# Patient Record
Sex: MALE | Race: WHITE | NOT HISPANIC OR LATINO | Employment: OTHER | URBAN - NONMETROPOLITAN AREA
[De-identification: names, ages, dates, MRNs, and addresses within clinical notes are randomized per-mention and may not be internally consistent; named-entity substitution may affect disease eponyms.]

---

## 2017-02-15 ENCOUNTER — TRANSCRIBE ORDERS (OUTPATIENT)
Dept: LAB | Facility: HOSPITAL | Age: 72
End: 2017-02-15

## 2017-02-15 ENCOUNTER — APPOINTMENT (OUTPATIENT)
Dept: LAB | Facility: HOSPITAL | Age: 72
End: 2017-02-15

## 2017-02-15 DIAGNOSIS — R53.83 FATIGUE, UNSPECIFIED TYPE: ICD-10-CM

## 2017-02-15 DIAGNOSIS — R07.9 CHEST PAIN, UNSPECIFIED: Primary | ICD-10-CM

## 2017-02-15 LAB
ALBUMIN SERPL-MCNC: 4.2 G/DL (ref 3.4–4.8)
ALBUMIN/GLOB SERPL: 1.3 G/DL (ref 1.1–1.8)
ALP SERPL-CCNC: 95 U/L (ref 38–126)
ALT SERPL W P-5'-P-CCNC: 24 U/L (ref 21–72)
ANION GAP SERPL CALCULATED.3IONS-SCNC: 11 MMOL/L (ref 5–15)
AST SERPL-CCNC: 58 U/L (ref 17–59)
BILIRUB SERPL-MCNC: 1 MG/DL (ref 0.2–1.3)
BUN BLD-MCNC: 26 MG/DL (ref 7–21)
BUN/CREAT SERPL: 16.9 (ref 7–25)
CALCIUM SPEC-SCNC: 9.6 MG/DL (ref 8.4–10.2)
CHLORIDE SERPL-SCNC: 102 MMOL/L (ref 95–110)
CK MB SERPL-CCNC: 1.27 NG/ML (ref 0–5)
CK SERPL-CCNC: 89 U/L (ref 55–170)
CO2 SERPL-SCNC: 28 MMOL/L (ref 22–31)
CREAT BLD-MCNC: 1.54 MG/DL (ref 0.7–1.3)
D-DIMER, QUANTITATIVE (MAD,POW, STR): 1401 NG/ML (FEU) (ref 0–470)
DEPRECATED RDW RBC AUTO: 46.4 FL (ref 35.1–43.9)
EOSINOPHIL # BLD MANUAL: 0.93 10*3/MM3 (ref 0–0.7)
EOSINOPHIL NFR BLD MANUAL: 10 % (ref 0–7)
ERYTHROCYTE [DISTWIDTH] IN BLOOD BY AUTOMATED COUNT: 13.2 % (ref 11.5–14.5)
GFR SERPL CREATININE-BSD FRML MDRD: 45 ML/MIN/1.73 (ref 42–98)
GLOBULIN UR ELPH-MCNC: 3.3 GM/DL (ref 2.3–3.5)
GLUCOSE BLD-MCNC: 80 MG/DL (ref 60–100)
HCT VFR BLD AUTO: 45 % (ref 39–49)
HGB BLD-MCNC: 15.9 G/DL (ref 13.7–17.3)
LYMPHOCYTES # BLD MANUAL: 2.04 10*3/MM3 (ref 0.6–4.2)
LYMPHOCYTES NFR BLD MANUAL: 12 % (ref 0–12)
LYMPHOCYTES NFR BLD MANUAL: 22 % (ref 10–50)
MCH RBC QN AUTO: 34.3 PG (ref 26.5–34)
MCHC RBC AUTO-ENTMCNC: 35.3 G/DL (ref 31.5–36.3)
MCV RBC AUTO: 97 FL (ref 80–98)
MONOCYTES # BLD AUTO: 1.11 10*3/MM3 (ref 0–0.9)
NEUTROPHILS # BLD AUTO: 5.2 10*3/MM3 (ref 2–8.6)
NEUTROPHILS NFR BLD MANUAL: 56 % (ref 37–80)
PLATELET # BLD AUTO: 138 10*3/MM3 (ref 150–450)
PMV BLD AUTO: 10.6 FL (ref 8–12)
POTASSIUM BLD-SCNC: 4.3 MMOL/L (ref 3.5–5.1)
PROT SERPL-MCNC: 7.5 G/DL (ref 6.3–8.6)
RBC # BLD AUTO: 4.64 10*6/MM3 (ref 4.37–5.74)
RBC MORPH BLD: NORMAL
SMALL PLATELETS BLD QL SMEAR: ABNORMAL
SODIUM BLD-SCNC: 141 MMOL/L (ref 137–145)
TROPONIN I SERPL-MCNC: <0.012 NG/ML
TSH SERPL DL<=0.05 MIU/L-ACNC: 3.13 MIU/ML (ref 0.46–4.68)
WBC MORPH BLD: NORMAL
WBC NRBC COR # BLD: 9.29 10*3/MM3 (ref 3.2–9.8)

## 2017-02-15 PROCEDURE — 36415 COLL VENOUS BLD VENIPUNCTURE: CPT | Performed by: INTERNAL MEDICINE

## 2017-02-15 PROCEDURE — 84484 ASSAY OF TROPONIN QUANT: CPT | Performed by: INTERNAL MEDICINE

## 2017-02-15 PROCEDURE — 82550 ASSAY OF CK (CPK): CPT | Performed by: INTERNAL MEDICINE

## 2017-02-15 PROCEDURE — 85007 BL SMEAR W/DIFF WBC COUNT: CPT | Performed by: INTERNAL MEDICINE

## 2017-02-15 PROCEDURE — 85027 COMPLETE CBC AUTOMATED: CPT | Performed by: INTERNAL MEDICINE

## 2017-02-15 PROCEDURE — 85379 FIBRIN DEGRADATION QUANT: CPT | Performed by: INTERNAL MEDICINE

## 2017-02-15 PROCEDURE — 80053 COMPREHEN METABOLIC PANEL: CPT | Performed by: INTERNAL MEDICINE

## 2017-02-15 PROCEDURE — 82553 CREATINE MB FRACTION: CPT | Performed by: INTERNAL MEDICINE

## 2017-02-15 PROCEDURE — 84443 ASSAY THYROID STIM HORMONE: CPT | Performed by: INTERNAL MEDICINE

## 2017-04-12 RX ORDER — ASPIRIN 81 MG/1
81 TABLET, CHEWABLE ORAL DAILY
COMMUNITY

## 2017-04-12 RX ORDER — OMEPRAZOLE 40 MG/1
40 CAPSULE, DELAYED RELEASE ORAL EVERY OTHER DAY
COMMUNITY
Start: 2017-02-14

## 2017-04-12 RX ORDER — SIMVASTATIN 40 MG
40 TABLET ORAL DAILY
COMMUNITY
Start: 2017-02-14

## 2017-04-12 RX ORDER — LEVOTHYROXINE SODIUM 0.07 MG/1
75 TABLET ORAL DAILY
COMMUNITY
Start: 2016-10-06 | End: 2017-10-02

## 2017-04-12 RX ORDER — COLCHICINE 0.6 MG/1
0.6 TABLET ORAL DAILY
COMMUNITY
Start: 2016-09-29 | End: 2019-01-01

## 2017-04-12 RX ORDER — ISOSORBIDE MONONITRATE 30 MG/1
30 TABLET, EXTENDED RELEASE ORAL DAILY
COMMUNITY
Start: 2016-09-29 | End: 2019-01-01 | Stop reason: ALTCHOICE

## 2017-04-12 RX ORDER — ALLOPURINOL 300 MG/1
300 TABLET ORAL DAILY
COMMUNITY
Start: 2017-02-14 | End: 2019-01-01

## 2017-04-19 ENCOUNTER — ANESTHESIA (OUTPATIENT)
Dept: GASTROENTEROLOGY | Facility: HOSPITAL | Age: 72
End: 2017-04-19

## 2017-04-19 ENCOUNTER — HOSPITAL ENCOUNTER (OUTPATIENT)
Facility: HOSPITAL | Age: 72
Setting detail: HOSPITAL OUTPATIENT SURGERY
Discharge: HOME | End: 2017-04-19
Attending: INTERNAL MEDICINE | Admitting: INTERNAL MEDICINE

## 2017-04-19 ENCOUNTER — ANESTHESIA EVENT (OUTPATIENT)
Dept: GASTROENTEROLOGY | Facility: HOSPITAL | Age: 72
End: 2017-04-19

## 2017-04-19 VITALS
HEIGHT: 72 IN | WEIGHT: 240.96 LBS | SYSTOLIC BLOOD PRESSURE: 118 MMHG | RESPIRATION RATE: 20 BRPM | HEART RATE: 61 BPM | DIASTOLIC BLOOD PRESSURE: 76 MMHG | BODY MASS INDEX: 32.64 KG/M2 | OXYGEN SATURATION: 95 % | TEMPERATURE: 97.9 F

## 2017-04-19 DIAGNOSIS — Z86.010 HISTORY OF COLON POLYPS: ICD-10-CM

## 2017-04-19 PROCEDURE — 88305 TISSUE EXAM BY PATHOLOGIST: CPT | Performed by: PATHOLOGY

## 2017-04-19 PROCEDURE — 25010000002 MIDAZOLAM PER 1 MG: Performed by: NURSE ANESTHETIST, CERTIFIED REGISTERED

## 2017-04-19 PROCEDURE — 25010000002 PROPOFOL 10 MG/ML EMULSION: Performed by: NURSE ANESTHETIST, CERTIFIED REGISTERED

## 2017-04-19 PROCEDURE — 88305 TISSUE EXAM BY PATHOLOGIST: CPT | Performed by: INTERNAL MEDICINE

## 2017-04-19 RX ORDER — DEXAMETHASONE SODIUM PHOSPHATE 4 MG/ML
8 INJECTION, SOLUTION INTRA-ARTICULAR; INTRALESIONAL; INTRAMUSCULAR; INTRAVENOUS; SOFT TISSUE ONCE AS NEEDED
Status: DISCONTINUED | OUTPATIENT
Start: 2017-04-19 | End: 2017-04-19 | Stop reason: HOSPADM

## 2017-04-19 RX ORDER — PROPOFOL 10 MG/ML
VIAL (ML) INTRAVENOUS AS NEEDED
Status: DISCONTINUED | OUTPATIENT
Start: 2017-04-19 | End: 2017-04-19 | Stop reason: SURG

## 2017-04-19 RX ORDER — PROMETHAZINE HYDROCHLORIDE 25 MG/1
25 SUPPOSITORY RECTAL ONCE AS NEEDED
Status: DISCONTINUED | OUTPATIENT
Start: 2017-04-19 | End: 2017-04-19 | Stop reason: HOSPADM

## 2017-04-19 RX ORDER — DEXTROSE AND SODIUM CHLORIDE 5; .45 G/100ML; G/100ML
30 INJECTION, SOLUTION INTRAVENOUS CONTINUOUS
Status: DISCONTINUED | OUTPATIENT
Start: 2017-04-19 | End: 2017-04-19 | Stop reason: HOSPADM

## 2017-04-19 RX ORDER — PROMETHAZINE HYDROCHLORIDE 25 MG/ML
12.5 INJECTION, SOLUTION INTRAMUSCULAR; INTRAVENOUS ONCE AS NEEDED
Status: DISCONTINUED | OUTPATIENT
Start: 2017-04-19 | End: 2017-04-19 | Stop reason: HOSPADM

## 2017-04-19 RX ORDER — MIDAZOLAM HYDROCHLORIDE 1 MG/ML
INJECTION INTRAMUSCULAR; INTRAVENOUS AS NEEDED
Status: DISCONTINUED | OUTPATIENT
Start: 2017-04-19 | End: 2017-04-19 | Stop reason: SURG

## 2017-04-19 RX ORDER — PROMETHAZINE HYDROCHLORIDE 25 MG/1
25 TABLET ORAL ONCE AS NEEDED
Status: DISCONTINUED | OUTPATIENT
Start: 2017-04-19 | End: 2017-04-19 | Stop reason: HOSPADM

## 2017-04-19 RX ORDER — ONDANSETRON 2 MG/ML
4 INJECTION INTRAMUSCULAR; INTRAVENOUS ONCE AS NEEDED
Status: DISCONTINUED | OUTPATIENT
Start: 2017-04-19 | End: 2017-04-19 | Stop reason: HOSPADM

## 2017-04-19 RX ADMIN — PROPOFOL 40 MG: 10 INJECTION, EMULSION INTRAVENOUS at 15:10

## 2017-04-19 RX ADMIN — PROPOFOL 30 MG: 10 INJECTION, EMULSION INTRAVENOUS at 15:05

## 2017-04-19 RX ADMIN — PROPOFOL 60 MG: 10 INJECTION, EMULSION INTRAVENOUS at 15:04

## 2017-04-19 RX ADMIN — MIDAZOLAM 2 MG: 1 INJECTION INTRAMUSCULAR; INTRAVENOUS at 15:02

## 2017-04-19 RX ADMIN — DEXTROSE AND SODIUM CHLORIDE 30 ML/HR: 5; 450 INJECTION, SOLUTION INTRAVENOUS at 14:16

## 2017-04-19 NOTE — H&P
Felicity Ayala DO,[facility]  Gastroenterology  Hepatology  Endoscopy  Board Certified in Internal Medicine and gastroenterology  [address], [address]  [place]. [address]  - [phone]   F [phone]     GASTROENTEROLOGY HISTORY AND PHYSICAL  NOTE   FELICITY AYALA DO.         SUBJECTIVE:   4/19/2017    Name: Kuldip Nevarez  DOD: 1945        Chief Complaint:     Subjective : Surveillance examination for high risk for colon cancer    Patient is 72 y.o. male presents with surveillance examination.  Personal history of colon polyps as well as a personal history of colon cancer 13 years ago.  Low anterior resection for distal sigmoid colon cancer.      ROS/HISTORY/ CURRENT MEDICATIONS/OBJECTIVE/VS/PE:   Review of Systems:   Review of Systems    History:     Past Medical History:   Diagnosis Date   • Arthritis    • Cancer     colon, skin   • Coronary artery disease    • Disease of thyroid gland    • History of transfusion    • MI (myocardial infarction)     2003   • Pacemaker    • Sleep apnea      Past Surgical History:   Procedure Laterality Date   • APPENDECTOMY     • COLON SURGERY     • KIDNEY STONE SURGERY       History reviewed. No pertinent family history.  Social History   Substance Use Topics   • Smoking status: Former Smoker     Quit date: 2003   • Smokeless tobacco: None   • Alcohol use No     Prescriptions Prior to Admission   Medication Sig Dispense Refill Last Dose   • allopurinol (ZYLOPRIM) 300 MG tablet Take 300 mg by mouth Daily.   4/18/2017 at Unknown time   • colchicine 0.6 MG tablet Take 0.6 mg by mouth Daily.   4/18/2017 at Unknown time   • Cyanocobalamin (VITAMIN B 12 PO) Take 1 tablet by mouth Daily.   4/18/2017 at Unknown time   • isosorbide mononitrate (IMDUR) 30 MG 24 hr tablet Take 30 mg by mouth Daily.   4/18/2017 at Unknown time   • levothyroxine (SYNTHROID) 75 MCG tablet Take 75 mcg by mouth Daily.   4/18/2017 at Unknown time   • omeprazole (priLOSEC) 40 MG  capsule Take 40 mg by mouth Every Other Day.   4/18/2017 at Unknown time   • simvastatin (ZOCOR) 40 MG tablet Take 40 mg by mouth Daily.   4/18/2017 at Unknown time   • aspirin 81 MG chewable tablet Chew 81 mg Daily.   4/15/2017     Allergies:  Tape    I have reviewed the patients medical history, surgical history and family history in the available medical record system.     Current Medications:     Current Facility-Administered Medications   Medication Dose Route Frequency Provider Last Rate Last Dose   • dexamethasone (DECADRON) injection 8 mg  8 mg Intravenous Once PRN Bessie Huertas CRNA       • dextrose 5 % and sodium chloride 0.45 % infusion  30 mL/hr Intravenous Continuous Barrie Jarrett DO 30 mL/hr at 04/19/17 1416 30 mL/hr at 04/19/17 1416   • meperidine (DEMEROL) injection 12.5 mg  12.5 mg Intravenous Q5 Min PRN Bessie Huertas CRNA       • ondansetron (ZOFRAN) injection 4 mg  4 mg Intravenous Once PRN Bessie Huertas CRNA       • promethazine (PHENERGAN) injection 12.5 mg  12.5 mg Intravenous Once PRN Bessie Huertas CRNA        Or   • promethazine (PHENERGAN) injection 12.5 mg  12.5 mg Intramuscular Once PRN Bessie Huertas CRNA        Or   • promethazine (PHENERGAN) suppository 25 mg  25 mg Rectal Once PRN Bessie Huertas CRNA        Or   • promethazine (PHENERGAN) tablet 25 mg  25 mg Oral Once PRN Bessie Huertas CRNA           Objective     Physical Exam:   Temp:  [99.2 °F (37.3 °C)] 99.2 °F (37.3 °C)  Heart Rate:  [68] 68  Resp:  [18] 18  BP: (160)/(90) 160/90    Physical Exam:  General Appearance:    Alert, cooperative, in no acute distress   Head:    Normocephalic, without obvious abnormality, atraumatic   Eyes:            Lids and lashes normal, conjunctivae and sclerae normal, no   icterus, no pallor, corneas clear, PERRLA   Ears:    Ears appear intact with no abnormalities noted   Throat:   No oral lesions, no thrush, oral mucosa moist   Neck:   No adenopathy,  supple, trachea midline, no thyromegaly, no     carotid bruit, no JVD   Back:     No kyphosis present, no scoliosis present, no skin lesions,       erythema or scars, no tenderness to percussion or                   palpation,   range of motion normal   Lungs:     Clear to auscultation,respirations regular, even and                   unlabored    Heart:    Regular rhythm and normal rate, normal S1 and S2, no            murmur, no gallop, no rub, no click   Breast Exam:    Deferred   Abdomen:     Normal bowel sounds, no masses, no organomegaly, soft        non-tender, non-distended, no guarding, no rebound                 tenderness   Genitalia:    Deferred   Extremities:   Moves all extremities well, no edema, no cyanosis, no              redness   Pulses:   Pulses palpable and equal bilaterally   Skin:   No bleeding, bruising or rash   Lymph nodes:   No palpable adenopathy   Neurologic:   Cranial nerves 2 - 12 grossly intact, sensation intact, DTR        present and equal bilaterally      Results Review:     Lab Results   Component Value Date    WBC 9.29 02/15/2017    WBC 5.4 09/06/2016    WBC 6.5 09/02/2016    HGB 15.9 02/15/2017    HGB 14.8 09/06/2016    HGB 14.3 09/02/2016    HCT 45.0 02/15/2017    HCT 42.3 09/06/2016    HCT 41.1 09/02/2016     (L) 02/15/2017     (L) 09/06/2016     (L) 09/02/2016             No results found for: LIPASE  Lab Results   Component Value Date    INR 1.2 09/06/2016          Radiology Review:  Imaging Results (last 72 hours)     ** No results found for the last 72 hours. **           I reviewed the patient's new clinical results.  I reviewed the patient's new imaging results and agree with the interpretation.     ASSESSMENT/PLAN:   ASSESSMENT:   1.  Personal history of colon cancer, status post low anterior resection    PLAN:   1.  Colonoscopy    Risk and benefits associated with the procedure are reviewed with the patient.  He wishes to proceed      Barrie POWELL  DO [place]  04/19/17  2:58 PM

## 2017-04-19 NOTE — ANESTHESIA POSTPROCEDURE EVALUATION
Patient: Kuldip Nevarez    Procedure Summary     Date Anesthesia Start Anesthesia Stop Room / Location    04/19/17 1503 1530 [facility] ENDOSCOPY 2 / [facility] ENDOSCOPY       Procedure Diagnosis Surgeon Provider    COLONOSCOPY (N/A ) History of colon polyps  (HX OF COLON POLYPS) DO Bessie Johnston CRNA          Anesthesia Type: MAC  Last vitals  BP      Temp      Pulse     Resp      SpO2        Post Anesthesia Care and Evaluation    Patient location during evaluation: bedside  Patient participation: complete - patient participated  Level of consciousness: awake  Pain score: 0  Pain management: adequate  Airway patency: patent  Anesthetic complications: No anesthetic complications  PONV Status: none  Cardiovascular status: acceptable  Respiratory status: acceptable  Hydration status: acceptable

## 2017-04-19 NOTE — ANESTHESIA PREPROCEDURE EVALUATION
Anesthesia Evaluation     Patient summary reviewed and Nursing notes reviewed   NPO Status: > 2 hours   Airway   Mallampati: III  TM distance: >3 FB  Neck ROM: full  no difficulty expected  Dental - normal exam     Pulmonary - normal exam   (+) sleep apnea,   Cardiovascular - normal exam  Exercise tolerance: good (4-7 METS)    (+) pacemaker pacemaker interrogated unknown, past MI  >12 months, CAD, hyperlipidemia      Neuro/Psych  GI/Hepatic/Renal/Endo    (+) obesity,      ROS Comment: Colon Cancer 2004    Musculoskeletal     Abdominal  - normal exam   Substance History      OB/GYN          Other   (+) arthritis                                 Anesthesia Plan    ASA 3     MAC     Anesthetic plan and risks discussed with patient.

## 2017-04-19 NOTE — PLAN OF CARE
Problem: Patient Care Overview (Adult)  Goal: Plan of Care Review  Outcome: Outcome(s) achieved Date Met:  04/19/17 04/19/17 1544   Coping/Psychosocial Response Interventions   Plan Of Care Reviewed With patient   Patient Care Overview   Progress no change   Outcome Evaluation   Outcome Summary/Follow up Plan vss, pt alert         Problem: GI Endoscopy (Adult)  Goal: Signs and Symptoms of Listed Potential Problems Will be Absent or Manageable (GI Endoscopy)  Outcome: Outcome(s) achieved Date Met:  04/19/17 04/19/17 1544   GI Endoscopy   Problems Assessed (GI Endoscopy) all   Problems Present (GI Endoscopy) none

## 2017-04-19 NOTE — PLAN OF CARE
Problem: Patient Care Overview (Adult)  Goal: Plan of Care Review  Outcome: Ongoing (interventions implemented as appropriate)    04/19/17 1529   Coping/Psychosocial Response Interventions   Plan Of Care Reviewed With patient   Patient Care Overview   Progress no change   Outcome Evaluation   Outcome Summary/Follow up Plan vss         Problem: GI Endoscopy (Adult)  Goal: Signs and Symptoms of Listed Potential Problems Will be Absent or Manageable (GI Endoscopy)  Outcome: Ongoing (interventions implemented as appropriate)    04/19/17 1529   GI Endoscopy   Problems Assessed (GI Endoscopy) all   Problems Present (GI Endoscopy) none

## 2017-04-21 LAB
LAB AP CASE REPORT: NORMAL
Lab: NORMAL
PATH REPORT.FINAL DX SPEC: NORMAL
PATH REPORT.GROSS SPEC: NORMAL

## 2017-10-06 ENCOUNTER — TRANSCRIBE ORDERS (OUTPATIENT)
Dept: CARDIAC SURGERY | Facility: CLINIC | Age: 72
End: 2017-10-06

## 2017-10-06 DIAGNOSIS — I73.9 PERIPHERAL VASCULAR DISEASE (HCC): Primary | ICD-10-CM

## 2019-01-01 ENCOUNTER — ANESTHESIA EVENT (OUTPATIENT)
Dept: PERIOP | Facility: HOSPITAL | Age: 74
End: 2019-01-01

## 2019-01-01 ENCOUNTER — APPOINTMENT (OUTPATIENT)
Dept: CARDIOLOGY | Facility: HOSPITAL | Age: 74
End: 2019-01-01

## 2019-01-01 ENCOUNTER — APPOINTMENT (OUTPATIENT)
Dept: CT IMAGING | Facility: HOSPITAL | Age: 74
End: 2019-01-01

## 2019-01-01 ENCOUNTER — APPOINTMENT (OUTPATIENT)
Dept: NUCLEAR MEDICINE | Facility: HOSPITAL | Age: 74
End: 2019-01-01

## 2019-01-01 ENCOUNTER — PREP FOR SURGERY (OUTPATIENT)
Dept: OTHER | Facility: HOSPITAL | Age: 74
End: 2019-01-01

## 2019-01-01 ENCOUNTER — OFFICE VISIT (OUTPATIENT)
Dept: ORTHOPEDIC SURGERY | Facility: CLINIC | Age: 74
End: 2019-01-01

## 2019-01-01 ENCOUNTER — HOSPITAL ENCOUNTER (INPATIENT)
Facility: HOSPITAL | Age: 74
LOS: 13 days | Discharge: HOME HEALTH | End: 2019-07-09
Attending: FAMILY MEDICINE | Admitting: FAMILY MEDICINE

## 2019-01-01 ENCOUNTER — READMISSION MANAGEMENT (OUTPATIENT)
Dept: CALL CENTER | Facility: HOSPITAL | Age: 74
End: 2019-01-01

## 2019-01-01 ENCOUNTER — OFFICE VISIT (OUTPATIENT)
Dept: CARDIAC SURGERY | Facility: CLINIC | Age: 74
End: 2019-01-01

## 2019-01-01 ENCOUNTER — PROCEDURE VISIT (OUTPATIENT)
Dept: PULMONOLOGY | Facility: CLINIC | Age: 74
End: 2019-01-01

## 2019-01-01 ENCOUNTER — APPOINTMENT (OUTPATIENT)
Dept: GENERAL RADIOLOGY | Facility: HOSPITAL | Age: 74
End: 2019-01-01

## 2019-01-01 ENCOUNTER — ANESTHESIA (OUTPATIENT)
Dept: PERIOP | Facility: HOSPITAL | Age: 74
End: 2019-01-01

## 2019-01-01 ENCOUNTER — HOSPITAL ENCOUNTER (INPATIENT)
Facility: HOSPITAL | Age: 74
LOS: 3 days | Discharge: HOME | End: 2019-10-21
Attending: THORACIC SURGERY (CARDIOTHORACIC VASCULAR SURGERY) | Admitting: THORACIC SURGERY (CARDIOTHORACIC VASCULAR SURGERY)

## 2019-01-01 ENCOUNTER — OFFICE VISIT (OUTPATIENT)
Dept: ONCOLOGY | Facility: CLINIC | Age: 74
End: 2019-01-01

## 2019-01-01 ENCOUNTER — APPOINTMENT (OUTPATIENT)
Dept: ONCOLOGY | Facility: HOSPITAL | Age: 74
End: 2019-01-01

## 2019-01-01 ENCOUNTER — TELEPHONE (OUTPATIENT)
Dept: NUTRITION | Facility: HOSPITAL | Age: 74
End: 2019-01-01

## 2019-01-01 ENCOUNTER — TELEPHONE (OUTPATIENT)
Dept: ONCOLOGY | Facility: CLINIC | Age: 74
End: 2019-01-01

## 2019-01-01 ENCOUNTER — APPOINTMENT (OUTPATIENT)
Dept: ONCOLOGY | Facility: CLINIC | Age: 74
End: 2019-01-01

## 2019-01-01 ENCOUNTER — HOSPITAL ENCOUNTER (OUTPATIENT)
Dept: GENERAL RADIOLOGY | Facility: HOSPITAL | Age: 74
Discharge: HOME | End: 2019-11-25
Admitting: NURSE PRACTITIONER

## 2019-01-01 ENCOUNTER — APPOINTMENT (OUTPATIENT)
Dept: ULTRASOUND IMAGING | Facility: HOSPITAL | Age: 74
End: 2019-01-01

## 2019-01-01 ENCOUNTER — APPOINTMENT (OUTPATIENT)
Dept: INTERVENTIONAL RADIOLOGY/VASCULAR | Facility: HOSPITAL | Age: 74
End: 2019-01-01

## 2019-01-01 ENCOUNTER — TELEPHONE (OUTPATIENT)
Dept: ONCOLOGY | Facility: HOSPITAL | Age: 74
End: 2019-01-01

## 2019-01-01 ENCOUNTER — LAB (OUTPATIENT)
Dept: ONCOLOGY | Facility: HOSPITAL | Age: 74
End: 2019-01-01

## 2019-01-01 ENCOUNTER — TELEPHONE (OUTPATIENT)
Dept: PULMONOLOGY | Facility: CLINIC | Age: 74
End: 2019-01-01

## 2019-01-01 ENCOUNTER — HOSPITAL ENCOUNTER (OUTPATIENT)
Facility: HOSPITAL | Age: 74
Setting detail: OBSERVATION
Discharge: HOME | End: 2019-08-20
Attending: FAMILY MEDICINE | Admitting: INTERNAL MEDICINE

## 2019-01-01 ENCOUNTER — OFFICE VISIT (OUTPATIENT)
Dept: PULMONOLOGY | Facility: CLINIC | Age: 74
End: 2019-01-01

## 2019-01-01 ENCOUNTER — APPOINTMENT (OUTPATIENT)
Dept: PREADMISSION TESTING | Facility: HOSPITAL | Age: 74
End: 2019-01-01

## 2019-01-01 ENCOUNTER — TELEPHONE (OUTPATIENT)
Dept: ORTHOPEDIC SURGERY | Facility: CLINIC | Age: 74
End: 2019-01-01

## 2019-01-01 ENCOUNTER — CONSULT (OUTPATIENT)
Dept: ONCOLOGY | Facility: CLINIC | Age: 74
End: 2019-01-01

## 2019-01-01 ENCOUNTER — HOSPITAL ENCOUNTER (INPATIENT)
Facility: HOSPITAL | Age: 74
LOS: 11 days | Discharge: NURSING, REHAB OR LONG-TERM CARE | End: 2019-06-26
Attending: FAMILY MEDICINE | Admitting: INTERNAL MEDICINE

## 2019-01-01 ENCOUNTER — HOSPITAL ENCOUNTER (INPATIENT)
Facility: HOSPITAL | Age: 74
LOS: 2 days | Discharge: HOME HEALTH | End: 2019-12-09
Attending: HOSPITALIST | Admitting: HOSPITALIST

## 2019-01-01 ENCOUNTER — HOSPITAL ENCOUNTER (OUTPATIENT)
Dept: PET IMAGING | Facility: HOSPITAL | Age: 74
Discharge: HOME | End: 2019-09-27
Admitting: INTERNAL MEDICINE

## 2019-01-01 VITALS
HEIGHT: 72 IN | WEIGHT: 206 LBS | HEART RATE: 61 BPM | DIASTOLIC BLOOD PRESSURE: 76 MMHG | SYSTOLIC BLOOD PRESSURE: 129 MMHG | BODY MASS INDEX: 27.9 KG/M2 | OXYGEN SATURATION: 98 %

## 2019-01-01 VITALS
WEIGHT: 220.5 LBS | DIASTOLIC BLOOD PRESSURE: 70 MMHG | SYSTOLIC BLOOD PRESSURE: 132 MMHG | BODY MASS INDEX: 29.87 KG/M2 | RESPIRATION RATE: 18 BRPM | TEMPERATURE: 98.5 F | OXYGEN SATURATION: 95 % | HEART RATE: 83 BPM | HEIGHT: 72 IN

## 2019-01-01 VITALS
BODY MASS INDEX: 30.75 KG/M2 | HEIGHT: 72 IN | HEART RATE: 60 BPM | BODY MASS INDEX: 30.34 KG/M2 | OXYGEN SATURATION: 99 % | SYSTOLIC BLOOD PRESSURE: 130 MMHG | DIASTOLIC BLOOD PRESSURE: 70 MMHG | WEIGHT: 224 LBS | WEIGHT: 227 LBS | TEMPERATURE: 97.8 F | HEIGHT: 72 IN

## 2019-01-01 VITALS
SYSTOLIC BLOOD PRESSURE: 115 MMHG | TEMPERATURE: 98.8 F | WEIGHT: 183.6 LBS | RESPIRATION RATE: 18 BRPM | DIASTOLIC BLOOD PRESSURE: 67 MMHG | HEIGHT: 72 IN | BODY MASS INDEX: 24.87 KG/M2 | HEART RATE: 63 BPM | OXYGEN SATURATION: 93 %

## 2019-01-01 VITALS
HEART RATE: 64 BPM | SYSTOLIC BLOOD PRESSURE: 101 MMHG | TEMPERATURE: 96.3 F | OXYGEN SATURATION: 93 % | BODY MASS INDEX: 27.75 KG/M2 | DIASTOLIC BLOOD PRESSURE: 57 MMHG | HEIGHT: 72 IN | WEIGHT: 204.9 LBS | RESPIRATION RATE: 20 BRPM

## 2019-01-01 VITALS
OXYGEN SATURATION: 98 % | HEART RATE: 60 BPM | BODY MASS INDEX: 27.72 KG/M2 | HEIGHT: 70 IN | DIASTOLIC BLOOD PRESSURE: 77 MMHG | SYSTOLIC BLOOD PRESSURE: 136 MMHG | TEMPERATURE: 98.4 F | WEIGHT: 193.6 LBS | RESPIRATION RATE: 18 BRPM

## 2019-01-01 VITALS
HEART RATE: 70 BPM | RESPIRATION RATE: 18 BRPM | TEMPERATURE: 99.6 F | HEIGHT: 72 IN | BODY MASS INDEX: 30.46 KG/M2 | WEIGHT: 224.9 LBS | OXYGEN SATURATION: 93 % | SYSTOLIC BLOOD PRESSURE: 128 MMHG | DIASTOLIC BLOOD PRESSURE: 64 MMHG

## 2019-01-01 VITALS
DIASTOLIC BLOOD PRESSURE: 80 MMHG | OXYGEN SATURATION: 98 % | BODY MASS INDEX: 29.53 KG/M2 | SYSTOLIC BLOOD PRESSURE: 140 MMHG | WEIGHT: 218 LBS | HEIGHT: 72 IN | HEART RATE: 57 BPM

## 2019-01-01 VITALS
OXYGEN SATURATION: 98 % | WEIGHT: 205.4 LBS | BODY MASS INDEX: 27.82 KG/M2 | HEART RATE: 62 BPM | HEIGHT: 72 IN | SYSTOLIC BLOOD PRESSURE: 104 MMHG | DIASTOLIC BLOOD PRESSURE: 61 MMHG

## 2019-01-01 VITALS
HEART RATE: 55 BPM | DIASTOLIC BLOOD PRESSURE: 82 MMHG | OXYGEN SATURATION: 99 % | TEMPERATURE: 97.2 F | RESPIRATION RATE: 16 BRPM | WEIGHT: 218 LBS | SYSTOLIC BLOOD PRESSURE: 140 MMHG | BODY MASS INDEX: 29.53 KG/M2 | HEIGHT: 72 IN

## 2019-01-01 VITALS
BODY MASS INDEX: 29.66 KG/M2 | HEIGHT: 72 IN | RESPIRATION RATE: 18 BRPM | TEMPERATURE: 98.7 F | HEART RATE: 61 BPM | DIASTOLIC BLOOD PRESSURE: 76 MMHG | SYSTOLIC BLOOD PRESSURE: 150 MMHG | OXYGEN SATURATION: 96 % | WEIGHT: 219 LBS

## 2019-01-01 VITALS — HEIGHT: 72 IN | WEIGHT: 227 LBS | BODY MASS INDEX: 30.75 KG/M2

## 2019-01-01 VITALS
DIASTOLIC BLOOD PRESSURE: 66 MMHG | SYSTOLIC BLOOD PRESSURE: 122 MMHG | HEART RATE: 60 BPM | TEMPERATURE: 97.9 F | HEIGHT: 70 IN | WEIGHT: 198.8 LBS | BODY MASS INDEX: 28.46 KG/M2

## 2019-01-01 VITALS — HEIGHT: 72 IN | BODY MASS INDEX: 27.79 KG/M2

## 2019-01-01 DIAGNOSIS — J44.9 COPD MIXED TYPE (HCC): ICD-10-CM

## 2019-01-01 DIAGNOSIS — Z78.9 IMPAIRED MOBILITY AND ADLS: ICD-10-CM

## 2019-01-01 DIAGNOSIS — Z74.09 IMPAIRED MOBILITY AND ACTIVITIES OF DAILY LIVING: ICD-10-CM

## 2019-01-01 DIAGNOSIS — Z09 FOLLOW-UP SURGERY CARE: Primary | ICD-10-CM

## 2019-01-01 DIAGNOSIS — R53.81 PHYSICAL DECONDITIONING: ICD-10-CM

## 2019-01-01 DIAGNOSIS — R91.1 LUNG NODULE: ICD-10-CM

## 2019-01-01 DIAGNOSIS — N18.30 STAGE 3 CHRONIC KIDNEY DISEASE (HCC): Primary | ICD-10-CM

## 2019-01-01 DIAGNOSIS — Z74.09 IMPAIRED MOBILITY AND ADLS: ICD-10-CM

## 2019-01-01 DIAGNOSIS — N18.30 STAGE 3 CHRONIC KIDNEY DISEASE (HCC): ICD-10-CM

## 2019-01-01 DIAGNOSIS — C18.9 MALIGNANT NEOPLASM OF COLON, UNSPECIFIED PART OF COLON (HCC): ICD-10-CM

## 2019-01-01 DIAGNOSIS — C78.01 SECONDARY MALIGNANT MELANOMA OF RIGHT LUNG (HCC): ICD-10-CM

## 2019-01-01 DIAGNOSIS — R91.1 NODULE OF UPPER LOBE OF RIGHT LUNG: Primary | ICD-10-CM

## 2019-01-01 DIAGNOSIS — R06.09 DYSPNEA ON EXERTION: Primary | ICD-10-CM

## 2019-01-01 DIAGNOSIS — Z78.9 IMPAIRED MOBILITY AND ACTIVITIES OF DAILY LIVING: ICD-10-CM

## 2019-01-01 DIAGNOSIS — Z74.09 IMPAIRED FUNCTIONAL MOBILITY, BALANCE, GAIT, AND ENDURANCE: ICD-10-CM

## 2019-01-01 DIAGNOSIS — S72.002D CLOSED FRACTURE OF LEFT HIP WITH ROUTINE HEALING, SUBSEQUENT ENCOUNTER: Primary | ICD-10-CM

## 2019-01-01 DIAGNOSIS — I25.10 CORONARY ARTERY DISEASE INVOLVING NATIVE CORONARY ARTERY OF NATIVE HEART WITHOUT ANGINA PECTORIS: ICD-10-CM

## 2019-01-01 DIAGNOSIS — E66.09 CLASS 1 OBESITY DUE TO EXCESS CALORIES WITH SERIOUS COMORBIDITY AND BODY MASS INDEX (BMI) OF 30.0 TO 30.9 IN ADULT: ICD-10-CM

## 2019-01-01 DIAGNOSIS — R91.1 LUNG NODULE: Primary | ICD-10-CM

## 2019-01-01 DIAGNOSIS — J18.9 PNEUMONIA OF LEFT LUNG DUE TO INFECTIOUS ORGANISM, UNSPECIFIED PART OF LUNG: ICD-10-CM

## 2019-01-01 DIAGNOSIS — R55 NEAR SYNCOPE: Primary | ICD-10-CM

## 2019-01-01 DIAGNOSIS — S72.002D CLOSED FRACTURE OF LEFT HIP WITH ROUTINE HEALING, SUBSEQUENT ENCOUNTER: ICD-10-CM

## 2019-01-01 DIAGNOSIS — C78.01 SECONDARY MALIGNANT MELANOMA OF RIGHT LUNG (HCC): Primary | ICD-10-CM

## 2019-01-01 DIAGNOSIS — S72.145D CLOSED NONDISPLACED INTERTROCHANTERIC FRACTURE OF LEFT FEMUR WITH ROUTINE HEALING: ICD-10-CM

## 2019-01-01 DIAGNOSIS — E83.42 HYPOMAGNESEMIA: ICD-10-CM

## 2019-01-01 DIAGNOSIS — Z87.81 S/P LEFT HIP FRACTURE: Primary | ICD-10-CM

## 2019-01-01 DIAGNOSIS — I25.10 ATHEROSCLEROSIS OF NATIVE CORONARY ARTERY OF NATIVE HEART, ANGINA PRESENCE UNSPECIFIED: ICD-10-CM

## 2019-01-01 DIAGNOSIS — I49.5 SSS (SICK SINUS SYNDROME) (HCC): ICD-10-CM

## 2019-01-01 DIAGNOSIS — R19.7 DIARRHEA, UNSPECIFIED TYPE: ICD-10-CM

## 2019-01-01 DIAGNOSIS — Z79.899 OTHER LONG TERM (CURRENT) DRUG THERAPY: ICD-10-CM

## 2019-01-01 DIAGNOSIS — R41.0 DELIRIUM: ICD-10-CM

## 2019-01-01 DIAGNOSIS — Z74.09 IMPAIRED PHYSICAL MOBILITY: ICD-10-CM

## 2019-01-01 DIAGNOSIS — Z87.891 PERSONAL HISTORY OF TOBACCO USE, PRESENTING HAZARDS TO HEALTH: ICD-10-CM

## 2019-01-01 DIAGNOSIS — Z85.038 HISTORY OF COLON CANCER: ICD-10-CM

## 2019-01-01 DIAGNOSIS — R91.1 NODULE OF UPPER LOBE OF RIGHT LUNG: ICD-10-CM

## 2019-01-01 DIAGNOSIS — Z09 FOLLOW-UP SURGERY CARE: ICD-10-CM

## 2019-01-01 DIAGNOSIS — I95.1 ORTHOSTASIS: ICD-10-CM

## 2019-01-01 DIAGNOSIS — R48.9 SYMBOLIC DYSFUNCTION: ICD-10-CM

## 2019-01-01 DIAGNOSIS — Z85.048 HISTORY OF RECTAL CANCER: ICD-10-CM

## 2019-01-01 DIAGNOSIS — N19 RENAL FAILURE, UNSPECIFIED CHRONICITY: ICD-10-CM

## 2019-01-01 DIAGNOSIS — I50.23 ACUTE ON CHRONIC SYSTOLIC CHF (CONGESTIVE HEART FAILURE) (HCC): Primary | ICD-10-CM

## 2019-01-01 LAB
A-A DO2: ABNORMAL MMHG
A-A DO2: ABNORMAL MMHG
ABO GROUP BLD: NORMAL
ABO GROUP BLD: NORMAL
ALBUMIN SERPL-MCNC: 2.7 G/DL (ref 3.5–5.2)
ALBUMIN SERPL-MCNC: 2.7 G/DL (ref 3.5–5.2)
ALBUMIN SERPL-MCNC: 2.8 G/DL (ref 3.5–5.2)
ALBUMIN SERPL-MCNC: 2.9 G/DL (ref 3.5–5.2)
ALBUMIN SERPL-MCNC: 3 G/DL (ref 3.5–5.2)
ALBUMIN SERPL-MCNC: 3.1 G/DL (ref 3.5–5.2)
ALBUMIN SERPL-MCNC: 3.2 G/DL (ref 3.5–5.2)
ALBUMIN SERPL-MCNC: 3.4 G/DL (ref 3.5–5.2)
ALBUMIN SERPL-MCNC: 3.6 G/DL (ref 3.5–5.2)
ALBUMIN/GLOB SERPL: 0.8 G/DL
ALBUMIN/GLOB SERPL: 0.9 G/DL
ALBUMIN/GLOB SERPL: 1 G/DL
ALBUMIN/GLOB SERPL: 1.1 G/DL
ALBUMIN/GLOB SERPL: 1.1 G/DL
ALP SERPL-CCNC: 107 U/L (ref 39–117)
ALP SERPL-CCNC: 108 U/L (ref 39–117)
ALP SERPL-CCNC: 111 U/L (ref 39–117)
ALP SERPL-CCNC: 112 U/L (ref 39–117)
ALP SERPL-CCNC: 120 U/L (ref 39–117)
ALP SERPL-CCNC: 124 U/L (ref 39–117)
ALP SERPL-CCNC: 126 U/L (ref 39–117)
ALP SERPL-CCNC: 131 U/L (ref 39–117)
ALP SERPL-CCNC: 133 U/L (ref 39–117)
ALP SERPL-CCNC: 136 U/L (ref 39–117)
ALP SERPL-CCNC: 147 U/L (ref 39–117)
ALP SERPL-CCNC: 189 U/L (ref 39–117)
ALP SERPL-CCNC: 206 U/L (ref 39–117)
ALP SERPL-CCNC: 223 U/L (ref 39–117)
ALT SERPL W P-5'-P-CCNC: 10 U/L (ref 1–41)
ALT SERPL W P-5'-P-CCNC: 10 U/L (ref 1–41)
ALT SERPL W P-5'-P-CCNC: 11 U/L (ref 1–41)
ALT SERPL W P-5'-P-CCNC: 11 U/L (ref 1–41)
ALT SERPL W P-5'-P-CCNC: 13 U/L (ref 1–41)
ALT SERPL W P-5'-P-CCNC: 14 U/L (ref 1–41)
ALT SERPL W P-5'-P-CCNC: 14 U/L (ref 1–41)
ALT SERPL W P-5'-P-CCNC: 16 U/L (ref 1–41)
ALT SERPL W P-5'-P-CCNC: 16 U/L (ref 1–41)
ALT SERPL W P-5'-P-CCNC: 17 U/L (ref 1–41)
ALT SERPL W P-5'-P-CCNC: 22 U/L (ref 1–41)
ALT SERPL W P-5'-P-CCNC: 24 U/L (ref 1–41)
ALT SERPL W P-5'-P-CCNC: 27 U/L (ref 1–41)
ALT SERPL W P-5'-P-CCNC: 29 U/L (ref 1–41)
ALT SERPL W P-5'-P-CCNC: 32 U/L (ref 1–41)
ALT SERPL W P-5'-P-CCNC: 34 U/L (ref 1–41)
AMMONIA BLD-SCNC: 13 UMOL/L (ref 16–60)
ANION GAP SERPL CALCULATED.3IONS-SCNC: 10 MMOL/L (ref 5–15)
ANION GAP SERPL CALCULATED.3IONS-SCNC: 10 MMOL/L (ref 5–15)
ANION GAP SERPL CALCULATED.3IONS-SCNC: 11 MMOL/L
ANION GAP SERPL CALCULATED.3IONS-SCNC: 11 MMOL/L (ref 5–15)
ANION GAP SERPL CALCULATED.3IONS-SCNC: 12 MMOL/L
ANION GAP SERPL CALCULATED.3IONS-SCNC: 12 MMOL/L (ref 5–15)
ANION GAP SERPL CALCULATED.3IONS-SCNC: 13 MMOL/L
ANION GAP SERPL CALCULATED.3IONS-SCNC: 19 MMOL/L
ANION GAP SERPL CALCULATED.3IONS-SCNC: 7 MMOL/L (ref 5–15)
ANION GAP SERPL CALCULATED.3IONS-SCNC: 9 MMOL/L (ref 5–15)
ANISOCYTOSIS BLD QL: ABNORMAL
APTT PPP: 36 SECONDS (ref 20–40.3)
ARTERIAL PATENCY WRIST A: ABNORMAL
AST SERPL-CCNC: 16 U/L (ref 1–40)
AST SERPL-CCNC: 16 U/L (ref 1–40)
AST SERPL-CCNC: 20 U/L (ref 1–40)
AST SERPL-CCNC: 21 U/L (ref 1–40)
AST SERPL-CCNC: 22 U/L (ref 1–40)
AST SERPL-CCNC: 26 U/L (ref 1–40)
AST SERPL-CCNC: 36 U/L (ref 1–40)
AST SERPL-CCNC: 37 U/L (ref 1–40)
AST SERPL-CCNC: 37 U/L (ref 1–40)
AST SERPL-CCNC: 38 U/L (ref 1–40)
AST SERPL-CCNC: 38 U/L (ref 1–40)
AST SERPL-CCNC: 39 U/L (ref 1–40)
AST SERPL-CCNC: 39 U/L (ref 1–40)
AST SERPL-CCNC: 40 U/L (ref 1–40)
AST SERPL-CCNC: 47 U/L (ref 1–40)
AST SERPL-CCNC: 52 U/L (ref 1–40)
ATMOSPHERIC PRESS: 741 MMHG
ATMOSPHERIC PRESS: 743 MMHG
ATMOSPHERIC PRESS: 743 MMHG
ATMOSPHERIC PRESS: 744 MMHG
ATMOSPHERIC PRESS: 745 MMHG
ATMOSPHERIC PRESS: 746 MMHG
ATMOSPHERIC PRESS: 746 MMHG
B PERT DNA SPEC QL NAA+PROBE: NOT DETECTED
BACTERIA BLD CULT: ABNORMAL
BACTERIA SPEC AEROBE CULT: ABNORMAL
BACTERIA SPEC AEROBE CULT: NORMAL
BACTERIA SPEC RESP CULT: NORMAL
BACTERIA SPEC RESP CULT: NORMAL
BACTERIA UR QL AUTO: ABNORMAL /HPF
BASE EXCESS BLDA CALC-SCNC: -0.6 MMOL/L (ref 0–2)
BASE EXCESS BLDA CALC-SCNC: -0.7 MMOL/L (ref 0–2)
BASE EXCESS BLDA CALC-SCNC: -0.8 MMOL/L (ref 0–2)
BASE EXCESS BLDA CALC-SCNC: -0.8 MMOL/L (ref 0–2)
BASE EXCESS BLDA CALC-SCNC: -1.7 MMOL/L (ref 0–2)
BASE EXCESS BLDA CALC-SCNC: -3.2 MMOL/L (ref 0–2)
BASE EXCESS BLDA CALC-SCNC: -5.3 MMOL/L (ref 0–2)
BASE EXCESS BLDA CALC-SCNC: -6.9 MMOL/L (ref 0–2)
BASE EXCESS BLDA CALC-SCNC: 0.3 MMOL/L (ref 0–2)
BASOPHILS # BLD AUTO: 0.01 10*3/MM3 (ref 0–0.2)
BASOPHILS # BLD AUTO: 0.02 10*3/MM3 (ref 0–0.2)
BASOPHILS # BLD AUTO: 0.03 10*3/MM3 (ref 0–0.2)
BASOPHILS # BLD AUTO: 0.04 10*3/MM3 (ref 0–0.2)
BASOPHILS # BLD AUTO: 0.05 10*3/MM3 (ref 0–0.2)
BASOPHILS # BLD AUTO: 0.06 10*3/MM3 (ref 0–0.2)
BASOPHILS # BLD AUTO: 0.09 10*3/MM3 (ref 0–0.2)
BASOPHILS NFR BLD AUTO: 0.1 % (ref 0–1.5)
BASOPHILS NFR BLD AUTO: 0.2 % (ref 0–1.5)
BASOPHILS NFR BLD AUTO: 0.3 % (ref 0–1.5)
BASOPHILS NFR BLD AUTO: 0.4 % (ref 0–1.5)
BASOPHILS NFR BLD AUTO: 0.4 % (ref 0–1.5)
BASOPHILS NFR BLD AUTO: 0.5 % (ref 0–1.5)
BDY SITE: ABNORMAL
BH CV ECHO MEAS - ACS: 2.2 CM
BH CV ECHO MEAS - AO MAX PG (FULL): 2.8 MMHG
BH CV ECHO MEAS - AO MAX PG: 6.6 MMHG
BH CV ECHO MEAS - AO MEAN PG (FULL): 1 MMHG
BH CV ECHO MEAS - AO MEAN PG: 3 MMHG
BH CV ECHO MEAS - AO ROOT AREA (BSA CORRECTED): 1.7
BH CV ECHO MEAS - AO ROOT AREA: 12.6 CM^2
BH CV ECHO MEAS - AO ROOT DIAM: 4 CM
BH CV ECHO MEAS - AO V2 MAX: 128 CM/SEC
BH CV ECHO MEAS - AO V2 MEAN: 82.5 CM/SEC
BH CV ECHO MEAS - AO V2 VTI: 22.8 CM
BH CV ECHO MEAS - AVA(I,A): 3 CM^2
BH CV ECHO MEAS - AVA(I,D): 3 CM^2
BH CV ECHO MEAS - AVA(V,A): 2.9 CM^2
BH CV ECHO MEAS - AVA(V,D): 2.9 CM^2
BH CV ECHO MEAS - BSA(HAYCOCK): 2.4 M^2
BH CV ECHO MEAS - BSA: 2.3 M^2
BH CV ECHO MEAS - BZI_BMI: 32.6 KILOGRAMS/M^2
BH CV ECHO MEAS - BZI_METRIC_HEIGHT: 182.9 CM
BH CV ECHO MEAS - BZI_METRIC_WEIGHT: 108.9 KG
BH CV ECHO MEAS - EDV(CUBED): 230.3 ML
BH CV ECHO MEAS - EDV(TEICH): 189 ML
BH CV ECHO MEAS - EF(CUBED): 62.5 %
BH CV ECHO MEAS - EF(TEICH): 53.1 %
BH CV ECHO MEAS - ESV(CUBED): 86.4 ML
BH CV ECHO MEAS - ESV(TEICH): 88.6 ML
BH CV ECHO MEAS - FS: 27.9 %
BH CV ECHO MEAS - IVS/LVPW: 1.6
BH CV ECHO MEAS - IVSD: 1.3 CM
BH CV ECHO MEAS - LA DIMENSION: 4.4 CM
BH CV ECHO MEAS - LA/AO: 1.1
BH CV ECHO MEAS - LV MASS(C)D: 284.7 GRAMS
BH CV ECHO MEAS - LV MASS(C)DI: 123.7 GRAMS/M^2
BH CV ECHO MEAS - LV MAX PG: 3.8 MMHG
BH CV ECHO MEAS - LV MEAN PG: 2 MMHG
BH CV ECHO MEAS - LV V1 MAX: 97.5 CM/SEC
BH CV ECHO MEAS - LV V1 MEAN: 61.4 CM/SEC
BH CV ECHO MEAS - LV V1 VTI: 17.7 CM
BH CV ECHO MEAS - LVIDD: 6.1 CM
BH CV ECHO MEAS - LVIDS: 4.4 CM
BH CV ECHO MEAS - LVOT AREA (M): 3.8 CM^2
BH CV ECHO MEAS - LVOT AREA: 3.8 CM^2
BH CV ECHO MEAS - LVOT DIAM: 2.2 CM
BH CV ECHO MEAS - LVPWD: 0.83 CM
BH CV ECHO MEAS - MR MAX PG: 15.1 MMHG
BH CV ECHO MEAS - MR MAX VEL: 194 CM/SEC
BH CV ECHO MEAS - MV A MAX VEL: 106 CM/SEC
BH CV ECHO MEAS - MV DEC SLOPE: 389 CM/SEC^2
BH CV ECHO MEAS - MV E MAX VEL: 87.8 CM/SEC
BH CV ECHO MEAS - MV E/A: 0.83
BH CV ECHO MEAS - MV MAX PG: 4.2 MMHG
BH CV ECHO MEAS - MV MEAN PG: 2 MMHG
BH CV ECHO MEAS - MV P1/2T MAX VEL: 89 CM/SEC
BH CV ECHO MEAS - MV P1/2T: 67 MSEC
BH CV ECHO MEAS - MV V2 MAX: 102 CM/SEC
BH CV ECHO MEAS - MV V2 MEAN: 62.4 CM/SEC
BH CV ECHO MEAS - MV V2 VTI: 22.3 CM
BH CV ECHO MEAS - MVA P1/2T LCG: 2.5 CM^2
BH CV ECHO MEAS - MVA(P1/2T): 3.3 CM^2
BH CV ECHO MEAS - MVA(VTI): 3 CM^2
BH CV ECHO MEAS - PA MAX PG: 2 MMHG
BH CV ECHO MEAS - PA V2 MAX: 70.6 CM/SEC
BH CV ECHO MEAS - RAP SYSTOLE: 10 MMHG
BH CV ECHO MEAS - RVDD: 4.4 CM
BH CV ECHO MEAS - RVSP: 44.8 MMHG
BH CV ECHO MEAS - SI(AO): 124.5 ML/M^2
BH CV ECHO MEAS - SI(CUBED): 62.6 ML/M^2
BH CV ECHO MEAS - SI(LVOT): 29.2 ML/M^2
BH CV ECHO MEAS - SI(TEICH): 43.6 ML/M^2
BH CV ECHO MEAS - SV(AO): 286.5 ML
BH CV ECHO MEAS - SV(CUBED): 144 ML
BH CV ECHO MEAS - SV(LVOT): 67.3 ML
BH CV ECHO MEAS - SV(TEICH): 100.4 ML
BH CV ECHO MEAS - TR MAX VEL: 295 CM/SEC
BILIRUB SERPL-MCNC: 0.6 MG/DL (ref 0.2–1.2)
BILIRUB SERPL-MCNC: 0.7 MG/DL (ref 0.2–1.2)
BILIRUB SERPL-MCNC: 0.8 MG/DL (ref 0.2–1.2)
BILIRUB SERPL-MCNC: 1 MG/DL (ref 0.2–1.2)
BILIRUB SERPL-MCNC: 1.2 MG/DL (ref 0.2–1.2)
BILIRUB SERPL-MCNC: 1.2 MG/DL (ref 0.2–1.2)
BILIRUB SERPL-MCNC: 1.4 MG/DL (ref 0.2–1.2)
BILIRUB SERPL-MCNC: 1.6 MG/DL (ref 0.2–1.2)
BILIRUB SERPL-MCNC: 1.8 MG/DL (ref 0.2–1.2)
BILIRUB UR QL STRIP: NEGATIVE
BLD GP AB SCN SERPL QL: NEGATIVE
BLD GP AB SCN SERPL QL: NEGATIVE
BUN BLD-MCNC: 14 MG/DL (ref 8–23)
BUN BLD-MCNC: 17 MG/DL (ref 8–23)
BUN BLD-MCNC: 21 MG/DL (ref 8–23)
BUN BLD-MCNC: 22 MG/DL (ref 8–23)
BUN BLD-MCNC: 22 MG/DL (ref 8–23)
BUN BLD-MCNC: 30 MG/DL (ref 8–23)
BUN BLD-MCNC: 32 MG/DL (ref 8–23)
BUN BLD-MCNC: 32 MG/DL (ref 8–23)
BUN BLD-MCNC: 33 MG/DL (ref 8–23)
BUN BLD-MCNC: 34 MG/DL (ref 8–23)
BUN BLD-MCNC: 37 MG/DL (ref 8–23)
BUN BLD-MCNC: 39 MG/DL (ref 8–23)
BUN BLD-MCNC: 39 MG/DL (ref 8–23)
BUN BLD-MCNC: 41 MG/DL (ref 8–23)
BUN BLD-MCNC: 42 MG/DL (ref 8–23)
BUN BLD-MCNC: 44 MG/DL (ref 8–23)
BUN BLD-MCNC: 44 MG/DL (ref 8–23)
BUN BLD-MCNC: 45 MG/DL (ref 8–23)
BUN BLD-MCNC: 46 MG/DL (ref 8–23)
BUN BLD-MCNC: 47 MG/DL (ref 8–23)
BUN/CREAT SERPL: 10.4 (ref 7–25)
BUN/CREAT SERPL: 12.1 (ref 7–25)
BUN/CREAT SERPL: 14.6 (ref 7–25)
BUN/CREAT SERPL: 15.5 (ref 7–25)
BUN/CREAT SERPL: 16.3 (ref 7–25)
BUN/CREAT SERPL: 16.3 (ref 7–25)
BUN/CREAT SERPL: 16.5 (ref 7–25)
BUN/CREAT SERPL: 19.6 (ref 7–25)
BUN/CREAT SERPL: 20.1 (ref 7–25)
BUN/CREAT SERPL: 20.4 (ref 7–25)
BUN/CREAT SERPL: 21.6 (ref 7–25)
BUN/CREAT SERPL: 22.3 (ref 7–25)
BUN/CREAT SERPL: 23.9 (ref 7–25)
BUN/CREAT SERPL: 23.9 (ref 7–25)
BUN/CREAT SERPL: 24.2 (ref 7–25)
BUN/CREAT SERPL: 25.7 (ref 7–25)
BUN/CREAT SERPL: 25.8 (ref 7–25)
BUN/CREAT SERPL: 27.7 (ref 7–25)
BUN/CREAT SERPL: 27.8 (ref 7–25)
BUN/CREAT SERPL: 28 (ref 7–25)
BUN/CREAT SERPL: 28.2 (ref 7–25)
BUN/CREAT SERPL: 28.9 (ref 7–25)
BUN/CREAT SERPL: 29.5 (ref 7–25)
BUN/CREAT SERPL: 32.4 (ref 7–25)
C PNEUM DNA NPH QL NAA+NON-PROBE: NOT DETECTED
CA-I BLD-MCNC: 4.32 MG/DL (ref 4.6–5.6)
CA-I BLD-MCNC: 4.56 MG/DL (ref 4.6–5.6)
CALCIUM SPEC-SCNC: 10.2 MG/DL (ref 8.6–10.5)
CALCIUM SPEC-SCNC: 8.5 MG/DL (ref 8.6–10.5)
CALCIUM SPEC-SCNC: 8.5 MG/DL (ref 8.6–10.5)
CALCIUM SPEC-SCNC: 8.6 MG/DL (ref 8.6–10.5)
CALCIUM SPEC-SCNC: 8.7 MG/DL (ref 8.6–10.5)
CALCIUM SPEC-SCNC: 8.8 MG/DL (ref 8.6–10.5)
CALCIUM SPEC-SCNC: 8.9 MG/DL (ref 8.6–10.5)
CALCIUM SPEC-SCNC: 9 MG/DL (ref 8.6–10.5)
CALCIUM SPEC-SCNC: 9.1 MG/DL (ref 8.6–10.5)
CALCIUM SPEC-SCNC: 9.2 MG/DL (ref 8.6–10.5)
CALCIUM SPEC-SCNC: 9.3 MG/DL (ref 8.6–10.5)
CALCIUM SPEC-SCNC: 9.4 MG/DL (ref 8.6–10.5)
CALCIUM SPEC-SCNC: 9.4 MG/DL (ref 8.6–10.5)
CHLORIDE SERPL-SCNC: 101 MMOL/L (ref 98–107)
CHLORIDE SERPL-SCNC: 101 MMOL/L (ref 98–107)
CHLORIDE SERPL-SCNC: 102 MMOL/L (ref 98–107)
CHLORIDE SERPL-SCNC: 102 MMOL/L (ref 98–107)
CHLORIDE SERPL-SCNC: 103 MMOL/L (ref 98–107)
CHLORIDE SERPL-SCNC: 104 MMOL/L (ref 98–107)
CHLORIDE SERPL-SCNC: 105 MMOL/L (ref 98–107)
CHLORIDE SERPL-SCNC: 106 MMOL/L (ref 98–107)
CHLORIDE SERPL-SCNC: 107 MMOL/L (ref 98–107)
CHLORIDE SERPL-SCNC: 108 MMOL/L (ref 98–107)
CK SERPL-CCNC: 184 U/L (ref 20–200)
CLARITY UR: CLEAR
CO2 SERPL-SCNC: 19 MMOL/L (ref 22–29)
CO2 SERPL-SCNC: 22 MMOL/L (ref 22–29)
CO2 SERPL-SCNC: 23 MMOL/L (ref 22–29)
CO2 SERPL-SCNC: 24 MMOL/L (ref 22–29)
CO2 SERPL-SCNC: 25 MMOL/L (ref 22–29)
CO2 SERPL-SCNC: 26 MMOL/L (ref 22–29)
CO2 SERPL-SCNC: 27 MMOL/L (ref 22–29)
CO2 SERPL-SCNC: 32 MMOL/L (ref 22–29)
COHGB MFR BLD: 1.5 % (ref 0–5)
COHGB MFR BLD: 1.6 % (ref 0–5)
COLOR UR: YELLOW
CREAT BLD-MCNC: 1.12 MG/DL (ref 0.76–1.27)
CREAT BLD-MCNC: 1.31 MG/DL (ref 0.76–1.27)
CREAT BLD-MCNC: 1.35 MG/DL (ref 0.76–1.27)
CREAT BLD-MCNC: 1.35 MG/DL (ref 0.76–1.27)
CREAT BLD-MCNC: 1.4 MG/DL (ref 0.76–1.27)
CREAT BLD-MCNC: 1.42 MG/DL (ref 0.76–1.27)
CREAT BLD-MCNC: 1.44 MG/DL (ref 0.76–1.27)
CREAT BLD-MCNC: 1.45 MG/DL (ref 0.76–1.27)
CREAT BLD-MCNC: 1.48 MG/DL (ref 0.76–1.27)
CREAT BLD-MCNC: 1.5 MG/DL (ref 0.76–1.27)
CREAT BLD-MCNC: 1.52 MG/DL (ref 0.76–1.27)
CREAT BLD-MCNC: 1.52 MG/DL (ref 0.76–1.27)
CREAT BLD-MCNC: 1.53 MG/DL (ref 0.76–1.27)
CREAT BLD-MCNC: 1.56 MG/DL (ref 0.76–1.27)
CREAT BLD-MCNC: 1.59 MG/DL (ref 0.76–1.27)
CREAT BLD-MCNC: 1.59 MG/DL (ref 0.76–1.27)
CREAT BLD-MCNC: 1.62 MG/DL (ref 0.76–1.27)
CREAT BLD-MCNC: 1.62 MG/DL (ref 0.76–1.27)
CREAT BLD-MCNC: 1.63 MG/DL (ref 0.76–1.27)
CREAT BLD-MCNC: 1.64 MG/DL (ref 0.76–1.27)
CREAT BLD-MCNC: 1.66 MG/DL (ref 0.76–1.27)
CREAT BLD-MCNC: 1.84 MG/DL (ref 0.76–1.27)
CREAT BLD-MCNC: 1.94 MG/DL (ref 0.76–1.27)
CREAT BLD-MCNC: 2.13 MG/DL (ref 0.76–1.27)
D-DIMER, QUANTITATIVE (MAD,POW, STR): >4000 NG/ML (FEU) (ref 0–470)
D-LACTATE SERPL-SCNC: 1.1 MMOL/L (ref 0.5–2)
D-LACTATE SERPL-SCNC: 1.3 MMOL/L (ref 0.5–2)
D-LACTATE SERPL-SCNC: 1.5 MMOL/L (ref 0.5–2)
D-LACTATE SERPL-SCNC: 2.3 MMOL/L (ref 0.5–2)
DEPRECATED RDW RBC AUTO: 44.8 FL (ref 37–54)
DEPRECATED RDW RBC AUTO: 45.3 FL (ref 37–54)
DEPRECATED RDW RBC AUTO: 45.5 FL (ref 37–54)
DEPRECATED RDW RBC AUTO: 45.8 FL (ref 37–54)
DEPRECATED RDW RBC AUTO: 46 FL (ref 37–54)
DEPRECATED RDW RBC AUTO: 46.1 FL (ref 37–54)
DEPRECATED RDW RBC AUTO: 46.2 FL (ref 37–54)
DEPRECATED RDW RBC AUTO: 46.4 FL (ref 37–54)
DEPRECATED RDW RBC AUTO: 46.5 FL (ref 37–54)
DEPRECATED RDW RBC AUTO: 47.2 FL (ref 37–54)
DEPRECATED RDW RBC AUTO: 47.5 FL (ref 37–54)
DEPRECATED RDW RBC AUTO: 47.8 FL (ref 37–54)
DEPRECATED RDW RBC AUTO: 48.4 FL (ref 37–54)
DEPRECATED RDW RBC AUTO: 49.1 FL (ref 37–54)
DEPRECATED RDW RBC AUTO: 49.2 FL (ref 37–54)
DEPRECATED RDW RBC AUTO: 51.6 FL (ref 37–54)
DEPRECATED RDW RBC AUTO: 51.7 FL (ref 37–54)
DEPRECATED RDW RBC AUTO: 53.1 FL (ref 37–54)
DEPRECATED RDW RBC AUTO: 54.8 FL (ref 37–54)
EOSINOPHIL # BLD AUTO: 0 10*3/MM3 (ref 0–0.4)
EOSINOPHIL # BLD AUTO: 0.01 10*3/MM3 (ref 0–0.4)
EOSINOPHIL # BLD AUTO: 0.01 10*3/MM3 (ref 0–0.4)
EOSINOPHIL # BLD AUTO: 0.03 10*3/MM3 (ref 0–0.4)
EOSINOPHIL # BLD AUTO: 0.03 10*3/MM3 (ref 0–0.4)
EOSINOPHIL # BLD AUTO: 0.06 10*3/MM3 (ref 0–0.4)
EOSINOPHIL # BLD AUTO: 0.24 10*3/MM3 (ref 0–0.4)
EOSINOPHIL # BLD AUTO: 0.25 10*3/MM3 (ref 0–0.4)
EOSINOPHIL # BLD AUTO: 0.26 10*3/MM3 (ref 0–0.4)
EOSINOPHIL # BLD AUTO: 0.27 10*3/MM3 (ref 0–0.4)
EOSINOPHIL # BLD AUTO: 0.61 10*3/MM3 (ref 0–0.4)
EOSINOPHIL # BLD AUTO: 0.61 10*3/MM3 (ref 0–0.4)
EOSINOPHIL # BLD AUTO: 0.69 10*3/MM3 (ref 0–0.4)
EOSINOPHIL # BLD MANUAL: 0.71 10*3/MM3 (ref 0–0.4)
EOSINOPHIL NFR BLD AUTO: 0 % (ref 0.3–6.2)
EOSINOPHIL NFR BLD AUTO: 0.1 % (ref 0.3–6.2)
EOSINOPHIL NFR BLD AUTO: 0.1 % (ref 0.3–6.2)
EOSINOPHIL NFR BLD AUTO: 0.2 % (ref 0.3–6.2)
EOSINOPHIL NFR BLD AUTO: 0.2 % (ref 0.3–6.2)
EOSINOPHIL NFR BLD AUTO: 0.4 % (ref 0.3–6.2)
EOSINOPHIL NFR BLD AUTO: 1.4 % (ref 0.3–6.2)
EOSINOPHIL NFR BLD AUTO: 10.6 % (ref 0.3–6.2)
EOSINOPHIL NFR BLD AUTO: 10.6 % (ref 0.3–6.2)
EOSINOPHIL NFR BLD AUTO: 2.5 % (ref 0.3–6.2)
EOSINOPHIL NFR BLD AUTO: 3 % (ref 0.3–6.2)
EOSINOPHIL NFR BLD AUTO: 3.2 % (ref 0.3–6.2)
EOSINOPHIL NFR BLD AUTO: 8.1 % (ref 0.3–6.2)
EOSINOPHIL NFR BLD MANUAL: 4 % (ref 0.3–6.2)
ERYTHROCYTE [DISTWIDTH] IN BLOOD BY AUTOMATED COUNT: 12.8 % (ref 12.3–15.4)
ERYTHROCYTE [DISTWIDTH] IN BLOOD BY AUTOMATED COUNT: 12.9 % (ref 12.3–15.4)
ERYTHROCYTE [DISTWIDTH] IN BLOOD BY AUTOMATED COUNT: 12.9 % (ref 12.3–15.4)
ERYTHROCYTE [DISTWIDTH] IN BLOOD BY AUTOMATED COUNT: 13 % (ref 12.3–15.4)
ERYTHROCYTE [DISTWIDTH] IN BLOOD BY AUTOMATED COUNT: 13.2 % (ref 12.3–15.4)
ERYTHROCYTE [DISTWIDTH] IN BLOOD BY AUTOMATED COUNT: 13.4 % (ref 12.3–15.4)
ERYTHROCYTE [DISTWIDTH] IN BLOOD BY AUTOMATED COUNT: 13.4 % (ref 12.3–15.4)
ERYTHROCYTE [DISTWIDTH] IN BLOOD BY AUTOMATED COUNT: 13.6 % (ref 12.3–15.4)
ERYTHROCYTE [DISTWIDTH] IN BLOOD BY AUTOMATED COUNT: 13.7 % (ref 12.3–15.4)
ERYTHROCYTE [DISTWIDTH] IN BLOOD BY AUTOMATED COUNT: 14.2 % (ref 12.3–15.4)
ERYTHROCYTE [DISTWIDTH] IN BLOOD BY AUTOMATED COUNT: 14.3 % (ref 12.3–15.4)
ERYTHROCYTE [DISTWIDTH] IN BLOOD BY AUTOMATED COUNT: 14.6 % (ref 12.3–15.4)
ERYTHROCYTE [DISTWIDTH] IN BLOOD BY AUTOMATED COUNT: 14.7 % (ref 12.3–15.4)
FLUAV H1 2009 PAND RNA NPH QL NAA+PROBE: NOT DETECTED
FLUAV H1 HA GENE NPH QL NAA+PROBE: NOT DETECTED
FLUAV H3 RNA NPH QL NAA+PROBE: NOT DETECTED
FLUAV SUBTYP SPEC NAA+PROBE: NOT DETECTED
FLUBV RNA ISLT QL NAA+PROBE: NOT DETECTED
GAS FLOW AIRWAY: 3 LPM
GAS FLOW AIRWAY: 30 LPM
GAS FLOW AIRWAY: 4 LPM
GFR SERPL CREATININE-BSD FRML MDRD: 30 ML/MIN/1.73
GFR SERPL CREATININE-BSD FRML MDRD: 34 ML/MIN/1.73
GFR SERPL CREATININE-BSD FRML MDRD: 36 ML/MIN/1.73
GFR SERPL CREATININE-BSD FRML MDRD: 40 ML/MIN/1.73
GFR SERPL CREATININE-BSD FRML MDRD: 41 ML/MIN/1.73
GFR SERPL CREATININE-BSD FRML MDRD: 41 ML/MIN/1.73
GFR SERPL CREATININE-BSD FRML MDRD: 42 ML/MIN/1.73
GFR SERPL CREATININE-BSD FRML MDRD: 42 ML/MIN/1.73
GFR SERPL CREATININE-BSD FRML MDRD: 43 ML/MIN/1.73
GFR SERPL CREATININE-BSD FRML MDRD: 44 ML/MIN/1.73
GFR SERPL CREATININE-BSD FRML MDRD: 45 ML/MIN/1.73
GFR SERPL CREATININE-BSD FRML MDRD: 45 ML/MIN/1.73
GFR SERPL CREATININE-BSD FRML MDRD: 46 ML/MIN/1.73
GFR SERPL CREATININE-BSD FRML MDRD: 46 ML/MIN/1.73
GFR SERPL CREATININE-BSD FRML MDRD: 47 ML/MIN/1.73
GFR SERPL CREATININE-BSD FRML MDRD: 48 ML/MIN/1.73
GFR SERPL CREATININE-BSD FRML MDRD: 48 ML/MIN/1.73
GFR SERPL CREATININE-BSD FRML MDRD: 49 ML/MIN/1.73
GFR SERPL CREATININE-BSD FRML MDRD: 51 ML/MIN/1.73
GFR SERPL CREATININE-BSD FRML MDRD: 51 ML/MIN/1.73
GFR SERPL CREATININE-BSD FRML MDRD: 53 ML/MIN/1.73
GFR SERPL CREATININE-BSD FRML MDRD: 64 ML/MIN/1.73
GLOBULIN UR ELPH-MCNC: 2.7 GM/DL
GLOBULIN UR ELPH-MCNC: 2.9 GM/DL
GLOBULIN UR ELPH-MCNC: 2.9 GM/DL
GLOBULIN UR ELPH-MCNC: 3.1 GM/DL
GLOBULIN UR ELPH-MCNC: 3.2 GM/DL
GLOBULIN UR ELPH-MCNC: 3.3 GM/DL
GLOBULIN UR ELPH-MCNC: 3.3 GM/DL
GLOBULIN UR ELPH-MCNC: 3.4 GM/DL
GLOBULIN UR ELPH-MCNC: 3.5 GM/DL
GLOBULIN UR ELPH-MCNC: 3.7 GM/DL
GLOBULIN UR ELPH-MCNC: 3.7 GM/DL
GLOBULIN UR ELPH-MCNC: 3.8 GM/DL
GLOBULIN UR ELPH-MCNC: 3.9 GM/DL
GLUCOSE BLD-MCNC: 101 MG/DL (ref 65–99)
GLUCOSE BLD-MCNC: 103 MG/DL (ref 65–99)
GLUCOSE BLD-MCNC: 104 MG/DL (ref 65–99)
GLUCOSE BLD-MCNC: 108 MG/DL (ref 65–99)
GLUCOSE BLD-MCNC: 111 MG/DL (ref 65–99)
GLUCOSE BLD-MCNC: 116 MG/DL (ref 65–99)
GLUCOSE BLD-MCNC: 121 MG/DL (ref 65–99)
GLUCOSE BLD-MCNC: 122 MG/DL (ref 65–99)
GLUCOSE BLD-MCNC: 122 MG/DL (ref 65–99)
GLUCOSE BLD-MCNC: 125 MG/DL (ref 65–99)
GLUCOSE BLD-MCNC: 125 MG/DL (ref 65–99)
GLUCOSE BLD-MCNC: 129 MG/DL (ref 65–99)
GLUCOSE BLD-MCNC: 131 MG/DL (ref 65–99)
GLUCOSE BLD-MCNC: 131 MG/DL (ref 65–99)
GLUCOSE BLD-MCNC: 148 MG/DL (ref 65–99)
GLUCOSE BLD-MCNC: 87 MG/DL (ref 65–99)
GLUCOSE BLD-MCNC: 88 MG/DL (ref 65–99)
GLUCOSE BLD-MCNC: 89 MG/DL (ref 65–99)
GLUCOSE BLD-MCNC: 89 MG/DL (ref 65–99)
GLUCOSE BLD-MCNC: 93 MG/DL (ref 65–99)
GLUCOSE BLD-MCNC: 94 MG/DL (ref 65–99)
GLUCOSE BLD-MCNC: 95 MG/DL (ref 65–99)
GLUCOSE BLD-MCNC: 96 MG/DL (ref 65–99)
GLUCOSE BLD-MCNC: 99 MG/DL (ref 65–99)
GLUCOSE BLDA-MCNC: 131 MMOL/L (ref 65–95)
GLUCOSE BLDA-MCNC: 144 MMOL/L (ref 65–95)
GLUCOSE BLDC GLUCOMTR-MCNC: 117 MG/DL (ref 70–130)
GLUCOSE UR STRIP-MCNC: NEGATIVE MG/DL
GRAM STN SPEC: ABNORMAL
GRAM STN SPEC: NORMAL
HADV DNA SPEC NAA+PROBE: NOT DETECTED
HBA1C MFR BLD: 5.1 % (ref 4.8–5.6)
HCO3 BLDA-SCNC: 21 MMOL/L (ref 20–26)
HCO3 BLDA-SCNC: 21.7 MMOL/L (ref 20–26)
HCO3 BLDA-SCNC: 22.1 MMOL/L (ref 20–26)
HCO3 BLDA-SCNC: 22.2 MMOL/L (ref 20–26)
HCO3 BLDA-SCNC: 22.3 MMOL/L (ref 20–26)
HCO3 BLDA-SCNC: 22.3 MMOL/L (ref 20–26)
HCO3 BLDA-SCNC: 23 MMOL/L (ref 20–26)
HCO3 BLDA-SCNC: 24 MMOL/L (ref 20–26)
HCO3 BLDA-SCNC: 24.8 MMOL/L (ref 20–26)
HCOV 229E RNA SPEC QL NAA+PROBE: NOT DETECTED
HCOV HKU1 RNA SPEC QL NAA+PROBE: NOT DETECTED
HCOV NL63 RNA SPEC QL NAA+PROBE: NOT DETECTED
HCOV OC43 RNA SPEC QL NAA+PROBE: NOT DETECTED
HCT VFR BLD AUTO: 32.9 % (ref 37.5–51)
HCT VFR BLD AUTO: 35 % (ref 37.5–51)
HCT VFR BLD AUTO: 35 % (ref 37.5–51)
HCT VFR BLD AUTO: 35.2 % (ref 37.5–51)
HCT VFR BLD AUTO: 35.3 % (ref 37.5–51)
HCT VFR BLD AUTO: 35.4 % (ref 37.5–51)
HCT VFR BLD AUTO: 35.5 % (ref 37.5–51)
HCT VFR BLD AUTO: 35.6 % (ref 37.5–51)
HCT VFR BLD AUTO: 35.9 % (ref 37.5–51)
HCT VFR BLD AUTO: 36 % (ref 37.5–51)
HCT VFR BLD AUTO: 36.4 % (ref 37.5–51)
HCT VFR BLD AUTO: 36.9 % (ref 37.5–51)
HCT VFR BLD AUTO: 36.9 % (ref 37.5–51)
HCT VFR BLD AUTO: 37 % (ref 37.5–51)
HCT VFR BLD AUTO: 37.1 % (ref 37.5–51)
HCT VFR BLD AUTO: 37.2 % (ref 37.5–51)
HCT VFR BLD AUTO: 37.4 % (ref 37.5–51)
HCT VFR BLD AUTO: 37.4 % (ref 37.5–51)
HCT VFR BLD AUTO: 37.6 % (ref 37.5–51)
HCT VFR BLD AUTO: 39.7 % (ref 37.5–51)
HCT VFR BLD AUTO: 39.8 % (ref 37.5–51)
HCT VFR BLD AUTO: 40.3 % (ref 37.5–51)
HCT VFR BLD AUTO: 40.8 % (ref 37.5–51)
HCT VFR BLD CALC: 37.8 % (ref 38–51)
HCT VFR BLD CALC: 41 % (ref 38–51)
HGB BLD-MCNC: 11 G/DL (ref 13–17.7)
HGB BLD-MCNC: 11.8 G/DL (ref 13–17.7)
HGB BLD-MCNC: 11.8 G/DL (ref 13–17.7)
HGB BLD-MCNC: 11.9 G/DL (ref 13–17.7)
HGB BLD-MCNC: 12 G/DL (ref 13–17.7)
HGB BLD-MCNC: 12 G/DL (ref 13–17.7)
HGB BLD-MCNC: 12.1 G/DL (ref 13–17.7)
HGB BLD-MCNC: 12.2 G/DL (ref 13–17.7)
HGB BLD-MCNC: 12.3 G/DL (ref 13–17.7)
HGB BLD-MCNC: 12.4 G/DL (ref 13–17.7)
HGB BLD-MCNC: 12.4 G/DL (ref 13–17.7)
HGB BLD-MCNC: 12.5 G/DL (ref 13–17.7)
HGB BLD-MCNC: 12.6 G/DL (ref 13–17.7)
HGB BLD-MCNC: 13.2 G/DL (ref 13–17.7)
HGB BLD-MCNC: 13.7 G/DL (ref 13–17.7)
HGB BLD-MCNC: 13.8 G/DL (ref 13–17.7)
HGB BLD-MCNC: 14 G/DL (ref 13–17.7)
HGB BLDA-MCNC: 12.3 G/DL (ref 14–18)
HGB BLDA-MCNC: 13.4 G/DL (ref 14–18)
HGB UR QL STRIP.AUTO: ABNORMAL
HMPV RNA NPH QL NAA+NON-PROBE: NOT DETECTED
HOLD SPECIMEN: NORMAL
HOROWITZ INDEX BLD+IHG-RTO: 100 %
HOROWITZ INDEX BLD+IHG-RTO: 100 %
HOROWITZ INDEX BLD+IHG-RTO: 33 %
HOROWITZ INDEX BLD+IHG-RTO: 35 %
HOROWITZ INDEX BLD+IHG-RTO: 40 %
HPIV1 RNA SPEC QL NAA+PROBE: NOT DETECTED
HPIV2 RNA SPEC QL NAA+PROBE: NOT DETECTED
HPIV3 RNA NPH QL NAA+PROBE: NOT DETECTED
HPIV4 P GENE NPH QL NAA+PROBE: NOT DETECTED
HYALINE CASTS UR QL AUTO: ABNORMAL /LPF
IMM GRANULOCYTES # BLD AUTO: 0.02 10*3/MM3 (ref 0–0.05)
IMM GRANULOCYTES # BLD AUTO: 0.02 10*3/MM3 (ref 0–0.05)
IMM GRANULOCYTES # BLD AUTO: 0.04 10*3/MM3 (ref 0–0.05)
IMM GRANULOCYTES # BLD AUTO: 0.05 10*3/MM3 (ref 0–0.05)
IMM GRANULOCYTES # BLD AUTO: 0.07 10*3/MM3 (ref 0–0.05)
IMM GRANULOCYTES # BLD AUTO: 0.12 10*3/MM3 (ref 0–0.05)
IMM GRANULOCYTES # BLD AUTO: 0.12 10*3/MM3 (ref 0–0.05)
IMM GRANULOCYTES # BLD AUTO: 0.15 10*3/MM3 (ref 0–0.05)
IMM GRANULOCYTES # BLD AUTO: 0.3 10*3/MM3 (ref 0–0.05)
IMM GRANULOCYTES # BLD AUTO: 0.33 10*3/MM3 (ref 0–0.05)
IMM GRANULOCYTES # BLD AUTO: 0.56 10*3/MM3 (ref 0–0.05)
IMM GRANULOCYTES # BLD AUTO: 0.57 10*3/MM3 (ref 0–0.05)
IMM GRANULOCYTES # BLD AUTO: 0.64 10*3/MM3 (ref 0–0.05)
IMM GRANULOCYTES # BLD AUTO: 0.65 10*3/MM3 (ref 0–0.05)
IMM GRANULOCYTES # BLD AUTO: 0.73 10*3/MM3 (ref 0–0.05)
IMM GRANULOCYTES # BLD AUTO: 0.75 10*3/MM3 (ref 0–0.05)
IMM GRANULOCYTES # BLD AUTO: 0.76 10*3/MM3 (ref 0–0.05)
IMM GRANULOCYTES # BLD AUTO: 0.83 10*3/MM3 (ref 0–0.05)
IMM GRANULOCYTES NFR BLD AUTO: 0.3 % (ref 0–0.5)
IMM GRANULOCYTES NFR BLD AUTO: 0.3 % (ref 0–0.5)
IMM GRANULOCYTES NFR BLD AUTO: 0.5 % (ref 0–0.5)
IMM GRANULOCYTES NFR BLD AUTO: 0.5 % (ref 0–0.5)
IMM GRANULOCYTES NFR BLD AUTO: 0.6 % (ref 0–0.5)
IMM GRANULOCYTES NFR BLD AUTO: 0.6 % (ref 0–0.5)
IMM GRANULOCYTES NFR BLD AUTO: 0.7 % (ref 0–0.5)
IMM GRANULOCYTES NFR BLD AUTO: 0.9 % (ref 0–0.5)
IMM GRANULOCYTES NFR BLD AUTO: 1.2 % (ref 0–0.5)
IMM GRANULOCYTES NFR BLD AUTO: 1.2 % (ref 0–0.5)
IMM GRANULOCYTES NFR BLD AUTO: 2 % (ref 0–0.5)
IMM GRANULOCYTES NFR BLD AUTO: 2.2 % (ref 0–0.5)
IMM GRANULOCYTES NFR BLD AUTO: 3 % (ref 0–0.5)
IMM GRANULOCYTES NFR BLD AUTO: 4 % (ref 0–0.5)
IMM GRANULOCYTES NFR BLD AUTO: 4.3 % (ref 0–0.5)
IMM GRANULOCYTES NFR BLD AUTO: 4.6 % (ref 0–0.5)
IMM GRANULOCYTES NFR BLD AUTO: 4.7 % (ref 0–0.5)
IMM GRANULOCYTES NFR BLD AUTO: 4.7 % (ref 0–0.5)
IMM GRANULOCYTES NFR BLD AUTO: 5 % (ref 0–0.5)
IMM GRANULOCYTES NFR BLD AUTO: 5.2 % (ref 0–0.5)
INR PPP: 1.37 (ref 0.8–1.2)
IRON 24H UR-MRATE: 64 MCG/DL (ref 59–158)
IRON SATN MFR SERPL: 26 % (ref 20–50)
ISOLATED FROM: ABNORMAL
KETONES UR QL STRIP: NEGATIVE
L PNEUMO1 AG UR QL IA: NEGATIVE
LAB AP CASE REPORT: NORMAL
LAB AP DIAGNOSIS COMMENT: NORMAL
LAB AP SPECIAL STAINS: NORMAL
LEUKOCYTE ESTERASE UR QL STRIP.AUTO: NEGATIVE
LYMPHOCYTES # BLD AUTO: 0.47 10*3/MM3 (ref 0.7–3.1)
LYMPHOCYTES # BLD AUTO: 0.52 10*3/MM3 (ref 0.7–3.1)
LYMPHOCYTES # BLD AUTO: 0.56 10*3/MM3 (ref 0.7–3.1)
LYMPHOCYTES # BLD AUTO: 0.57 10*3/MM3 (ref 0.7–3.1)
LYMPHOCYTES # BLD AUTO: 0.69 10*3/MM3 (ref 0.7–3.1)
LYMPHOCYTES # BLD AUTO: 0.69 10*3/MM3 (ref 0.7–3.1)
LYMPHOCYTES # BLD AUTO: 0.71 10*3/MM3 (ref 0.7–3.1)
LYMPHOCYTES # BLD AUTO: 0.73 10*3/MM3 (ref 0.7–3.1)
LYMPHOCYTES # BLD AUTO: 0.74 10*3/MM3 (ref 0.7–3.1)
LYMPHOCYTES # BLD AUTO: 0.78 10*3/MM3 (ref 0.7–3.1)
LYMPHOCYTES # BLD AUTO: 0.86 10*3/MM3 (ref 0.7–3.1)
LYMPHOCYTES # BLD AUTO: 0.94 10*3/MM3 (ref 0.7–3.1)
LYMPHOCYTES # BLD AUTO: 1 10*3/MM3 (ref 0.7–3.1)
LYMPHOCYTES # BLD AUTO: 1.05 10*3/MM3 (ref 0.7–3.1)
LYMPHOCYTES # BLD AUTO: 1.07 10*3/MM3 (ref 0.7–3.1)
LYMPHOCYTES # BLD AUTO: 1.12 10*3/MM3 (ref 0.7–3.1)
LYMPHOCYTES # BLD AUTO: 1.16 10*3/MM3 (ref 0.7–3.1)
LYMPHOCYTES # BLD AUTO: 1.32 10*3/MM3 (ref 0.7–3.1)
LYMPHOCYTES # BLD AUTO: 1.35 10*3/MM3 (ref 0.7–3.1)
LYMPHOCYTES # BLD AUTO: 1.82 10*3/MM3 (ref 0.7–3.1)
LYMPHOCYTES # BLD MANUAL: 0.33 10*3/MM3 (ref 0.7–3.1)
LYMPHOCYTES # BLD MANUAL: 1.6 10*3/MM3 (ref 0.7–3.1)
LYMPHOCYTES NFR BLD AUTO: 10.2 % (ref 19.6–45.3)
LYMPHOCYTES NFR BLD AUTO: 13.2 % (ref 19.6–45.3)
LYMPHOCYTES NFR BLD AUTO: 16.2 % (ref 19.6–45.3)
LYMPHOCYTES NFR BLD AUTO: 18.5 % (ref 19.6–45.3)
LYMPHOCYTES NFR BLD AUTO: 20.2 % (ref 19.6–45.3)
LYMPHOCYTES NFR BLD AUTO: 21.4 % (ref 19.6–45.3)
LYMPHOCYTES NFR BLD AUTO: 3.1 % (ref 19.6–45.3)
LYMPHOCYTES NFR BLD AUTO: 3.4 % (ref 19.6–45.3)
LYMPHOCYTES NFR BLD AUTO: 3.7 % (ref 19.6–45.3)
LYMPHOCYTES NFR BLD AUTO: 4.5 % (ref 19.6–45.3)
LYMPHOCYTES NFR BLD AUTO: 4.6 % (ref 19.6–45.3)
LYMPHOCYTES NFR BLD AUTO: 4.9 % (ref 19.6–45.3)
LYMPHOCYTES NFR BLD AUTO: 5.3 % (ref 19.6–45.3)
LYMPHOCYTES NFR BLD AUTO: 5.4 % (ref 19.6–45.3)
LYMPHOCYTES NFR BLD AUTO: 5.5 % (ref 19.6–45.3)
LYMPHOCYTES NFR BLD AUTO: 5.6 % (ref 19.6–45.3)
LYMPHOCYTES NFR BLD AUTO: 6.2 % (ref 19.6–45.3)
LYMPHOCYTES NFR BLD AUTO: 6.9 % (ref 19.6–45.3)
LYMPHOCYTES NFR BLD AUTO: 7.4 % (ref 19.6–45.3)
LYMPHOCYTES NFR BLD AUTO: 8.3 % (ref 19.6–45.3)
LYMPHOCYTES NFR BLD MANUAL: 2 % (ref 19.6–45.3)
LYMPHOCYTES NFR BLD MANUAL: 2 % (ref 5–12)
LYMPHOCYTES NFR BLD MANUAL: 7 % (ref 5–12)
LYMPHOCYTES NFR BLD MANUAL: 9 % (ref 19.6–45.3)
Lab: ABNORMAL
Lab: NORMAL
M PNEUMO IGG SER IA-ACNC: NOT DETECTED
MACROCYTES BLD QL SMEAR: ABNORMAL
MAGNESIUM SERPL-MCNC: 1.4 MG/DL (ref 1.6–2.4)
MAGNESIUM SERPL-MCNC: 1.8 MG/DL (ref 1.6–2.4)
MAGNESIUM SERPL-MCNC: 1.9 MG/DL (ref 1.6–2.4)
MAGNESIUM SERPL-MCNC: 2.1 MG/DL (ref 1.6–2.4)
MAXIMAL PREDICTED HEART RATE: 144 BPM
MCH RBC QN AUTO: 31.6 PG (ref 26.6–33)
MCH RBC QN AUTO: 31.7 PG (ref 26.6–33)
MCH RBC QN AUTO: 31.9 PG (ref 26.6–33)
MCH RBC QN AUTO: 31.9 PG (ref 26.6–33)
MCH RBC QN AUTO: 32.6 PG (ref 26.6–33)
MCH RBC QN AUTO: 32.7 PG (ref 26.6–33)
MCH RBC QN AUTO: 32.9 PG (ref 26.6–33)
MCH RBC QN AUTO: 33.1 PG (ref 26.6–33)
MCH RBC QN AUTO: 33.2 PG (ref 26.6–33)
MCH RBC QN AUTO: 33.3 PG (ref 26.6–33)
MCH RBC QN AUTO: 33.4 PG (ref 26.6–33)
MCH RBC QN AUTO: 33.4 PG (ref 26.6–33)
MCH RBC QN AUTO: 33.5 PG (ref 26.6–33)
MCH RBC QN AUTO: 33.5 PG (ref 26.6–33)
MCH RBC QN AUTO: 33.6 PG (ref 26.6–33)
MCH RBC QN AUTO: 33.7 PG (ref 26.6–33)
MCH RBC QN AUTO: 33.8 PG (ref 26.6–33)
MCH RBC QN AUTO: 34 PG (ref 26.6–33)
MCHC RBC AUTO-ENTMCNC: 32.6 G/DL (ref 31.5–35.7)
MCHC RBC AUTO-ENTMCNC: 32.8 G/DL (ref 31.5–35.7)
MCHC RBC AUTO-ENTMCNC: 32.8 G/DL (ref 31.5–35.7)
MCHC RBC AUTO-ENTMCNC: 33.2 G/DL (ref 31.5–35.7)
MCHC RBC AUTO-ENTMCNC: 33.2 G/DL (ref 31.5–35.7)
MCHC RBC AUTO-ENTMCNC: 33.4 G/DL (ref 31.5–35.7)
MCHC RBC AUTO-ENTMCNC: 33.4 G/DL (ref 31.5–35.7)
MCHC RBC AUTO-ENTMCNC: 33.7 G/DL (ref 31.5–35.7)
MCHC RBC AUTO-ENTMCNC: 33.7 G/DL (ref 31.5–35.7)
MCHC RBC AUTO-ENTMCNC: 33.8 G/DL (ref 31.5–35.7)
MCHC RBC AUTO-ENTMCNC: 33.9 G/DL (ref 31.5–35.7)
MCHC RBC AUTO-ENTMCNC: 34 G/DL (ref 31.5–35.7)
MCHC RBC AUTO-ENTMCNC: 34.1 G/DL (ref 31.5–35.7)
MCHC RBC AUTO-ENTMCNC: 34.3 G/DL (ref 31.5–35.7)
MCHC RBC AUTO-ENTMCNC: 34.3 G/DL (ref 31.5–35.7)
MCHC RBC AUTO-ENTMCNC: 34.4 G/DL (ref 31.5–35.7)
MCHC RBC AUTO-ENTMCNC: 34.5 G/DL (ref 31.5–35.7)
MCHC RBC AUTO-ENTMCNC: 34.5 G/DL (ref 31.5–35.7)
MCHC RBC AUTO-ENTMCNC: 34.7 G/DL (ref 31.5–35.7)
MCV RBC AUTO: 100.5 FL (ref 79–97)
MCV RBC AUTO: 100.8 FL (ref 79–97)
MCV RBC AUTO: 101.6 FL (ref 79–97)
MCV RBC AUTO: 94.7 FL (ref 79–97)
MCV RBC AUTO: 95.4 FL (ref 79–97)
MCV RBC AUTO: 95.8 FL (ref 79–97)
MCV RBC AUTO: 96.5 FL (ref 79–97)
MCV RBC AUTO: 96.5 FL (ref 79–97)
MCV RBC AUTO: 96.9 FL (ref 79–97)
MCV RBC AUTO: 97 FL (ref 79–97)
MCV RBC AUTO: 97.1 FL (ref 79–97)
MCV RBC AUTO: 97.3 FL (ref 79–97)
MCV RBC AUTO: 97.6 FL (ref 79–97)
MCV RBC AUTO: 97.8 FL (ref 79–97)
MCV RBC AUTO: 97.9 FL (ref 79–97)
MCV RBC AUTO: 98.1 FL (ref 79–97)
MCV RBC AUTO: 98.2 FL (ref 79–97)
MCV RBC AUTO: 98.3 FL (ref 79–97)
MCV RBC AUTO: 98.9 FL (ref 79–97)
MCV RBC AUTO: 99.4 FL (ref 79–97)
MCV RBC AUTO: 99.7 FL (ref 79–97)
METAMYELOCYTES NFR BLD MANUAL: 2 % (ref 0–0)
METHGB BLD QL: 0.3 % (ref 0–3)
METHGB BLD QL: 0.9 % (ref 0–3)
MODALITY: ABNORMAL
MONOCYTES # BLD AUTO: 0.33 10*3/MM3 (ref 0.1–0.9)
MONOCYTES # BLD AUTO: 0.48 10*3/MM3 (ref 0.1–0.9)
MONOCYTES # BLD AUTO: 0.51 10*3/MM3 (ref 0.1–0.9)
MONOCYTES # BLD AUTO: 0.53 10*3/MM3 (ref 0.1–0.9)
MONOCYTES # BLD AUTO: 0.65 10*3/MM3 (ref 0.1–0.9)
MONOCYTES # BLD AUTO: 0.72 10*3/MM3 (ref 0.1–0.9)
MONOCYTES # BLD AUTO: 0.75 10*3/MM3 (ref 0.1–0.9)
MONOCYTES # BLD AUTO: 0.76 10*3/MM3 (ref 0.1–0.9)
MONOCYTES # BLD AUTO: 0.81 10*3/MM3 (ref 0.1–0.9)
MONOCYTES # BLD AUTO: 0.83 10*3/MM3 (ref 0.1–0.9)
MONOCYTES # BLD AUTO: 0.85 10*3/MM3 (ref 0.1–0.9)
MONOCYTES # BLD AUTO: 0.86 10*3/MM3 (ref 0.1–0.9)
MONOCYTES # BLD AUTO: 0.86 10*3/MM3 (ref 0.1–0.9)
MONOCYTES # BLD AUTO: 0.97 10*3/MM3 (ref 0.1–0.9)
MONOCYTES # BLD AUTO: 1.06 10*3/MM3 (ref 0.1–0.9)
MONOCYTES # BLD AUTO: 1.09 10*3/MM3 (ref 0.1–0.9)
MONOCYTES # BLD AUTO: 1.17 10*3/MM3 (ref 0.1–0.9)
MONOCYTES # BLD AUTO: 1.23 10*3/MM3 (ref 0.1–0.9)
MONOCYTES # BLD AUTO: 1.24 10*3/MM3 (ref 0.1–0.9)
MONOCYTES # BLD AUTO: 1.37 10*3/MM3 (ref 0.1–0.9)
MONOCYTES # BLD AUTO: 1.46 10*3/MM3 (ref 0.1–0.9)
MONOCYTES # BLD AUTO: 1.59 10*3/MM3 (ref 0.1–0.9)
MONOCYTES NFR BLD AUTO: 10 % (ref 5–12)
MONOCYTES NFR BLD AUTO: 10.1 % (ref 5–12)
MONOCYTES NFR BLD AUTO: 10.3 % (ref 5–12)
MONOCYTES NFR BLD AUTO: 10.9 % (ref 5–12)
MONOCYTES NFR BLD AUTO: 3.9 % (ref 5–12)
MONOCYTES NFR BLD AUTO: 4.3 % (ref 5–12)
MONOCYTES NFR BLD AUTO: 5.1 % (ref 5–12)
MONOCYTES NFR BLD AUTO: 5.2 % (ref 5–12)
MONOCYTES NFR BLD AUTO: 5.2 % (ref 5–12)
MONOCYTES NFR BLD AUTO: 5.8 % (ref 5–12)
MONOCYTES NFR BLD AUTO: 6.3 % (ref 5–12)
MONOCYTES NFR BLD AUTO: 7.3 % (ref 5–12)
MONOCYTES NFR BLD AUTO: 8.1 % (ref 5–12)
MONOCYTES NFR BLD AUTO: 8.8 % (ref 5–12)
MONOCYTES NFR BLD AUTO: 9 % (ref 5–12)
MONOCYTES NFR BLD AUTO: 9 % (ref 5–12)
MONOCYTES NFR BLD AUTO: 9.1 % (ref 5–12)
MONOCYTES NFR BLD AUTO: 9.1 % (ref 5–12)
MONOCYTES NFR BLD AUTO: 9.2 % (ref 5–12)
MONOCYTES NFR BLD AUTO: 9.3 % (ref 5–12)
MRSA SPEC QL CULT: NORMAL
MYELOCYTES NFR BLD MANUAL: 2 % (ref 0–0)
NEUTROPHILS # BLD AUTO: 10.74 10*3/MM3 (ref 1.7–7)
NEUTROPHILS # BLD AUTO: 11 10*3/MM3 (ref 1.7–7)
NEUTROPHILS # BLD AUTO: 11.07 10*3/MM3 (ref 1.7–7)
NEUTROPHILS # BLD AUTO: 12.08 10*3/MM3 (ref 1.7–7)
NEUTROPHILS # BLD AUTO: 12.26 10*3/MM3 (ref 1.7–7)
NEUTROPHILS # BLD AUTO: 13.07 10*3/MM3 (ref 1.7–7)
NEUTROPHILS # BLD AUTO: 13.48 10*3/MM3 (ref 1.7–7)
NEUTROPHILS # BLD AUTO: 13.6 10*3/MM3 (ref 1.7–7)
NEUTROPHILS # BLD AUTO: 13.74 10*3/MM3 (ref 1.7–7)
NEUTROPHILS # BLD AUTO: 14.5 10*3/MM3 (ref 1.7–7)
NEUTROPHILS # BLD AUTO: 15.89 10*3/MM3 (ref 1.7–7)
NEUTROPHILS # BLD AUTO: 15.89 10*3/MM3 (ref 1.7–7)
NEUTROPHILS # BLD AUTO: 16.19 10*3/MM3 (ref 1.7–7)
NEUTROPHILS # BLD AUTO: 3.39 10*3/MM3 (ref 1.7–7)
NEUTROPHILS # BLD AUTO: 3.53 10*3/MM3 (ref 1.7–7)
NEUTROPHILS # BLD AUTO: 5.04 10*3/MM3 (ref 1.7–7)
NEUTROPHILS # BLD AUTO: 5.8 10*3/MM3 (ref 1.7–7)
NEUTROPHILS # BLD AUTO: 6.27 10*3/MM3 (ref 1.7–7)
NEUTROPHILS # BLD AUTO: 7.7 10*3/MM3 (ref 1.7–7)
NEUTROPHILS # BLD AUTO: 7.78 10*3/MM3 (ref 1.7–7)
NEUTROPHILS # BLD AUTO: 8.81 10*3/MM3 (ref 1.7–7)
NEUTROPHILS # BLD AUTO: 9.3 10*3/MM3 (ref 1.7–7)
NEUTROPHILS NFR BLD AUTO: 59.2 % (ref 42.7–76)
NEUTROPHILS NFR BLD AUTO: 59.4 % (ref 42.7–76)
NEUTROPHILS NFR BLD AUTO: 61.3 % (ref 42.7–76)
NEUTROPHILS NFR BLD AUTO: 69.6 % (ref 42.7–76)
NEUTROPHILS NFR BLD AUTO: 74.1 % (ref 42.7–76)
NEUTROPHILS NFR BLD AUTO: 75.7 % (ref 42.7–76)
NEUTROPHILS NFR BLD AUTO: 77.4 % (ref 42.7–76)
NEUTROPHILS NFR BLD AUTO: 81.7 % (ref 42.7–76)
NEUTROPHILS NFR BLD AUTO: 82 % (ref 42.7–76)
NEUTROPHILS NFR BLD AUTO: 82.2 % (ref 42.7–76)
NEUTROPHILS NFR BLD AUTO: 82.9 % (ref 42.7–76)
NEUTROPHILS NFR BLD AUTO: 83.8 % (ref 42.7–76)
NEUTROPHILS NFR BLD AUTO: 84.1 % (ref 42.7–76)
NEUTROPHILS NFR BLD AUTO: 85.5 % (ref 42.7–76)
NEUTROPHILS NFR BLD AUTO: 85.5 % (ref 42.7–76)
NEUTROPHILS NFR BLD AUTO: 85.9 % (ref 42.7–76)
NEUTROPHILS NFR BLD AUTO: 86 % (ref 42.7–76)
NEUTROPHILS NFR BLD AUTO: 86.3 % (ref 42.7–76)
NEUTROPHILS NFR BLD AUTO: 86.5 % (ref 42.7–76)
NEUTROPHILS NFR BLD AUTO: 87.7 % (ref 42.7–76)
NEUTROPHILS NFR BLD MANUAL: 76 % (ref 42.7–76)
NEUTROPHILS NFR BLD MANUAL: 95 % (ref 42.7–76)
NEUTS BAND NFR BLD MANUAL: 1 % (ref 0–5)
NITRITE UR QL STRIP: NEGATIVE
NOTE: ABNORMAL
NOTE: ABNORMAL
NRBC BLD AUTO-RTO: 0 /100 WBC (ref 0–0.2)
NRBC SPEC MANUAL: 1 /100 WBC (ref 0–0.2)
NT-PROBNP SERPL-MCNC: 3297 PG/ML (ref 5–1800)
NT-PROBNP SERPL-MCNC: 5635 PG/ML (ref 5–1800)
NT-PROBNP SERPL-MCNC: ABNORMAL PG/ML (ref 5–1800)
OXYHGB MFR BLDV: 86.2 % (ref 94–99)
OXYHGB MFR BLDV: 93.6 % (ref 94–99)
PATH REPORT.FINAL DX SPEC: NORMAL
PATH REPORT.GROSS SPEC: NORMAL
PCO2 BLDA: 29.2 MM HG (ref 35–45)
PCO2 BLDA: 30.5 MM HG (ref 35–45)
PCO2 BLDA: 30.7 MM HG (ref 35–45)
PCO2 BLDA: 34.5 MM HG (ref 35–45)
PCO2 BLDA: 38.1 MM HG (ref 35–45)
PCO2 BLDA: 38.9 MM HG (ref 35–45)
PCO2 BLDA: 39.4 MM HG (ref 35–45)
PCO2 BLDA: 49.8 MM HG (ref 35–45)
PCO2 BLDA: 60.9 MM HG (ref 35–45)
PCO2 TEMP ADJ BLD: ABNORMAL MM HG (ref 35–48)
PCO2 TEMP ADJ BLD: ABNORMAL MM HG (ref 35–48)
PEEP RESPIRATORY: 5 CM[H2O]
PEEP RESPIRATORY: 5 CM[H2O]
PH BLDA: 7.17 PH UNITS (ref 7.35–7.45)
PH BLDA: 7.26 PH UNITS (ref 7.35–7.45)
PH BLDA: 7.39 PH UNITS (ref 7.35–7.45)
PH BLDA: 7.41 PH UNITS (ref 7.35–7.45)
PH BLDA: 7.47 PH UNITS (ref 7.35–7.45)
PH BLDA: 7.47 PH UNITS (ref 7.35–7.45)
PH BLDA: 7.48 PH UNITS (ref 7.35–7.45)
PH UR STRIP.AUTO: 6 [PH] (ref 5–9)
PH, TEMP CORRECTED: ABNORMAL PH UNITS
PH, TEMP CORRECTED: ABNORMAL PH UNITS
PHOSPHATE SERPL-MCNC: 2.6 MG/DL (ref 2.5–4.5)
PHOSPHATE SERPL-MCNC: 3.1 MG/DL (ref 2.5–4.5)
PHOSPHATE SERPL-MCNC: 3.1 MG/DL (ref 2.5–4.5)
PHOSPHATE SERPL-MCNC: 3.2 MG/DL (ref 2.5–4.5)
PHOSPHATE SERPL-MCNC: 3.4 MG/DL (ref 2.5–4.5)
PLAT MORPH BLD: NORMAL
PLATELET # BLD AUTO: 110 10*3/MM3 (ref 140–450)
PLATELET # BLD AUTO: 114 10*3/MM3 (ref 140–450)
PLATELET # BLD AUTO: 121 10*3/MM3 (ref 140–450)
PLATELET # BLD AUTO: 129 10*3/MM3 (ref 140–450)
PLATELET # BLD AUTO: 137 10*3/MM3 (ref 140–450)
PLATELET # BLD AUTO: 144 10*3/MM3 (ref 140–450)
PLATELET # BLD AUTO: 154 10*3/MM3 (ref 140–450)
PLATELET # BLD AUTO: 169 10*3/MM3 (ref 140–450)
PLATELET # BLD AUTO: 182 10*3/MM3 (ref 140–450)
PLATELET # BLD AUTO: 184 10*3/MM3 (ref 140–450)
PLATELET # BLD AUTO: 186 10*3/MM3 (ref 140–450)
PLATELET # BLD AUTO: 190 10*3/MM3 (ref 140–450)
PLATELET # BLD AUTO: 191 10*3/MM3 (ref 140–450)
PLATELET # BLD AUTO: 195 10*3/MM3 (ref 140–450)
PLATELET # BLD AUTO: 209 10*3/MM3 (ref 140–450)
PLATELET # BLD AUTO: 213 10*3/MM3 (ref 140–450)
PLATELET # BLD AUTO: 216 10*3/MM3 (ref 140–450)
PLATELET # BLD AUTO: 217 10*3/MM3 (ref 140–450)
PLATELET # BLD AUTO: 218 10*3/MM3 (ref 140–450)
PLATELET # BLD AUTO: 223 10*3/MM3 (ref 140–450)
PLATELET # BLD AUTO: 223 10*3/MM3 (ref 140–450)
PLATELET # BLD AUTO: 91 10*3/MM3 (ref 140–450)
PLATELET # BLD AUTO: 98 10*3/MM3 (ref 140–450)
PMV BLD AUTO: 10 FL (ref 6–12)
PMV BLD AUTO: 10.7 FL (ref 6–12)
PMV BLD AUTO: 11 FL (ref 6–12)
PMV BLD AUTO: 11.2 FL (ref 6–12)
PMV BLD AUTO: 11.3 FL (ref 6–12)
PMV BLD AUTO: 11.4 FL (ref 6–12)
PMV BLD AUTO: 11.4 FL (ref 6–12)
PMV BLD AUTO: 11.5 FL (ref 6–12)
PMV BLD AUTO: 11.7 FL (ref 6–12)
PMV BLD AUTO: 11.8 FL (ref 6–12)
PMV BLD AUTO: 11.8 FL (ref 6–12)
PMV BLD AUTO: 11.9 FL (ref 6–12)
PMV BLD AUTO: 11.9 FL (ref 6–12)
PMV BLD AUTO: 12 FL (ref 6–12)
PMV BLD AUTO: 9.2 FL (ref 6–12)
PMV BLD AUTO: 9.4 FL (ref 6–12)
PMV BLD AUTO: 9.6 FL (ref 6–12)
PO2 BLDA: 379 MM HG (ref 83–108)
PO2 BLDA: 50.8 MM HG (ref 83–108)
PO2 BLDA: 53.5 MM HG (ref 83–108)
PO2 BLDA: 61.9 MM HG (ref 83–108)
PO2 BLDA: 68.5 MM HG (ref 83–108)
PO2 BLDA: 75.2 MM HG (ref 83–108)
PO2 BLDA: 82.7 MM HG (ref 83–108)
PO2 BLDA: 90.7 MM HG (ref 83–108)
PO2 BLDA: 93.2 MM HG (ref 83–108)
PO2 TEMP ADJ BLD: ABNORMAL MM HG (ref 83–108)
PO2 TEMP ADJ BLD: ABNORMAL MM HG (ref 83–108)
POLYCHROMASIA BLD QL SMEAR: ABNORMAL
POTASSIUM BLD-SCNC: 3.5 MMOL/L (ref 3.5–5.2)
POTASSIUM BLD-SCNC: 3.5 MMOL/L (ref 3.5–5.2)
POTASSIUM BLD-SCNC: 4 MMOL/L (ref 3.5–5.2)
POTASSIUM BLD-SCNC: 4.1 MMOL/L (ref 3.5–5.2)
POTASSIUM BLD-SCNC: 4.2 MMOL/L (ref 3.5–5.2)
POTASSIUM BLD-SCNC: 4.3 MMOL/L (ref 3.5–5.2)
POTASSIUM BLD-SCNC: 4.3 MMOL/L (ref 3.5–5.2)
POTASSIUM BLD-SCNC: 4.4 MMOL/L (ref 3.5–5.2)
POTASSIUM BLD-SCNC: 4.4 MMOL/L (ref 3.5–5.2)
POTASSIUM BLD-SCNC: 4.5 MMOL/L (ref 3.5–5.2)
POTASSIUM BLD-SCNC: 4.6 MMOL/L (ref 3.5–5.2)
POTASSIUM BLD-SCNC: 4.7 MMOL/L (ref 3.5–5.2)
POTASSIUM BLD-SCNC: 4.8 MMOL/L (ref 3.5–5.2)
POTASSIUM BLD-SCNC: 5 MMOL/L (ref 3.5–5.2)
POTASSIUM BLD-SCNC: 5 MMOL/L (ref 3.5–5.2)
POTASSIUM BLDA-SCNC: 4 MMOL/L (ref 3.4–4.5)
POTASSIUM BLDA-SCNC: 4.7 MMOL/L (ref 3.4–4.5)
PROCALCITONIN SERPL-MCNC: 0.09 NG/ML (ref 0.1–0.25)
PROCALCITONIN SERPL-MCNC: 0.69 NG/ML (ref 0.1–0.25)
PROT SERPL-MCNC: 5.7 G/DL (ref 6–8.5)
PROT SERPL-MCNC: 5.8 G/DL (ref 6–8.5)
PROT SERPL-MCNC: 6 G/DL (ref 6–8.5)
PROT SERPL-MCNC: 6.2 G/DL (ref 6–8.5)
PROT SERPL-MCNC: 6.2 G/DL (ref 6–8.5)
PROT SERPL-MCNC: 6.3 G/DL (ref 6–8.5)
PROT SERPL-MCNC: 6.4 G/DL (ref 6–8.5)
PROT SERPL-MCNC: 6.5 G/DL (ref 6–8.5)
PROT SERPL-MCNC: 6.5 G/DL (ref 6–8.5)
PROT SERPL-MCNC: 6.6 G/DL (ref 6–8.5)
PROT SERPL-MCNC: 6.8 G/DL (ref 6–8.5)
PROT SERPL-MCNC: 7.3 G/DL (ref 6–8.5)
PROT SERPL-MCNC: 7.4 G/DL (ref 6–8.5)
PROT UR QL STRIP: NEGATIVE
PROTHROMBIN TIME: 16.7 SECONDS (ref 11.1–15.3)
PSV: 8 CMH2O
RBC # BLD AUTO: 3.45 10*6/MM3 (ref 4.14–5.8)
RBC # BLD AUTO: 3.57 10*6/MM3 (ref 4.14–5.8)
RBC # BLD AUTO: 3.58 10*6/MM3 (ref 4.14–5.8)
RBC # BLD AUTO: 3.59 10*6/MM3 (ref 4.14–5.8)
RBC # BLD AUTO: 3.6 10*6/MM3 (ref 4.14–5.8)
RBC # BLD AUTO: 3.61 10*6/MM3 (ref 4.14–5.8)
RBC # BLD AUTO: 3.65 10*6/MM3 (ref 4.14–5.8)
RBC # BLD AUTO: 3.68 10*6/MM3 (ref 4.14–5.8)
RBC # BLD AUTO: 3.68 10*6/MM3 (ref 4.14–5.8)
RBC # BLD AUTO: 3.69 10*6/MM3 (ref 4.14–5.8)
RBC # BLD AUTO: 3.72 10*6/MM3 (ref 4.14–5.8)
RBC # BLD AUTO: 3.73 10*6/MM3 (ref 4.14–5.8)
RBC # BLD AUTO: 3.74 10*6/MM3 (ref 4.14–5.8)
RBC # BLD AUTO: 3.75 10*6/MM3 (ref 4.14–5.8)
RBC # BLD AUTO: 3.76 10*6/MM3 (ref 4.14–5.8)
RBC # BLD AUTO: 3.79 10*6/MM3 (ref 4.14–5.8)
RBC # BLD AUTO: 3.82 10*6/MM3 (ref 4.14–5.8)
RBC # BLD AUTO: 4.08 10*6/MM3 (ref 4.14–5.8)
RBC # BLD AUTO: 4.1 10*6/MM3 (ref 4.14–5.8)
RBC # BLD AUTO: 4.16 10*6/MM3 (ref 4.14–5.8)
RBC # BLD AUTO: 4.26 10*6/MM3 (ref 4.14–5.8)
RBC # UR: ABNORMAL /HPF
RBC MORPH BLD: NORMAL
REF LAB TEST METHOD: ABNORMAL
RH BLD: POSITIVE
RH BLD: POSITIVE
RHINOVIRUS RNA SPEC NAA+PROBE: NOT DETECTED
RSV RNA NPH QL NAA+NON-PROBE: NOT DETECTED
S PNEUM AG SPEC QL LA: NEGATIVE
SAO2 % BLDCOA: 87.9 % (ref 94–99)
SAO2 % BLDCOA: 88 % (ref 94–99)
SAO2 % BLDCOA: 88.9 % (ref 94–99)
SAO2 % BLDCOA: 91.3 % (ref 94–99)
SAO2 % BLDCOA: 95.9 % (ref 94–99)
SAO2 % BLDCOA: 96.4 % (ref 94–99)
SAO2 % BLDCOA: 96.6 % (ref 94–99)
SAO2 % BLDCOA: 96.7 % (ref 94–99)
SAO2 % BLDCOA: >100 % (ref 94–99)
SET MECH RESP RATE: 14
SET MECH RESP RATE: 14
SMALL PLATELETS BLD QL SMEAR: ADEQUATE
SMALL PLATELETS BLD QL SMEAR: NORMAL
SODIUM BLD-SCNC: 137 MMOL/L (ref 136–145)
SODIUM BLD-SCNC: 138 MMOL/L (ref 136–145)
SODIUM BLD-SCNC: 138 MMOL/L (ref 136–145)
SODIUM BLD-SCNC: 139 MMOL/L (ref 136–145)
SODIUM BLD-SCNC: 140 MMOL/L (ref 136–145)
SODIUM BLD-SCNC: 140 MMOL/L (ref 136–145)
SODIUM BLD-SCNC: 141 MMOL/L (ref 136–145)
SODIUM BLD-SCNC: 142 MMOL/L (ref 136–145)
SODIUM BLD-SCNC: 143 MMOL/L (ref 136–145)
SODIUM BLD-SCNC: 145 MMOL/L (ref 136–145)
SODIUM BLDA-SCNC: 136 MMOL/L (ref 136–146)
SODIUM BLDA-SCNC: 140 MMOL/L (ref 136–146)
SP GR UR STRIP: 1.02 (ref 1–1.03)
SQUAMOUS #/AREA URNS HPF: ABNORMAL /HPF
STRESS TARGET HR: 122 BPM
T&S EXPIRATION DATE: NORMAL
T&S EXPIRATION DATE: NORMAL
T4 FREE SERPL-MCNC: 0.96 NG/DL (ref 0.93–1.7)
T4 FREE SERPL-MCNC: 1.01 NG/DL (ref 0.93–1.7)
TIBC SERPL-MCNC: 250 MCG/DL (ref 298–536)
TRANSFERRIN SERPL-MCNC: 168 MG/DL (ref 200–360)
TROPONIN T SERPL-MCNC: 0.02 NG/ML (ref 0–0.03)
TROPONIN T SERPL-MCNC: 0.04 NG/ML (ref 0–0.03)
TROPONIN T SERPL-MCNC: 0.05 NG/ML (ref 0–0.03)
TROPONIN T SERPL-MCNC: 0.07 NG/ML (ref 0–0.03)
TROPONIN T SERPL-MCNC: <0.01 NG/ML (ref 0–0.03)
TROPONIN T SERPL-MCNC: <0.01 NG/ML (ref 0–0.03)
TSH SERPL DL<=0.05 MIU/L-ACNC: 2.09 MIU/ML (ref 0.27–4.2)
TSH SERPL DL<=0.05 MIU/L-ACNC: 2.76 UIU/ML (ref 0.27–4.2)
TSH SERPL DL<=0.05 MIU/L-ACNC: 2.95 UIU/ML (ref 0.27–4.2)
UROBILINOGEN UR QL STRIP: ABNORMAL
VENTILATOR MODE: ABNORMAL
VENTILATOR MODE: AC
VT ON VENT VENT: 500 ML
VT ON VENT VENT: 500 ML
WBC MORPH BLD: NORMAL
WBC NRBC COR # BLD: 10.21 10*3/MM3 (ref 3.4–10.8)
WBC NRBC COR # BLD: 10.28 10*3/MM3 (ref 3.4–10.8)
WBC NRBC COR # BLD: 10.46 10*3/MM3 (ref 3.4–10.8)
WBC NRBC COR # BLD: 10.5 10*3/MM3 (ref 3.4–10.8)
WBC NRBC COR # BLD: 11.06 10*3/MM3 (ref 3.4–10.8)
WBC NRBC COR # BLD: 11.89 10*3/MM3 (ref 3.4–10.8)
WBC NRBC COR # BLD: 12.82 10*3/MM3 (ref 3.4–10.8)
WBC NRBC COR # BLD: 13.39 10*3/MM3 (ref 3.4–10.8)
WBC NRBC COR # BLD: 13.57 10*3/MM3 (ref 3.4–10.8)
WBC NRBC COR # BLD: 13.95 10*3/MM3 (ref 3.4–10.8)
WBC NRBC COR # BLD: 14.79 10*3/MM3 (ref 3.4–10.8)
WBC NRBC COR # BLD: 15.19 10*3/MM3 (ref 3.4–10.8)
WBC NRBC COR # BLD: 15.9 10*3/MM3 (ref 3.4–10.8)
WBC NRBC COR # BLD: 16.55 10*3/MM3 (ref 3.4–10.8)
WBC NRBC COR # BLD: 17.74 10*3/MM3 (ref 3.4–10.8)
WBC NRBC COR # BLD: 18.52 10*3/MM3 (ref 3.4–10.8)
WBC NRBC COR # BLD: 18.9 10*3/MM3 (ref 3.4–10.8)
WBC NRBC COR # BLD: 5.74 10*3/MM3 (ref 3.4–10.8)
WBC NRBC COR # BLD: 5.77 10*3/MM3 (ref 3.4–10.8)
WBC NRBC COR # BLD: 8.33 10*3/MM3 (ref 3.4–10.8)
WBC NRBC COR # BLD: 8.46 10*3/MM3 (ref 3.4–10.8)
WBC NRBC COR # BLD: 8.49 10*3/MM3 (ref 3.4–10.8)
WBC NRBC COR # BLD: 9.39 10*3/MM3 (ref 3.4–10.8)
WBC UR QL AUTO: ABNORMAL /HPF
WHOLE BLOOD HOLD SPECIMEN: NORMAL

## 2019-01-01 PROCEDURE — 97110 THERAPEUTIC EXERCISES: CPT

## 2019-01-01 PROCEDURE — C1751 CATH, INF, PER/CENT/MIDLINE: HCPCS

## 2019-01-01 PROCEDURE — 36600 WITHDRAWAL OF ARTERIAL BLOOD: CPT

## 2019-01-01 PROCEDURE — 99024 POSTOP FOLLOW-UP VISIT: CPT | Performed by: NURSE PRACTITIONER

## 2019-01-01 PROCEDURE — 94760 N-INVAS EAR/PLS OXIMETRY 1: CPT

## 2019-01-01 PROCEDURE — 97116 GAIT TRAINING THERAPY: CPT

## 2019-01-01 PROCEDURE — C1729 CATH, DRAINAGE: HCPCS | Performed by: THORACIC SURGERY (CARDIOTHORACIC VASCULAR SURGERY)

## 2019-01-01 PROCEDURE — 25010000002 FENTANYL CITRATE (PF) 100 MCG/2ML SOLUTION 2 ML VIAL: Performed by: ANESTHESIOLOGY

## 2019-01-01 PROCEDURE — 25010000002 PIPERACILLIN SOD-TAZOBACTAM PER 1 G: Performed by: FAMILY MEDICINE

## 2019-01-01 PROCEDURE — 85025 COMPLETE CBC W/AUTO DIFF WBC: CPT | Performed by: INTERNAL MEDICINE

## 2019-01-01 PROCEDURE — 99232 SBSQ HOSP IP/OBS MODERATE 35: CPT | Performed by: FAMILY MEDICINE

## 2019-01-01 PROCEDURE — 25010000002 MAGNESIUM SULFATE PER 500 MG OF MAGNESIUM: Performed by: INTERNAL MEDICINE

## 2019-01-01 PROCEDURE — 84443 ASSAY THYROID STIM HORMONE: CPT | Performed by: HOSPITALIST

## 2019-01-01 PROCEDURE — 63710000001 PREDNISONE PER 1 MG: Performed by: NURSE PRACTITIONER

## 2019-01-01 PROCEDURE — 97162 PT EVAL MOD COMPLEX 30 MIN: CPT

## 2019-01-01 PROCEDURE — 97530 THERAPEUTIC ACTIVITIES: CPT

## 2019-01-01 PROCEDURE — 94799 UNLISTED PULMONARY SVC/PX: CPT

## 2019-01-01 PROCEDURE — 83735 ASSAY OF MAGNESIUM: CPT | Performed by: FAMILY MEDICINE

## 2019-01-01 PROCEDURE — 99024 POSTOP FOLLOW-UP VISIT: CPT | Performed by: ORTHOPAEDIC SURGERY

## 2019-01-01 PROCEDURE — 25010000002 EPINEPHRINE PER 0.1 MG: Performed by: NURSE ANESTHETIST, CERTIFIED REGISTERED

## 2019-01-01 PROCEDURE — 80053 COMPREHEN METABOLIC PANEL: CPT | Performed by: INTERNAL MEDICINE

## 2019-01-01 PROCEDURE — 97535 SELF CARE MNGMENT TRAINING: CPT

## 2019-01-01 PROCEDURE — 63710000001 PREDNISONE PER 5 MG: Performed by: FAMILY MEDICINE

## 2019-01-01 PROCEDURE — 87899 AGENT NOS ASSAY W/OPTIC: CPT | Performed by: FAMILY MEDICINE

## 2019-01-01 PROCEDURE — 71045 X-RAY EXAM CHEST 1 VIEW: CPT

## 2019-01-01 PROCEDURE — 71046 X-RAY EXAM CHEST 2 VIEWS: CPT

## 2019-01-01 PROCEDURE — 84466 ASSAY OF TRANSFERRIN: CPT | Performed by: FAMILY MEDICINE

## 2019-01-01 PROCEDURE — 88342 IMHCHEM/IMCYTCHM 1ST ANTB: CPT | Performed by: PATHOLOGY

## 2019-01-01 PROCEDURE — 25010000002 CEFAZOLIN PER 500 MG: Performed by: ORTHOPAEDIC SURGERY

## 2019-01-01 PROCEDURE — A9539 TC99M PENTETATE: HCPCS | Performed by: INTERNAL MEDICINE

## 2019-01-01 PROCEDURE — 81001 URINALYSIS AUTO W/SCOPE: CPT | Performed by: NURSE PRACTITIONER

## 2019-01-01 PROCEDURE — 25010000002 MAGNESIUM SULFATE 2 GM/50ML SOLUTION: Performed by: FAMILY MEDICINE

## 2019-01-01 PROCEDURE — 85027 COMPLETE CBC AUTOMATED: CPT | Performed by: FAMILY MEDICINE

## 2019-01-01 PROCEDURE — C1713 ANCHOR/SCREW BN/BN,TIS/BN: HCPCS | Performed by: ORTHOPAEDIC SURGERY

## 2019-01-01 PROCEDURE — 25010000002 MIDAZOLAM PER 1 MG: Performed by: INTERNAL MEDICINE

## 2019-01-01 PROCEDURE — 88331 PATH CONSLTJ SURG 1 BLK 1SPC: CPT | Performed by: PATHOLOGY

## 2019-01-01 PROCEDURE — 93010 ELECTROCARDIOGRAM REPORT: CPT | Performed by: INTERNAL MEDICINE

## 2019-01-01 PROCEDURE — G9903 PT SCRN TBCO ID AS NON USER: HCPCS | Performed by: INTERNAL MEDICINE

## 2019-01-01 PROCEDURE — 80069 RENAL FUNCTION PANEL: CPT | Performed by: FAMILY MEDICINE

## 2019-01-01 PROCEDURE — 25010000002 HYDROMORPHONE 1 MG/ML SOLUTION: Performed by: NURSE ANESTHETIST, CERTIFIED REGISTERED

## 2019-01-01 PROCEDURE — 82803 BLOOD GASES ANY COMBINATION: CPT

## 2019-01-01 PROCEDURE — 82375 ASSAY CARBOXYHB QUANT: CPT

## 2019-01-01 PROCEDURE — 97166 OT EVAL MOD COMPLEX 45 MIN: CPT

## 2019-01-01 PROCEDURE — 25010000002 METHYLPREDNISOLONE PER 40 MG: Performed by: FAMILY MEDICINE

## 2019-01-01 PROCEDURE — 97542 WHEELCHAIR MNGMENT TRAINING: CPT

## 2019-01-01 PROCEDURE — G0463 HOSPITAL OUTPT CLINIC VISIT: HCPCS | Performed by: INTERNAL MEDICINE

## 2019-01-01 PROCEDURE — 78815 PET IMAGE W/CT SKULL-THIGH: CPT

## 2019-01-01 PROCEDURE — 25010000002 MIDAZOLAM PER 1 MG: Performed by: NURSE ANESTHETIST, CERTIFIED REGISTERED

## 2019-01-01 PROCEDURE — 71250 CT THORAX DX C-: CPT

## 2019-01-01 PROCEDURE — 80048 BASIC METABOLIC PNL TOTAL CA: CPT | Performed by: NURSE PRACTITIONER

## 2019-01-01 PROCEDURE — A9540 TC99M MAA: HCPCS | Performed by: INTERNAL MEDICINE

## 2019-01-01 PROCEDURE — 94727 GAS DIL/WSHOT DETER LNG VOL: CPT | Performed by: INTERNAL MEDICINE

## 2019-01-01 PROCEDURE — 84484 ASSAY OF TROPONIN QUANT: CPT | Performed by: INTERNAL MEDICINE

## 2019-01-01 PROCEDURE — 87040 BLOOD CULTURE FOR BACTERIA: CPT | Performed by: FAMILY MEDICINE

## 2019-01-01 PROCEDURE — 82962 GLUCOSE BLOOD TEST: CPT

## 2019-01-01 PROCEDURE — 80053 COMPREHEN METABOLIC PANEL: CPT | Performed by: FAMILY MEDICINE

## 2019-01-01 PROCEDURE — 83605 ASSAY OF LACTIC ACID: CPT | Performed by: INTERNAL MEDICINE

## 2019-01-01 PROCEDURE — 70450 CT HEAD/BRAIN W/O DYE: CPT

## 2019-01-01 PROCEDURE — A9539 TC99M PENTETATE: HCPCS | Performed by: HOSPITALIST

## 2019-01-01 PROCEDURE — 85025 COMPLETE CBC W/AUTO DIFF WBC: CPT | Performed by: THORACIC SURGERY (CARDIOTHORACIC VASCULAR SURGERY)

## 2019-01-01 PROCEDURE — G0378 HOSPITAL OBSERVATION PER HR: HCPCS

## 2019-01-01 PROCEDURE — 25010000002 MORPHINE PER 10 MG: Performed by: ORTHOPAEDIC SURGERY

## 2019-01-01 PROCEDURE — 80053 COMPREHEN METABOLIC PANEL: CPT

## 2019-01-01 PROCEDURE — 76000 FLUOROSCOPY <1 HR PHYS/QHP: CPT

## 2019-01-01 PROCEDURE — 63710000001 PREDNISONE PER 1 MG: Performed by: FAMILY MEDICINE

## 2019-01-01 PROCEDURE — 85025 COMPLETE CBC W/AUTO DIFF WBC: CPT | Performed by: NURSE PRACTITIONER

## 2019-01-01 PROCEDURE — 82805 BLOOD GASES W/O2 SATURATION: CPT

## 2019-01-01 PROCEDURE — 87798 DETECT AGENT NOS DNA AMP: CPT | Performed by: FAMILY MEDICINE

## 2019-01-01 PROCEDURE — 94640 AIRWAY INHALATION TREATMENT: CPT

## 2019-01-01 PROCEDURE — 82550 ASSAY OF CK (CPK): CPT | Performed by: FAMILY MEDICINE

## 2019-01-01 PROCEDURE — 80048 BASIC METABOLIC PNL TOTAL CA: CPT | Performed by: THORACIC SURGERY (CARDIOTHORACIC VASCULAR SURGERY)

## 2019-01-01 PROCEDURE — 84439 ASSAY OF FREE THYROXINE: CPT | Performed by: HOSPITALIST

## 2019-01-01 PROCEDURE — 25010000002 PIPERACILLIN-TAZOBACTAM: Performed by: FAMILY MEDICINE

## 2019-01-01 PROCEDURE — 85025 COMPLETE CBC W/AUTO DIFF WBC: CPT | Performed by: FAMILY MEDICINE

## 2019-01-01 PROCEDURE — 88307 TISSUE EXAM BY PATHOLOGIST: CPT | Performed by: THORACIC SURGERY (CARDIOTHORACIC VASCULAR SURGERY)

## 2019-01-01 PROCEDURE — 25010000002 PROPOFOL 10 MG/ML EMULSION: Performed by: NURSE ANESTHETIST, CERTIFIED REGISTERED

## 2019-01-01 PROCEDURE — 99238 HOSP IP/OBS DSCHRG MGMT 30/<: CPT | Performed by: FAMILY MEDICINE

## 2019-01-01 PROCEDURE — 87147 CULTURE TYPE IMMUNOLOGIC: CPT | Performed by: FAMILY MEDICINE

## 2019-01-01 PROCEDURE — 86900 BLOOD TYPING SEROLOGIC ABO: CPT | Performed by: ANESTHESIOLOGY

## 2019-01-01 PROCEDURE — 94729 DIFFUSING CAPACITY: CPT | Performed by: INTERNAL MEDICINE

## 2019-01-01 PROCEDURE — 83735 ASSAY OF MAGNESIUM: CPT | Performed by: HOSPITALIST

## 2019-01-01 PROCEDURE — 25010000002 FUROSEMIDE PER 20 MG: Performed by: INTERNAL MEDICINE

## 2019-01-01 PROCEDURE — 83540 ASSAY OF IRON: CPT | Performed by: FAMILY MEDICINE

## 2019-01-01 PROCEDURE — 0 TECHNETIUM TC 99M PENTETATE KIT: Performed by: INTERNAL MEDICINE

## 2019-01-01 PROCEDURE — 74176 CT ABD & PELVIS W/O CONTRAST: CPT

## 2019-01-01 PROCEDURE — 99222 1ST HOSP IP/OBS MODERATE 55: CPT | Performed by: FAMILY MEDICINE

## 2019-01-01 PROCEDURE — C1769 GUIDE WIRE: HCPCS | Performed by: ORTHOPAEDIC SURGERY

## 2019-01-01 PROCEDURE — 93306 TTE W/DOPPLER COMPLETE: CPT

## 2019-01-01 PROCEDURE — 99223 1ST HOSP IP/OBS HIGH 75: CPT | Performed by: ORTHOPAEDIC SURGERY

## 2019-01-01 PROCEDURE — 83605 ASSAY OF LACTIC ACID: CPT | Performed by: HOSPITALIST

## 2019-01-01 PROCEDURE — 85610 PROTHROMBIN TIME: CPT | Performed by: ORTHOPAEDIC SURGERY

## 2019-01-01 PROCEDURE — 99215 OFFICE O/P EST HI 40 MIN: CPT | Performed by: INTERNAL MEDICINE

## 2019-01-01 PROCEDURE — 1123F ACP DISCUSS/DSCN MKR DOCD: CPT | Performed by: INTERNAL MEDICINE

## 2019-01-01 PROCEDURE — 25010000002 FENTANYL CITRATE (PF) 100 MCG/2ML SOLUTION: Performed by: NURSE ANESTHETIST, CERTIFIED REGISTERED

## 2019-01-01 PROCEDURE — 84484 ASSAY OF TROPONIN QUANT: CPT | Performed by: STUDENT IN AN ORGANIZED HEALTH CARE EDUCATION/TRAINING PROGRAM

## 2019-01-01 PROCEDURE — 99204 OFFICE O/P NEW MOD 45 MIN: CPT | Performed by: INTERNAL MEDICINE

## 2019-01-01 PROCEDURE — 93005 ELECTROCARDIOGRAM TRACING: CPT | Performed by: STUDENT IN AN ORGANIZED HEALTH CARE EDUCATION/TRAINING PROGRAM

## 2019-01-01 PROCEDURE — 87205 SMEAR GRAM STAIN: CPT | Performed by: FAMILY MEDICINE

## 2019-01-01 PROCEDURE — 85007 BL SMEAR W/DIFF WBC COUNT: CPT | Performed by: INTERNAL MEDICINE

## 2019-01-01 PROCEDURE — 25010000002 FUROSEMIDE PER 20 MG: Performed by: FAMILY MEDICINE

## 2019-01-01 PROCEDURE — 85027 COMPLETE CBC AUTOMATED: CPT | Performed by: ORTHOPAEDIC SURGERY

## 2019-01-01 PROCEDURE — 97116 GAIT TRAINING THERAPY: CPT | Performed by: PHYSICAL THERAPIST

## 2019-01-01 PROCEDURE — 27244 TREAT THIGH FRACTURE: CPT | Performed by: ORTHOPAEDIC SURGERY

## 2019-01-01 PROCEDURE — 93005 ELECTROCARDIOGRAM TRACING: CPT | Performed by: HOSPITALIST

## 2019-01-01 PROCEDURE — 82140 ASSAY OF AMMONIA: CPT | Performed by: FAMILY MEDICINE

## 2019-01-01 PROCEDURE — 99239 HOSP IP/OBS DSCHRG MGMT >30: CPT | Performed by: NURSE PRACTITIONER

## 2019-01-01 PROCEDURE — 88341 IMHCHEM/IMCYTCHM EA ADD ANTB: CPT | Performed by: PATHOLOGY

## 2019-01-01 PROCEDURE — 88305 TISSUE EXAM BY PATHOLOGIST: CPT | Performed by: PATHOLOGY

## 2019-01-01 PROCEDURE — 88341 IMHCHEM/IMCYTCHM EA ADD ANTB: CPT

## 2019-01-01 PROCEDURE — 85025 COMPLETE CBC W/AUTO DIFF WBC: CPT | Performed by: HOSPITALIST

## 2019-01-01 PROCEDURE — 80048 BASIC METABOLIC PNL TOTAL CA: CPT | Performed by: PHYSICIAN ASSISTANT

## 2019-01-01 PROCEDURE — 94770: CPT

## 2019-01-01 PROCEDURE — 87150 DNA/RNA AMPLIFIED PROBE: CPT | Performed by: FAMILY MEDICINE

## 2019-01-01 PROCEDURE — 87205 SMEAR GRAM STAIN: CPT | Performed by: INTERNAL MEDICINE

## 2019-01-01 PROCEDURE — G8731 PAIN NEG NO PLAN: HCPCS | Performed by: INTERNAL MEDICINE

## 2019-01-01 PROCEDURE — 99205 OFFICE O/P NEW HI 60 MIN: CPT | Performed by: THORACIC SURGERY (CARDIOTHORACIC VASCULAR SURGERY)

## 2019-01-01 PROCEDURE — 88305 TISSUE EXAM BY PATHOLOGIST: CPT | Performed by: THORACIC SURGERY (CARDIOTHORACIC VASCULAR SURGERY)

## 2019-01-01 PROCEDURE — 83605 ASSAY OF LACTIC ACID: CPT | Performed by: FAMILY MEDICINE

## 2019-01-01 PROCEDURE — 87633 RESP VIRUS 12-25 TARGETS: CPT | Performed by: FAMILY MEDICINE

## 2019-01-01 PROCEDURE — 83880 ASSAY OF NATRIURETIC PEPTIDE: CPT | Performed by: INTERNAL MEDICINE

## 2019-01-01 PROCEDURE — 93005 ELECTROCARDIOGRAM TRACING: CPT | Performed by: INTERNAL MEDICINE

## 2019-01-01 PROCEDURE — 78582 LUNG VENTILAT&PERFUS IMAGING: CPT

## 2019-01-01 PROCEDURE — 86901 BLOOD TYPING SEROLOGIC RH(D): CPT | Performed by: THORACIC SURGERY (CARDIOTHORACIC VASCULAR SURGERY)

## 2019-01-01 PROCEDURE — 88342 IMHCHEM/IMCYTCHM 1ST ANTB: CPT

## 2019-01-01 PROCEDURE — 25810000003 SODIUM CHLORIDE 0.9 % WITH KCL 20 MEQ 20-0.9 MEQ/L-% SOLUTION: Performed by: ORTHOPAEDIC SURGERY

## 2019-01-01 PROCEDURE — 87486 CHLMYD PNEUM DNA AMP PROBE: CPT | Performed by: FAMILY MEDICINE

## 2019-01-01 PROCEDURE — 0QS734Z REPOSITION LEFT UPPER FEMUR WITH INTERNAL FIXATION DEVICE, PERCUTANEOUS APPROACH: ICD-10-PCS | Performed by: ORTHOPAEDIC SURGERY

## 2019-01-01 PROCEDURE — 72040 X-RAY EXAM NECK SPINE 2-3 VW: CPT

## 2019-01-01 PROCEDURE — 84443 ASSAY THYROID STIM HORMONE: CPT | Performed by: FAMILY MEDICINE

## 2019-01-01 PROCEDURE — 86900 BLOOD TYPING SEROLOGIC ABO: CPT | Performed by: THORACIC SURGERY (CARDIOTHORACIC VASCULAR SURGERY)

## 2019-01-01 PROCEDURE — 25010000002 PHENYLEPHRINE PER 1 ML: Performed by: NURSE ANESTHETIST, CERTIFIED REGISTERED

## 2019-01-01 PROCEDURE — 0 TECHNETIUM ALBUMIN AGGREGATED: Performed by: INTERNAL MEDICINE

## 2019-01-01 PROCEDURE — 87070 CULTURE OTHR SPECIMN AEROBIC: CPT | Performed by: INTERNAL MEDICINE

## 2019-01-01 PROCEDURE — 84100 ASSAY OF PHOSPHORUS: CPT | Performed by: HOSPITALIST

## 2019-01-01 PROCEDURE — 85025 COMPLETE CBC W/AUTO DIFF WBC: CPT | Performed by: PHYSICIAN ASSISTANT

## 2019-01-01 PROCEDURE — 32674 THORACOSCOPY LYMPH NODE EXC: CPT | Performed by: THORACIC SURGERY (CARDIOTHORACIC VASCULAR SURGERY)

## 2019-01-01 PROCEDURE — 86850 RBC ANTIBODY SCREEN: CPT | Performed by: THORACIC SURGERY (CARDIOTHORACIC VASCULAR SURGERY)

## 2019-01-01 PROCEDURE — 0 FLUDEOXYGLUCOSE F18 SOLUTION: Performed by: INTERNAL MEDICINE

## 2019-01-01 PROCEDURE — 25010000002 NEOSTIGMINE 4 MG/4ML SOLUTION PREFILLED SYRINGE: Performed by: NURSE ANESTHETIST, CERTIFIED REGISTERED

## 2019-01-01 PROCEDURE — 99214 OFFICE O/P EST MOD 30 MIN: CPT | Performed by: INTERNAL MEDICINE

## 2019-01-01 PROCEDURE — 88307 TISSUE EXAM BY PATHOLOGIST: CPT | Performed by: PATHOLOGY

## 2019-01-01 PROCEDURE — 0 TECHNETIUM TC 99M PENTETATE KIT: Performed by: HOSPITALIST

## 2019-01-01 PROCEDURE — 36415 COLL VENOUS BLD VENIPUNCTURE: CPT

## 2019-01-01 PROCEDURE — 85027 COMPLETE CBC AUTOMATED: CPT | Performed by: THORACIC SURGERY (CARDIOTHORACIC VASCULAR SURGERY)

## 2019-01-01 PROCEDURE — 25010000002 CEFAZOLIN PER 500 MG: Performed by: THORACIC SURGERY (CARDIOTHORACIC VASCULAR SURGERY)

## 2019-01-01 PROCEDURE — 25010000002 AMIODARONE IN DEXTROSE 5% 150-4.21 MG/100ML-% SOLUTION: Performed by: INTERNAL MEDICINE

## 2019-01-01 PROCEDURE — 88342 IMHCHEM/IMCYTCHM 1ST ANTB: CPT | Performed by: THORACIC SURGERY (CARDIOTHORACIC VASCULAR SURGERY)

## 2019-01-01 PROCEDURE — 97110 THERAPEUTIC EXERCISES: CPT | Performed by: PHYSICAL THERAPIST

## 2019-01-01 PROCEDURE — 80053 COMPREHEN METABOLIC PANEL: CPT | Performed by: HOSPITALIST

## 2019-01-01 PROCEDURE — 85025 COMPLETE CBC W/AUTO DIFF WBC: CPT

## 2019-01-01 PROCEDURE — 25010000002 CEFTRIAXONE PER 250 MG: Performed by: INTERNAL MEDICINE

## 2019-01-01 PROCEDURE — 80053 COMPREHEN METABOLIC PANEL: CPT | Performed by: STUDENT IN AN ORGANIZED HEALTH CARE EDUCATION/TRAINING PROGRAM

## 2019-01-01 PROCEDURE — 88323 CONSLTJ&REPRT MATRL PREP SLD: CPT

## 2019-01-01 PROCEDURE — 74019 RADEX ABDOMEN 2 VIEWS: CPT

## 2019-01-01 PROCEDURE — 25010000002 SUCCINYLCHOLINE PER 20 MG: Performed by: NURSE ANESTHETIST, CERTIFIED REGISTERED

## 2019-01-01 PROCEDURE — A9552 F18 FDG: HCPCS | Performed by: INTERNAL MEDICINE

## 2019-01-01 PROCEDURE — 87081 CULTURE SCREEN ONLY: CPT | Performed by: FAMILY MEDICINE

## 2019-01-01 PROCEDURE — 25010000002 FENTANYL CITRATE (PF) 100 MCG/2ML SOLUTION: Performed by: INTERNAL MEDICINE

## 2019-01-01 PROCEDURE — 83880 ASSAY OF NATRIURETIC PEPTIDE: CPT | Performed by: HOSPITALIST

## 2019-01-01 PROCEDURE — 87070 CULTURE OTHR SPECIMN AEROBIC: CPT | Performed by: FAMILY MEDICINE

## 2019-01-01 PROCEDURE — 83036 HEMOGLOBIN GLYCOSYLATED A1C: CPT | Performed by: FAMILY MEDICINE

## 2019-01-01 PROCEDURE — 84484 ASSAY OF TROPONIN QUANT: CPT | Performed by: FAMILY MEDICINE

## 2019-01-01 PROCEDURE — 32608 THORACOSCOPY W/BX NODULE: CPT | Performed by: THORACIC SURGERY (CARDIOTHORACIC VASCULAR SURGERY)

## 2019-01-01 PROCEDURE — 25010000002 ZIPRASIDONE MESYLATE PER 10 MG: Performed by: INTERNAL MEDICINE

## 2019-01-01 PROCEDURE — 94002 VENT MGMT INPAT INIT DAY: CPT

## 2019-01-01 PROCEDURE — 92523 SPEECH SOUND LANG COMPREHEN: CPT | Performed by: SPEECH-LANGUAGE PATHOLOGIST

## 2019-01-01 PROCEDURE — 99285 EMERGENCY DEPT VISIT HI MDM: CPT

## 2019-01-01 PROCEDURE — 83880 ASSAY OF NATRIURETIC PEPTIDE: CPT | Performed by: FAMILY MEDICINE

## 2019-01-01 PROCEDURE — 86901 BLOOD TYPING SEROLOGIC RH(D): CPT | Performed by: ANESTHESIOLOGY

## 2019-01-01 PROCEDURE — 88341 IMHCHEM/IMCYTCHM EA ADD ANTB: CPT | Performed by: THORACIC SURGERY (CARDIOTHORACIC VASCULAR SURGERY)

## 2019-01-01 PROCEDURE — 73502 X-RAY EXAM HIP UNI 2-3 VIEWS: CPT

## 2019-01-01 PROCEDURE — 0BBC4ZX EXCISION OF RIGHT UPPER LUNG LOBE, PERCUTANEOUS ENDOSCOPIC APPROACH, DIAGNOSTIC: ICD-10-PCS | Performed by: THORACIC SURGERY (CARDIOTHORACIC VASCULAR SURGERY)

## 2019-01-01 PROCEDURE — 0BJ08ZZ INSPECTION OF TRACHEOBRONCHIAL TREE, VIA NATURAL OR ARTIFICIAL OPENING ENDOSCOPIC: ICD-10-PCS | Performed by: THORACIC SURGERY (CARDIOTHORACIC VASCULAR SURGERY)

## 2019-01-01 PROCEDURE — 25010000002 HEPARIN (PORCINE) PER 1000 UNITS: Performed by: HOSPITALIST

## 2019-01-01 PROCEDURE — 88331 PATH CONSLTJ SURG 1 BLK 1SPC: CPT | Performed by: THORACIC SURGERY (CARDIOTHORACIC VASCULAR SURGERY)

## 2019-01-01 PROCEDURE — 25010000002 ONDANSETRON PER 1 MG: Performed by: NURSE ANESTHETIST, CERTIFIED REGISTERED

## 2019-01-01 PROCEDURE — 87581 M.PNEUMON DNA AMP PROBE: CPT | Performed by: FAMILY MEDICINE

## 2019-01-01 PROCEDURE — 99205 OFFICE O/P NEW HI 60 MIN: CPT | Performed by: INTERNAL MEDICINE

## 2019-01-01 PROCEDURE — 36410 VNPNXR 3YR/> PHY/QHP DX/THER: CPT

## 2019-01-01 PROCEDURE — 25010000002 HALOPERIDOL LACTATE PER 5 MG: Performed by: INTERNAL MEDICINE

## 2019-01-01 PROCEDURE — 83735 ASSAY OF MAGNESIUM: CPT | Performed by: INTERNAL MEDICINE

## 2019-01-01 PROCEDURE — 07B74ZX EXCISION OF THORAX LYMPHATIC, PERCUTANEOUS ENDOSCOPIC APPROACH, DIAGNOSTIC: ICD-10-PCS | Performed by: THORACIC SURGERY (CARDIOTHORACIC VASCULAR SURGERY)

## 2019-01-01 PROCEDURE — 5A1945Z RESPIRATORY VENTILATION, 24-96 CONSECUTIVE HOURS: ICD-10-PCS | Performed by: INTERNAL MEDICINE

## 2019-01-01 PROCEDURE — 85730 THROMBOPLASTIN TIME PARTIAL: CPT | Performed by: ORTHOPAEDIC SURGERY

## 2019-01-01 PROCEDURE — 93005 ELECTROCARDIOGRAM TRACING: CPT | Performed by: FAMILY MEDICINE

## 2019-01-01 PROCEDURE — 84145 PROCALCITONIN (PCT): CPT | Performed by: HOSPITALIST

## 2019-01-01 PROCEDURE — 85025 COMPLETE CBC W/AUTO DIFF WBC: CPT | Performed by: STUDENT IN AN ORGANIZED HEALTH CARE EDUCATION/TRAINING PROGRAM

## 2019-01-01 PROCEDURE — 99284 EMERGENCY DEPT VISIT MOD MDM: CPT

## 2019-01-01 PROCEDURE — 84443 ASSAY THYROID STIM HORMONE: CPT

## 2019-01-01 PROCEDURE — 76937 US GUIDE VASCULAR ACCESS: CPT

## 2019-01-01 PROCEDURE — 93005 ELECTROCARDIOGRAM TRACING: CPT

## 2019-01-01 PROCEDURE — 84145 PROCALCITONIN (PCT): CPT | Performed by: FAMILY MEDICINE

## 2019-01-01 PROCEDURE — 83050 HGB METHEMOGLOBIN QUAN: CPT

## 2019-01-01 PROCEDURE — 85379 FIBRIN DEGRADATION QUANT: CPT | Performed by: INTERNAL MEDICINE

## 2019-01-01 PROCEDURE — 86850 RBC ANTIBODY SCREEN: CPT | Performed by: ANESTHESIOLOGY

## 2019-01-01 PROCEDURE — 25010000002 CEFTRIAXONE: Performed by: INTERNAL MEDICINE

## 2019-01-01 PROCEDURE — 93880 EXTRACRANIAL BILAT STUDY: CPT

## 2019-01-01 PROCEDURE — 94060 EVALUATION OF WHEEZING: CPT | Performed by: INTERNAL MEDICINE

## 2019-01-01 PROCEDURE — 25010000002 VANCOMYCIN 5 G RECONSTITUTED SOLUTION: Performed by: FAMILY MEDICINE

## 2019-01-01 PROCEDURE — 84439 ASSAY OF FREE THYROXINE: CPT

## 2019-01-01 PROCEDURE — 99213 OFFICE O/P EST LOW 20 MIN: CPT | Performed by: ORTHOPAEDIC SURGERY

## 2019-01-01 PROCEDURE — 97530 THERAPEUTIC ACTIVITIES: CPT | Performed by: PHYSICAL THERAPIST

## 2019-01-01 DEVICE — CLIP LIGAT VASC HORIZON TI SM/WD RD 6CT: Type: IMPLANTABLE DEVICE | Site: CHEST | Status: FUNCTIONAL

## 2019-01-01 DEVICE — ENDOPATH ECHELON ENDOSCOPIC LINEAR CUTTER RELOADS, BLACK, 60MM
Type: IMPLANTABLE DEVICE | Site: CHEST | Status: FUNCTIONAL
Brand: ECHELON ENDOPATH

## 2019-01-01 DEVICE — IMPLANTABLE DEVICE: Type: IMPLANTABLE DEVICE | Site: FEMUR | Status: FUNCTIONAL

## 2019-01-01 DEVICE — PERI-STRIPS DRY WITH VERITAS COLLAGEN MATRIX (PSD-V) IS PREPARED FROM DEHYDRATED BOVINE PERICARDIUM PROCURED FROM CATTLE UNDER 30 MONTHS OF AGE IN THE UNITED STATES. ONE (1) TUBE OF PSD GEL (GEL) IS PROVIDED FOR EVERY TWO (2) POUCHES OF PSD-V. THE GEL IS USED TO CREATE A TEMPORARY BOND BETWEEN THE PSD-V BUTTRESS AND THE SURGICAL STAPLER JAWS UNTIL THE STAPLER IS POSITIONED AND FIRED.
Type: IMPLANTABLE DEVICE | Site: CHEST | Status: FUNCTIONAL
Brand: PERI-STRIPS DRY WITH VERITAS COLLAGEN MATRIX

## 2019-01-01 DEVICE — SCRW CORT S/TAP 4.5X44MM: Type: IMPLANTABLE DEVICE | Site: FEMUR | Status: FUNCTIONAL

## 2019-01-01 RX ORDER — SENNA AND DOCUSATE SODIUM 50; 8.6 MG/1; MG/1
2 TABLET, FILM COATED ORAL NIGHTLY
Status: DISCONTINUED | OUTPATIENT
Start: 2019-01-01 | End: 2019-01-01 | Stop reason: HOSPADM

## 2019-01-01 RX ORDER — ONDANSETRON 4 MG/1
4 TABLET, FILM COATED ORAL EVERY 6 HOURS PRN
Status: CANCELLED | OUTPATIENT
Start: 2019-01-01

## 2019-01-01 RX ORDER — SODIUM CHLORIDE AND POTASSIUM CHLORIDE 150; 900 MG/100ML; MG/100ML
100 INJECTION, SOLUTION INTRAVENOUS CONTINUOUS
Status: DISCONTINUED | OUTPATIENT
Start: 2019-01-01 | End: 2019-01-01

## 2019-01-01 RX ORDER — SODIUM CHLORIDE 9 MG/ML
30 INJECTION, SOLUTION INTRAVENOUS CONTINUOUS
Status: DISCONTINUED | OUTPATIENT
Start: 2019-01-01 | End: 2019-01-01 | Stop reason: HOSPADM

## 2019-01-01 RX ORDER — ZIPRASIDONE MESYLATE 20 MG/ML
20 INJECTION, POWDER, LYOPHILIZED, FOR SOLUTION INTRAMUSCULAR ONCE
Status: COMPLETED | OUTPATIENT
Start: 2019-01-01 | End: 2019-01-01

## 2019-01-01 RX ORDER — SODIUM CHLORIDE 0.9 % (FLUSH) 0.9 %
3 SYRINGE (ML) INJECTION EVERY 12 HOURS SCHEDULED
Status: CANCELLED | OUTPATIENT
Start: 2019-01-01

## 2019-01-01 RX ORDER — PREDNISONE 20 MG/1
40 TABLET ORAL
Status: DISCONTINUED | OUTPATIENT
Start: 2019-01-01 | End: 2019-01-01 | Stop reason: HOSPADM

## 2019-01-01 RX ORDER — ACETAMINOPHEN 325 MG/1
650 TABLET ORAL EVERY 4 HOURS PRN
Status: CANCELLED | OUTPATIENT
Start: 2019-01-01

## 2019-01-01 RX ORDER — COLCHICINE 0.6 MG/1
0.6 TABLET ORAL DAILY
Status: DISCONTINUED | OUTPATIENT
Start: 2019-01-01 | End: 2019-01-01 | Stop reason: HOSPADM

## 2019-01-01 RX ORDER — HALOPERIDOL 5 MG/ML
2 INJECTION INTRAMUSCULAR ONCE
Status: DISCONTINUED | OUTPATIENT
Start: 2019-01-01 | End: 2019-01-01

## 2019-01-01 RX ORDER — ASPIRIN 81 MG/1
81 TABLET, CHEWABLE ORAL DAILY
Status: DISCONTINUED | OUTPATIENT
Start: 2019-01-01 | End: 2019-01-01 | Stop reason: HOSPADM

## 2019-01-01 RX ORDER — IPRATROPIUM BROMIDE AND ALBUTEROL SULFATE 2.5; .5 MG/3ML; MG/3ML
3 SOLUTION RESPIRATORY (INHALATION)
Status: DISCONTINUED | OUTPATIENT
Start: 2019-01-01 | End: 2019-01-01 | Stop reason: HOSPADM

## 2019-01-01 RX ORDER — PREDNISONE 20 MG/1
40 TABLET ORAL
Status: DISCONTINUED | OUTPATIENT
Start: 2019-01-01 | End: 2019-01-01

## 2019-01-01 RX ORDER — SODIUM CHLORIDE, SODIUM GLUCONATE, SODIUM ACETATE, POTASSIUM CHLORIDE, AND MAGNESIUM CHLORIDE 526; 502; 368; 37; 30 MG/100ML; MG/100ML; MG/100ML; MG/100ML; MG/100ML
INJECTION, SOLUTION INTRAVENOUS CONTINUOUS PRN
Status: DISCONTINUED | OUTPATIENT
Start: 2019-01-01 | End: 2019-01-01 | Stop reason: SURG

## 2019-01-01 RX ORDER — ONDANSETRON 4 MG/1
4 TABLET, FILM COATED ORAL EVERY 6 HOURS PRN
Status: DISCONTINUED | OUTPATIENT
Start: 2019-01-01 | End: 2019-01-01 | Stop reason: HOSPADM

## 2019-01-01 RX ORDER — PSEUDOEPHEDRINE HCL 30 MG
100 TABLET ORAL 2 TIMES DAILY PRN
Start: 2019-01-01 | End: 2019-01-01

## 2019-01-01 RX ORDER — VASOPRESSIN 20 U/ML
INJECTION PARENTERAL AS NEEDED
Status: DISCONTINUED | OUTPATIENT
Start: 2019-01-01 | End: 2019-01-01 | Stop reason: SURG

## 2019-01-01 RX ORDER — FLUDROCORTISONE ACETATE 0.1 MG/1
100 TABLET ORAL DAILY
Status: DISCONTINUED | OUTPATIENT
Start: 2019-01-01 | End: 2019-01-01 | Stop reason: HOSPADM

## 2019-01-01 RX ORDER — CALCIUM CARBONATE 200(500)MG
2 TABLET,CHEWABLE ORAL 2 TIMES DAILY PRN
Status: CANCELLED | OUTPATIENT
Start: 2019-01-01

## 2019-01-01 RX ORDER — IPRATROPIUM BROMIDE AND ALBUTEROL SULFATE 2.5; .5 MG/3ML; MG/3ML
3 SOLUTION RESPIRATORY (INHALATION) EVERY 4 HOURS PRN
Status: CANCELLED | OUTPATIENT
Start: 2019-01-01

## 2019-01-01 RX ORDER — ROCURONIUM BROMIDE 10 MG/ML
INJECTION, SOLUTION INTRAVENOUS AS NEEDED
Status: DISCONTINUED | OUTPATIENT
Start: 2019-01-01 | End: 2019-01-01 | Stop reason: SURG

## 2019-01-01 RX ORDER — LANOLIN ALCOHOL/MO/W.PET/CERES
400 CREAM (GRAM) TOPICAL DAILY
Status: DISCONTINUED | OUTPATIENT
Start: 2019-01-01 | End: 2019-01-01 | Stop reason: HOSPADM

## 2019-01-01 RX ORDER — ALLOPURINOL 100 MG/1
100 TABLET ORAL DAILY
COMMUNITY

## 2019-01-01 RX ORDER — IPRATROPIUM BROMIDE AND ALBUTEROL SULFATE 2.5; .5 MG/3ML; MG/3ML
3 SOLUTION RESPIRATORY (INHALATION) EVERY 6 HOURS PRN
Status: DISCONTINUED | OUTPATIENT
Start: 2019-01-01 | End: 2019-01-01 | Stop reason: HOSPADM

## 2019-01-01 RX ORDER — ONDANSETRON 2 MG/ML
4 INJECTION INTRAMUSCULAR; INTRAVENOUS EVERY 6 HOURS PRN
Status: DISCONTINUED | OUTPATIENT
Start: 2019-01-01 | End: 2019-01-01 | Stop reason: HOSPADM

## 2019-01-01 RX ORDER — PREDNISONE 10 MG/1
10 TABLET ORAL
Status: COMPLETED | OUTPATIENT
Start: 2019-01-01 | End: 2019-01-01

## 2019-01-01 RX ORDER — ISOSORBIDE MONONITRATE 30 MG/1
30 TABLET, EXTENDED RELEASE ORAL DAILY
Status: DISCONTINUED | OUTPATIENT
Start: 2019-01-01 | End: 2019-01-01 | Stop reason: HOSPADM

## 2019-01-01 RX ORDER — ONDANSETRON 2 MG/ML
4 INJECTION INTRAMUSCULAR; INTRAVENOUS EVERY 4 HOURS PRN
Status: DISCONTINUED | OUTPATIENT
Start: 2019-01-01 | End: 2019-01-01 | Stop reason: HOSPADM

## 2019-01-01 RX ORDER — BUPIVACAINE HCL/0.9 % NACL/PF 0.1 %
2 PLASTIC BAG, INJECTION (ML) EPIDURAL EVERY 8 HOURS
Status: COMPLETED | OUTPATIENT
Start: 2019-01-01 | End: 2019-01-01

## 2019-01-01 RX ORDER — SODIUM CHLORIDE 0.9 % (FLUSH) 0.9 %
10 SYRINGE (ML) INJECTION EVERY 12 HOURS SCHEDULED
Status: DISCONTINUED | OUTPATIENT
Start: 2019-01-01 | End: 2019-01-01 | Stop reason: HOSPADM

## 2019-01-01 RX ORDER — PREDNISONE 20 MG/1
20 TABLET ORAL
Status: COMPLETED | OUTPATIENT
Start: 2019-01-01 | End: 2019-01-01

## 2019-01-01 RX ORDER — ISOSORBIDE MONONITRATE 30 MG/1
30 TABLET, EXTENDED RELEASE ORAL DAILY
Status: DISCONTINUED | OUTPATIENT
Start: 2019-01-01 | End: 2019-01-01

## 2019-01-01 RX ORDER — CALCIUM CHLORIDE 100 MG/ML
1 INJECTION INTRAVENOUS; INTRAVENTRICULAR ONCE
Status: COMPLETED | OUTPATIENT
Start: 2019-01-01 | End: 2019-01-01

## 2019-01-01 RX ORDER — IPRATROPIUM BROMIDE AND ALBUTEROL SULFATE 2.5; .5 MG/3ML; MG/3ML
3 SOLUTION RESPIRATORY (INHALATION) EVERY 4 HOURS PRN
Status: DISCONTINUED | OUTPATIENT
Start: 2019-01-01 | End: 2019-01-01 | Stop reason: HOSPADM

## 2019-01-01 RX ORDER — SODIUM CHLORIDE 0.9 % (FLUSH) 0.9 %
5 SYRINGE (ML) INJECTION AS NEEDED
Status: DISCONTINUED | OUTPATIENT
Start: 2019-01-01 | End: 2019-01-01

## 2019-01-01 RX ORDER — MULTIVITAMIN WITH IRON
1 TABLET ORAL DAILY
COMMUNITY
End: 2020-01-01

## 2019-01-01 RX ORDER — FAMOTIDINE 20 MG/1
20 TABLET, FILM COATED ORAL DAILY
Status: DISCONTINUED | OUTPATIENT
Start: 2019-01-01 | End: 2019-01-01

## 2019-01-01 RX ORDER — PROMETHAZINE HYDROCHLORIDE 25 MG/ML
12.5 INJECTION, SOLUTION INTRAMUSCULAR; INTRAVENOUS EVERY 6 HOURS PRN
Status: DISCONTINUED | OUTPATIENT
Start: 2019-01-01 | End: 2019-01-01

## 2019-01-01 RX ORDER — MIDODRINE HYDROCHLORIDE 5 MG/1
5 TABLET ORAL 3 TIMES DAILY
Refills: 2 | COMMUNITY
Start: 2019-01-01 | End: 2019-01-01 | Stop reason: ALTCHOICE

## 2019-01-01 RX ORDER — ISOSORBIDE MONONITRATE 30 MG/1
30 TABLET, EXTENDED RELEASE ORAL
Status: DISCONTINUED | OUTPATIENT
Start: 2019-01-01 | End: 2019-01-01 | Stop reason: HOSPADM

## 2019-01-01 RX ORDER — HALOPERIDOL 5 MG/ML
2 INJECTION INTRAMUSCULAR EVERY 6 HOURS PRN
Status: DISCONTINUED | OUTPATIENT
Start: 2019-01-01 | End: 2019-01-01

## 2019-01-01 RX ORDER — MIDAZOLAM HYDROCHLORIDE 1 MG/ML
INJECTION INTRAMUSCULAR; INTRAVENOUS AS NEEDED
Status: DISCONTINUED | OUTPATIENT
Start: 2019-01-01 | End: 2019-01-01 | Stop reason: SURG

## 2019-01-01 RX ORDER — LACTULOSE 10 G/15ML
30 SOLUTION ORAL ONCE
Status: DISCONTINUED | OUTPATIENT
Start: 2019-01-01 | End: 2019-01-01 | Stop reason: HOSPADM

## 2019-01-01 RX ORDER — BISACODYL 5 MG/1
5 TABLET, DELAYED RELEASE ORAL DAILY PRN
Status: DISCONTINUED | OUTPATIENT
Start: 2019-01-01 | End: 2019-01-01 | Stop reason: HOSPADM

## 2019-01-01 RX ORDER — 0.9 % SODIUM CHLORIDE 0.9 %
VIAL (ML) INJECTION AS NEEDED
Status: DISCONTINUED | OUTPATIENT
Start: 2019-01-01 | End: 2019-01-01 | Stop reason: HOSPADM

## 2019-01-01 RX ORDER — ALBUTEROL SULFATE 90 UG/1
2 AEROSOL, METERED RESPIRATORY (INHALATION)
Status: DISCONTINUED | OUTPATIENT
Start: 2019-01-01 | End: 2019-01-01

## 2019-01-01 RX ORDER — ATORVASTATIN CALCIUM 20 MG/1
20 TABLET, FILM COATED ORAL DAILY
Status: DISCONTINUED | OUTPATIENT
Start: 2019-01-01 | End: 2019-01-01 | Stop reason: HOSPADM

## 2019-01-01 RX ORDER — ALLOPURINOL 300 MG/1
300 TABLET ORAL DAILY
Status: CANCELLED | OUTPATIENT
Start: 2019-01-01

## 2019-01-01 RX ORDER — ALLOPURINOL 300 MG/1
300 TABLET ORAL DAILY
Status: DISCONTINUED | OUTPATIENT
Start: 2019-01-01 | End: 2019-01-01 | Stop reason: HOSPADM

## 2019-01-01 RX ORDER — MIDODRINE HYDROCHLORIDE 5 MG/1
5 TABLET ORAL 3 TIMES DAILY
Status: DISCONTINUED | OUTPATIENT
Start: 2019-01-01 | End: 2019-01-01 | Stop reason: HOSPADM

## 2019-01-01 RX ORDER — LIDOCAINE HYDROCHLORIDE 20 MG/ML
INJECTION, SOLUTION INFILTRATION; PERINEURAL AS NEEDED
Status: DISCONTINUED | OUTPATIENT
Start: 2019-01-01 | End: 2019-01-01 | Stop reason: SURG

## 2019-01-01 RX ORDER — ALLOPURINOL 100 MG/1
100 TABLET ORAL DAILY
Status: DISCONTINUED | OUTPATIENT
Start: 2019-01-01 | End: 2019-01-01 | Stop reason: HOSPADM

## 2019-01-01 RX ORDER — SODIUM CHLORIDE 0.9 % (FLUSH) 0.9 %
3-10 SYRINGE (ML) INJECTION AS NEEDED
Status: DISCONTINUED | OUTPATIENT
Start: 2019-01-01 | End: 2019-01-01 | Stop reason: HOSPADM

## 2019-01-01 RX ORDER — ONDANSETRON 4 MG/1
4 TABLET, FILM COATED ORAL EVERY 6 HOURS PRN
Status: DISCONTINUED | OUTPATIENT
Start: 2019-01-01 | End: 2019-01-01 | Stop reason: SDUPTHER

## 2019-01-01 RX ORDER — EPINEPHRINE 1 MG/ML
INJECTION, SOLUTION, CONCENTRATE INTRAVENOUS AS NEEDED
Status: DISCONTINUED | OUTPATIENT
Start: 2019-01-01 | End: 2019-01-01 | Stop reason: SURG

## 2019-01-01 RX ORDER — ATORVASTATIN CALCIUM 20 MG/1
20 TABLET, FILM COATED ORAL DAILY
Status: CANCELLED | OUTPATIENT
Start: 2019-01-01

## 2019-01-01 RX ORDER — BUPIVACAINE HCL/0.9 % NACL/PF 0.1 %
2 PLASTIC BAG, INJECTION (ML) EPIDURAL ONCE
Status: CANCELLED | OUTPATIENT
Start: 2019-01-01 | End: 2019-01-01

## 2019-01-01 RX ORDER — DOCUSATE SODIUM 100 MG/1
100 CAPSULE, LIQUID FILLED ORAL 2 TIMES DAILY
Status: DISCONTINUED | OUTPATIENT
Start: 2019-01-01 | End: 2019-01-01

## 2019-01-01 RX ORDER — ONDANSETRON 4 MG/1
4 TABLET, FILM COATED ORAL 4 TIMES DAILY PRN
Qty: 40 TABLET | Refills: 3 | Status: SHIPPED | OUTPATIENT
Start: 2019-01-01 | End: 2020-01-01

## 2019-01-01 RX ORDER — ONDANSETRON 2 MG/ML
4 INJECTION INTRAMUSCULAR; INTRAVENOUS EVERY 6 HOURS PRN
Status: DISCONTINUED | OUTPATIENT
Start: 2019-01-01 | End: 2019-01-01

## 2019-01-01 RX ORDER — OXYCODONE HYDROCHLORIDE 5 MG/1
5 TABLET ORAL EVERY 4 HOURS PRN
Status: DISCONTINUED | OUTPATIENT
Start: 2019-01-01 | End: 2019-01-01 | Stop reason: HOSPADM

## 2019-01-01 RX ORDER — DIPHENOXYLATE HYDROCHLORIDE AND ATROPINE SULFATE 2.5; .025 MG/1; MG/1
TABLET ORAL
COMMUNITY
Start: 2019-01-01 | End: 2020-01-01

## 2019-01-01 RX ORDER — ACETAMINOPHEN 650 MG/1
650 SUPPOSITORY RECTAL EVERY 4 HOURS PRN
Status: DISCONTINUED | OUTPATIENT
Start: 2019-01-01 | End: 2019-01-01 | Stop reason: HOSPADM

## 2019-01-01 RX ORDER — MAGNESIUM HYDROXIDE 1200 MG/15ML
LIQUID ORAL AS NEEDED
Status: DISCONTINUED | OUTPATIENT
Start: 2019-01-01 | End: 2019-01-01 | Stop reason: HOSPADM

## 2019-01-01 RX ORDER — ONDANSETRON 2 MG/ML
INJECTION INTRAMUSCULAR; INTRAVENOUS AS NEEDED
Status: DISCONTINUED | OUTPATIENT
Start: 2019-01-01 | End: 2019-01-01 | Stop reason: SURG

## 2019-01-01 RX ORDER — BUPIVACAINE HCL/0.9 % NACL/PF 0.1 %
2 PLASTIC BAG, INJECTION (ML) EPIDURAL ONCE
Status: COMPLETED | OUTPATIENT
Start: 2019-01-01 | End: 2019-01-01

## 2019-01-01 RX ORDER — SODIUM CHLORIDE 9 MG/ML
75 INJECTION, SOLUTION INTRAVENOUS CONTINUOUS
Status: DISCONTINUED | OUTPATIENT
Start: 2019-01-01 | End: 2019-01-01

## 2019-01-01 RX ORDER — LEVOTHYROXINE SODIUM 0.07 MG/1
75 TABLET ORAL
Status: DISCONTINUED | OUTPATIENT
Start: 2019-01-01 | End: 2019-01-01 | Stop reason: HOSPADM

## 2019-01-01 RX ORDER — LEVOTHYROXINE SODIUM 0.07 MG/1
75 TABLET ORAL DAILY
Status: DISCONTINUED | OUTPATIENT
Start: 2019-01-01 | End: 2019-01-01

## 2019-01-01 RX ORDER — PREDNISONE 20 MG/1
40 TABLET ORAL
Status: CANCELLED | OUTPATIENT
Start: 2019-01-01

## 2019-01-01 RX ORDER — DOCUSATE SODIUM 100 MG/1
100 CAPSULE, LIQUID FILLED ORAL 2 TIMES DAILY
Status: CANCELLED | OUTPATIENT
Start: 2019-01-01

## 2019-01-01 RX ORDER — FENTANYL CITRATE 50 UG/ML
25 INJECTION, SOLUTION INTRAMUSCULAR; INTRAVENOUS
Status: DISCONTINUED | OUTPATIENT
Start: 2019-01-01 | End: 2019-01-01 | Stop reason: SDUPTHER

## 2019-01-01 RX ORDER — SODIUM CHLORIDE 9 MG/ML
INJECTION, SOLUTION INTRAVENOUS
Status: COMPLETED
Start: 2019-01-01 | End: 2019-01-01

## 2019-01-01 RX ORDER — NALOXONE HCL 0.4 MG/ML
0.4 VIAL (ML) INJECTION
Status: DISCONTINUED | OUTPATIENT
Start: 2019-01-01 | End: 2019-01-01

## 2019-01-01 RX ORDER — SUCCINYLCHOLINE CHLORIDE 20 MG/ML
INJECTION INTRAMUSCULAR; INTRAVENOUS AS NEEDED
Status: DISCONTINUED | OUTPATIENT
Start: 2019-01-01 | End: 2019-01-01 | Stop reason: SURG

## 2019-01-01 RX ORDER — MIDAZOLAM HYDROCHLORIDE 1 MG/ML
2 INJECTION INTRAMUSCULAR; INTRAVENOUS EVERY 4 HOURS PRN
Status: DISCONTINUED | OUTPATIENT
Start: 2019-01-01 | End: 2019-01-01

## 2019-01-01 RX ORDER — ACETAMINOPHEN 325 MG/1
650 TABLET ORAL EVERY 4 HOURS PRN
Status: DISCONTINUED | OUTPATIENT
Start: 2019-01-01 | End: 2019-01-01 | Stop reason: HOSPADM

## 2019-01-01 RX ORDER — ALBUTEROL SULFATE 2.5 MG/3ML
2.5 SOLUTION RESPIRATORY (INHALATION)
Status: DISPENSED | OUTPATIENT
Start: 2019-01-01 | End: 2019-01-01

## 2019-01-01 RX ORDER — FUROSEMIDE 10 MG/ML
20 INJECTION INTRAMUSCULAR; INTRAVENOUS EVERY 12 HOURS
Status: DISCONTINUED | OUTPATIENT
Start: 2019-01-01 | End: 2019-01-01

## 2019-01-01 RX ORDER — ONDANSETRON 2 MG/ML
4 INJECTION INTRAMUSCULAR; INTRAVENOUS EVERY 6 HOURS PRN
Status: CANCELLED | OUTPATIENT
Start: 2019-01-01

## 2019-01-01 RX ORDER — SODIUM CHLORIDE 9 MG/ML
50 INJECTION, SOLUTION INTRAVENOUS CONTINUOUS
Status: DISCONTINUED | OUTPATIENT
Start: 2019-01-01 | End: 2019-01-01 | Stop reason: HOSPADM

## 2019-01-01 RX ORDER — NEOSTIGMINE METHYLSULFATE 4 MG/4 ML
SYRINGE (ML) INTRAVENOUS AS NEEDED
Status: DISCONTINUED | OUTPATIENT
Start: 2019-01-01 | End: 2019-01-01 | Stop reason: SURG

## 2019-01-01 RX ORDER — SODIUM CHLORIDE 9 MG/ML
50 INJECTION, SOLUTION INTRAVENOUS CONTINUOUS
Status: DISCONTINUED | OUTPATIENT
Start: 2019-01-01 | End: 2019-01-01

## 2019-01-01 RX ORDER — FENTANYL CITRATE 50 UG/ML
INJECTION, SOLUTION INTRAMUSCULAR; INTRAVENOUS AS NEEDED
Status: DISCONTINUED | OUTPATIENT
Start: 2019-01-01 | End: 2019-01-01 | Stop reason: SURG

## 2019-01-01 RX ORDER — SODIUM CHLORIDE 0.9 % (FLUSH) 0.9 %
3 SYRINGE (ML) INJECTION EVERY 12 HOURS SCHEDULED
Status: DISCONTINUED | OUTPATIENT
Start: 2019-01-01 | End: 2019-01-01 | Stop reason: HOSPADM

## 2019-01-01 RX ORDER — ACETAMINOPHEN 500 MG
1000 TABLET ORAL EVERY 6 HOURS SCHEDULED
Status: DISCONTINUED | OUTPATIENT
Start: 2019-01-01 | End: 2019-01-01 | Stop reason: HOSPADM

## 2019-01-01 RX ORDER — HEPARIN SODIUM 5000 [USP'U]/ML
5000 INJECTION, SOLUTION INTRAVENOUS; SUBCUTANEOUS EVERY 8 HOURS SCHEDULED
Status: DISCONTINUED | OUTPATIENT
Start: 2019-01-01 | End: 2019-01-01 | Stop reason: HOSPADM

## 2019-01-01 RX ORDER — FUROSEMIDE 10 MG/ML
40 INJECTION INTRAMUSCULAR; INTRAVENOUS EVERY 12 HOURS
Status: DISCONTINUED | OUTPATIENT
Start: 2019-01-01 | End: 2019-01-01

## 2019-01-01 RX ORDER — SODIUM CHLORIDE 0.9 % (FLUSH) 0.9 %
10 SYRINGE (ML) INJECTION EVERY 12 HOURS SCHEDULED
Status: CANCELLED | OUTPATIENT
Start: 2019-01-01

## 2019-01-01 RX ORDER — METHYLPREDNISOLONE SODIUM SUCCINATE 40 MG/ML
20 INJECTION, POWDER, LYOPHILIZED, FOR SOLUTION INTRAMUSCULAR; INTRAVENOUS EVERY 12 HOURS
Status: DISCONTINUED | OUTPATIENT
Start: 2019-01-01 | End: 2019-01-01

## 2019-01-01 RX ORDER — CYCLOBENZAPRINE HCL 5 MG
5 TABLET ORAL ONCE
Status: COMPLETED | OUTPATIENT
Start: 2019-01-01 | End: 2019-01-01

## 2019-01-01 RX ORDER — SENNA AND DOCUSATE SODIUM 50; 8.6 MG/1; MG/1
2 TABLET, FILM COATED ORAL NIGHTLY
Status: CANCELLED | OUTPATIENT
Start: 2019-01-01

## 2019-01-01 RX ORDER — PROPOFOL 10 MG/ML
VIAL (ML) INTRAVENOUS AS NEEDED
Status: DISCONTINUED | OUTPATIENT
Start: 2019-01-01 | End: 2019-01-01 | Stop reason: SURG

## 2019-01-01 RX ORDER — KETAMINE HYDROCHLORIDE 100 MG/ML
INJECTION INTRAMUSCULAR; INTRAVENOUS AS NEEDED
Status: DISCONTINUED | OUTPATIENT
Start: 2019-01-01 | End: 2019-01-01 | Stop reason: SURG

## 2019-01-01 RX ORDER — HYDROCODONE BITARTRATE AND ACETAMINOPHEN 7.5; 325 MG/1; MG/1
1 TABLET ORAL EVERY 6 HOURS PRN
Status: DISCONTINUED | OUTPATIENT
Start: 2019-01-01 | End: 2019-01-01

## 2019-01-01 RX ORDER — ACETAMINOPHEN 325 MG/1
650 TABLET ORAL EVERY 4 HOURS PRN
Status: DISCONTINUED | OUTPATIENT
Start: 2019-01-01 | End: 2019-01-01

## 2019-01-01 RX ORDER — LEVOTHYROXINE SODIUM 0.07 MG/1
75 TABLET ORAL DAILY
Status: DISCONTINUED | OUTPATIENT
Start: 2019-01-01 | End: 2019-01-01 | Stop reason: HOSPADM

## 2019-01-01 RX ORDER — LANOLIN ALCOHOL/MO/W.PET/CERES
3 CREAM (GRAM) TOPICAL NIGHTLY PRN
Status: DISCONTINUED | OUTPATIENT
Start: 2019-01-01 | End: 2019-01-01 | Stop reason: HOSPADM

## 2019-01-01 RX ORDER — ACETAMINOPHEN 160 MG/5ML
650 SOLUTION ORAL EVERY 4 HOURS PRN
Status: DISCONTINUED | OUTPATIENT
Start: 2019-01-01 | End: 2019-01-01 | Stop reason: HOSPADM

## 2019-01-01 RX ORDER — BISACODYL 10 MG
10 SUPPOSITORY, RECTAL RECTAL DAILY PRN
Status: DISCONTINUED | OUTPATIENT
Start: 2019-01-01 | End: 2019-01-01 | Stop reason: HOSPADM

## 2019-01-01 RX ORDER — SODIUM CHLORIDE 0.9 % (FLUSH) 0.9 %
20 SYRINGE (ML) INJECTION AS NEEDED
Status: DISCONTINUED | OUTPATIENT
Start: 2019-01-01 | End: 2019-01-01 | Stop reason: HOSPADM

## 2019-01-01 RX ORDER — ACETAMINOPHEN 325 MG/1
650 TABLET ORAL EVERY 4 HOURS PRN
Qty: 1 BOTTLE
Start: 2019-01-01 | End: 2019-01-01

## 2019-01-01 RX ORDER — SODIUM CHLORIDE 0.9 % (FLUSH) 0.9 %
10 SYRINGE (ML) INJECTION AS NEEDED
Status: DISCONTINUED | OUTPATIENT
Start: 2019-01-01 | End: 2019-01-01 | Stop reason: HOSPADM

## 2019-01-01 RX ORDER — NOREPINEPHRINE BIT/0.9 % NACL 8 MG/250ML
.02-.3 INFUSION BOTTLE (ML) INTRAVENOUS
Status: DISCONTINUED | OUTPATIENT
Start: 2019-01-01 | End: 2019-01-01

## 2019-01-01 RX ORDER — SENNA AND DOCUSATE SODIUM 50; 8.6 MG/1; MG/1
2 TABLET, FILM COATED ORAL NIGHTLY
Start: 2019-01-01 | End: 2020-01-01

## 2019-01-01 RX ORDER — PANTOPRAZOLE SODIUM 40 MG/1
40 TABLET, DELAYED RELEASE ORAL EVERY MORNING
Status: DISCONTINUED | OUTPATIENT
Start: 2019-01-01 | End: 2019-01-01 | Stop reason: HOSPADM

## 2019-01-01 RX ORDER — SODIUM CHLORIDE 0.9 % (FLUSH) 0.9 %
10 SYRINGE (ML) INJECTION AS NEEDED
Status: CANCELLED | OUTPATIENT
Start: 2019-01-01

## 2019-01-01 RX ORDER — LEVOTHYROXINE SODIUM 0.07 MG/1
75 TABLET ORAL
Status: CANCELLED | OUTPATIENT
Start: 2019-01-01

## 2019-01-01 RX ORDER — SODIUM CHLORIDE 9 MG/ML
100 INJECTION, SOLUTION INTRAVENOUS CONTINUOUS
Status: DISCONTINUED | OUTPATIENT
Start: 2019-01-01 | End: 2019-01-01 | Stop reason: SDUPTHER

## 2019-01-01 RX ORDER — LEVOTHYROXINE SODIUM 0.07 MG/1
50 TABLET ORAL DAILY
COMMUNITY
End: 2020-01-01 | Stop reason: SDUPTHER

## 2019-01-01 RX ORDER — SODIUM CHLORIDE 9 MG/ML
100 INJECTION, SOLUTION INTRAVENOUS CONTINUOUS
Status: CANCELLED | OUTPATIENT
Start: 2019-01-01

## 2019-01-01 RX ORDER — IPRATROPIUM BROMIDE AND ALBUTEROL SULFATE 2.5; .5 MG/3ML; MG/3ML
3 SOLUTION RESPIRATORY (INHALATION)
Status: DISCONTINUED | OUTPATIENT
Start: 2019-01-01 | End: 2019-01-01

## 2019-01-01 RX ORDER — KETOROLAC TROMETHAMINE 15 MG/ML
15 INJECTION, SOLUTION INTRAMUSCULAR; INTRAVENOUS ONCE
Status: DISCONTINUED | OUTPATIENT
Start: 2019-01-01 | End: 2019-01-01 | Stop reason: HOSPADM

## 2019-01-01 RX ORDER — COLCHICINE 0.6 MG/1
0.6 TABLET ORAL DAILY
Status: CANCELLED | OUTPATIENT
Start: 2019-01-01

## 2019-01-01 RX ORDER — IPRATROPIUM BROMIDE AND ALBUTEROL SULFATE 2.5; .5 MG/3ML; MG/3ML
3 SOLUTION RESPIRATORY (INHALATION)
Status: CANCELLED | OUTPATIENT
Start: 2019-01-01

## 2019-01-01 RX ORDER — ASPIRIN 81 MG/1
81 TABLET, CHEWABLE ORAL DAILY
Status: DISCONTINUED | OUTPATIENT
Start: 2019-01-01 | End: 2019-01-01

## 2019-01-01 RX ORDER — NITROGLYCERIN 40 MG/100ML
5-200 INJECTION INTRAVENOUS
Status: DISCONTINUED | OUTPATIENT
Start: 2019-01-01 | End: 2019-01-01

## 2019-01-01 RX ORDER — OXYCODONE HYDROCHLORIDE AND ACETAMINOPHEN 5; 325 MG/1; MG/1
1 TABLET ORAL EVERY 4 HOURS PRN
Status: DISCONTINUED | OUTPATIENT
Start: 2019-01-01 | End: 2019-01-01 | Stop reason: HOSPADM

## 2019-01-01 RX ORDER — DOCUSATE SODIUM 100 MG/1
100 CAPSULE, LIQUID FILLED ORAL 2 TIMES DAILY
Status: DISCONTINUED | OUTPATIENT
Start: 2019-01-01 | End: 2019-01-01 | Stop reason: HOSPADM

## 2019-01-01 RX ORDER — MAGNESIUM SULFATE HEPTAHYDRATE 40 MG/ML
2 INJECTION, SOLUTION INTRAVENOUS ONCE
Status: COMPLETED | OUTPATIENT
Start: 2019-01-01 | End: 2019-01-01

## 2019-01-01 RX ORDER — CALCIUM CARBONATE 200(500)MG
2 TABLET,CHEWABLE ORAL 2 TIMES DAILY PRN
Status: DISCONTINUED | OUTPATIENT
Start: 2019-01-01 | End: 2019-01-01 | Stop reason: HOSPADM

## 2019-01-01 RX ORDER — KETOROLAC TROMETHAMINE 10 MG/1
10 TABLET, FILM COATED ORAL EVERY 8 HOURS
Status: DISCONTINUED | OUTPATIENT
Start: 2019-01-01 | End: 2019-01-01 | Stop reason: HOSPADM

## 2019-01-01 RX ORDER — FLUDROCORTISONE ACETATE 0.1 MG/1
100 TABLET ORAL DAILY
Status: DISCONTINUED | OUTPATIENT
Start: 2019-01-01 | End: 2019-01-01

## 2019-01-01 RX ORDER — PANTOPRAZOLE SODIUM 40 MG/1
40 TABLET, DELAYED RELEASE ORAL
Status: CANCELLED | OUTPATIENT
Start: 2019-01-01

## 2019-01-01 RX ORDER — ZIPRASIDONE MESYLATE 20 MG/ML
20 INJECTION, POWDER, LYOPHILIZED, FOR SOLUTION INTRAMUSCULAR EVERY 4 HOURS PRN
Status: DISCONTINUED | OUTPATIENT
Start: 2019-01-01 | End: 2019-01-01

## 2019-01-01 RX ORDER — OXYCODONE HYDROCHLORIDE AND ACETAMINOPHEN 5; 325 MG/1; MG/1
1-2 TABLET ORAL EVERY 4 HOURS PRN
Qty: 36 TABLET | Refills: 0 | Status: SHIPPED | OUTPATIENT
Start: 2019-01-01

## 2019-01-01 RX ORDER — ALBUTEROL SULFATE 90 UG/1
AEROSOL, METERED RESPIRATORY (INHALATION) AS NEEDED
Status: DISCONTINUED | OUTPATIENT
Start: 2019-01-01 | End: 2019-01-01 | Stop reason: SURG

## 2019-01-01 RX ORDER — BUPIVACAINE HCL/0.9 % NACL/PF 0.1 %
2 PLASTIC BAG, INJECTION (ML) EPIDURAL ONCE
Status: DISCONTINUED | OUTPATIENT
Start: 2019-01-01 | End: 2019-01-01

## 2019-01-01 RX ORDER — BACITRACIN 50000 [IU]/1
INJECTION, POWDER, FOR SOLUTION INTRAMUSCULAR AS NEEDED
Status: DISCONTINUED | OUTPATIENT
Start: 2019-01-01 | End: 2019-01-01 | Stop reason: HOSPADM

## 2019-01-01 RX ORDER — PANTOPRAZOLE SODIUM 40 MG/10ML
40 INJECTION, POWDER, LYOPHILIZED, FOR SOLUTION INTRAVENOUS
Status: DISCONTINUED | OUTPATIENT
Start: 2019-01-01 | End: 2019-01-01

## 2019-01-01 RX ORDER — PANTOPRAZOLE SODIUM 40 MG/1
40 TABLET, DELAYED RELEASE ORAL
Status: DISCONTINUED | OUTPATIENT
Start: 2019-01-01 | End: 2019-01-01 | Stop reason: HOSPADM

## 2019-01-01 RX ORDER — ACETAMINOPHEN 160 MG/5ML
650 SOLUTION ORAL EVERY 4 HOURS PRN
Status: DISCONTINUED | OUTPATIENT
Start: 2019-01-01 | End: 2019-01-01 | Stop reason: SDUPTHER

## 2019-01-01 RX ORDER — LACTULOSE 10 G/15ML
10 SOLUTION ORAL ONCE
Status: DISCONTINUED | OUTPATIENT
Start: 2019-01-01 | End: 2019-01-01 | Stop reason: HOSPADM

## 2019-01-01 RX ORDER — FUROSEMIDE 20 MG/1
20 TABLET ORAL DAILY
Qty: 30 TABLET | Refills: 1 | Status: SHIPPED | OUTPATIENT
Start: 2019-01-01 | End: 2020-01-01

## 2019-01-01 RX ORDER — FLUDROCORTISONE ACETATE 0.1 MG/1
1 TABLET ORAL DAILY
COMMUNITY
Start: 2019-01-01 | End: 2019-01-01 | Stop reason: HOSPADM

## 2019-01-01 RX ORDER — FUROSEMIDE 10 MG/ML
20 INJECTION INTRAMUSCULAR; INTRAVENOUS
Status: DISCONTINUED | OUTPATIENT
Start: 2019-01-01 | End: 2019-01-01 | Stop reason: HOSPADM

## 2019-01-01 RX ORDER — PANTOPRAZOLE SODIUM 40 MG/1
40 TABLET, DELAYED RELEASE ORAL EVERY MORNING
Status: DISCONTINUED | OUTPATIENT
Start: 2019-01-01 | End: 2019-01-01

## 2019-01-01 RX ORDER — ACETAMINOPHEN 325 MG/1
650 TABLET ORAL ONCE
Status: COMPLETED | OUTPATIENT
Start: 2019-01-01 | End: 2019-01-01

## 2019-01-01 RX ORDER — PREDNISONE 10 MG/1
TABLET ORAL
Qty: 20 TABLET | Refills: 0 | Status: SHIPPED | OUTPATIENT
Start: 2019-01-01 | End: 2019-01-01 | Stop reason: HOSPADM

## 2019-01-01 RX ORDER — SODIUM CHLORIDE 0.9 % (FLUSH) 0.9 %
3 SYRINGE (ML) INJECTION EVERY 12 HOURS SCHEDULED
Status: DISCONTINUED | OUTPATIENT
Start: 2019-01-01 | End: 2019-01-01

## 2019-01-01 RX ORDER — EPHEDRINE SULFATE 50 MG/ML
INJECTION, SOLUTION INTRAVENOUS AS NEEDED
Status: DISCONTINUED | OUTPATIENT
Start: 2019-01-01 | End: 2019-01-01 | Stop reason: SURG

## 2019-01-01 RX ORDER — MORPHINE SULFATE 2 MG/ML
2 INJECTION, SOLUTION INTRAMUSCULAR; INTRAVENOUS EVERY 4 HOURS PRN
Status: DISCONTINUED | OUTPATIENT
Start: 2019-01-01 | End: 2019-01-01

## 2019-01-01 RX ORDER — ISOSORBIDE MONONITRATE 30 MG/1
30 TABLET, EXTENDED RELEASE ORAL
Status: CANCELLED | OUTPATIENT
Start: 2019-01-01

## 2019-01-01 RX ADMIN — LEVOTHYROXINE SODIUM 75 MCG: 75 TABLET ORAL at 06:02

## 2019-01-01 RX ADMIN — NYSTATIN 10 ML: 100000 SUSPENSION ORAL at 19:30

## 2019-01-01 RX ADMIN — NYSTATIN 10 ML: 100000 SUSPENSION ORAL at 23:22

## 2019-01-01 RX ADMIN — ACETAMINOPHEN 1000 MG: 500 TABLET ORAL at 18:01

## 2019-01-01 RX ADMIN — Medication 400 MG: at 08:51

## 2019-01-01 RX ADMIN — ISOSORBIDE MONONITRATE 30 MG: 30 TABLET, EXTENDED RELEASE ORAL at 08:47

## 2019-01-01 RX ADMIN — ALLOPURINOL 100 MG: 100 TABLET ORAL at 08:51

## 2019-01-01 RX ADMIN — PANTOPRAZOLE SODIUM 40 MG: 40 TABLET, DELAYED RELEASE ORAL at 05:37

## 2019-01-01 RX ADMIN — PREDNISONE 40 MG: 20 TABLET ORAL at 13:17

## 2019-01-01 RX ADMIN — ACETAMINOPHEN 1000 MG: 500 TABLET ORAL at 16:10

## 2019-01-01 RX ADMIN — ALLOPURINOL 300 MG: 300 TABLET ORAL at 08:29

## 2019-01-01 RX ADMIN — IPRATROPIUM BROMIDE AND ALBUTEROL SULFATE 3 ML: 2.5; .5 SOLUTION RESPIRATORY (INHALATION) at 04:48

## 2019-01-01 RX ADMIN — NITROGLYCERIN 5 MCG/MIN: 40 INJECTION INTRAVENOUS at 16:44

## 2019-01-01 RX ADMIN — METHYLPREDNISOLONE SODIUM SUCCINATE 20 MG: 40 INJECTION, POWDER, FOR SOLUTION INTRAMUSCULAR; INTRAVENOUS at 01:41

## 2019-01-01 RX ADMIN — PHENYLEPHRINE HYDROCHLORIDE 150 MCG: 10 INJECTION INTRAVENOUS at 09:16

## 2019-01-01 RX ADMIN — ISOSORBIDE MONONITRATE 30 MG: 30 TABLET, EXTENDED RELEASE ORAL at 08:41

## 2019-01-01 RX ADMIN — COLCHICINE 0.6 MG: 0.6 TABLET, FILM COATED ORAL at 09:03

## 2019-01-01 RX ADMIN — ATORVASTATIN CALCIUM 20 MG: 20 TABLET, FILM COATED ORAL at 08:19

## 2019-01-01 RX ADMIN — SODIUM CHLORIDE, PRESERVATIVE FREE 10 ML: 5 INJECTION INTRAVENOUS at 20:06

## 2019-01-01 RX ADMIN — CEFTRIAXONE 1 G: 1 INJECTION, POWDER, FOR SOLUTION INTRAMUSCULAR; INTRAVENOUS at 21:42

## 2019-01-01 RX ADMIN — HEPARIN SODIUM 5000 UNITS: 5000 INJECTION, SOLUTION INTRAVENOUS; SUBCUTANEOUS at 06:02

## 2019-01-01 RX ADMIN — PANTOPRAZOLE SODIUM 40 MG: 40 TABLET, DELAYED RELEASE ORAL at 06:00

## 2019-01-01 RX ADMIN — ISOSORBIDE MONONITRATE 30 MG: 30 TABLET, EXTENDED RELEASE ORAL at 08:26

## 2019-01-01 RX ADMIN — KETOROLAC TROMETHAMINE 10 MG: 10 TABLET, FILM COATED ORAL at 22:53

## 2019-01-01 RX ADMIN — ACETAMINOPHEN 650 MG: 325 TABLET, FILM COATED ORAL at 04:15

## 2019-01-01 RX ADMIN — IPRATROPIUM BROMIDE AND ALBUTEROL SULFATE 3 ML: 2.5; .5 SOLUTION RESPIRATORY (INHALATION) at 05:23

## 2019-01-01 RX ADMIN — ASPIRIN 81 MG 81 MG: 81 TABLET ORAL at 21:10

## 2019-01-01 RX ADMIN — EPHEDRINE SULFATE 10 MG: 50 INJECTION INTRAVENOUS at 09:16

## 2019-01-01 RX ADMIN — NYSTATIN 10 ML: 100000 SUSPENSION ORAL at 06:16

## 2019-01-01 RX ADMIN — PREDNISONE 30 MG: 20 TABLET ORAL at 08:08

## 2019-01-01 RX ADMIN — CALCIUM CHLORIDE 1 G: 100 INJECTION, SOLUTION INTRAVENOUS at 11:17

## 2019-01-01 RX ADMIN — SODIUM CHLORIDE, PRESERVATIVE FREE 3 ML: 5 INJECTION INTRAVENOUS at 21:42

## 2019-01-01 RX ADMIN — PIPERACILLIN SODIUM,TAZOBACTAM SODIUM 3.38 G: 3; .375 INJECTION, POWDER, FOR SOLUTION INTRAVENOUS at 10:18

## 2019-01-01 RX ADMIN — ATORVASTATIN CALCIUM 20 MG: 20 TABLET, FILM COATED ORAL at 08:35

## 2019-01-01 RX ADMIN — NYSTATIN 10 ML: 100000 SUSPENSION ORAL at 22:20

## 2019-01-01 RX ADMIN — ALBUTEROL SULFATE 2 PUFF: 90 AEROSOL, METERED RESPIRATORY (INHALATION) at 13:05

## 2019-01-01 RX ADMIN — COLCHICINE 0.6 MG: 0.6 TABLET, FILM COATED ORAL at 08:29

## 2019-01-01 RX ADMIN — SODIUM CHLORIDE, PRESERVATIVE FREE 10 ML: 5 INJECTION INTRAVENOUS at 09:58

## 2019-01-01 RX ADMIN — KETOROLAC TROMETHAMINE 10 MG: 10 TABLET, FILM COATED ORAL at 08:12

## 2019-01-01 RX ADMIN — PANTOPRAZOLE SODIUM 40 MG: 40 TABLET, DELAYED RELEASE ORAL at 05:58

## 2019-01-01 RX ADMIN — APIXABAN 2.5 MG: 2.5 TABLET, FILM COATED ORAL at 20:09

## 2019-01-01 RX ADMIN — MIDODRINE HYDROCHLORIDE 5 MG: 5 TABLET ORAL at 08:13

## 2019-01-01 RX ADMIN — NYSTATIN 10 ML: 100000 SUSPENSION ORAL at 03:26

## 2019-01-01 RX ADMIN — METHYLPREDNISOLONE SODIUM SUCCINATE 20 MG: 40 INJECTION, POWDER, FOR SOLUTION INTRAMUSCULAR; INTRAVENOUS at 12:21

## 2019-01-01 RX ADMIN — APIXABAN 2.5 MG: 2.5 TABLET, FILM COATED ORAL at 20:13

## 2019-01-01 RX ADMIN — PANTOPRAZOLE SODIUM 40 MG: 40 TABLET, DELAYED RELEASE ORAL at 05:44

## 2019-01-01 RX ADMIN — METHYLPREDNISOLONE SODIUM SUCCINATE 20 MG: 40 INJECTION, POWDER, FOR SOLUTION INTRAMUSCULAR; INTRAVENOUS at 00:45

## 2019-01-01 RX ADMIN — ISOSORBIDE MONONITRATE 30 MG: 30 TABLET, EXTENDED RELEASE ORAL at 10:18

## 2019-01-01 RX ADMIN — ZIPRASIDONE MESYLATE 20 MG: 20 INJECTION, POWDER, LYOPHILIZED, FOR SOLUTION INTRAMUSCULAR at 10:26

## 2019-01-01 RX ADMIN — ACETAMINOPHEN 650 MG: 325 TABLET, FILM COATED ORAL at 21:39

## 2019-01-01 RX ADMIN — ASPIRIN 81 MG 81 MG: 81 TABLET ORAL at 09:06

## 2019-01-01 RX ADMIN — ASPIRIN 81 MG 81 MG: 81 TABLET ORAL at 08:51

## 2019-01-01 RX ADMIN — PANTOPRAZOLE SODIUM 40 MG: 40 TABLET, DELAYED RELEASE ORAL at 05:54

## 2019-01-01 RX ADMIN — APIXABAN 2.5 MG: 2.5 TABLET, FILM COATED ORAL at 20:23

## 2019-01-01 RX ADMIN — ATORVASTATIN CALCIUM 20 MG: 20 TABLET, FILM COATED ORAL at 08:08

## 2019-01-01 RX ADMIN — FLUDROCORTISONE ACETATE 100 MCG: 0.1 TABLET ORAL at 08:50

## 2019-01-01 RX ADMIN — LEVOTHYROXINE SODIUM 75 MCG: 75 TABLET ORAL at 05:44

## 2019-01-01 RX ADMIN — ALBUTEROL SULFATE 2 PUFF: 90 AEROSOL, METERED RESPIRATORY (INHALATION) at 16:09

## 2019-01-01 RX ADMIN — PIPERACILLIN SODIUM,TAZOBACTAM SODIUM 3.38 G: 3; .375 INJECTION, POWDER, FOR SOLUTION INTRAVENOUS at 00:36

## 2019-01-01 RX ADMIN — NYSTATIN 10 ML: 100000 SUSPENSION ORAL at 06:07

## 2019-01-01 RX ADMIN — IPRATROPIUM BROMIDE 2 PUFF: 17 AEROSOL, METERED RESPIRATORY (INHALATION) at 16:09

## 2019-01-01 RX ADMIN — POTASSIUM CHLORIDE AND SODIUM CHLORIDE 100 ML/HR: 900; 150 INJECTION, SOLUTION INTRAVENOUS at 03:20

## 2019-01-01 RX ADMIN — METHYLPREDNISOLONE SODIUM SUCCINATE 20 MG: 40 INJECTION, POWDER, FOR SOLUTION INTRAMUSCULAR; INTRAVENOUS at 12:47

## 2019-01-01 RX ADMIN — IPRATROPIUM BROMIDE AND ALBUTEROL SULFATE 3 ML: 2.5; .5 SOLUTION RESPIRATORY (INHALATION) at 19:34

## 2019-01-01 RX ADMIN — IPRATROPIUM BROMIDE AND ALBUTEROL SULFATE 3 ML: 2.5; .5 SOLUTION RESPIRATORY (INHALATION) at 20:07

## 2019-01-01 RX ADMIN — NYSTATIN 10 ML: 100000 SUSPENSION ORAL at 10:41

## 2019-01-01 RX ADMIN — ALLOPURINOL 100 MG: 100 TABLET ORAL at 10:15

## 2019-01-01 RX ADMIN — NYSTATIN 10 ML: 100000 SUSPENSION ORAL at 14:26

## 2019-01-01 RX ADMIN — PIPERACILLIN SODIUM,TAZOBACTAM SODIUM 3.38 G: 3; .375 INJECTION, POWDER, FOR SOLUTION INTRAVENOUS at 17:59

## 2019-01-01 RX ADMIN — ATORVASTATIN CALCIUM 20 MG: 20 TABLET, FILM COATED ORAL at 10:15

## 2019-01-01 RX ADMIN — NYSTATIN 10 ML: 100000 SUSPENSION ORAL at 15:24

## 2019-01-01 RX ADMIN — PANTOPRAZOLE SODIUM 40 MG: 40 TABLET, DELAYED RELEASE ORAL at 06:07

## 2019-01-01 RX ADMIN — LEVOTHYROXINE SODIUM 75 MCG: 75 TABLET ORAL at 05:23

## 2019-01-01 RX ADMIN — KETOROLAC TROMETHAMINE 10 MG: 10 TABLET, FILM COATED ORAL at 17:37

## 2019-01-01 RX ADMIN — IPRATROPIUM BROMIDE AND ALBUTEROL SULFATE 3 ML: 2.5; .5 SOLUTION RESPIRATORY (INHALATION) at 19:05

## 2019-01-01 RX ADMIN — FUROSEMIDE 20 MG: 10 INJECTION, SOLUTION INTRAMUSCULAR; INTRAVENOUS at 08:14

## 2019-01-01 RX ADMIN — LEVOTHYROXINE SODIUM 75 MCG: 75 TABLET ORAL at 05:54

## 2019-01-01 RX ADMIN — ZIPRASIDONE MESYLATE 20 MG: 20 INJECTION, POWDER, LYOPHILIZED, FOR SOLUTION INTRAMUSCULAR at 17:13

## 2019-01-01 RX ADMIN — FENTANYL CITRATE 50 MCG: 50 INJECTION, SOLUTION INTRAMUSCULAR; INTRAVENOUS at 10:15

## 2019-01-01 RX ADMIN — LEVOTHYROXINE SODIUM 75 MCG: 75 TABLET ORAL at 09:35

## 2019-01-01 RX ADMIN — DOCUSATE SODIUM 100 MG: 100 CAPSULE, LIQUID FILLED ORAL at 20:40

## 2019-01-01 RX ADMIN — IPRATROPIUM BROMIDE AND ALBUTEROL SULFATE 3 ML: 2.5; .5 SOLUTION RESPIRATORY (INHALATION) at 15:35

## 2019-01-01 RX ADMIN — SODIUM CHLORIDE: 900 INJECTION, SOLUTION INTRAVENOUS at 11:50

## 2019-01-01 RX ADMIN — ACETAMINOPHEN 650 MG: 325 TABLET, FILM COATED ORAL at 20:05

## 2019-01-01 RX ADMIN — IPRATROPIUM BROMIDE AND ALBUTEROL SULFATE 3 ML: 2.5; .5 SOLUTION RESPIRATORY (INHALATION) at 06:36

## 2019-01-01 RX ADMIN — ISOSORBIDE MONONITRATE 30 MG: 30 TABLET, EXTENDED RELEASE ORAL at 08:08

## 2019-01-01 RX ADMIN — PIPERACILLIN SODIUM,TAZOBACTAM SODIUM 3.38 G: 3; .375 INJECTION, POWDER, FOR SOLUTION INTRAVENOUS at 00:33

## 2019-01-01 RX ADMIN — NYSTATIN 10 ML: 100000 SUSPENSION ORAL at 18:14

## 2019-01-01 RX ADMIN — NYSTATIN 10 ML: 100000 SUSPENSION ORAL at 18:03

## 2019-01-01 RX ADMIN — VANCOMYCIN HYDROCHLORIDE 2250 MG: 5 INJECTION, POWDER, LYOPHILIZED, FOR SOLUTION INTRAVENOUS at 21:55

## 2019-01-01 RX ADMIN — APIXABAN 2.5 MG: 2.5 TABLET, FILM COATED ORAL at 08:08

## 2019-01-01 RX ADMIN — DOCUSATE SODIUM 100 MG: 100 CAPSULE, LIQUID FILLED ORAL at 08:41

## 2019-01-01 RX ADMIN — NYSTATIN 10 ML: 100000 SUSPENSION ORAL at 06:21

## 2019-01-01 RX ADMIN — IPRATROPIUM BROMIDE AND ALBUTEROL SULFATE 3 ML: 2.5; .5 SOLUTION RESPIRATORY (INHALATION) at 16:05

## 2019-01-01 RX ADMIN — FLUDROCORTISONE ACETATE 100 MCG: 0.1 TABLET ORAL at 08:19

## 2019-01-01 RX ADMIN — NYSTATIN 10 ML: 100000 SUSPENSION ORAL at 06:11

## 2019-01-01 RX ADMIN — NYSTATIN 10 ML: 100000 SUSPENSION ORAL at 10:54

## 2019-01-01 RX ADMIN — COLCHICINE 0.6 MG: 0.6 TABLET, FILM COATED ORAL at 08:43

## 2019-01-01 RX ADMIN — IPRATROPIUM BROMIDE AND ALBUTEROL SULFATE 3 ML: 2.5; .5 SOLUTION RESPIRATORY (INHALATION) at 15:15

## 2019-01-01 RX ADMIN — LEVOTHYROXINE SODIUM 75 MCG: 75 TABLET ORAL at 08:19

## 2019-01-01 RX ADMIN — ALLOPURINOL 300 MG: 300 TABLET ORAL at 09:57

## 2019-01-01 RX ADMIN — COLCHICINE 0.6 MG: 0.6 TABLET, FILM COATED ORAL at 08:18

## 2019-01-01 RX ADMIN — SODIUM CHLORIDE 75 ML/HR: 9 INJECTION, SOLUTION INTRAVENOUS at 11:52

## 2019-01-01 RX ADMIN — SODIUM CHLORIDE, PRESERVATIVE FREE 10 ML: 5 INJECTION INTRAVENOUS at 08:13

## 2019-01-01 RX ADMIN — SODIUM CHLORIDE, SODIUM GLUCONATE, SODIUM ACETATE, POTASSIUM CHLORIDE, AND MAGNESIUM CHLORIDE: 526; 502; 368; 37; 30 INJECTION, SOLUTION INTRAVENOUS at 09:25

## 2019-01-01 RX ADMIN — Medication 400 MG: at 08:13

## 2019-01-01 RX ADMIN — ISOSORBIDE MONONITRATE 30 MG: 30 TABLET, EXTENDED RELEASE ORAL at 08:01

## 2019-01-01 RX ADMIN — APIXABAN 2.5 MG: 2.5 TABLET, FILM COATED ORAL at 08:11

## 2019-01-01 RX ADMIN — ALLOPURINOL 300 MG: 300 TABLET ORAL at 08:24

## 2019-01-01 RX ADMIN — LEVOTHYROXINE SODIUM 75 MCG: 75 TABLET ORAL at 06:20

## 2019-01-01 RX ADMIN — MORPHINE SULFATE 4 MG: 4 INJECTION INTRAVENOUS at 01:57

## 2019-01-01 RX ADMIN — ASPIRIN 81 MG 81 MG: 81 TABLET ORAL at 08:13

## 2019-01-01 RX ADMIN — SODIUM CHLORIDE 1000 ML: 9 INJECTION, SOLUTION INTRAVENOUS at 13:50

## 2019-01-01 RX ADMIN — MIDODRINE HYDROCHLORIDE 5 MG: 5 TABLET ORAL at 16:46

## 2019-01-01 RX ADMIN — IPRATROPIUM BROMIDE AND ALBUTEROL SULFATE 3 ML: 2.5; .5 SOLUTION RESPIRATORY (INHALATION) at 06:39

## 2019-01-01 RX ADMIN — ATORVASTATIN CALCIUM 20 MG: 20 TABLET, FILM COATED ORAL at 08:00

## 2019-01-01 RX ADMIN — APIXABAN 2.5 MG: 2.5 TABLET, FILM COATED ORAL at 08:23

## 2019-01-01 RX ADMIN — KIT FOR THE PREPARATION OF TECHNETIUM TC 99M PENTETATE 1 DOSE: 20 INJECTION, POWDER, LYOPHILIZED, FOR SOLUTION INTRAVENOUS; RESPIRATORY (INHALATION) at 09:12

## 2019-01-01 RX ADMIN — IPRATROPIUM BROMIDE AND ALBUTEROL SULFATE 3 ML: 2.5; .5 SOLUTION RESPIRATORY (INHALATION) at 15:43

## 2019-01-01 RX ADMIN — NYSTATIN 10 ML: 100000 SUSPENSION ORAL at 10:03

## 2019-01-01 RX ADMIN — SODIUM BICARBONATE 50 MEQ: 84 INJECTION INTRAVENOUS at 11:17

## 2019-01-01 RX ADMIN — ACETAMINOPHEN 1000 MG: 500 TABLET ORAL at 12:52

## 2019-01-01 RX ADMIN — ISOSORBIDE MONONITRATE 30 MG: 30 TABLET, EXTENDED RELEASE ORAL at 08:35

## 2019-01-01 RX ADMIN — IPRATROPIUM BROMIDE AND ALBUTEROL SULFATE 3 ML: 2.5; .5 SOLUTION RESPIRATORY (INHALATION) at 10:34

## 2019-01-01 RX ADMIN — APIXABAN 2.5 MG: 2.5 TABLET, FILM COATED ORAL at 08:24

## 2019-01-01 RX ADMIN — ATORVASTATIN CALCIUM 20 MG: 20 TABLET, FILM COATED ORAL at 08:24

## 2019-01-01 RX ADMIN — NYSTATIN 10 ML: 100000 SUSPENSION ORAL at 00:24

## 2019-01-01 RX ADMIN — PROPOFOL 120 MG: 10 INJECTION, EMULSION INTRAVENOUS at 08:39

## 2019-01-01 RX ADMIN — KETOROLAC TROMETHAMINE 10 MG: 10 TABLET, FILM COATED ORAL at 22:27

## 2019-01-01 RX ADMIN — ALLOPURINOL 300 MG: 300 TABLET ORAL at 21:10

## 2019-01-01 RX ADMIN — NYSTATIN 10 ML: 100000 SUSPENSION ORAL at 18:34

## 2019-01-01 RX ADMIN — APIXABAN 2.5 MG: 2.5 TABLET, FILM COATED ORAL at 22:22

## 2019-01-01 RX ADMIN — ACETAMINOPHEN 1000 MG: 500 TABLET ORAL at 05:54

## 2019-01-01 RX ADMIN — ATORVASTATIN CALCIUM 20 MG: 20 TABLET, FILM COATED ORAL at 21:10

## 2019-01-01 RX ADMIN — NYSTATIN 10 ML: 100000 SUSPENSION ORAL at 14:52

## 2019-01-01 RX ADMIN — Medication 400 MG: at 16:11

## 2019-01-01 RX ADMIN — IPRATROPIUM BROMIDE AND ALBUTEROL SULFATE 3 ML: 2.5; .5 SOLUTION RESPIRATORY (INHALATION) at 06:56

## 2019-01-01 RX ADMIN — MIDODRINE HYDROCHLORIDE 5 MG: 5 TABLET ORAL at 16:39

## 2019-01-01 RX ADMIN — NYSTATIN 10 ML: 100000 SUSPENSION ORAL at 11:54

## 2019-01-01 RX ADMIN — ISOSORBIDE MONONITRATE 30 MG: 30 TABLET, EXTENDED RELEASE ORAL at 08:24

## 2019-01-01 RX ADMIN — CYCLOBENZAPRINE 5 MG: 5 TABLET, FILM COATED ORAL at 17:42

## 2019-01-01 RX ADMIN — SODIUM CHLORIDE 1000 ML: 900 INJECTION, SOLUTION INTRAVENOUS at 16:11

## 2019-01-01 RX ADMIN — APIXABAN 2.5 MG: 2.5 TABLET, FILM COATED ORAL at 20:40

## 2019-01-01 RX ADMIN — Medication 2 G: at 18:24

## 2019-01-01 RX ADMIN — IPRATROPIUM BROMIDE AND ALBUTEROL SULFATE 3 ML: 2.5; .5 SOLUTION RESPIRATORY (INHALATION) at 18:45

## 2019-01-01 RX ADMIN — ROCURONIUM BROMIDE 50 MG: 10 INJECTION INTRAVENOUS at 12:11

## 2019-01-01 RX ADMIN — LEVOTHYROXINE SODIUM 75 MCG: 75 TABLET ORAL at 05:48

## 2019-01-01 RX ADMIN — LEVOTHYROXINE SODIUM 75 MCG: 75 TABLET ORAL at 06:16

## 2019-01-01 RX ADMIN — MIDODRINE HYDROCHLORIDE 5 MG: 5 TABLET ORAL at 08:19

## 2019-01-01 RX ADMIN — COLCHICINE 0.6 MG: 0.6 TABLET, FILM COATED ORAL at 08:35

## 2019-01-01 RX ADMIN — IPRATROPIUM BROMIDE AND ALBUTEROL SULFATE 3 ML: 2.5; .5 SOLUTION RESPIRATORY (INHALATION) at 19:16

## 2019-01-01 RX ADMIN — FUROSEMIDE 20 MG: 10 INJECTION, SOLUTION INTRAMUSCULAR; INTRAVENOUS at 08:56

## 2019-01-01 RX ADMIN — SODIUM CHLORIDE, PRESERVATIVE FREE 3 ML: 5 INJECTION INTRAVENOUS at 15:32

## 2019-01-01 RX ADMIN — HALOPERIDOL LACTATE 2 MG: 5 INJECTION, SOLUTION INTRAMUSCULAR at 09:35

## 2019-01-01 RX ADMIN — PANTOPRAZOLE SODIUM 40 MG: 40 TABLET, DELAYED RELEASE ORAL at 05:23

## 2019-01-01 RX ADMIN — SODIUM CHLORIDE, PRESERVATIVE FREE 3 ML: 5 INJECTION INTRAVENOUS at 08:10

## 2019-01-01 RX ADMIN — SODIUM CHLORIDE 30 ML/HR: 9 INJECTION, SOLUTION INTRAVENOUS at 16:11

## 2019-01-01 RX ADMIN — PIPERACILLIN SODIUM,TAZOBACTAM SODIUM 3.38 G: 3; .375 INJECTION, POWDER, FOR SOLUTION INTRAVENOUS at 01:17

## 2019-01-01 RX ADMIN — ALLOPURINOL 300 MG: 300 TABLET ORAL at 08:23

## 2019-01-01 RX ADMIN — ALBUTEROL SULFATE 2.5 MG: 2.5 SOLUTION RESPIRATORY (INHALATION) at 13:52

## 2019-01-01 RX ADMIN — SODIUM CHLORIDE, PRESERVATIVE FREE 10 ML: 5 INJECTION INTRAVENOUS at 20:42

## 2019-01-01 RX ADMIN — IPRATROPIUM BROMIDE AND ALBUTEROL SULFATE 3 ML: 2.5; .5 SOLUTION RESPIRATORY (INHALATION) at 05:19

## 2019-01-01 RX ADMIN — SODIUM CHLORIDE 75 ML/HR: 9 INJECTION, SOLUTION INTRAVENOUS at 08:10

## 2019-01-01 RX ADMIN — IPRATROPIUM BROMIDE AND ALBUTEROL SULFATE 3 ML: 2.5; .5 SOLUTION RESPIRATORY (INHALATION) at 18:47

## 2019-01-01 RX ADMIN — IPRATROPIUM BROMIDE AND ALBUTEROL SULFATE 3 ML: 2.5; .5 SOLUTION RESPIRATORY (INHALATION) at 19:14

## 2019-01-01 RX ADMIN — PANTOPRAZOLE SODIUM 40 MG: 40 TABLET, DELAYED RELEASE ORAL at 06:10

## 2019-01-01 RX ADMIN — PANTOPRAZOLE SODIUM 40 MG: 40 TABLET, DELAYED RELEASE ORAL at 06:01

## 2019-01-01 RX ADMIN — IPRATROPIUM BROMIDE AND ALBUTEROL SULFATE 3 ML: 2.5; .5 SOLUTION RESPIRATORY (INHALATION) at 19:54

## 2019-01-01 RX ADMIN — COLCHICINE 0.6 MG: 0.6 TABLET, FILM COATED ORAL at 09:35

## 2019-01-01 RX ADMIN — NYSTATIN 10 ML: 100000 SUSPENSION ORAL at 18:01

## 2019-01-01 RX ADMIN — ISOSORBIDE MONONITRATE 30 MG: 30 TABLET, EXTENDED RELEASE ORAL at 08:18

## 2019-01-01 RX ADMIN — LEVOTHYROXINE SODIUM 75 MCG: 75 TABLET ORAL at 06:10

## 2019-01-01 RX ADMIN — Medication 2 G: at 12:06

## 2019-01-01 RX ADMIN — NYSTATIN 10 ML: 100000 SUSPENSION ORAL at 15:39

## 2019-01-01 RX ADMIN — FENTANYL CITRATE 25 MCG: 50 INJECTION, SOLUTION INTRAMUSCULAR; INTRAVENOUS at 11:51

## 2019-01-01 RX ADMIN — NYSTATIN 10 ML: 100000 SUSPENSION ORAL at 03:45

## 2019-01-01 RX ADMIN — LIDOCAINE HYDROCHLORIDE 80 MG: 20 INJECTION, SOLUTION INFILTRATION; PERINEURAL at 12:01

## 2019-01-01 RX ADMIN — PIPERACILLIN SODIUM,TAZOBACTAM SODIUM 3.38 G: 3; .375 INJECTION, POWDER, FOR SOLUTION INTRAVENOUS at 13:04

## 2019-01-01 RX ADMIN — ALLOPURINOL 300 MG: 300 TABLET ORAL at 08:00

## 2019-01-01 RX ADMIN — APIXABAN 2.5 MG: 2.5 TABLET, FILM COATED ORAL at 20:10

## 2019-01-01 RX ADMIN — Medication 2 G: at 23:00

## 2019-01-01 RX ADMIN — PIPERACILLIN SODIUM,TAZOBACTAM SODIUM 3.38 G: 3; .375 INJECTION, POWDER, FOR SOLUTION INTRAVENOUS at 18:20

## 2019-01-01 RX ADMIN — NYSTATIN 10 ML: 100000 SUSPENSION ORAL at 18:39

## 2019-01-01 RX ADMIN — IPRATROPIUM BROMIDE AND ALBUTEROL SULFATE 3 ML: 2.5; .5 SOLUTION RESPIRATORY (INHALATION) at 05:07

## 2019-01-01 RX ADMIN — MIDODRINE HYDROCHLORIDE 5 MG: 5 TABLET ORAL at 20:52

## 2019-01-01 RX ADMIN — FENTANYL CITRATE 25 MCG: 50 INJECTION, SOLUTION INTRAMUSCULAR; INTRAVENOUS at 11:38

## 2019-01-01 RX ADMIN — NYSTATIN 10 ML: 100000 SUSPENSION ORAL at 00:03

## 2019-01-01 RX ADMIN — NYSTATIN 10 ML: 100000 SUSPENSION ORAL at 06:00

## 2019-01-01 RX ADMIN — COLCHICINE 0.6 MG: 0.6 TABLET, FILM COATED ORAL at 08:08

## 2019-01-01 RX ADMIN — VASOPRESSIN 2 UNITS: 20 INJECTION INTRAVENOUS at 09:30

## 2019-01-01 RX ADMIN — NYSTATIN 10 ML: 100000 SUSPENSION ORAL at 23:39

## 2019-01-01 RX ADMIN — PHENOL 2 SPRAY: 1.5 LIQUID ORAL at 20:10

## 2019-01-01 RX ADMIN — APIXABAN 2.5 MG: 2.5 TABLET, FILM COATED ORAL at 08:44

## 2019-01-01 RX ADMIN — IPRATROPIUM BROMIDE AND ALBUTEROL SULFATE 3 ML: 2.5; .5 SOLUTION RESPIRATORY (INHALATION) at 14:16

## 2019-01-01 RX ADMIN — ATORVASTATIN CALCIUM 20 MG: 20 TABLET, FILM COATED ORAL at 08:34

## 2019-01-01 RX ADMIN — NYSTATIN 10 ML: 100000 SUSPENSION ORAL at 23:04

## 2019-01-01 RX ADMIN — ACETAMINOPHEN 1000 MG: 500 TABLET ORAL at 12:38

## 2019-01-01 RX ADMIN — MELATONIN 3 MG: at 20:19

## 2019-01-01 RX ADMIN — FENTANYL CITRATE 50 MCG: 50 INJECTION, SOLUTION INTRAMUSCULAR; INTRAVENOUS at 08:39

## 2019-01-01 RX ADMIN — MELATONIN 3 MG: at 20:11

## 2019-01-01 RX ADMIN — COLCHICINE 0.6 MG: 0.6 TABLET, FILM COATED ORAL at 14:30

## 2019-01-01 RX ADMIN — METHYLPREDNISOLONE SODIUM SUCCINATE 20 MG: 40 INJECTION, POWDER, FOR SOLUTION INTRAMUSCULAR; INTRAVENOUS at 00:36

## 2019-01-01 RX ADMIN — ALLOPURINOL 300 MG: 300 TABLET ORAL at 08:46

## 2019-01-01 RX ADMIN — IPRATROPIUM BROMIDE AND ALBUTEROL SULFATE 3 ML: 2.5; .5 SOLUTION RESPIRATORY (INHALATION) at 14:31

## 2019-01-01 RX ADMIN — EPINEPHRINE 10 MCG: 1 INJECTION, SOLUTION, CONCENTRATE INTRAVENOUS at 09:47

## 2019-01-01 RX ADMIN — ROCURONIUM BROMIDE 25 MG: 10 INJECTION INTRAVENOUS at 08:46

## 2019-01-01 RX ADMIN — ALLOPURINOL 300 MG: 300 TABLET ORAL at 08:34

## 2019-01-01 RX ADMIN — PANTOPRAZOLE SODIUM 40 MG: 40 TABLET, DELAYED RELEASE ORAL at 06:16

## 2019-01-01 RX ADMIN — NYSTATIN 10 ML: 100000 SUSPENSION ORAL at 01:25

## 2019-01-01 RX ADMIN — COLCHICINE 0.6 MG: 0.6 TABLET, FILM COATED ORAL at 08:24

## 2019-01-01 RX ADMIN — FUROSEMIDE 20 MG: 10 INJECTION, SOLUTION INTRAMUSCULAR; INTRAVENOUS at 20:38

## 2019-01-01 RX ADMIN — ATORVASTATIN CALCIUM 20 MG: 20 TABLET, FILM COATED ORAL at 08:41

## 2019-01-01 RX ADMIN — FENTANYL CITRATE 50 MCG: 50 INJECTION, SOLUTION INTRAMUSCULAR; INTRAVENOUS at 10:08

## 2019-01-01 RX ADMIN — SODIUM CHLORIDE, SODIUM GLUCONATE, SODIUM ACETATE, POTASSIUM CHLORIDE, AND MAGNESIUM CHLORIDE: 526; 502; 368; 37; 30 INJECTION, SOLUTION INTRAVENOUS at 08:45

## 2019-01-01 RX ADMIN — ALLOPURINOL 300 MG: 300 TABLET ORAL at 08:18

## 2019-01-01 RX ADMIN — APIXABAN 2.5 MG: 2.5 TABLET, FILM COATED ORAL at 09:41

## 2019-01-01 RX ADMIN — EPINEPHRINE 20 MCG: 1 INJECTION, SOLUTION, CONCENTRATE INTRAVENOUS at 09:28

## 2019-01-01 RX ADMIN — APIXABAN 2.5 MG: 2.5 TABLET, FILM COATED ORAL at 08:18

## 2019-01-01 RX ADMIN — Medication 1 DOSE: at 15:02

## 2019-01-01 RX ADMIN — COLCHICINE 0.6 MG: 0.6 TABLET, FILM COATED ORAL at 09:41

## 2019-01-01 RX ADMIN — PREDNISONE 30 MG: 20 TABLET ORAL at 08:28

## 2019-01-01 RX ADMIN — ATORVASTATIN CALCIUM 20 MG: 20 TABLET, FILM COATED ORAL at 08:23

## 2019-01-01 RX ADMIN — HEPARIN SODIUM 5000 UNITS: 5000 INJECTION, SOLUTION INTRAVENOUS; SUBCUTANEOUS at 06:40

## 2019-01-01 RX ADMIN — PREDNISONE 10 MG: 10 TABLET ORAL at 08:11

## 2019-01-01 RX ADMIN — NOREPINEPHRINE BITARTRATE 0.02 MCG/KG/MIN: 1 INJECTION, SOLUTION, CONCENTRATE INTRAVENOUS at 12:50

## 2019-01-01 RX ADMIN — FENTANYL CITRATE 100 MCG: 50 INJECTION, SOLUTION INTRAMUSCULAR; INTRAVENOUS at 14:00

## 2019-01-01 RX ADMIN — PANTOPRAZOLE SODIUM 40 MG: 40 TABLET, DELAYED RELEASE ORAL at 06:42

## 2019-01-01 RX ADMIN — FLUDROCORTISONE ACETATE 100 MCG: 0.1 TABLET ORAL at 08:13

## 2019-01-01 RX ADMIN — COLCHICINE 0.6 MG: 0.6 TABLET, FILM COATED ORAL at 08:23

## 2019-01-01 RX ADMIN — IPRATROPIUM BROMIDE AND ALBUTEROL SULFATE 3 ML: 2.5; .5 SOLUTION RESPIRATORY (INHALATION) at 05:03

## 2019-01-01 RX ADMIN — ATORVASTATIN CALCIUM 20 MG: 20 TABLET, FILM COATED ORAL at 08:29

## 2019-01-01 RX ADMIN — MAGNESIUM SULFATE HEPTAHYDRATE 2 G: 40 INJECTION, SOLUTION INTRAVENOUS at 20:41

## 2019-01-01 RX ADMIN — SUCCINYLCHOLINE CHLORIDE 120 MG: 20 INJECTION, SOLUTION INTRAMUSCULAR; INTRAVENOUS at 08:39

## 2019-01-01 RX ADMIN — NYSTATIN 10 ML: 100000 SUSPENSION ORAL at 10:29

## 2019-01-01 RX ADMIN — Medication 2 G: at 09:42

## 2019-01-01 RX ADMIN — IPRATROPIUM BROMIDE AND ALBUTEROL SULFATE 3 ML: 2.5; .5 SOLUTION RESPIRATORY (INHALATION) at 11:03

## 2019-01-01 RX ADMIN — NYSTATIN 10 ML: 100000 SUSPENSION ORAL at 15:13

## 2019-01-01 RX ADMIN — APIXABAN 2.5 MG: 2.5 TABLET, FILM COATED ORAL at 20:05

## 2019-01-01 RX ADMIN — APIXABAN 2.5 MG: 2.5 TABLET, FILM COATED ORAL at 09:03

## 2019-01-01 RX ADMIN — PANTOPRAZOLE SODIUM 40 MG: 40 TABLET, DELAYED RELEASE ORAL at 05:48

## 2019-01-01 RX ADMIN — NYSTATIN 10 ML: 100000 SUSPENSION ORAL at 06:04

## 2019-01-01 RX ADMIN — Medication 400 MG: at 08:19

## 2019-01-01 RX ADMIN — FLUDROCORTISONE ACETATE 100 MCG: 0.1 TABLET ORAL at 10:16

## 2019-01-01 RX ADMIN — ALLOPURINOL 300 MG: 300 TABLET ORAL at 09:41

## 2019-01-01 RX ADMIN — OXYCODONE HYDROCHLORIDE 5 MG: 5 TABLET ORAL at 21:04

## 2019-01-01 RX ADMIN — DOCUSATE SODIUM 100 MG: 100 CAPSULE, LIQUID FILLED ORAL at 21:55

## 2019-01-01 RX ADMIN — NYSTATIN 10 ML: 100000 SUSPENSION ORAL at 18:04

## 2019-01-01 RX ADMIN — ALLOPURINOL 100 MG: 100 TABLET ORAL at 08:13

## 2019-01-01 RX ADMIN — IPRATROPIUM BROMIDE AND ALBUTEROL SULFATE 3 ML: 2.5; .5 SOLUTION RESPIRATORY (INHALATION) at 11:16

## 2019-01-01 RX ADMIN — PIPERACILLIN SODIUM,TAZOBACTAM SODIUM 3.38 G: 3; .375 INJECTION, POWDER, FOR SOLUTION INTRAVENOUS at 10:33

## 2019-01-01 RX ADMIN — LEVOTHYROXINE SODIUM 75 MCG: 75 TABLET ORAL at 06:07

## 2019-01-01 RX ADMIN — ALLOPURINOL 300 MG: 300 TABLET ORAL at 10:18

## 2019-01-01 RX ADMIN — ISOSORBIDE MONONITRATE 30 MG: 30 TABLET, EXTENDED RELEASE ORAL at 08:11

## 2019-01-01 RX ADMIN — PHENOL 2 SPRAY: 1.5 LIQUID ORAL at 11:27

## 2019-01-01 RX ADMIN — KETAMINE HYDROCHLORIDE 20 MG: 100 INJECTION INTRAMUSCULAR; INTRAVENOUS at 10:11

## 2019-01-01 RX ADMIN — NYSTATIN 10 ML: 100000 SUSPENSION ORAL at 10:33

## 2019-01-01 RX ADMIN — SODIUM CHLORIDE, SODIUM GLUCONATE, SODIUM ACETATE, POTASSIUM CHLORIDE, AND MAGNESIUM CHLORIDE: 526; 502; 368; 37; 30 INJECTION, SOLUTION INTRAVENOUS at 12:03

## 2019-01-01 RX ADMIN — MAGNESIUM SULFATE HEPTAHYDRATE 1 G: 500 INJECTION, SOLUTION INTRAMUSCULAR; INTRAVENOUS at 10:02

## 2019-01-01 RX ADMIN — PANTOPRAZOLE SODIUM 40 MG: 40 TABLET, DELAYED RELEASE ORAL at 06:31

## 2019-01-01 RX ADMIN — LIDOCAINE HYDROCHLORIDE 60 MG: 20 INJECTION, SOLUTION INFILTRATION; PERINEURAL at 08:39

## 2019-01-01 RX ADMIN — NYSTATIN 10 ML: 100000 SUSPENSION ORAL at 06:08

## 2019-01-01 RX ADMIN — COLCHICINE 0.6 MG: 0.6 TABLET, FILM COATED ORAL at 09:57

## 2019-01-01 RX ADMIN — APIXABAN 2.5 MG: 2.5 TABLET, FILM COATED ORAL at 20:21

## 2019-01-01 RX ADMIN — PANTOPRAZOLE SODIUM 40 MG: 40 TABLET, DELAYED RELEASE ORAL at 06:38

## 2019-01-01 RX ADMIN — NYSTATIN 10 ML: 100000 SUSPENSION ORAL at 11:08

## 2019-01-01 RX ADMIN — HEPARIN SODIUM 5000 UNITS: 5000 INJECTION, SOLUTION INTRAVENOUS; SUBCUTANEOUS at 21:14

## 2019-01-01 RX ADMIN — NYSTATIN 10 ML: 100000 SUSPENSION ORAL at 18:02

## 2019-01-01 RX ADMIN — SODIUM CHLORIDE, PRESERVATIVE FREE 10 ML: 5 INJECTION INTRAVENOUS at 08:47

## 2019-01-01 RX ADMIN — Medication 2 MG: at 13:41

## 2019-01-01 RX ADMIN — LEVOTHYROXINE SODIUM 75 MCG: 75 TABLET ORAL at 08:40

## 2019-01-01 RX ADMIN — ALBUTEROL SULFATE 2.5 MG: 2.5 SOLUTION RESPIRATORY (INHALATION) at 08:14

## 2019-01-01 RX ADMIN — PANTOPRAZOLE SODIUM 40 MG: 40 TABLET, DELAYED RELEASE ORAL at 10:15

## 2019-01-01 RX ADMIN — ATORVASTATIN CALCIUM 20 MG: 20 TABLET, FILM COATED ORAL at 08:13

## 2019-01-01 RX ADMIN — KETAMINE HYDROCHLORIDE 20 MG: 100 INJECTION INTRAMUSCULAR; INTRAVENOUS at 09:40

## 2019-01-01 RX ADMIN — DOCUSATE SODIUM 100 MG: 100 CAPSULE, LIQUID FILLED ORAL at 23:18

## 2019-01-01 RX ADMIN — PANTOPRAZOLE SODIUM 40 MG: 40 TABLET, DELAYED RELEASE ORAL at 05:46

## 2019-01-01 RX ADMIN — COLCHICINE 0.6 MG: 0.6 TABLET, FILM COATED ORAL at 08:34

## 2019-01-01 RX ADMIN — ISOSORBIDE MONONITRATE 30 MG: 30 TABLET, EXTENDED RELEASE ORAL at 08:29

## 2019-01-01 RX ADMIN — SODIUM CHLORIDE, SODIUM GLUCONATE, SODIUM ACETATE, POTASSIUM CHLORIDE, AND MAGNESIUM CHLORIDE: 526; 502; 368; 37; 30 INJECTION, SOLUTION INTRAVENOUS at 09:35

## 2019-01-01 RX ADMIN — NYSTATIN 10 ML: 100000 SUSPENSION ORAL at 18:38

## 2019-01-01 RX ADMIN — ALLOPURINOL 100 MG: 100 TABLET ORAL at 08:19

## 2019-01-01 RX ADMIN — PANTOPRAZOLE SODIUM 40 MG: 40 TABLET, DELAYED RELEASE ORAL at 06:02

## 2019-01-01 RX ADMIN — FENTANYL CITRATE 25 MCG: 50 INJECTION, SOLUTION INTRAMUSCULAR; INTRAVENOUS at 09:20

## 2019-01-01 RX ADMIN — NYSTATIN 10 ML: 100000 SUSPENSION ORAL at 06:05

## 2019-01-01 RX ADMIN — ISOSORBIDE MONONITRATE 30 MG: 30 TABLET, EXTENDED RELEASE ORAL at 08:34

## 2019-01-01 RX ADMIN — SODIUM CHLORIDE, PRESERVATIVE FREE 10 ML: 5 INJECTION INTRAVENOUS at 08:37

## 2019-01-01 RX ADMIN — APIXABAN 2.5 MG: 2.5 TABLET, FILM COATED ORAL at 20:11

## 2019-01-01 RX ADMIN — NYSTATIN 10 ML: 100000 SUSPENSION ORAL at 14:11

## 2019-01-01 RX ADMIN — FLUDEOXYGLUCOSE F18 1 DOSE: 300 INJECTION INTRAVENOUS at 11:15

## 2019-01-01 RX ADMIN — NYSTATIN 10 ML: 100000 SUSPENSION ORAL at 06:01

## 2019-01-01 RX ADMIN — APIXABAN 2.5 MG: 2.5 TABLET, FILM COATED ORAL at 15:43

## 2019-01-01 RX ADMIN — LEVOTHYROXINE SODIUM 75 MCG: 75 TABLET ORAL at 05:37

## 2019-01-01 RX ADMIN — NYSTATIN 10 ML: 100000 SUSPENSION ORAL at 22:04

## 2019-01-01 RX ADMIN — ALBUTEROL SULFATE 2.5 MG: 2.5 SOLUTION RESPIRATORY (INHALATION) at 22:19

## 2019-01-01 RX ADMIN — NYSTATIN 10 ML: 100000 SUSPENSION ORAL at 16:31

## 2019-01-01 RX ADMIN — SODIUM CHLORIDE, PRESERVATIVE FREE 10 ML: 5 INJECTION INTRAVENOUS at 22:23

## 2019-01-01 RX ADMIN — NYSTATIN 10 ML: 100000 SUSPENSION ORAL at 15:30

## 2019-01-01 RX ADMIN — ASPIRIN 81 MG 81 MG: 81 TABLET ORAL at 16:11

## 2019-01-01 RX ADMIN — ACETAMINOPHEN 650 MG: 325 TABLET, FILM COATED ORAL at 07:46

## 2019-01-01 RX ADMIN — IPRATROPIUM BROMIDE AND ALBUTEROL SULFATE 3 ML: 2.5; .5 SOLUTION RESPIRATORY (INHALATION) at 10:53

## 2019-01-01 RX ADMIN — IPRATROPIUM BROMIDE AND ALBUTEROL SULFATE 3 ML: 2.5; .5 SOLUTION RESPIRATORY (INHALATION) at 15:03

## 2019-01-01 RX ADMIN — IPRATROPIUM BROMIDE AND ALBUTEROL SULFATE 3 ML: 2.5; .5 SOLUTION RESPIRATORY (INHALATION) at 18:36

## 2019-01-01 RX ADMIN — CYCLOBENZAPRINE 5 MG: 5 TABLET, FILM COATED ORAL at 08:41

## 2019-01-01 RX ADMIN — NYSTATIN 10 ML: 100000 SUSPENSION ORAL at 18:07

## 2019-01-01 RX ADMIN — LEVOTHYROXINE SODIUM 75 MCG: 75 TABLET ORAL at 08:14

## 2019-01-01 RX ADMIN — PANTOPRAZOLE SODIUM 40 MG: 40 TABLET, DELAYED RELEASE ORAL at 06:20

## 2019-01-01 RX ADMIN — APIXABAN 2.5 MG: 2.5 TABLET, FILM COATED ORAL at 10:17

## 2019-01-01 RX ADMIN — IPRATROPIUM BROMIDE AND ALBUTEROL SULFATE 3 ML: 2.5; .5 SOLUTION RESPIRATORY (INHALATION) at 14:23

## 2019-01-01 RX ADMIN — ACETAMINOPHEN 1000 MG: 500 TABLET ORAL at 16:46

## 2019-01-01 RX ADMIN — IPRATROPIUM BROMIDE AND ALBUTEROL SULFATE 3 ML: 2.5; .5 SOLUTION RESPIRATORY (INHALATION) at 20:26

## 2019-01-01 RX ADMIN — LEVOTHYROXINE SODIUM 75 MCG: 75 TABLET ORAL at 05:58

## 2019-01-01 RX ADMIN — DOCUSATE SODIUM 100 MG: 100 CAPSULE, LIQUID FILLED ORAL at 20:42

## 2019-01-01 RX ADMIN — ACETAMINOPHEN 650 MG: 325 TABLET, FILM COATED ORAL at 21:06

## 2019-01-01 RX ADMIN — APIXABAN 2.5 MG: 2.5 TABLET, FILM COATED ORAL at 20:18

## 2019-01-01 RX ADMIN — IPRATROPIUM BROMIDE AND ALBUTEROL SULFATE 3 ML: 2.5; .5 SOLUTION RESPIRATORY (INHALATION) at 14:30

## 2019-01-01 RX ADMIN — ISOSORBIDE MONONITRATE 30 MG: 30 TABLET, EXTENDED RELEASE ORAL at 21:10

## 2019-01-01 RX ADMIN — ALBUTEROL SULFATE 2.5 MG: 2.5 SOLUTION RESPIRATORY (INHALATION) at 06:19

## 2019-01-01 RX ADMIN — HYDROMORPHONE HYDROCHLORIDE 0.25 MG: 1 INJECTION, SOLUTION INTRAMUSCULAR; INTRAVENOUS; SUBCUTANEOUS at 14:48

## 2019-01-01 RX ADMIN — PIPERACILLIN SODIUM,TAZOBACTAM SODIUM 3.38 G: 3; .375 INJECTION, POWDER, FOR SOLUTION INTRAVENOUS at 10:15

## 2019-01-01 RX ADMIN — MORPHINE SULFATE 4 MG: 4 INJECTION INTRAVENOUS at 02:51

## 2019-01-01 RX ADMIN — NYSTATIN 10 ML: 100000 SUSPENSION ORAL at 11:31

## 2019-01-01 RX ADMIN — EPINEPHRINE 30 MCG: 1 INJECTION, SOLUTION, CONCENTRATE INTRAVENOUS at 09:34

## 2019-01-01 RX ADMIN — APIXABAN 2.5 MG: 2.5 TABLET, FILM COATED ORAL at 08:35

## 2019-01-01 RX ADMIN — MIDAZOLAM HYDROCHLORIDE 1 MG: 2 INJECTION, SOLUTION INTRAMUSCULAR; INTRAVENOUS at 11:34

## 2019-01-01 RX ADMIN — SODIUM CHLORIDE, PRESERVATIVE FREE 10 ML: 5 INJECTION INTRAVENOUS at 22:22

## 2019-01-01 RX ADMIN — NYSTATIN 10 ML: 100000 SUSPENSION ORAL at 22:23

## 2019-01-01 RX ADMIN — APIXABAN 2.5 MG: 2.5 TABLET, FILM COATED ORAL at 20:14

## 2019-01-01 RX ADMIN — ATORVASTATIN CALCIUM 20 MG: 20 TABLET, FILM COATED ORAL at 08:28

## 2019-01-01 RX ADMIN — IPRATROPIUM BROMIDE AND ALBUTEROL SULFATE 3 ML: 2.5; .5 SOLUTION RESPIRATORY (INHALATION) at 07:29

## 2019-01-01 RX ADMIN — NYSTATIN 10 ML: 100000 SUSPENSION ORAL at 11:09

## 2019-01-01 RX ADMIN — Medication 400 MG: at 08:12

## 2019-01-01 RX ADMIN — NYSTATIN 10 ML: 100000 SUSPENSION ORAL at 15:51

## 2019-01-01 RX ADMIN — ALLOPURINOL 300 MG: 300 TABLET ORAL at 08:32

## 2019-01-01 RX ADMIN — PIPERACILLIN SODIUM,TAZOBACTAM SODIUM 3.38 G: 3; .375 INJECTION, POWDER, FOR SOLUTION INTRAVENOUS at 17:53

## 2019-01-01 RX ADMIN — NYSTATIN 10 ML: 100000 SUSPENSION ORAL at 16:51

## 2019-01-01 RX ADMIN — LEVOTHYROXINE SODIUM 75 MCG: 75 TABLET ORAL at 06:09

## 2019-01-01 RX ADMIN — Medication 400 MG: at 10:14

## 2019-01-01 RX ADMIN — PIPERACILLIN SODIUM,TAZOBACTAM SODIUM 3.38 G: 3; .375 INJECTION, POWDER, FOR SOLUTION INTRAVENOUS at 18:29

## 2019-01-01 RX ADMIN — Medication 10 ML: at 14:26

## 2019-01-01 RX ADMIN — ATORVASTATIN CALCIUM 20 MG: 20 TABLET, FILM COATED ORAL at 08:43

## 2019-01-01 RX ADMIN — METHYLPREDNISOLONE SODIUM SUCCINATE 20 MG: 40 INJECTION, POWDER, FOR SOLUTION INTRAMUSCULAR; INTRAVENOUS at 00:15

## 2019-01-01 RX ADMIN — APIXABAN 2.5 MG: 2.5 TABLET, FILM COATED ORAL at 14:28

## 2019-01-01 RX ADMIN — ALBUTEROL SULFATE 2.5 MG: 2.5 SOLUTION RESPIRATORY (INHALATION) at 07:35

## 2019-01-01 RX ADMIN — PANTOPRAZOLE SODIUM 40 MG: 40 TABLET, DELAYED RELEASE ORAL at 06:40

## 2019-01-01 RX ADMIN — SODIUM CHLORIDE, PRESERVATIVE FREE 10 ML: 5 INJECTION INTRAVENOUS at 10:16

## 2019-01-01 RX ADMIN — FENTANYL CITRATE: 50 INJECTION INTRAMUSCULAR; INTRAVENOUS at 11:36

## 2019-01-01 RX ADMIN — NYSTATIN 10 ML: 100000 SUSPENSION ORAL at 15:11

## 2019-01-01 RX ADMIN — ALLOPURINOL 300 MG: 300 TABLET ORAL at 08:43

## 2019-01-01 RX ADMIN — NYSTATIN 10 ML: 100000 SUSPENSION ORAL at 23:40

## 2019-01-01 RX ADMIN — SODIUM CHLORIDE, PRESERVATIVE FREE 3 ML: 5 INJECTION INTRAVENOUS at 20:42

## 2019-01-01 RX ADMIN — APIXABAN 2.5 MG: 2.5 TABLET, FILM COATED ORAL at 08:47

## 2019-01-01 RX ADMIN — LEVOTHYROXINE SODIUM 75 MCG: 75 TABLET ORAL at 21:10

## 2019-01-01 RX ADMIN — APIXABAN 2.5 MG: 2.5 TABLET, FILM COATED ORAL at 08:26

## 2019-01-01 RX ADMIN — LEVOTHYROXINE SODIUM 75 MCG: 75 TABLET ORAL at 10:16

## 2019-01-01 RX ADMIN — ISOSORBIDE MONONITRATE 30 MG: 30 TABLET, EXTENDED RELEASE ORAL at 14:30

## 2019-01-01 RX ADMIN — ACETAMINOPHEN 1000 MG: 500 TABLET ORAL at 21:03

## 2019-01-01 RX ADMIN — NYSTATIN 10 ML: 100000 SUSPENSION ORAL at 10:56

## 2019-01-01 RX ADMIN — SODIUM CHLORIDE, PRESERVATIVE FREE 3 ML: 5 INJECTION INTRAVENOUS at 21:56

## 2019-01-01 RX ADMIN — COLCHICINE 0.6 MG: 0.6 TABLET, FILM COATED ORAL at 08:31

## 2019-01-01 RX ADMIN — COLCHICINE 0.6 MG: 0.6 TABLET, FILM COATED ORAL at 21:10

## 2019-01-01 RX ADMIN — NYSTATIN 10 ML: 100000 SUSPENSION ORAL at 12:21

## 2019-01-01 RX ADMIN — SODIUM CHLORIDE, PRESERVATIVE FREE 10 ML: 5 INJECTION INTRAVENOUS at 20:46

## 2019-01-01 RX ADMIN — IPRATROPIUM BROMIDE AND ALBUTEROL SULFATE 3 ML: 2.5; .5 SOLUTION RESPIRATORY (INHALATION) at 10:12

## 2019-01-01 RX ADMIN — APIXABAN 2.5 MG: 2.5 TABLET, FILM COATED ORAL at 21:06

## 2019-01-01 RX ADMIN — ACETAMINOPHEN 650 MG: 325 TABLET, FILM COATED ORAL at 10:41

## 2019-01-01 RX ADMIN — ATORVASTATIN CALCIUM 20 MG: 20 TABLET, FILM COATED ORAL at 08:51

## 2019-01-01 RX ADMIN — PANTOPRAZOLE SODIUM 40 MG: 40 TABLET, DELAYED RELEASE ORAL at 06:06

## 2019-01-01 RX ADMIN — PROPOFOL 90 MG: 10 INJECTION, EMULSION INTRAVENOUS at 12:01

## 2019-01-01 RX ADMIN — GLYCOPYRROLATE 0.4 MG: 0.2 INJECTION, SOLUTION INTRAMUSCULAR; INTRAVITREAL at 13:41

## 2019-01-01 RX ADMIN — PANTOPRAZOLE SODIUM 40 MG: 40 TABLET, DELAYED RELEASE ORAL at 06:03

## 2019-01-01 RX ADMIN — ATORVASTATIN CALCIUM 20 MG: 20 TABLET, FILM COATED ORAL at 09:35

## 2019-01-01 RX ADMIN — COLCHICINE 0.6 MG: 0.6 TABLET, FILM COATED ORAL at 10:18

## 2019-01-01 RX ADMIN — NYSTATIN 10 ML: 100000 SUSPENSION ORAL at 18:06

## 2019-01-01 RX ADMIN — ALLOPURINOL 300 MG: 300 TABLET ORAL at 14:30

## 2019-01-01 RX ADMIN — MIDODRINE HYDROCHLORIDE 5 MG: 5 TABLET ORAL at 21:04

## 2019-01-01 RX ADMIN — ISOSORBIDE MONONITRATE 30 MG: 30 TABLET, EXTENDED RELEASE ORAL at 08:31

## 2019-01-01 RX ADMIN — SODIUM CHLORIDE 50 ML/HR: 9 INJECTION, SOLUTION INTRAVENOUS at 19:12

## 2019-01-01 RX ADMIN — IPRATROPIUM BROMIDE AND ALBUTEROL SULFATE 3 ML: 2.5; .5 SOLUTION RESPIRATORY (INHALATION) at 19:19

## 2019-01-01 RX ADMIN — ALLOPURINOL 300 MG: 300 TABLET ORAL at 08:28

## 2019-01-01 RX ADMIN — IPRATROPIUM BROMIDE AND ALBUTEROL SULFATE 3 ML: 2.5; .5 SOLUTION RESPIRATORY (INHALATION) at 06:58

## 2019-01-01 RX ADMIN — PIPERACILLIN SODIUM,TAZOBACTAM SODIUM 3.38 G: 3; .375 INJECTION, POWDER, FOR SOLUTION INTRAVENOUS at 00:45

## 2019-01-01 RX ADMIN — IPRATROPIUM BROMIDE AND ALBUTEROL SULFATE 3 ML: 2.5; .5 SOLUTION RESPIRATORY (INHALATION) at 11:10

## 2019-01-01 RX ADMIN — SODIUM CHLORIDE, PRESERVATIVE FREE 3 ML: 5 INJECTION INTRAVENOUS at 11:16

## 2019-01-01 RX ADMIN — PREDNISONE 40 MG: 20 TABLET ORAL at 08:46

## 2019-01-01 RX ADMIN — PIPERACILLIN SODIUM,TAZOBACTAM SODIUM 3.38 G: 3; .375 INJECTION, POWDER, FOR SOLUTION INTRAVENOUS at 01:41

## 2019-01-01 RX ADMIN — ATORVASTATIN CALCIUM 20 MG: 20 TABLET, FILM COATED ORAL at 14:30

## 2019-01-01 RX ADMIN — ISOSORBIDE MONONITRATE 30 MG: 30 TABLET, EXTENDED RELEASE ORAL at 09:41

## 2019-01-01 RX ADMIN — IPRATROPIUM BROMIDE AND ALBUTEROL SULFATE 3 ML: 2.5; .5 SOLUTION RESPIRATORY (INHALATION) at 05:35

## 2019-01-01 RX ADMIN — ONDANSETRON 4 MG: 2 INJECTION INTRAMUSCULAR; INTRAVENOUS at 13:43

## 2019-01-01 RX ADMIN — FLUDROCORTISONE ACETATE 100 MCG: 0.1 TABLET ORAL at 16:44

## 2019-01-01 RX ADMIN — IPRATROPIUM BROMIDE AND ALBUTEROL SULFATE 3 ML: 2.5; .5 SOLUTION RESPIRATORY (INHALATION) at 11:47

## 2019-01-01 RX ADMIN — PREDNISONE 40 MG: 20 TABLET ORAL at 08:27

## 2019-01-01 RX ADMIN — FENTANYL CITRATE 50 MCG: 50 INJECTION, SOLUTION INTRAMUSCULAR; INTRAVENOUS at 10:00

## 2019-01-01 RX ADMIN — KETOROLAC TROMETHAMINE 10 MG: 10 TABLET, FILM COATED ORAL at 16:39

## 2019-01-01 RX ADMIN — APIXABAN 2.5 MG: 2.5 TABLET, FILM COATED ORAL at 20:44

## 2019-01-01 RX ADMIN — ATORVASTATIN CALCIUM 20 MG: 20 TABLET, FILM COATED ORAL at 10:18

## 2019-01-01 RX ADMIN — IPRATROPIUM BROMIDE AND ALBUTEROL SULFATE 3 ML: 2.5; .5 SOLUTION RESPIRATORY (INHALATION) at 19:04

## 2019-01-01 RX ADMIN — ATORVASTATIN CALCIUM 20 MG: 20 TABLET, FILM COATED ORAL at 09:41

## 2019-01-01 RX ADMIN — AMIODARONE HYDROCHLORIDE 150 MG: 1.5 INJECTION, SOLUTION INTRAVENOUS at 17:13

## 2019-01-01 RX ADMIN — ISOSORBIDE MONONITRATE 30 MG: 30 TABLET, EXTENDED RELEASE ORAL at 09:34

## 2019-01-01 RX ADMIN — APIXABAN 2.5 MG: 2.5 TABLET, FILM COATED ORAL at 08:00

## 2019-01-01 RX ADMIN — COLCHICINE 0.6 MG: 0.6 TABLET, FILM COATED ORAL at 08:46

## 2019-01-01 RX ADMIN — PANTOPRAZOLE SODIUM 40 MG: 40 INJECTION, POWDER, FOR SOLUTION INTRAVENOUS at 13:36

## 2019-01-01 RX ADMIN — PIPERACILLIN SODIUM,TAZOBACTAM SODIUM 3.38 G: 3; .375 INJECTION, POWDER, FOR SOLUTION INTRAVENOUS at 18:39

## 2019-01-01 RX ADMIN — LEVOTHYROXINE SODIUM 75 MCG: 75 TABLET ORAL at 06:01

## 2019-01-01 RX ADMIN — MIDODRINE HYDROCHLORIDE 5 MG: 5 TABLET ORAL at 21:17

## 2019-01-01 RX ADMIN — APIXABAN 2.5 MG: 2.5 TABLET, FILM COATED ORAL at 08:32

## 2019-01-01 RX ADMIN — IPRATROPIUM BROMIDE AND ALBUTEROL SULFATE 3 ML: 2.5; .5 SOLUTION RESPIRATORY (INHALATION) at 08:14

## 2019-01-01 RX ADMIN — LEVOTHYROXINE SODIUM 75 MCG: 75 TABLET ORAL at 06:38

## 2019-01-01 RX ADMIN — ATORVASTATIN CALCIUM 20 MG: 20 TABLET, FILM COATED ORAL at 08:26

## 2019-01-01 RX ADMIN — ATORVASTATIN CALCIUM 20 MG: 20 TABLET, FILM COATED ORAL at 08:47

## 2019-01-01 RX ADMIN — APIXABAN 2.5 MG: 2.5 TABLET, FILM COATED ORAL at 21:39

## 2019-01-01 RX ADMIN — EPHEDRINE SULFATE 20 MG: 50 INJECTION INTRAVENOUS at 09:27

## 2019-01-01 RX ADMIN — ALBUTEROL SULFATE 8 PUFF: 90 AEROSOL, METERED RESPIRATORY (INHALATION) at 13:00

## 2019-01-01 RX ADMIN — METHYLPREDNISOLONE SODIUM SUCCINATE 20 MG: 40 INJECTION, POWDER, FOR SOLUTION INTRAMUSCULAR; INTRAVENOUS at 00:34

## 2019-01-01 RX ADMIN — ATORVASTATIN CALCIUM 20 MG: 20 TABLET, FILM COATED ORAL at 09:57

## 2019-01-01 RX ADMIN — LEVOTHYROXINE SODIUM 75 MCG: 75 TABLET ORAL at 06:40

## 2019-01-01 RX ADMIN — ACETAMINOPHEN 650 MG: 325 TABLET, FILM COATED ORAL at 09:05

## 2019-01-01 RX ADMIN — CEFTRIAXONE 1 G: 1 INJECTION, POWDER, FOR SOLUTION INTRAMUSCULAR; INTRAVENOUS at 21:55

## 2019-01-01 RX ADMIN — IPRATROPIUM BROMIDE AND ALBUTEROL SULFATE 3 ML: 2.5; .5 SOLUTION RESPIRATORY (INHALATION) at 10:22

## 2019-01-01 RX ADMIN — PIPERACILLIN SODIUM,TAZOBACTAM SODIUM 3.38 G: 3; .375 INJECTION, POWDER, FOR SOLUTION INTRAVENOUS at 11:11

## 2019-01-01 RX ADMIN — ACETAMINOPHEN 650 MG: 325 TABLET, FILM COATED ORAL at 19:42

## 2019-01-01 RX ADMIN — IPRATROPIUM BROMIDE AND ALBUTEROL SULFATE 3 ML: 2.5; .5 SOLUTION RESPIRATORY (INHALATION) at 06:24

## 2019-01-01 RX ADMIN — ASPIRIN 81 MG 81 MG: 81 TABLET ORAL at 08:19

## 2019-01-01 RX ADMIN — APIXABAN 2.5 MG: 2.5 TABLET, FILM COATED ORAL at 08:28

## 2019-01-01 RX ADMIN — ISOSORBIDE MONONITRATE 30 MG: 30 TABLET, EXTENDED RELEASE ORAL at 09:03

## 2019-01-01 RX ADMIN — IPRATROPIUM BROMIDE AND ALBUTEROL SULFATE 3 ML: 2.5; .5 SOLUTION RESPIRATORY (INHALATION) at 15:45

## 2019-01-01 RX ADMIN — NYSTATIN 10 ML: 100000 SUSPENSION ORAL at 08:33

## 2019-01-01 RX ADMIN — ALLOPURINOL 300 MG: 300 TABLET ORAL at 08:35

## 2019-01-01 RX ADMIN — FENTANYL CITRATE 25 MCG: 50 INJECTION, SOLUTION INTRAMUSCULAR; INTRAVENOUS at 10:20

## 2019-01-01 RX ADMIN — ALLOPURINOL 300 MG: 300 TABLET ORAL at 08:09

## 2019-01-01 RX ADMIN — PREDNISONE 20 MG: 20 TABLET ORAL at 08:18

## 2019-01-01 RX ADMIN — DOCUSATE SODIUM 100 MG: 100 CAPSULE, LIQUID FILLED ORAL at 08:00

## 2019-01-01 RX ADMIN — PANTOPRAZOLE SODIUM 40 MG: 40 TABLET, DELAYED RELEASE ORAL at 06:09

## 2019-01-01 RX ADMIN — LEVOTHYROXINE SODIUM 75 MCG: 75 TABLET ORAL at 06:00

## 2019-01-01 RX ADMIN — PIPERACILLIN SODIUM,TAZOBACTAM SODIUM 3.38 G: 3; .375 INJECTION, POWDER, FOR SOLUTION INTRAVENOUS at 01:00

## 2019-01-01 RX ADMIN — DOCUSATE SODIUM 100 MG: 100 CAPSULE, LIQUID FILLED ORAL at 08:34

## 2019-01-01 RX ADMIN — PREDNISONE 20 MG: 20 TABLET ORAL at 09:00

## 2019-01-01 RX ADMIN — NYSTATIN 10 ML: 100000 SUSPENSION ORAL at 12:55

## 2019-01-01 RX ADMIN — IPRATROPIUM BROMIDE AND ALBUTEROL SULFATE 3 ML: 2.5; .5 SOLUTION RESPIRATORY (INHALATION) at 16:33

## 2019-01-01 RX ADMIN — NYSTATIN 10 ML: 100000 SUSPENSION ORAL at 19:58

## 2019-01-01 RX ADMIN — METHYLPREDNISOLONE SODIUM SUCCINATE 20 MG: 40 INJECTION, POWDER, FOR SOLUTION INTRAMUSCULAR; INTRAVENOUS at 12:54

## 2019-01-01 RX ADMIN — ATORVASTATIN CALCIUM 20 MG: 20 TABLET, FILM COATED ORAL at 08:09

## 2019-01-01 RX ADMIN — PHENOL 2 SPRAY: 1.5 LIQUID ORAL at 06:22

## 2019-01-01 RX ADMIN — SODIUM CHLORIDE 2328 ML: 900 INJECTION, SOLUTION INTRAVENOUS at 18:26

## 2019-01-01 RX ADMIN — ASPIRIN 81 MG 81 MG: 81 TABLET ORAL at 10:14

## 2019-01-01 RX ADMIN — HYDROMORPHONE HYDROCHLORIDE 0.5 MG: 1 INJECTION, SOLUTION INTRAMUSCULAR; INTRAVENOUS; SUBCUTANEOUS at 14:52

## 2019-01-01 RX ADMIN — ACETAMINOPHEN 1000 MG: 500 TABLET ORAL at 00:35

## 2019-01-01 RX ADMIN — ALLOPURINOL 300 MG: 300 TABLET ORAL at 08:41

## 2019-01-01 RX ADMIN — IPRATROPIUM BROMIDE AND ALBUTEROL SULFATE 3 ML: 2.5; .5 SOLUTION RESPIRATORY (INHALATION) at 10:33

## 2019-01-01 RX ADMIN — APIXABAN 2.5 MG: 2.5 TABLET, FILM COATED ORAL at 21:55

## 2019-01-01 RX ADMIN — ISOSORBIDE MONONITRATE 30 MG: 30 TABLET, EXTENDED RELEASE ORAL at 09:57

## 2019-01-01 RX ADMIN — PIPERACILLIN SODIUM,TAZOBACTAM SODIUM 3.38 G: 3; .375 INJECTION, POWDER, FOR SOLUTION INTRAVENOUS at 08:47

## 2019-01-01 RX ADMIN — COLCHICINE 0.6 MG: 0.6 TABLET, FILM COATED ORAL at 08:11

## 2019-01-01 RX ADMIN — APIXABAN 2.5 MG: 2.5 TABLET, FILM COATED ORAL at 08:41

## 2019-01-01 RX ADMIN — ROCURONIUM BROMIDE 5 MG: 10 INJECTION INTRAVENOUS at 08:39

## 2019-01-01 RX ADMIN — ACETAMINOPHEN 1000 MG: 500 TABLET ORAL at 06:41

## 2019-01-01 RX ADMIN — ATORVASTATIN CALCIUM 20 MG: 20 TABLET, FILM COATED ORAL at 16:11

## 2019-01-01 RX ADMIN — IPRATROPIUM BROMIDE AND ALBUTEROL SULFATE 3 ML: 2.5; .5 SOLUTION RESPIRATORY (INHALATION) at 16:01

## 2019-01-01 RX ADMIN — PIPERACILLIN SODIUM,TAZOBACTAM SODIUM 3.38 G: 3; .375 INJECTION, POWDER, FOR SOLUTION INTRAVENOUS at 17:42

## 2019-01-01 RX ADMIN — SODIUM CHLORIDE, PRESERVATIVE FREE 10 ML: 5 INJECTION INTRAVENOUS at 20:44

## 2019-01-01 RX ADMIN — IPRATROPIUM BROMIDE AND ALBUTEROL SULFATE 3 ML: 2.5; .5 SOLUTION RESPIRATORY (INHALATION) at 20:51

## 2019-01-01 RX ADMIN — NYSTATIN 10 ML: 100000 SUSPENSION ORAL at 14:12

## 2019-01-01 RX ADMIN — SODIUM CHLORIDE, PRESERVATIVE FREE 10 ML: 5 INJECTION INTRAVENOUS at 08:55

## 2019-01-01 RX ADMIN — NYSTATIN 10 ML: 100000 SUSPENSION ORAL at 02:12

## 2019-01-01 RX ADMIN — KIT FOR THE PREPARATION OF TECHNETIUM TC 99M PENTETATE 1 DOSE: 20 INJECTION, POWDER, LYOPHILIZED, FOR SOLUTION INTRAVENOUS; RESPIRATORY (INHALATION) at 14:35

## 2019-01-01 RX ADMIN — ISOSORBIDE MONONITRATE 30 MG: 30 TABLET, EXTENDED RELEASE ORAL at 08:23

## 2019-01-01 RX ADMIN — COLCHICINE 0.6 MG: 0.6 TABLET, FILM COATED ORAL at 08:41

## 2019-01-01 RX ADMIN — ALLOPURINOL 300 MG: 300 TABLET ORAL at 08:11

## 2019-01-01 RX ADMIN — NYSTATIN 10 ML: 100000 SUSPENSION ORAL at 03:36

## 2019-01-01 RX ADMIN — Medication 10 ML: at 11:16

## 2019-01-01 RX ADMIN — ACETAMINOPHEN 1000 MG: 500 TABLET ORAL at 06:38

## 2019-01-01 RX ADMIN — SODIUM CHLORIDE, PRESERVATIVE FREE 10 ML: 5 INJECTION INTRAVENOUS at 08:48

## 2019-01-01 RX ADMIN — ALLOPURINOL 300 MG: 300 TABLET ORAL at 09:34

## 2019-01-01 RX ADMIN — FENTANYL CITRATE 25 MCG: 50 INJECTION INTRAMUSCULAR; INTRAVENOUS at 14:16

## 2019-01-01 RX ADMIN — Medication 2 G: at 13:34

## 2019-01-01 RX ADMIN — SODIUM CHLORIDE, PRESERVATIVE FREE 10 ML: 5 INJECTION INTRAVENOUS at 21:07

## 2019-01-01 RX ADMIN — APIXABAN 2.5 MG: 2.5 TABLET, FILM COATED ORAL at 21:01

## 2019-01-01 RX ADMIN — SODIUM CHLORIDE, PRESERVATIVE FREE 10 ML: 5 INJECTION INTRAVENOUS at 09:59

## 2019-01-01 RX ADMIN — NYSTATIN 10 ML: 100000 SUSPENSION ORAL at 11:30

## 2019-01-01 RX ADMIN — PREDNISONE 10 MG: 10 TABLET ORAL at 08:08

## 2019-01-01 RX ADMIN — PANTOPRAZOLE SODIUM 40 MG: 40 INJECTION, POWDER, FOR SOLUTION INTRAVENOUS at 05:53

## 2019-01-01 RX ADMIN — APIXABAN 2.5 MG: 2.5 TABLET, FILM COATED ORAL at 09:57

## 2019-01-01 RX ADMIN — DOCUSATE SODIUM 100 MG: 100 CAPSULE, LIQUID FILLED ORAL at 10:17

## 2019-01-01 RX ADMIN — PIPERACILLIN SODIUM,TAZOBACTAM SODIUM 3.38 G: 3; .375 INJECTION, POWDER, FOR SOLUTION INTRAVENOUS at 17:22

## 2019-01-01 RX ADMIN — PANTOPRAZOLE SODIUM 40 MG: 40 INJECTION, POWDER, FOR SOLUTION INTRAVENOUS at 06:11

## 2019-01-01 RX ADMIN — IPRATROPIUM BROMIDE AND ALBUTEROL SULFATE 3 ML: 2.5; .5 SOLUTION RESPIRATORY (INHALATION) at 19:50

## 2019-01-01 RX ADMIN — PANTOPRAZOLE SODIUM 40 MG: 40 TABLET, DELAYED RELEASE ORAL at 05:57

## 2019-01-01 RX ADMIN — PANTOPRAZOLE SODIUM 40 MG: 40 TABLET, DELAYED RELEASE ORAL at 08:41

## 2019-01-01 RX ADMIN — SODIUM CHLORIDE 75 ML/HR: 9 INJECTION, SOLUTION INTRAVENOUS at 15:10

## 2019-01-01 RX ADMIN — IPRATROPIUM BROMIDE AND ALBUTEROL SULFATE 3 ML: 2.5; .5 SOLUTION RESPIRATORY (INHALATION) at 08:23

## 2019-01-01 RX ADMIN — FUROSEMIDE 40 MG: 10 INJECTION, SOLUTION INTRAMUSCULAR; INTRAVENOUS at 23:18

## 2019-01-01 RX ADMIN — ATORVASTATIN CALCIUM 20 MG: 20 TABLET, FILM COATED ORAL at 09:03

## 2019-01-01 RX ADMIN — ALLOPURINOL 300 MG: 300 TABLET ORAL at 08:26

## 2019-01-01 RX ADMIN — APIXABAN 2.5 MG: 2.5 TABLET, FILM COATED ORAL at 08:34

## 2019-01-01 RX ADMIN — IPRATROPIUM BROMIDE AND ALBUTEROL SULFATE 3 ML: 2.5; .5 SOLUTION RESPIRATORY (INHALATION) at 11:31

## 2019-01-01 RX ADMIN — MIDAZOLAM HYDROCHLORIDE 1 MG: 2 INJECTION, SOLUTION INTRAMUSCULAR; INTRAVENOUS at 11:47

## 2019-01-01 RX ADMIN — PIPERACILLIN SODIUM,TAZOBACTAM SODIUM 3.38 G: 3; .375 INJECTION, POWDER, FOR SOLUTION INTRAVENOUS at 20:12

## 2019-01-01 RX ADMIN — IPRATROPIUM BROMIDE AND ALBUTEROL SULFATE 3 ML: 2.5; .5 SOLUTION RESPIRATORY (INHALATION) at 11:20

## 2019-01-01 RX ADMIN — ALLOPURINOL 300 MG: 300 TABLET ORAL at 09:03

## 2019-01-01 RX ADMIN — ACETAMINOPHEN 1000 MG: 500 TABLET ORAL at 00:41

## 2019-01-01 RX ADMIN — IPRATROPIUM BROMIDE AND ALBUTEROL SULFATE 3 ML: 2.5; .5 SOLUTION RESPIRATORY (INHALATION) at 06:26

## 2019-01-01 RX ADMIN — APIXABAN 2.5 MG: 2.5 TABLET, FILM COATED ORAL at 08:29

## 2019-01-01 RX ADMIN — COLCHICINE 0.6 MG: 0.6 TABLET, FILM COATED ORAL at 08:01

## 2019-01-01 RX ADMIN — SODIUM CHLORIDE 75 ML/HR: 9 INJECTION, SOLUTION INTRAVENOUS at 17:21

## 2019-01-01 RX ADMIN — IPRATROPIUM BROMIDE AND ALBUTEROL SULFATE 3 ML: 2.5; .5 SOLUTION RESPIRATORY (INHALATION) at 10:31

## 2019-01-01 RX ADMIN — IPRATROPIUM BROMIDE AND ALBUTEROL SULFATE 3 ML: 2.5; .5 SOLUTION RESPIRATORY (INHALATION) at 13:09

## 2019-01-01 RX ADMIN — NYSTATIN 10 ML: 100000 SUSPENSION ORAL at 06:18

## 2019-01-01 RX ADMIN — PIPERACILLIN SODIUM,TAZOBACTAM SODIUM 3.38 G: 3; .375 INJECTION, POWDER, FOR SOLUTION INTRAVENOUS at 02:01

## 2019-01-01 RX ADMIN — MIDAZOLAM 2 MG: 1 INJECTION INTRAMUSCULAR; INTRAVENOUS at 12:58

## 2019-01-01 RX ADMIN — PIPERACILLIN SODIUM,TAZOBACTAM SODIUM 3.38 G: 3; .375 INJECTION, POWDER, FOR SOLUTION INTRAVENOUS at 10:30

## 2019-01-01 RX ADMIN — ALBUTEROL SULFATE 2.5 MG: 2.5 SOLUTION RESPIRATORY (INHALATION) at 15:14

## 2019-01-01 RX ADMIN — METHYLPREDNISOLONE SODIUM SUCCINATE 20 MG: 40 INJECTION, POWDER, FOR SOLUTION INTRAMUSCULAR; INTRAVENOUS at 15:43

## 2019-01-01 RX ADMIN — LEVOTHYROXINE SODIUM 75 MCG: 75 TABLET ORAL at 05:46

## 2019-01-01 RX ADMIN — IPRATROPIUM BROMIDE 2 PUFF: 17 AEROSOL, METERED RESPIRATORY (INHALATION) at 13:04

## 2019-01-01 RX ADMIN — HYDROMORPHONE HYDROCHLORIDE 0.25 MG: 1 INJECTION, SOLUTION INTRAMUSCULAR; INTRAVENOUS; SUBCUTANEOUS at 12:58

## 2019-01-01 RX ADMIN — NYSTATIN 10 ML: 100000 SUSPENSION ORAL at 18:05

## 2019-01-01 RX ADMIN — ALLOPURINOL 300 MG: 300 TABLET ORAL at 08:08

## 2019-01-01 RX ADMIN — IPRATROPIUM BROMIDE AND ALBUTEROL SULFATE 3 ML: 2.5; .5 SOLUTION RESPIRATORY (INHALATION) at 15:31

## 2019-01-01 RX ADMIN — NYSTATIN 10 ML: 100000 SUSPENSION ORAL at 06:02

## 2019-01-01 RX ADMIN — ATORVASTATIN CALCIUM 20 MG: 20 TABLET, FILM COATED ORAL at 08:31

## 2019-01-01 RX ADMIN — Medication 2 G: at 02:00

## 2019-01-01 RX ADMIN — IPRATROPIUM BROMIDE AND ALBUTEROL SULFATE 3 ML: 2.5; .5 SOLUTION RESPIRATORY (INHALATION) at 18:43

## 2019-01-01 RX ADMIN — FENTANYL CITRATE 25 MCG: 50 INJECTION, SOLUTION INTRAMUSCULAR; INTRAVENOUS at 11:44

## 2019-01-01 RX ADMIN — LEVOTHYROXINE SODIUM 75 MCG: 75 TABLET ORAL at 05:57

## 2019-01-01 RX ADMIN — DOCUSATE SODIUM 100 MG: 100 CAPSULE, LIQUID FILLED ORAL at 20:23

## 2019-01-01 RX ADMIN — ATORVASTATIN CALCIUM 20 MG: 20 TABLET, FILM COATED ORAL at 08:12

## 2019-01-01 RX ADMIN — KETOROLAC TROMETHAMINE 10 MG: 10 TABLET, FILM COATED ORAL at 06:39

## 2019-01-01 RX ADMIN — LEVOTHYROXINE SODIUM 75 MCG: 75 TABLET ORAL at 06:31

## 2019-01-01 RX ADMIN — COLCHICINE 0.6 MG: 0.6 TABLET, FILM COATED ORAL at 08:26

## 2019-06-15 PROBLEM — Z87.81 S/P LEFT HIP FRACTURE: Status: ACTIVE | Noted: 2019-01-01

## 2019-06-15 PROBLEM — R29.6 FREQUENT FALLS: Status: ACTIVE | Noted: 2019-01-01

## 2019-06-15 PROBLEM — S72.002A CLOSED FRACTURE OF LEFT HIP (HCC): Status: ACTIVE | Noted: 2019-01-01

## 2019-06-15 PROBLEM — S72.145D CLOSED NONDISPLACED INTERTROCHANTERIC FRACTURE OF LEFT FEMUR WITH ROUTINE HEALING: Status: ACTIVE | Noted: 2019-01-01

## 2019-06-16 PROBLEM — S72.145D CLOSED NONDISPLACED INTERTROCHANTERIC FRACTURE OF LEFT FEMUR WITH ROUTINE HEALING: Status: ACTIVE | Noted: 2019-01-01

## 2019-06-16 NOTE — ANESTHESIA PROCEDURE NOTES
Arterial Line      Patient reassessed immediately prior to procedure    Patient location during procedure: OR  Start time: 6/16/2019 8:58 AM  Stop Time:6/16/2019 9:08 AM       Line placed for hemodynamic monitoring.  Performed By   CRNA: Thad Carlos SRNA  Preanesthetic Checklist  Completed: patient identified, site marked, surgical consent, pre-op evaluation, timeout performed, IV checked, risks and benefits discussed and monitors and equipment checked  Arterial Line Prep   Sterile Tech: cap, gloves, sterile barriers and mask  Prep: ChloraPrep  Patient monitoring: EKG, continuous pulse oximetry and blood pressure monitoring  Arterial Line Procedure   Laterality:right  Location:  radial artery  Catheter size: 20 G   Guidance: landmark technique  Number of attempts: 2  Successful placement: yes          Post Assessment   Dressing Type: secured with tape and occlusive dressing applied.   Complications no  Circ/Move/Sens Assessment: normal and unchanged.   Patient Tolerance: patient tolerated the procedure well with no apparent complications

## 2019-06-16 NOTE — ANESTHESIA PROCEDURE NOTES
Airway  Urgency: elective    Airway not difficult    General Information and Staff    Patient location during procedure: OR    Indications and Patient Condition  Indications for airway management: airway protection    Preoxygenated: yes  Mask difficulty assessment: 0 - not attempted    Final Airway Details  Final airway type: endotracheal airway      Successful airway: ETT  Cuffed: yes   Successful intubation technique: direct laryngoscopy  Facilitating devices/methods: intubating stylet  Endotracheal tube insertion site: oral  Blade: Navid  Blade size: 4  ETT size (mm): 7.5  Cormack-Lehane Classification: grade I - full view of glottis  Placement verified by: chest auscultation and capnometry   Measured from: gums  ETT to gums (cm): 18  Number of attempts at approach: 1    Additional Comments  Intubated per NATE Carlos SRNA

## 2019-06-16 NOTE — ADDENDUM NOTE
Addendum  created 06/16/19 1102 by Devin Cortes CRNA    Intraprocedure Event edited, Intraprocedure Flowsheets edited, Intraprocedure Meds edited, Sign clinical note

## 2019-06-16 NOTE — ANESTHESIA POSTPROCEDURE EVALUATION
Patient: Kuldip Nevarez    Procedure Summary     Date:  06/16/19 Room / Location:  [facility] OR  / [facility] OR    Anesthesia Start:  0832 Anesthesia Stop:  1022    Procedure:  FEMUR OPEN REDUCTION INTERNAL FIXATION (Left Thigh) Diagnosis:       Closed nondisplaced intertrochanteric fracture of left femur with routine healing      (Closed nondisplaced intertrochanteric fracture of left femur with routine healing [S72.145D])    Surgeon:  Edward Harley MD Provider:  Ruddy Jasmine MD    Anesthesia Type:  general ASA Status:  4 - Emergent          Anesthesia Type: general  Last vitals  BP   140/80 (06/16/19 0330)   Temp   100.3 °F (37.9 °C) (06/16/19 0330)   Pulse   70 (06/16/19 0733)   Resp   18 (06/16/19 0330)     SpO2   93 % (06/16/19 0330)     Post Anesthesia Care and Evaluation    Patient location during evaluation: ICU  Patient participation: complete - patient cannot participate  Level of consciousness: obtunded/minimal responses  Pain score: 0  Pain management: adequate  Airway patency: patent  Anesthetic complications: anesthesia complication  PONV Status: none  Cardiovascular status: acceptable  Respiratory status: acceptable  Hydration status: acceptable

## 2019-06-16 NOTE — ANESTHESIA POSTPROCEDURE EVALUATION
Patient: Kuldip Nevarez    Procedure Summary     Date:  06/16/19 Room / Location:  [facility] OR  / [facility] OR    Anesthesia Start:  0832 Anesthesia Stop:  1022    Procedure:  FEMUR OPEN REDUCTION INTERNAL FIXATION (Left Thigh) Diagnosis:       Closed nondisplaced intertrochanteric fracture of left femur with routine healing      (Closed nondisplaced intertrochanteric fracture of left femur with routine healing [S72.145D])    Surgeon:  Edward Harley MD Provider:  Ruddy Jasmine MD    Anesthesia Type:  general ASA Status:  4 - Emergent          Anesthesia Type: general  Last vitals  BP   140/80 (06/16/19 0330)   Temp   100.3 °F (37.9 °C) (06/16/19 0330)   Pulse   80 (06/16/19 1052)   Resp   18 (06/16/19 0330)     SpO2   94 % (06/16/19 1052)     Post Anesthesia Care and Evaluation    Patient location during evaluation: ICU  Patient participation: complete - patient cannot participate  Level of consciousness: awake  Pain score: 0  Pain management: adequate  Airway patency: patent  Anesthetic complications: No anesthetic complications  PONV Status: none  Cardiovascular status: acceptable  Respiratory status: acceptable and intubated  Hydration status: acceptable

## 2019-06-25 PROBLEM — E07.9 DISEASE OF THYROID GLAND: Status: ACTIVE | Noted: 2019-01-01

## 2019-06-25 PROBLEM — N18.9 CKD (CHRONIC KIDNEY DISEASE): Status: ACTIVE | Noted: 2019-01-01

## 2019-06-25 PROBLEM — I25.10 CORONARY ARTERY DISEASE: Status: ACTIVE | Noted: 2019-01-01

## 2019-06-25 PROBLEM — J18.9 PNEUMONIA INVOLVING LEFT LUNG: Status: ACTIVE | Noted: 2019-01-01

## 2019-06-25 PROBLEM — J96.02 ACUTE RESPIRATORY FAILURE WITH HYPOXIA AND HYPERCAPNIA (HCC): Status: ACTIVE | Noted: 2019-01-01

## 2019-06-25 PROBLEM — J96.01 ACUTE RESPIRATORY FAILURE WITH HYPOXIA AND HYPERCAPNIA (HCC): Status: ACTIVE | Noted: 2019-01-01

## 2019-06-25 PROBLEM — Z95.0 PACEMAKER: Status: ACTIVE | Noted: 2019-01-01

## 2019-06-25 PROBLEM — R41.0 DELIRIUM: Status: ACTIVE | Noted: 2019-01-01

## 2019-06-26 PROBLEM — S72.002A CLOSED LEFT HIP FRACTURE (HCC): Status: ACTIVE | Noted: 2019-01-01

## 2019-06-26 PROBLEM — M1A.00X0 CHRONIC GOUTY ARTHROPATHY: Status: ACTIVE | Noted: 2019-01-01

## 2019-06-26 PROBLEM — R68.2 DRY MOUTH: Status: ACTIVE | Noted: 2019-01-01

## 2019-06-26 PROBLEM — N18.30 STAGE 3 CHRONIC KIDNEY DISEASE (HCC): Status: ACTIVE | Noted: 2019-01-01

## 2019-06-26 PROBLEM — F51.01 PRIMARY INSOMNIA: Status: ACTIVE | Noted: 2019-01-01

## 2019-07-01 NOTE — THERAPY TREATMENT NOTE
Inpatient Rehabilitation - Occupational Therapy Treatment Note    [facility]     Patient Name: Kuldip Nevarez  : 1945  MRN: [id number]    Today's Date: 2019                 Admit Date: 2019      Visit Dx:    ICD-10-CM ICD-9-CM   1. Symbolic dysfunction R48.9 784.60   2. Impaired physical mobility Z74.09 781.99   3. Impaired mobility and activities of daily living Z74.09 799.89       Patient Active Problem List   Diagnosis   • S/p left hip fracture   • Closed fracture of left hip (CMS/HCC)   • Frequent falls   • Closed nondisplaced intertrochanteric fracture of left femur with routine healing   • Delirium   • Pneumonia involving left lung   • Pacemaker   • Coronary artery disease   • Disease of thyroid gland   • Stage 3 chronic kidney disease (CMS/HCC)   • Acute respiratory failure with hypoxia and hypercapnia (CMS/HCC)   • Closed left hip fracture (CMS/HCC)   • Primary insomnia   • Dry mouth   • Chronic gouty arthropathy         Therapy Treatment    IRF Treatment Summary     Row Name 19 1600 19 1336 19 1120       Evaluation/Treatment Time and Intent    Subjective Information  no complaints  -LM  no complaints  -RW  no complaints  -LM    Existing Precautions/Restrictions  fall  -LM  fall  -RW  fall  -LM    Document Type  therapy note (daily note)  -LM  therapy note (daily note)  -RW  therapy note (daily note)  -LM    Mode of Treatment  individual therapy;occupational therapy  -LM  physical therapy  -RW  individual therapy;occupational therapy  -LM    Start Time (Evaluation/Treatment)  1600  -LM  1336  -RW  1120  -LM    Stop Time (Evaluation/Treatment)  1645  -LM  1423  -RW  --    Recorded by [LM] Aye Almeida COTA/JOVANNY [RW] Don Lobo PTA [LM] Aye Almeida COTA/L    Row Name 19 0830             Evaluation/Treatment Time and Intent    Subjective Information  no complaints  -RW      Existing Precautions/Restrictions  fall  -RW      Document Type   therapy note (daily note)  -RW      Mode of Treatment  physical therapy  -RW      Patient/Family Observations  in wc feeling better  -RW      Start Time (Evaluation/Treatment)  0828  -RW      Stop Time (Evaluation/Treatment)  0920  -RW      Recorded by [RW] Don Lobo PTA      Row Name 07/01/19 1600 07/01/19 1120 07/01/19 0830       Relationship/Environment    Primary Source of Support/Comfort  spouse  -LM  spouse  -LM  spouse;child(lynn)  -RW    Lives With  --  spouse  -LM  --    Recorded by [LM] Aye Almeida LONGO/L [LM] Aye Almeida LONGO/L [RW] Don Lobo PTA    Row Name 07/01/19 1120             Resource/Environmental Concerns    Current Living Arrangements  home/apartment/condo  -LM      Resource/Environmental Concerns  none  -LM      Transportation Concerns  car, none  -LM      Recorded by [LM] Aye Almeida LONGO/L      Row Name 07/01/19 1600 07/01/19 1336 07/01/19 1120       Cognition/Psychosocial- PT/OT    Affect/Mental Status (Cognitive)  WFL  -LM  WFL  -RW  WFL  -LM    Orientation Status (Cognition)  oriented x 4  -LM  oriented x 4  -RW  oriented x 4  -LM    Follows Commands (Cognition)  --  WFL  -RW  WFL  -LM    Personal Safety Interventions  --  gait belt;nonskid shoes/slippers when out of bed  -RW  gait belt  -LM    Cognitive Function (Cognitive)  --  --  safety deficit  -LM    Safety Deficit (Cognitive)  --  --  mild deficit  -LM    Recorded by [LM] Aye Almeida LONGO/L [RW] Don Lobo PTA [LM] Aye Almeida LONGO/L    Row Name 07/01/19 0830             Cognition/Psychosocial- PT/OT    Affect/Mental Status (Cognitive)  WFL  -RW      Orientation Status (Cognition)  oriented x 4  -RW      Follows Commands (Cognition)  WFL  -RW      Personal Safety Interventions  gait belt;nonskid shoes/slippers when out of bed  -RW      Recorded by [RW] Don Lobo PTA      Row Name 07/01/19 1336 07/01/19 0830          Mobility    Extremity Weight-bearing Status  left  lower extremity  -RW  left lower extremity  -RW     Left Lower Extremity (Weight-bearing Status)  weight-bearing as tolerated (WBAT)  -RW  weight-bearing as tolerated (WBAT)  -RW     Recorded by [RW] Don Lobo PTA [RW] Don Lobo PTA     Row Name 07/01/19 1600 07/01/19 1336          Bed Mobility Assessment/Treatment    Bed Mobility Assessment/Treatment  bed mobility (all) activities  -LM  --     Supine-Sit [place] (Bed Mobility)  supervision;conditional independence  -LM  conditional independence;supervision  -RW     Sit-Supine [place] (Bed Mobility)  --  conditional independence;supervision  -RW     Recorded by [LM] Aye Almeida COTA/L [RW] Don Lobo, KIT     Row Name 07/01/19 1600             Functional Mobility    Functional Mobility- Ind. Level  contact guard assist;minimum assist (75% patient effort)  -LM      Functional Mobility- Device  rolling walker  -LM      Recorded by [LM] Aye Almeida COTA/L      Row Name 07/01/19 1336 07/01/19 0830          Transfer Assessment/Treatment    Transfer Assessment/Treatment  sit-stand transfer;stand-sit transfer  -RW  sit-stand transfer;stand-sit transfer  -RW     Recorded by [RW] Don Lobo PTA [RW] Don Lobo, KIT     Row Name 07/01/19 1600 07/01/19 1336 07/01/19 0830       Sit-Stand Transfer    Sit-Stand [place] (Transfers)  contact guard  -LM  contact guard  -RW  contact guard  -RW    Assistive Device (Sit-Stand Transfers)  walker, front-wheeled  -LM  walker, front-wheeled  -RW  walker, front-wheeled  -RW    Recorded by [LM] Aye Almeida COTA/L [RW] Don Lobo PTA [RW] Don Lobo PTA    Row Name 07/01/19 1600 07/01/19 1336 07/01/19 0830       Stand-Sit Transfer    Stand-Sit [place] (Transfers)  contact guard  -LM  contact guard  -RW  contact guard  -RW    Assistive Device (Stand-Sit Transfers)  walker, front-wheeled  -LM  walker, front-wheeled  -RW  walker, front-wheeled  -RW    Recorded  by [LM] Aye Almeida COTA/JOVANNY [RW] Don Lobo PTA [RW] oDn Lobo PTA    Row Name 07/01/19 1336 07/01/19 0830          Gait/Stairs Assessment/Training    [place] Level (Gait)  contact guard;verbal cues  -RW  contact guard;verbal cues  -RW     Assistive Device (Gait)  walker, front-wheeled  -RW  walker, front-wheeled  -RW     Distance in Feet (Gait)  42, 48  -RW  64, 40, 45  -RW     Pattern (Gait)  step-to  -RW  step-to  -RW     Deviations/Abnormal Patterns (Gait)  antalgic  -RW  antalgic  -RW     Right Sided Gait Deviations  heel strike decreased tends to drag right le due to ue weakness in unloading left  -RW  heel strike decreased tends to drag right le due to ue weakness in unloading left  -RW     Recorded by [RW] Don Lobo PTA [RW] Don Lobo PTA     Row Name 07/01/19 1600 07/01/19 1336 07/01/19 0830       Wheelchair Mobility/Management    Method of Wheelchair Locomotion (Mobility)  bimanual (upper extremity) propulsion  -LM  --  --    Mobility Activities (Wheelchair)  forward propulsion;mobility (all) activities  -LM  --  --    Forward Propulsion [place] (Wheelchair)  conditional independence  -LM  conditional independence  -RW  conditional independence  -RW    Distance Propelled in Feet (Wheelchair)  --  52  -RW  100  -RW    Recorded by [LM] Aye Almeida COTA/JOVANNY [RW] Don Lobo, KIT [RW] Don Lobo, KIT    Row Name 07/01/19 1600             Bathing Assessment/Treatment    Bathing [place] Level  bathing skills  -LM      Recorded by [LM] Aye Almeida COTA/L      Row Name 07/01/19 1336 07/01/19 0830          Pain Scale: Numbers Pre/Post-Treatment    Pain Scale: Numbers, Pretreatment  -- gave no rating  -RW  7/10 with gt  -RW     Pain Scale: Numbers, Post-Treatment  --  7/10  -RW     Pain Location - Side  Left  -RW  Left  -RW     Pain Location  hip  -RW  hip  -RW     Pain Intervention(s)  --  Medication (See MAR)  -RW     Recorded by [RW] Yamil  Don VERDIN PTA [RW] Don Lobo PTA     Row Name 07/01/19 1600             Therapeutic Exercise    Therapeutic Exercise  aerobic exercise  -LM      Recorded by [LM] Aye Almeida COTA/L      Row Name 07/01/19 1600 07/01/19 1120          Upper Extremity Seated Therapeutic Exercise    Performed, Seated Upper Extremity (Therapeutic Exercise)  -- leverl 2 tband all planes 20 reps   -LM  shoulder flexion/extension;shoulder abduction/adduction;shoulder external/internal rotation;shoulder horizontal abduction/adduction;scapular protraction/retraction;elbow flexion/extension  -LM     Exercise Type, Seated Upper Extremity (Therapeutic Exercise)  --  AROM (active range of motion)  -LM     Expected Outcomes, Seated Upper Extremity (Therapeutic Exercise)  --  improve functional tolerance, community activity  -LM     Recorded by [LM] Aye Almeida COTA/JOVANNY [LM] Aye Almeida COTA/L     Row Name 07/01/19 1600             Aerobic Exercise Activity    Exercise Performed (Aerobic, Therapeutic Exercise)  arm bike  -LM      Time (Aerobic, Therapeutic Exercise)  15 pro 11  -LM      Recorded by [LM] Aye Almeida COTA/L      Row Name 07/01/19 1336 07/01/19 0830          Lower Extremity Seated Therapeutic Exercise    Performed, Seated Lower Extremity (Therapeutic Exercise)  hip abduction/adduction;knee flexion/extension  -RW  knee flexion/extension;LAQ (long arc quad), knee extension  -RW     Exercise Type, Seated Lower Extremity (Therapeutic Exercise)  AROM (active range of motion)  -RW  AROM (active range of motion);AAROM (active assistive range of motion);resistive exercise  -RW     Expected Outcomes, Seated Lower Extremity (Therapeutic Exercise)  improve functional tolerance, self-care activity;improve performance, gait skills;improve performance, transfer skills  -RW  improve functional tolerance, self-care activity;improve performance, gait skills;improve performance, transfer skills  -RW     Sets/Reps  Detail, Seated Lower Extremity (Therapeutic Exercise)  1-2/20  -RW  1-2/10-20  -RW     Comment, Seated Lower Extremity (Therapeutic Exercise)  --  having some difficulty with laq on left  -RW     Recorded by [RW] Don Lobo PTA [RW] Don Lobo PTA     Row Name 07/01/19 1336             Lower Extremity Supine Therapeutic Exercise    Performed, Supine Lower Extremity (Therapeutic Exercise)  SAQ (short arc quad) over bolster;hip abduction/adduction;heel slides;quadriceps sets  -RW      Exercise Type, Supine Lower Extremity (Therapeutic Exercise)  AROM (active range of motion)  -RW      Expected Outcomes, Supine Lower Extremity (Therapeutic Exercise)  improve functional tolerance, self-care activity;improve performance, gait skills;improve performance, transfer skills  -RW      Sets/Reps Detail, Supine Lower Extremity (Therapeutic Exercise)  1-2/20  -RW      Recorded by [RW] Don Lobo PTA      Row Name 07/01/19 1336 07/01/19 0830          Vital Signs    Pre Systolic BP Rehab  100  -RW  112  -RW     Pre Treatment Diastolic BP  65  -RW  61  -RW     Post Systolic BP Rehab  105  -RW  116  -RW     Post Treatment Diastolic BP  61  -RW  59  -RW     Recorded by [RW] Don Lobo PTA [RW] Don Lobo PTA     Row Name 07/01/19 1600 07/01/19 1336 07/01/19 1120       Positioning and Restraints    Pre-Treatment Position  in bed  -LM  in bed  -RW  sitting in chair/recliner  -LM    Post Treatment Position  bed  -LM  bed  -RW  wheelchair  -LM    In Bed  exit alarm on  -LM  exit alarm on;call light within reach;encouraged to call for assist  -RW  --    Recorded by [LM] Aye Almeida LONGO/L [RW] Don Lobo PTA [LM] Aye Almeida, LONGO/L    Row Name 07/01/19 0830             Positioning and Restraints    Pre-Treatment Position  sitting in chair/recliner  -RW      Post Treatment Position  wheelchair  -RW      In Wheelchair  call light within reach;encouraged to call for assist  -RW      Recorded  by [RW] Don Lobo, PTA      Row Name 07/01/19 1336 07/01/19 0830          Daily Summary of Progress (PT)    Functional Goal Overall Progress: Physical Therapy  progressing toward functional goals as expected  -RW  progressing toward functional goals as expected  -RW     Recorded by [RW] Don Lobo PTA [RW] Don Lobo, PTA     Row Name 07/01/19 1600 07/01/19 1120          Daily Summary of Progress (OT)    Functional Goal Overall Progress: Occupational Therapy  progressing toward functional goals as expected  -LM  progressing toward functional goals as expected  -LM     Recorded by [LM] Aye Almeida, LONGO/L [LM] Aye Almeida, LONGO/L       User Key  (r) = Recorded By, (t) = Taken By, (c) = Cosigned By    Initials Name Effective Dates    RW Don Lobo, PTA 03/07/18 -     LM Aye Almeida LONGO/L 03/07/18 -           Wound 06/16/19 0914 Left hip incision (Active)   Dressing Appearance intact;dry 7/1/2019  8:52 AM   Closure NOLVIA 7/1/2019  8:52 AM   Base dressing in place, unable to visualize 7/1/2019  8:52 AM   Periwound ecchymotic 7/1/2019  8:52 AM   Drainage Characteristics/Odor serous 7/1/2019  8:52 AM   Drainage Amount small 7/1/2019  8:52 AM   Care, Wound cleansed with;antimicrobial agent applied 7/1/2019  8:52 AM   Dressing Care, Wound dressing changed 7/1/2019  8:52 AM   Periwound Care, Wound absorptive dressing applied 7/1/2019  8:52 AM         OT Recommendation and Plan         Plan of Care Review  Plan of Care Reviewed With: patient  Daily Summary of Progress (OT)  Functional Goal Overall Progress: Occupational Therapy: progressing toward functional goals as expected  Plan of Care Reviewed With: patient  IRF Plan of Care Review: progress ongoing, continue  Progress, Functional Goals: demonstrating adequate progress  Outcome Summary: pt perf well with OT ther ex perf 3 lb wt.      OT IRF GOALS     Row Name 07/01/19 1716 07/01/19 1141 06/29/19 0740       Transfer FIM Goals  (OT-IRF)    Tub-Shower Transfer FIM Goal (OT-IRF)  Score of 6 (FIM)  -LM  Score of 6 (FIM)  -LM  Score of 6 (FIM)  -LW    Toilet Transfer FIM Goal (OT-IRF)  Score of 6 (FIM)  -LM  Score of 6 (FIM)  -LM  Score of 6 (FIM)  -LW    Time Frame (Transfer FIM Goals 1, OT-IRF)  long term goal (LTG);by discharge  -LM  long term goal (LTG);by discharge  -LM  long term goal (LTG);by discharge  -LW    Progress/Outcomes (Transfer FIM Goals, OT-IRF)  goal not met;continuing progress toward goal  -LM  goal not met;continuing progress toward goal  -LM  goal not met;continuing progress toward goal  -LW       Bathing FIM Goal (OT-IRF)    Bathing FIM Goal (OT-IRF)  Score of 6 (FIM)  -LM  Score of 6 (FIM)  -LM  Score of 6 (FIM)  -LW    Time Frame (Bathing FIM Goal, OT-IRF)  long term goal (LTG);by discharge  -LM  long term goal (LTG);by discharge  -LM  long term goal (LTG);by discharge  -LW    Progress/Outcomes (Bathing FIM Goal, OT-IRF)  goal not met;continuing progress toward goal  -LM  goal not met;continuing progress toward goal  -LM  goal not met;continuing progress toward goal  -LW       UB Dressing FIM Goal (OT-IRF)    Upper Body Dressing, FIM Goal, OT-IRF  Score of 7 (FIM)  -LM  Score of 7 (FIM)  -LM  Score of 7 (FIM)  -LW    Time Frame (UB Dressing FIM Goal, OT-IRF)  long term goal (LTG);by discharge  -LM  long term goal (LTG);by discharge  -LM  long term goal (LTG);by discharge  -LW    Progress/Outcomes (UB Dressing FIM Goal, OT-IRF)  goal not met;continuing progress toward goal  -LM  goal not met;continuing progress toward goal  -LM  goal not met;continuing progress toward goal  -LW       LB Dressing FIM Goal (OT-IRF)    Lower Body Dressing, FIM Goal, OT-IRF  Score of 6 (FIM)  -LM  Score of 6 (FIM)  -LM  Score of 6 (FIM)  -LW    Time Frame (LB Dressing FIM Goal, OT-IRF)  long term goal (LTG);by discharge  -LM  long term goal (LTG);by discharge  -LM  long term goal (LTG);by discharge  -LW    Progress/Outcomes (LB Dressing FIM  Goal, OT-IRF)  goal not met;continuing progress toward goal  -LM  goal not met;continuing progress toward goal  -LM  goal not met;continuing progress toward goal  -LW       Toileting FIM Goal (OT-IRF)    Toileting FIM Goal (OT-IRF)  Score of 6 (FIM)  -LM  Score of 6 (FIM)  -LM  Score of 6 (FIM)  -LW    Time Frame (Toileting FIM Goal, OT-IRF)  long term goal (LTG);by discharge  -LM  long term goal (LTG);by discharge  -LM  long term goal (LTG);by discharge  -LW    Progress/Outcomes (Toileting FIM Goal, OT-IRF)  goal not met;continuing progress toward goal  -LM  goal not met;continuing progress toward goal  -LM  goal not met;continuing progress toward goal  -LW    Row Name 06/29/19 0728 06/29/19 0600 06/28/19 0735       Transfer FIM Goals (OT-IRF)    Tub-Shower Transfer FIM Goal (OT-IRF)  Score of 6 (FIM)  -LW  Score of 6 (FIM)  -LW  Score of 6 (FIM)  -RC    Toilet Transfer FIM Goal (OT-IRF)  Score of 6 (FIM)  -LW  Score of 6 (FIM)  -LW  Score of 6 (FIM)  -RC    Time Frame (Transfer FIM Goals 1, OT-IRF)  long term goal (LTG);by discharge  -LW  long term goal (LTG);by discharge  -LW  long term goal (LTG);by discharge  -RC    Progress/Outcomes (Transfer FIM Goals, OT-IRF)  goal not met;continuing progress toward goal  -LW  goal not met;continuing progress toward goal  -LW  goal not met;continuing progress toward goal  -RC       Bathing FIM Goal (OT-IRF)    Bathing FIM Goal (OT-IRF)  Score of 6 (FIM)  -LW  Score of 6 (FIM)  -LW  Score of 6 (FIM)  -RC    Time Frame (Bathing FIM Goal, OT-IRF)  long term goal (LTG);by discharge  -LW  long term goal (LTG);by discharge  -LW  long term goal (LTG);by discharge  -RC    Progress/Outcomes (Bathing FIM Goal, OT-IRF)  goal not met;continuing progress toward goal  -LW  goal not met;continuing progress toward goal  -LW  goal not met;continuing progress toward goal  -RC       UB Dressing FIM Goal (OT-IRF)    Upper Body Dressing, FIM Goal, OT-IRF  Score of 7 (FIM)  -LW  Score of 7 (FIM)   -LW  Score of 7 (FIM)  -RC    Time Frame (UB Dressing FIM Goal, OT-IRF)  long term goal (LTG);by discharge  -LW  long term goal (LTG);by discharge  -LW  long term goal (LTG);by discharge  -RC    Progress/Outcomes (UB Dressing FIM Goal, OT-IRF)  goal not met;continuing progress toward goal  -LW  goal not met;continuing progress toward goal  -LW  goal not met;continuing progress toward goal  -RC       LB Dressing FIM Goal (OT-IRF)    Lower Body Dressing, FIM Goal, OT-IRF  Score of 6 (FIM)  -LW  Score of 6 (FIM)  -LW  Score of 6 (FIM)  -RC    Time Frame (LB Dressing FIM Goal, OT-IRF)  long term goal (LTG);by discharge  -LW  long term goal (LTG);by discharge  -LW  long term goal (LTG);by discharge  -RC    Progress/Outcomes (LB Dressing FIM Goal, OT-IRF)  goal not met;continuing progress toward goal  -LW  goal not met;continuing progress toward goal  -LW  goal not met;continuing progress toward goal  -RC       Toileting FIM Goal (OT-IRF)    Toileting FIM Goal (OT-IRF)  Score of 6 (FIM)  -LW  Score of 6 (FIM)  -LW  Score of 6 (FIM)  -RC    Time Frame (Toileting FIM Goal, OT-IRF)  long term goal (LTG);by discharge  -LW  long term goal (LTG);by discharge  -LW  long term goal (LTG);by discharge  -RC    Progress/Outcomes (Toileting FIM Goal, OT-IRF)  goal not met;continuing progress toward goal  -LW  goal not met;continuing progress toward goal  -LW  goal not met;continuing progress toward goal  -RC    Row Name 06/27/19 0820             Transfer FIM Goals (OT-IRF)    Tub-Shower Transfer FIM Goal (OT-IRF)  Score of 6 (FIM)  -MR      Toilet Transfer FIM Goal (OT-IRF)  Score of 6 (FIM)  -MR      Time Frame (Transfer FIM Goals 1, OT-IRF)  long term goal (LTG);by discharge  -MR      Progress/Outcomes (Transfer FIM Goals, OT-IRF)  goal not met  -MR         Bathing FIM Goal (OT-IRF)    Bathing FIM Goal (OT-IRF)  Score of 6 (FIM)  -MR      Time Frame (Bathing FIM Goal, OT-IRF)  long term goal (LTG);by discharge  -MR       Progress/Outcomes (Bathing FIM Goal, OT-IRF)  goal not met  -MR         UB Dressing FIM Goal (OT-IRF)    Upper Body Dressing, FIM Goal, OT-IRF  Score of 7 (FIM)  -MR      Time Frame (UB Dressing FIM Goal, OT-IRF)  long term goal (LTG);by discharge  -MR      Progress/Outcomes (UB Dressing FIM Goal, OT-IRF)  goal not met  -MR         LB Dressing FIM Goal (OT-IRF)    Lower Body Dressing, FIM Goal, OT-IRF  Score of 6 (FIM)  -MR      Time Frame (LB Dressing FIM Goal, OT-IRF)  long term goal (LTG);by discharge  -MR      Progress/Outcomes (LB Dressing FIM Goal, OT-IRF)  goal not met  -MR         Toileting FIM Goal (OT-IRF)    Toileting FIM Goal (OT-IRF)  Score of 6 (FIM)  -MR      Time Frame (Toileting FIM Goal, OT-IRF)  long term goal (LTG);by discharge  -MR      Progress/Outcomes (Toileting FIM Goal, OT-IRF)  goal not met  -MR        User Key  (r) = Recorded By, (t) = Taken By, (c) = Cosigned By    Initials Name Provider Type    Jossie George LONGO/L Occupational Therapy Assistant    Aye Stephenson LONGO/L Occupational Therapy Assistant    Jazz Melendez COTA/L Occupational Therapy Assistant    Alycia Estrada, OT Occupational Therapist          Occupational Therapy Education     Title: PT OT SLP Therapies (In Progress)     Topic: Occupational Therapy (Done)     Point: ADL training (Done)     Description: Instruct learner(s) on proper safety adaptation and remediation techniques during self care or transfers.   Instruct in proper use of assistive devices.    Learning Progress Summary           Patient Acceptance, E, VU by NAUN at 7/1/2019 11:48 AM    Acceptance, E,TB,D, VU by LW at 6/29/2019  7:29 AM    Comment:  Educated pt on use of sock aid and reacher for dressing.    Acceptance, E, NR by MAYRA at 6/28/2019  9:52 AM    Acceptance, E,TB, VU,NR by MR at 6/27/2019  1:15 PM    Comment:  Role of OT and POC.Benefit of activity, safety with t/f, AD/AE utilization to increase functional independence with  ADLs.                   Point: Home exercise program (Done)     Description: Instruct learner(s) on appropriate technique for monitoring, assisting and/or progressing therapeutic exercises/activities.    Learning Progress Summary           Patient Acceptance, E, VU by LM at 7/1/2019 11:48 AM    Acceptance, E,TB,D, VU by LW at 6/29/2019  7:29 AM    Comment:  Educated pt on use of sock aid and reacher for dressing.                   Point: Precautions (Done)     Description: Instruct learner(s) on prescribed precautions during self-care and functional transfers.    Learning Progress Summary           Patient Acceptance, E, VU by LM at 7/1/2019 11:48 AM    Acceptance, E,TB,D, VU by LW at 6/29/2019  7:29 AM    Comment:  Educated pt on use of sock aid and reacher for dressing.    Acceptance, E, NR by  at 6/28/2019  9:52 AM    Acceptance, E,TB, VU,NR by MR at 6/27/2019  1:15 PM    Comment:  Role of OT and POC.Benefit of activity, safety with t/f, AD/AE utilization to increase functional independence with ADLs.                   Point: Body mechanics (Done)     Description: Instruct learner(s) on proper positioning and spine alignment during self-care, functional mobility activities and/or exercises.    Learning Progress Summary           Patient Acceptance, E, VU by LM at 7/1/2019 11:48 AM    Acceptance, E,TB,D, VU by LW at 6/29/2019  7:29 AM    Comment:  Educated pt on use of sock aid and reacher for dressing.    Acceptance, E, NR by  at 6/28/2019  9:52 AM    Acceptance, E,TB, VU,NR by MR at 6/27/2019  1:15 PM    Comment:  Role of OT and POC.Benefit of activity, safety with t/f, AD/AE utilization to increase functional independence with ADLs.                               User Key     Initials Effective Dates Name Provider Type Discipline     03/07/18 -  Jossie Louis COTA/L Occupational Therapy Assistant OT    NAUN 03/07/18 -  Aye Almeida COTA/L Occupational Therapy Assistant OT    LW 03/07/18 -   Jazz Nunn COTA/L Occupational Therapy Assistant OT    MR 04/03/18 -  Alycia Rich, OT Occupational Therapist OT                Outcome Measures     Row Name 07/01/19 0900 06/29/19 0920 06/29/19 0600       How much help from another person do you currently need...    Turning from your back to your side while in flat bed without using bedrails?  3  -RW  3  -JA  --    Moving from lying on back to sitting on the side of a flat bed without bedrails?  3  -RW  3  -JA  --    Moving to and from a bed to a chair (including a wheelchair)?  3  -RW  3  -JA  --    Standing up from a chair using your arms (e.g., wheelchair, bedside chair)?  3  -RW  3  -JA  --    Climbing 3-5 steps with a railing?  3  -RW  3  -JA  --    To walk in hospital room?  3  -RW  3  -JA  --    AM-PAC 6 Clicks Score  18  -RW  18  -JA  --       How much help from another is currently needed...    Putting on and taking off regular lower body clothing?  --  --  3  -LW    Bathing (including washing, rinsing, and drying)  --  --  3  -LW    Toileting (which includes using toilet bed pan or urinal)  --  --  3  -LW    Putting on and taking off regular upper body clothing  --  --  3  -LW    Taking care of personal grooming (such as brushing teeth)  --  --  3  -LW    Eating meals  --  --  4  -LW    Score  --  --  19  -LW       Functional Assessment    Outcome Measure Options  --  AM-PAC 6 Clicks Basic Mobility (PT)  -JA  --      User Key  (r) = Recorded By, (t) = Taken By, (c) = Cosigned By    Initials Name Provider Type    Syd Álvarez PTA Physical Therapy Assistant    Don Pandey, KIT Physical Therapy Assistant    Jazz Melendez COTA/L Occupational Therapy Assistant             Time Calculation:     Time Calculation- OT     Row Name 07/01/19 1707 07/01/19 1132          Time Calculation- OT    OT Start Time  1600  -LM  1120  -LM     OT Stop Time  1645  -LM  1205  -LM     OT Time Calculation (min)  45 min  -LM  45 min  -LM      Total Timed Code Minutes- OT  --  45 minute(s)  -LM     OT Received On  07/01/19  -LM  07/01/19  -LM       User Key  (r) = Recorded By, (t) = Taken By, (c) = Cosigned By    Initials Name Provider Type    Aye Stephenson LONGO/L Occupational Therapy Assistant          Therapy Charges for Today     Code Description Service Date Service Provider Modifiers Qty    50470342541 HC OT THER PROC EA 15 MIN 7/1/2019 Aye Almeida COTA/L GO 2    03592965378 HC OT THERAPEUTIC ACT EA 15 MIN 7/1/2019 Aye Almeida COTA/L GO 1    80338063570 HC OT THER PROC EA 15 MIN 7/1/2019 Aye Almeida COTA/L GO 2    86145241167 HC OT THERAPEUTIC ACT EA 15 MIN 7/1/2019 Aye Almeida COTA/JOVANNY GO 1                   DONTA Dewey/JOVANNY  7/1/2019

## 2019-07-01 NOTE — PROGRESS NOTES
Orthopedic Progress Note      Patient: Kuldip Nevarez  Date of Admission: 6/26/2019  YOB: 1945  Medical Record Number: [id number]        POD # :  15  Length of stay:  5  Systemic or Specific Complaints: No Complaints  Pain Relief: some relief        Physical Exam:  74 y.o.  male    Vitals:  Temp:  [97.6 °F (36.4 °C)] 97.6 °F (36.4 °C)  Heart Rate:  [57-63] 60  Resp:  [16-18] 16  BP: (115-132)/(61-66) 120/61  alert and oriented - however, likely with some confusion at baseline    Ext: NV intact. ROM appropriate.    Skin: Incision CDI. No signs / sx of infection. No major pain with palpation. No erythema, drainage, swelling, or dehiscence. Staples intact.    Activity: Mobilizing Per P.T.     Weight Bearing: As Tolerated      Data Review  Lab Results (last 24 hours)     Procedure Component Value Units Date/Time    Basic Metabolic Panel [[id number]]  (Abnormal) Collected:  07/01/19 0607    Specimen:  Blood Updated:  07/01/19 0705     Glucose 88 mg/dL      BUN 32 mg/dL      Creatinine 1.48 mg/dL      Sodium 141 mmol/L      Potassium 4.6 mmol/L      Chloride 107 mmol/L      CO2 25.0 mmol/L      Calcium 8.6 mg/dL      eGFR Non African Amer 46 mL/min/1.73      BUN/Creatinine Ratio 21.6     Anion Gap 9.0 mmol/L     Narrative:       GFR Normal >60  Chronic Kidney Disease <60  Kidney Failure <15    CBC & Differential [[id number]] Collected:  07/01/19 0607    Specimen:  Blood Updated:  07/01/19 0651    Narrative:       The following orders were created for panel order CBC & Differential.  Procedure                               Abnormality         Status                     ---------                               -----------         ------                     CBC Auto Differential[[id number]]        Abnormal            Final result                 Please view results for these tests on the individual orders.    CBC Auto Differential [[id number]]  (Abnormal) Collected:  07/01/19 0607    Specimen:  Blood  Updated:  07/01/19 0651     WBC 10.28 10*3/mm3      RBC 3.75 10*6/mm3      Hemoglobin 12.6 g/dL      Hematocrit 37.4 %      MCV 99.7 fL      MCH 33.6 pg      MCHC 33.7 g/dL      RDW 14.2 %      RDW-SD 51.6 fl      MPV 11.4 fL      Platelets 169 10*3/mm3      Neutrophil % 75.7 %      Lymphocyte % 10.2 %      Monocyte % 10.3 %      Eosinophil % 2.5 %      Basophil % 0.1 %      Immature Grans % 1.2 %      Neutrophils, Absolute 7.78 10*3/mm3      Lymphocytes, Absolute 1.05 10*3/mm3      Monocytes, Absolute 1.06 10*3/mm3      Eosinophils, Absolute 0.26 10*3/mm3      Basophils, Absolute 0.01 10*3/mm3      Immature Grans, Absolute 0.12 10*3/mm3      nRBC 0.0 /100 WBC           Imaging Results (last 24 hours)     ** No results found for the last 24 hours. **          Results from last 7 days   Lab Units 07/01/19  0607 06/27/19  0458 06/26/19  0716   HEMOGLOBIN g/dL 12.6* 12.3* 12.6*       Medications:  allopurinol 300 mg Oral Daily   apixaban 2.5 mg Oral Q12H   atorvastatin 20 mg Oral Daily   colchicine 0.6 mg Oral Daily   ipratropium-albuterol 3 mL Nebulization 4x Daily - RT   isosorbide mononitrate 30 mg Oral Q24H   levothyroxine 75 mcg Oral Q AM   magic mouthwash with nystatin 10 mL Swish & Spit Q4H   pantoprazole 40 mg Oral Q AM   [START ON 7/3/2019] predniSONE 10 mg Oral Daily With Breakfast   predniSONE 20 mg Oral Daily With Breakfast   sennosides-docusate sodium 2 tablet Oral Nightly   sodium chloride 3 mL Intravenous Q12H     •  acetaminophen  •  calcium carbonate  •  ipratropium-albuterol  •  ondansetron **OR** [DISCONTINUED] ondansetron  •  phenol  •  sodium chloride        Assessment:  Doing well POD  15    Hospital Problem List     * (Principal) Closed left hip fracture (CMS/HCC)    Frequent falls              Plan:  Continue efforts to mobilize  Continue Pain Control Measures  Continue incisional Care  DVT prophylaxis      Discharge Plan:Continue to monitor for generalized improvement vs any acute  decompensations. Defer to rehab staff for judgment of patient progress and readiness for discharge. Assuming ongoing stability, will likely sign off on pt in coming days.        Sina Montero, Medical Student  Date: 7/1/2019  Time: 8:40 AM      Agree, doing well

## 2019-07-01 NOTE — PROGRESS NOTES
Nutrition Services    Patient Name:  Kuldip Nevarez  YOB: 1945  MRN: [id number]  Admit Date:  6/26/2019    Visited pt at noon meal. He ate all his lunch and enjoyed it. He has food in his room to snack on from friends and family. Likes the ice cream better than the magic cup. Feels like he is getting enough to eat. RDN staff to continue to monitor.    Electronically signed by:  Rosario Jansen RD  07/01/19 5:36 PM

## 2019-07-01 NOTE — PLAN OF CARE
Problem: Patient Care Overview  Goal: Plan of Care Review  Outcome: Ongoing (interventions implemented as appropriate)   07/01/19 8286   Patient Care Overview   IRF Plan of Care Review progress ongoing, continue   Progress, Functional Goals demonstrating adequate progress   OTHER   Outcome Summary pt bp is better controled today and pt feels better. sba wih bed mob and cgas to stand.gt cga x 50+ ' with cues to not drag foot. tolerating therex. cont with mobility.

## 2019-07-01 NOTE — THERAPY TREATMENT NOTE
Inpatient Rehabilitation - Physical Therapy Treatment Note  [facility]     Patient Name: Kuldip Nevarez  : 1945  MRN: [id number]    Today's Date: 2019       Date of Referral to PT: 19         Admit Date: 2019      Visit Dx:      ICD-10-CM ICD-9-CM   1. Symbolic dysfunction R48.9 784.60   2. Impaired physical mobility Z74.09 781.99   3. Impaired mobility and activities of daily living Z74.09 799.89       Patient Active Problem List   Diagnosis   • S/p left hip fracture   • Closed fracture of left hip (CMS/HCC)   • Frequent falls   • Closed nondisplaced intertrochanteric fracture of left femur with routine healing   • Delirium   • Pneumonia involving left lung   • Pacemaker   • Coronary artery disease   • Disease of thyroid gland   • Stage 3 chronic kidney disease (CMS/HCC)   • Acute respiratory failure with hypoxia and hypercapnia (CMS/HCC)   • Closed left hip fracture (CMS/HCC)   • Primary insomnia   • Dry mouth   • Chronic gouty arthropathy       Therapy Treatment    IRF Treatment Summary     Row Name 19 13319 11219 0830       Evaluation/Treatment Time and Intent    Subjective Information  no complaints  -RW  no complaints  -LM  no complaints  -RW    Existing Precautions/Restrictions  fall  -RW  fall  -LM  fall  -RW    Document Type  therapy note (daily note)  -RW  therapy note (daily note)  -LM  therapy note (daily note)  -RW    Mode of Treatment  physical therapy  -RW  individual therapy;occupational therapy  -LM  physical therapy  -RW    Patient/Family Observations  --  --  in wc feeling better  -RW    Start Time (Evaluation/Treatment)  1336  -RW  1120  -LM  0828  -RW    Stop Time (Evaluation/Treatment)  1423  -RW  1200  -LM  0920  -RW    Recorded by [RW] Don Lobo PTA [LM] Aye Almeida COTA/JOVANNY [RW] Don Lobo PTA    Row Name 19 11219 0830          Relationship/Environment    Primary Source of Support/Comfort  spouse  -   spouse;child(lynn)  -RW     Lives With  spouse  -LM  --     Recorded by [LM] Aye Almeida COTA/L [RW] Don Lobo PTA     Row Name 07/01/19 1120             Resource/Environmental Concerns    Current Living Arrangements  home/apartment/condo  -LM      Resource/Environmental Concerns  none  -LM      Transportation Concerns  car, none  -LM      Recorded by [LM] Aye Almeida COTA/L      Row Name 07/01/19 1336 07/01/19 1120 07/01/19 0830       Cognition/Psychosocial- PT/OT    Affect/Mental Status (Cognitive)  WFL  -RW  WFL  -LM  WFL  -RW    Orientation Status (Cognition)  oriented x 4  -RW  oriented x 4  -LM  oriented x 4  -RW    Follows Commands (Cognition)  WFL  -RW  WFL  -LM  WFL  -RW    Personal Safety Interventions  gait belt;nonskid shoes/slippers when out of bed  -RW  gait belt  -LM  gait belt;nonskid shoes/slippers when out of bed  -RW    Cognitive Function (Cognitive)  --  safety deficit  -LM  --    Safety Deficit (Cognitive)  --  mild deficit  -LM  --    Recorded by [RW] Don Lobo PTA [LM] Aye Almeida COTA/L [RW] Don Lobo PTA    Row Name 07/01/19 1336 07/01/19 0830          Mobility    Extremity Weight-bearing Status  left lower extremity  -RW  left lower extremity  -RW     Left Lower Extremity (Weight-bearing Status)  weight-bearing as tolerated (WBAT)  -RW  weight-bearing as tolerated (WBAT)  -RW     Recorded by [RW] Don Lobo PTA [RW] Don Lobo PTA     Row Name 07/01/19 1336             Bed Mobility Assessment/Treatment    Supine-Sit Cassia (Bed Mobility)  conditional independence;supervision  -RW      Sit-Supine Cassia (Bed Mobility)  conditional independence;supervision  -RW      Recorded by [RW] Don Lobo PTA      Row Name 07/01/19 1336 07/01/19 0830          Transfer Assessment/Treatment    Transfer Assessment/Treatment  sit-stand transfer;stand-sit transfer  -RW  sit-stand transfer;stand-sit transfer  -RW     Recorded by [RW]  Don Lobo PTA [RW] Don Lobo, KIT     Row Name 07/01/19 1336 07/01/19 0830          Sit-Stand Transfer    Sit-Stand Coos (Transfers)  contact guard  -RW  contact guard  -RW     Assistive Device (Sit-Stand Transfers)  walker, front-wheeled  -RW  walker, front-wheeled  -RW     Recorded by [RW] Don Lobo PTA [RW] Don Lobo, KIT     Row Name 07/01/19 1336 07/01/19 0830          Stand-Sit Transfer    Stand-Sit Coos (Transfers)  contact guard  -RW  contact guard  -RW     Assistive Device (Stand-Sit Transfers)  walker, front-wheeled  -RW  walker, front-wheeled  -RW     Recorded by [RW] Don Lobo PTA [RW] Don Lobo, KIT     Row Name 07/01/19 1336 07/01/19 0830          Gait/Stairs Assessment/Training    Coos Level (Gait)  contact guard;verbal cues  -RW  contact guard;verbal cues  -RW     Assistive Device (Gait)  walker, front-wheeled  -RW  walker, front-wheeled  -RW     Distance in Feet (Gait)  42, 48  -RW  64, 40, 45  -RW     Pattern (Gait)  step-to  -RW  step-to  -RW     Deviations/Abnormal Patterns (Gait)  antalgic  -RW  antalgic  -RW     Right Sided Gait Deviations  heel strike decreased tends to drag right le due to ue weakness in unloading left  -RW  heel strike decreased tends to drag right le due to ue weakness in unloading left  -RW     Recorded by [RW] Don Lobo PTA [RW] Don Lobo, KIT     Row Name 07/01/19 1336 07/01/19 0830          Wheelchair Mobility/Management    Forward Propulsion Coos (Wheelchair)  conditional independence  -RW  conditional independence  -RW     Distance Propelled in Feet (Wheelchair)  52  -RW  100  -RW     Recorded by [RW] Don Lobo PTA [RW] Don Lobo, KIT     Row Name 07/01/19 1336 07/01/19 0830          Pain Scale: Numbers Pre/Post-Treatment    Pain Scale: Numbers, Pretreatment  -- gave no rating  -RW  7/10 with gt  -RW     Pain Scale: Numbers, Post-Treatment  --  7/10  -RW     Pain Location  - Side  Left  -RW  Left  -RW     Pain Location  hip  -RW  hip  -RW     Pain Intervention(s)  --  Medication (See MAR)  -RW     Recorded by [RW] Don Lobo PTA [RW] Don Lobo PTA     Row Name 07/01/19 1120             Upper Extremity Seated Therapeutic Exercise    Performed, Seated Upper Extremity (Therapeutic Exercise)  shoulder flexion/extension;shoulder abduction/adduction;shoulder external/internal rotation;shoulder horizontal abduction/adduction;scapular protraction/retraction;elbow flexion/extension  -LM      Exercise Type, Seated Upper Extremity (Therapeutic Exercise)  AROM (active range of motion)  -LM      Expected Outcomes, Seated Upper Extremity (Therapeutic Exercise)  improve functional tolerance, community activity  -LM      Recorded by [LM] Aye Almeida COTA/L      Row Name 07/01/19 1336 07/01/19 0830          Lower Extremity Seated Therapeutic Exercise    Performed, Seated Lower Extremity (Therapeutic Exercise)  hip abduction/adduction;knee flexion/extension  -RW  knee flexion/extension;LAQ (long arc quad), knee extension  -RW     Exercise Type, Seated Lower Extremity (Therapeutic Exercise)  AROM (active range of motion)  -RW  AROM (active range of motion);AAROM (active assistive range of motion);resistive exercise  -RW     Expected Outcomes, Seated Lower Extremity (Therapeutic Exercise)  improve functional tolerance, self-care activity;improve performance, gait skills;improve performance, transfer skills  -RW  improve functional tolerance, self-care activity;improve performance, gait skills;improve performance, transfer skills  -RW     Sets/Reps Detail, Seated Lower Extremity (Therapeutic Exercise)  1-2/20  -RW  1-2/10-20  -RW     Comment, Seated Lower Extremity (Therapeutic Exercise)  --  having some difficulty with laq on left  -RW     Recorded by [RW] Don Lobo PTA [RW] Don Lobo PTA     Row Name 07/01/19 1336             Lower Extremity Supine Therapeutic Exercise     Performed, Supine Lower Extremity (Therapeutic Exercise)  SAQ (short arc quad) over bolster;hip abduction/adduction;heel slides;quadriceps sets  -RW      Exercise Type, Supine Lower Extremity (Therapeutic Exercise)  AROM (active range of motion)  -RW      Expected Outcomes, Supine Lower Extremity (Therapeutic Exercise)  improve functional tolerance, self-care activity;improve performance, gait skills;improve performance, transfer skills  -RW      Sets/Reps Detail, Supine Lower Extremity (Therapeutic Exercise)  1-2/20  -RW      Recorded by [RW] Don Lobo PTA      Row Name 07/01/19 1336 07/01/19 0830          Vital Signs    Pre Systolic BP Rehab  100  -RW  112  -RW     Pre Treatment Diastolic BP  65  -RW  61  -RW     Post Systolic BP Rehab  105  -RW  116  -RW     Post Treatment Diastolic BP  61  -RW  59  -RW     Recorded by [RW] Don Lobo PTA [RW] Don Lobo PTA     Row Name 07/01/19 1336 07/01/19 1120 07/01/19 0830       Positioning and Restraints    Pre-Treatment Position  in bed  -RW  sitting in chair/recliner  -LM  sitting in chair/recliner  -RW    Post Treatment Position  bed  -RW  wheelchair  -LM  wheelchair  -RW    In Bed  exit alarm on;call light within reach;encouraged to call for assist  -RW  --  --    In Wheelchair  --  --  call light within reach;encouraged to call for assist  -RW    Recorded by [RW] Don Lobo PTA [LM] Aye Almeida COTA/JOVANNY [RW] Don Lobo PTA    Row Name 07/01/19 1336 07/01/19 0830          Daily Summary of Progress (PT)    Functional Goal Overall Progress: Physical Therapy  progressing toward functional goals as expected  -RW  progressing toward functional goals as expected  -RW     Recorded by [RW] Don Lobo PTA [RW] Don Lobo PTA     Row Name 07/01/19 1120             Daily Summary of Progress (OT)    Functional Goal Overall Progress: Occupational Therapy  progressing toward functional goals as expected  -LM      Recorded by [LM]  Aye Almeida, LONGO/L        User Key  (r) = Recorded By, (t) = Taken By, (c) = Cosigned By    Initials Name Effective Dates    RW Don Lobo, PTA 03/07/18 -     LM Aye Almeida, LONGO/L 03/07/18 -         Wound 06/16/19 0914 Left hip incision (Active)   Dressing Appearance intact;dry 7/1/2019  8:52 AM   Closure NOLVIA 7/1/2019  8:52 AM   Base dressing in place, unable to visualize 7/1/2019  8:52 AM   Periwound ecchymotic 7/1/2019  8:52 AM   Drainage Characteristics/Odor serous 7/1/2019  8:52 AM   Drainage Amount small 7/1/2019  8:52 AM   Care, Wound cleansed with;antimicrobial agent applied 7/1/2019  8:52 AM   Dressing Care, Wound dressing changed 7/1/2019  8:52 AM   Periwound Care, Wound absorptive dressing applied 7/1/2019  8:52 AM     Physical Therapy Education     Title: PT OT SLP Therapies (In Progress)     Topic: Physical Therapy (In Progress)     Point: Mobility training (Done)     Learning Progress Summary           Patient Acceptance, E, VU,NR by  at 6/27/2019  1:06 PM    Comment:  Educated on proper technique with bed mobility, proper hand placement with transfers, and proper gait mechanics.                   Point: Home exercise program (In Progress)     Learning Progress Summary           Patient Acceptance, E, NR by  at 6/27/2019  2:15 PM    Comment:  Educated on supine ther ex to increase LLE strength and ROM.                               User Key     Initials Effective Dates Name Provider Type Discipline     04/03/18 -  Rudy Lombardi, PT Physical Therapist PT                  PT Recommendation and Plan     Plan of Care Reviewed With: patient  Daily Summary of Progress (PT)  Functional Goal Overall Progress: Physical Therapy: progressing toward functional goals as expected  IRF Plan of Care Review: progress ongoing, continue  Progress, Functional Goals: demonstrating adequate progress  Outcome Summary: pt bp is better controled today and pt feels better. sba wih bed mob and cgas  to stand.gt cga x 50+ ' with cues to not drag foot. tolerating therex. cont with mobility.      PT IRF GOALS     Row Name 07/01/19 1538             Bed Mobility Goal 1 (PT-IRF)    Activity/Assistive Device (Bed Mobility Goal 1, PT-IRF)  sit to supine/supine to sit  -RW      Brigantine Level (Bed Mobility Goal 1, PT-IRF)  conditional independence  -RW      Time Frame (Bed Mobility Goal 1, PT-IRF)  2 weeks  -RW      Barriers (Bed Mobility Goal 1, PT-IRF)  L hip fx: ORIF, WBAT  -RW      Progress/Outcomes (Bed Mobility Goal 1, PT-IRF)  goal not met  -RW         Transfer Goal 1 (PT-IRF)    Activity/Assistive Device (Transfer Goal 1, PT-IRF)  sit-to-stand/stand-to-sit;bed-to-chair/chair-to-bed  -RW      Brigantine Level (Transfer Goal 1, PT-IRF)  conditional independence  -RW      Time Frame (Transfer Goal 1, PT-IRF)  5 - 7 days  -RW      Barriers (Transfer Goal 1, PT-IRF)  L hip fx: ORIF, WBAT  -RW      Progress/Outcomes (Transfer Goal 1, PT-IRF)  goal not met  -RW         Gait/Walking Locomotion Goal 1 (PT-IRF)    Activity/Assistive Device (Gait/Walking Locomotion Goal 1, PT-IRF)  walker, rolling  -RW      Gait/Walking Locomotion Distance Goal 1 (PT-IRF)  150'X1  -RW      Brigantine Level (Gait/Walking Locomotion Goal 1, PT-IRF)  conditional independence  -RW      Time Frame (Gait/Walking Locomotion Goal 1, PT-IRF)  long term goal (LTG);by discharge  -RW      Barriers (Gait/Walking Locomotion Goal 1, PT-IRF)  L hip fx: ORIF, WBAT  -RW      Progress/Outcomes (Gait/Walking Locomotion Goal 1, PT-IRF)  goal not met  -RW         Stairs Goal 1 (PT-IRF)    Activity/Assistive Device (Stairs Goal 1, PT-IRF)  ascending stairs;descending stairs  -RW      Number of Stairs (Stairs Goal 1, PT-IRF)  2 steps, no railing.   -RW      Brigantine Level (Stairs Goal 1, PT-IRF)  supervision required  -RW      Time Frame (Stairs Goal 1, PT-IRF)  long term goal (LTG);by discharge  -RW      Barriers (Stairs Goal 1, PT-IRF)  L hip fx:  ORIF, WBAT  -RW      Progress/Outcomes (Stairs Goal 1, PT-IRF)  goal not met  -RW        User Key  (r) = Recorded By, (t) = Taken By, (c) = Cosigned By    Initials Name Provider Type    Don Pandey, KIT Physical Therapy Assistant        Outcome Measures     Row Name 07/01/19 0900 06/29/19 0920 06/29/19 0600       How much help from another person do you currently need...    Turning from your back to your side while in flat bed without using bedrails?  3  -RW  3  -JA  --    Moving from lying on back to sitting on the side of a flat bed without bedrails?  3  -RW  3  -JA  --    Moving to and from a bed to a chair (including a wheelchair)?  3  -RW  3  -JA  --    Standing up from a chair using your arms (e.g., wheelchair, bedside chair)?  3  -RW  3  -JA  --    Climbing 3-5 steps with a railing?  3  -RW  3  -JA  --    To walk in hospital room?  3  -RW  3  -JA  --    AM-PAC 6 Clicks Score  18  -RW  18  -JA  --       How much help from another is currently needed...    Putting on and taking off regular lower body clothing?  --  --  3  -LW    Bathing (including washing, rinsing, and drying)  --  --  3  -LW    Toileting (which includes using toilet bed pan or urinal)  --  --  3  -LW    Putting on and taking off regular upper body clothing  --  --  3  -LW    Taking care of personal grooming (such as brushing teeth)  --  --  3  -LW    Eating meals  --  --  4  -LW    Score  --  --  19  -LW       Functional Assessment    Outcome Measure Options  --  AM-PAC 6 Clicks Basic Mobility (PT)  -JA  --      User Key  (r) = Recorded By, (t) = Taken By, (c) = Cosigned By    Initials Name Provider Type    Syd Álvarez, KIT Physical Therapy Assistant    Don Pandey, KIT Physical Therapy Assistant    Jazz Melendez, DONTA/L Occupational Therapy Assistant             Time Calculation:     PT Charges     Row Name 07/01/19 1545 07/01/19 0946          Time Calculation    Start Time  1336  -RW  0828  -RW     Stop Time   1423  -  0920  -RW     Time Calculation (min)  47 min  -RW  52 min  -RW        Time Calculation- PT    Total Timed Code Minutes- PT  47 minute(s)  -RW  52 minute(s)  -RW       User Key  (r) = Recorded By, (t) = Taken By, (c) = Cosigned By    Initials Name Provider Type    Don Pandey PTA Physical Therapy Assistant          Therapy Charges for Today     Code Description Service Date Service Provider Modifiers Qty    01889881145 HC GAIT TRAINING EA 15 MIN 7/1/2019 Don Lobo, PTA GP 1    45826453228 HC PT THER PROC EA 15 MIN 7/1/2019 Don Lobo, PTA GP 1    06977020743 HC PT THERAPEUTIC ACT EA 15 MIN 7/1/2019 Don Lobo, PTA GP 1    02092641481 HC GAIT TRAINING EA 15 MIN 7/1/2019 Don Lobo, PTA GP 1    33320562281 HC PT THER PROC EA 15 MIN 7/1/2019 Don Lobo, PTA GP 1    75418893093 HC PT THERAPEUTIC ACT EA 15 MIN 7/1/2019 Don Lobo, PTA GP 1            PT G-Codes  Outcome Measure Options: AM-PAC 6 Clicks Basic Mobility (PT)  AM-PAC 6 Clicks Score: 18  Score: 19      Don Lobo PTA  7/1/2019

## 2019-07-01 NOTE — PLAN OF CARE
Problem: Patient Care Overview  Goal: Plan of Care Review  Outcome: Ongoing (interventions implemented as appropriate)   07/01/19 1142   Patient Care Overview   IRF Plan of Care Review progress ongoing, continue   Progress, Functional Goals demonstrating adequate progress   OTHER   Outcome Summary pt perf well with OT ther ex perf 3 lb wt.    Coping/Psychosocial   Plan of Care Reviewed With patient

## 2019-07-01 NOTE — PLAN OF CARE
Problem: Patient Care Overview  Goal: Plan of Care Review  Outcome: Ongoing (interventions implemented as appropriate)    Goal: Individualization and Mutuality  Outcome: Ongoing (interventions implemented as appropriate)    Goal: Discharge Needs Assessment  Outcome: Ongoing (interventions implemented as appropriate)    Goal: Home Safety Plan  Outcome: Ongoing (interventions implemented as appropriate)    Goal: Coping Plan  Outcome: Ongoing (interventions implemented as appropriate)    Goal: Community Reintegration Plan  Outcome: Ongoing (interventions implemented as appropriate)      Problem: Fractured Hip (Adult)  Goal: Signs and Symptoms of Listed Potential Problems Will be Absent, Minimized or Managed (Fractured Hip)  Outcome: Ongoing (interventions implemented as appropriate)      Problem: Fall Risk (Adult)  Goal: Absence of Fall  Outcome: Ongoing (interventions implemented as appropriate)      Problem: Functional Mobility Impairment (IRF) (Adult)  Goal: Optimal/Safe Level of [place] with Mobility  Outcome: Ongoing (interventions implemented as appropriate)      Problem: Skin Injury Risk (Adult)  Goal: Identify Related Risk Factors and Signs and Symptoms  Outcome: Ongoing (interventions implemented as appropriate)    Goal: Skin Health and Integrity  Outcome: Ongoing (interventions implemented as appropriate)   06/30/19 0235   Skin Injury Risk (Adult)   Skin Health and Integrity making progress toward outcome

## 2019-07-01 NOTE — PROGRESS NOTES
LOS: 5 days   Patient Care Team:  Mike Draper MD as PCP - General    Chief Complaint:   Closed left hip fracture (CMS/HCC) postop day 13          Interval History:     SUBJECTIVE: Feels better today.  He is drinking fluids.  He is been ambulating without any dizziness or lightheadedness.  Still does complain of hoarseness  Slowly improving pain in his hip  History taken from: patient chart RN    Objective     Vital Signs  Temp:  [97.6 °F (36.4 °C)] 97.6 °F (36.4 °C)  Heart Rate:  [57-63] 60  Resp:  [16-18] 16  BP: (115-120)/(61-66) 120/61      06/26/19  1404   Weight: 104 kg (229 lb)         Physical Exam:     General Appearance:    Alert, cooperative, in no acute distress   Head:    Normocephalic, without obvious abnormality, atraumatic   Eyes:            Lids and lashes normal, conjunctivae and sclerae normal, no   icterus, no pallor, corneas clear, PERRLA   Throat:   No oral lesions, no thrush, oral mucosa moist   Neck:   No adenopathy, supple, trachea midline, no thyromegaly, no   carotid bruit, no JVD       Lungs:     Clear to auscultation,respirations regular, even and                  Unlabored good bilateral air movement    Heart:    Regular rhythm and normal rate, normal S1 and S2, no            murmur, no gallop, no rub, no click no ectopy   Chest Wall:    No abnormalities observed   Abdomen:     Normal bowel sounds, no masses, no organomegaly, soft        non-tender, non-distended, no guarding, no rebound                Tenderness    Extremities:   Moves all extremities well, no edema, no cyanosis, no             Redness incision left thigh       Skin:   No bleeding, bruising or rash   Lymph nodes:   No palpable adenopathy   Neurologic:   Cranial nerves 2 - 12 grossly intact, sensation intact, DTR       present and equal bilaterally        RESULTS REVIEW:     Lab Results (last 24 hours)     Procedure Component Value Units Date/Time    Basic Metabolic Panel [[id number]]  (Abnormal) Collected:   07/01/19 0607    Specimen:  Blood Updated:  07/01/19 0705     Glucose 88 mg/dL      BUN 32 mg/dL      Creatinine 1.48 mg/dL      Sodium 141 mmol/L      Potassium 4.6 mmol/L      Chloride 107 mmol/L      CO2 25.0 mmol/L      Calcium 8.6 mg/dL      eGFR Non African Amer 46 mL/min/1.73      BUN/Creatinine Ratio 21.6     Anion Gap 9.0 mmol/L     Narrative:       GFR Normal >60  Chronic Kidney Disease <60  Kidney Failure <15    CBC & Differential [[id number]] Collected:  07/01/19 0607    Specimen:  Blood Updated:  07/01/19 0651    Narrative:       The following orders were created for panel order CBC & Differential.  Procedure                               Abnormality         Status                     ---------                               -----------         ------                     CBC Auto Differential[[id number]]        Abnormal            Final result                 Please view results for these tests on the individual orders.    CBC Auto Differential [[id number]]  (Abnormal) Collected:  07/01/19 0607    Specimen:  Blood Updated:  07/01/19 0651     WBC 10.28 10*3/mm3      RBC 3.75 10*6/mm3      Hemoglobin 12.6 g/dL      Hematocrit 37.4 %      MCV 99.7 fL      MCH 33.6 pg      MCHC 33.7 g/dL      RDW 14.2 %      RDW-SD 51.6 fl      MPV 11.4 fL      Platelets 169 10*3/mm3      Neutrophil % 75.7 %      Lymphocyte % 10.2 %      Monocyte % 10.3 %      Eosinophil % 2.5 %      Basophil % 0.1 %      Immature Grans % 1.2 %      Neutrophils, Absolute 7.78 10*3/mm3      Lymphocytes, Absolute 1.05 10*3/mm3      Monocytes, Absolute 1.06 10*3/mm3      Eosinophils, Absolute 0.26 10*3/mm3      Basophils, Absolute 0.01 10*3/mm3      Immature Grans, Absolute 0.12 10*3/mm3      nRBC 0.0 /100 WBC         Imaging Results (last 72 hours)     ** No results found for the last 72 hours. **          Assessment/Plan     Active Hospital Problems    Diagnosis   • **Closed left hip fracture (CMS/HCC)   • Primary insomnia     This is a  compliant at home prior to admission as well     • Dry mouth     Due to prior salivary gland excision  This is a chronic problem at home prior to admission     • Pneumonia involving left lung   • Pacemaker     [employer] dual-chamber pacemaker implanted December 2016 followed by Dr. Grullon     • Delirium   • Stage 3 chronic kidney disease (CMS/HCC)   • Acute respiratory failure with hypoxia and hypercapnia (CMS/HCC)     Improved at the time of admission to acute rehab.  Still using oxygen 2 L per nasal cannula.  On a tapering dose of prednisone over 8 days.  Started with 40 mg every morning x2 days and then decreasing every 2 days     • Coronary artery disease   • Frequent falls           PLAN: He is participating with therapy and seems to be making progress.  Lab work was reviewed.  He is taking fluids and will DC the IV.  We will continue to monitor electrolytes and renal function.  We will recheck lab work in 48 hours.        This document has been electronically signed by oRgers Tompkins MD on July 1, 2019 9:46 AM

## 2019-07-02 NOTE — PLAN OF CARE
Problem: Patient Care Overview  Goal: Plan of Care Review  Outcome: Ongoing (interventions implemented as appropriate)   07/02/19 6452   Patient Care Overview   IRF Plan of Care Review progress ongoing, continue   Progress, Functional Goals demonstrating adequate progress   OTHER   Outcome Summary working well w/ OT cga to [facility] for transfers, ue ex today, MD OK'd shower will offer tomorrow. cont poc   Coping/Psychosocial   Plan of Care Reviewed With patient

## 2019-07-02 NOTE — PLAN OF CARE
Problem: Patient Care Overview  Goal: Plan of Care Review  Outcome: Ongoing (interventions implemented as appropriate)    Goal: Individualization and Mutuality  Outcome: Ongoing (interventions implemented as appropriate)    Goal: Discharge Needs Assessment  Outcome: Ongoing (interventions implemented as appropriate)    Goal: Home Safety Plan  Outcome: Ongoing (interventions implemented as appropriate)    Goal: Coping Plan  Outcome: Ongoing (interventions implemented as appropriate)    Goal: Community Reintegration Plan  Outcome: Ongoing (interventions implemented as appropriate)      Problem: Fractured Hip (Adult)  Goal: Signs and Symptoms of Listed Potential Problems Will be Absent, Minimized or Managed (Fractured Hip)  Outcome: Ongoing (interventions implemented as appropriate)      Problem: Fall Risk (Adult)  Goal: Absence of Fall  Outcome: Ongoing (interventions implemented as appropriate)      Problem: Functional Mobility Impairment (IRF) (Adult)  Goal: Optimal/Safe Level of [place] with Mobility  Outcome: Ongoing (interventions implemented as appropriate)      Problem: Skin Injury Risk (Adult)  Goal: Identify Related Risk Factors and Signs and Symptoms  Outcome: Ongoing (interventions implemented as appropriate)    Goal: Skin Health and Integrity  Outcome: Ongoing (interventions implemented as appropriate)   07/02/19 0440   Skin Injury Risk (Adult)   Skin Health and Integrity making progress toward outcome

## 2019-07-02 NOTE — THERAPY TREATMENT NOTE
Inpatient Rehabilitation - Occupational Therapy Treatment Note    [facility]     Patient Name: Kuldip Nevarez  : 1945  MRN: [id number]    Today's Date: 2019                 Admit Date: 2019      Visit Dx:    ICD-10-CM ICD-9-CM   1. Symbolic dysfunction R48.9 784.60   2. Impaired physical mobility Z74.09 781.99   3. Impaired mobility and activities of daily living Z74.09 799.89       Patient Active Problem List   Diagnosis   • S/p left hip fracture   • Closed fracture of left hip (CMS/HCC)   • Frequent falls   • Closed nondisplaced intertrochanteric fracture of left femur with routine healing   • Delirium   • Pneumonia involving left lung   • Pacemaker   • Coronary artery disease   • Disease of thyroid gland   • Stage 3 chronic kidney disease (CMS/HCC)   • Acute respiratory failure with hypoxia and hypercapnia (CMS/HCC)   • Closed left hip fracture (CMS/HCC)   • Primary insomnia   • Dry mouth   • Chronic gouty arthropathy         Therapy Treatment    IRF Treatment Summary     Row Name 19 1410 19 1331 19 1030       Evaluation/Treatment Time and Intent    Subjective Information  no complaints  -RC  --  no complaints  -RC    Existing Precautions/Restrictions  fall  -RC  fall  -RW  fall  -RC    Document Type  therapy note (daily note)  -RC  therapy note (daily note)  -RW  therapy note (daily note)  -RC    Mode of Treatment  occupational therapy;individual therapy  -RC  physical therapy  -RW  occupational therapy;individual therapy  -RC    Patient/Family Observations  just finished PT   -RC  --  in bed just had breathing tx   -RC    Start Time (Evaluation/Treatment)  1410  -RC  1331  -RW  1030  -RC    Stop Time (Evaluation/Treatment)  1455  -RC  1409  -RW  1115  -RC    Recorded by [RC] Jossie Luois COTA/JOVANNY [RW] Don Lobo PTA [RC] Jossie Louis COTA/L    Row Name 19 0908             Evaluation/Treatment Time and Intent    Subjective Information  no complaints   -RW      Existing Precautions/Restrictions  fall  -RW      Document Type  therapy note (daily note)  -RW      Mode of Treatment  physical therapy  -RW      Patient/Family Observations  in wc  -RW      Start Time (Evaluation/Treatment)  0908  -RW      Stop Time (Evaluation/Treatment)  1003  -RW      Recorded by [RW] Don Lobo PTA      Row Name 07/02/19 1410 07/02/19 1331 07/02/19 1030       Relationship/Environment    Primary Source of Support/Comfort  child(lynn);extended family  -RC  child(lynn)  -RW  child(lynn);friend  -RC    Lives With  spouse  -RC  --  --    Recorded by [RC] Jossie Louis LONGO/L [RW] Don Lobo PTA [RC] Jossie Louis, LONGO/L    Row Name 07/02/19 1030             Resource/Environmental Concerns    Current Living Arrangements  home/apartment/condo  -RC      Recorded by [RC] Jossie Louis LONGO/L      Row Name 07/02/19 1410 07/02/19 1331 07/02/19 1030       Cognition/Psychosocial- PT/OT    Affect/Mental Status (Cognitive)  WFL  -RC  WFL  -RW  WFL  -RC    Orientation Status (Cognition)  --  oriented x 4  -RW  oriented x 4  -RC    Follows Commands (Cognition)  --  WFL  -RW  --    Personal Safety Interventions  --  gait belt;nonskid shoes/slippers when out of bed  -RW  --    Recorded by [RC] Jossie Louis LONGO/L [RW] Don Lobo PTA [RC] Jossie Louis, LONGO/L    Row Name 07/02/19 0908             Cognition/Psychosocial- PT/OT    Affect/Mental Status (Cognitive)  WFL  -RW      Orientation Status (Cognition)  oriented x 4  -RW      Follows Commands (Cognition)  WFL  -RW      Recorded by [RW] Don Lobo PTA      Row Name 07/02/19 1331 07/02/19 0908          Mobility    Extremity Weight-bearing Status  left lower extremity  -RW  left lower extremity  -RW     Left Lower Extremity (Weight-bearing Status)  weight-bearing as tolerated (WBAT)  -RW  weight-bearing as tolerated (WBAT)  -RW     Recorded by [RW] Don Lobo PTA [RW] Don Lobo, KIT     Row Name  07/02/19 1410 07/02/19 1030 07/02/19 0908       Bed Mobility Assessment/Treatment    Supine-Sit [place] (Bed Mobility)  --  conditional independence  -RC  conditional independence;supervision  -RW    Sit-Supine [place] (Bed Mobility)  conditional independence  -RC  --  conditional independence;supervision  -RW    Recorded by [RC] Jossie Louis COTA/L [RC] Jossie Louis LONGO/JOVANNY [RW] Don Lobo, KIT    Row Name 07/02/19 1331 07/02/19 0908          Transfer Assessment/Treatment    Transfer Assessment/Treatment  sit-stand transfer;stand-sit transfer  -RW  sit-stand transfer;stand-sit transfer  -RW     Recorded by [RW] Don Lobo PTA [RW] Don Lobo, KIT     Row Name 07/02/19 1030             Bed-Chair Transfer    Bed-Chair [place] (Transfers)  contact guard  -RC      Assistive Device (Bed-Chair Transfers)  walker, front-wheeled  -RC      Recorded by [RC] Jossie Louis COTA/L      Row Name 07/02/19 1410 07/02/19 1030          Chair-Bed Transfer    Chair-Bed [place]son (Transfers)  contact guard  -RC  contact guard  -RC     Assistive Device (Chair-Bed Transfers)  --  walker, front-wheeled  -RC     Recorded by [RC] Jossie Louis COTA/L [RC] Jossie Louis LONGO/L     Row Name 07/02/19 1410 07/02/19 1331 07/02/19 1030       Sit-Stand Transfer    Sit-Stand [place]son (Transfers)  stand by assist  -RC  contact guard  -RW  contact guard  -RC    Assistive Device (Sit-Stand Transfers)  walker, front-wheeled  -RC  walker, front-wheeled  -RW  walker, front-wheeled  -RC    Recorded by [RC] Jossie Louis COTA/JOVANNY [RW] Don Lobo, PTA [RC] Jossie Louis LONGO/L    Row Name 07/02/19 0908             Sit-Stand Transfer    Sit-Stand [place] (Transfers)  contact guard  -RW      Assistive Device (Sit-Stand Transfers)  walker, front-wheeled  -RW      Recorded by [RW] Don Lobo PTA      Row Name 07/02/19 1410 07/02/19 1331 07/02/19 1030       Stand-Sit Transfer     Stand-Sit [place] (Transfers)  stand by assist  -RC  contact guard  -RW  contact guard  -RC    Assistive Device (Stand-Sit Transfers)  walker, front-wheeled  -RC  walker, front-wheeled  -RW  walker, front-wheeled  -RC    Recorded by [RC] Jossie Louis COTA/JOVANNY [RW] Don Lobo PTA [RC] Jossie Louis COTA/L    Row Name 07/02/19 0908             Stand-Sit Transfer    Stand-Sit [place] (Transfers)  contact guard  -RW      Assistive Device (Stand-Sit Transfers)  walker, front-wheeled  -RW      Recorded by [RW] Don Lobo PTA      Row Name 07/02/19 1331 07/02/19 0908          Gait/Stairs Assessment/Training    [place] Level (Gait)  verbal cues  -RW  contact guard;verbal cues  -RW     Assistive Device (Gait)  walker, front-wheeled  -RW  walker, front-wheeled  -RW     Distance in Feet (Gait)  74, 80, 81  -RW  2  -RW     Pattern (Gait)  step-through  -RW  step-to  -RW     Deviations/Abnormal Patterns (Gait)  antalgic  -RW  antalgic  -RW     Right Sided Gait Deviations  --  heel strike decreased tends to drag right le due to ue weakness in unloading left  -RW     Recorded by [RW] Don Lobo PTA [RW] Don Lobo PTA     Row Name 07/02/19 0908             Wheelchair Mobility/Management    Forward Propulsion [place]ber (Wheelchair)  conditional independence  -RW      Distance Propelled in Feet (Wheelchair)  150  -RW      Recorded by [RW] Don Lobo PTA      Row Name 07/02/19 1410             Lower Body Dressing Assessment/Treatment    Lower Body Dressing [place] Level  doff;shoes/slippers;standby assist  -RC      Comment (Lower Body Dressing)  practiced using ae for lb dressing   -RC      Recorded by [RC] Jossie Louis COTA/L      Row Name 07/02/19 1410 07/02/19 1331 07/02/19 1030       Pain Scale: Numbers Pre/Post-Treatment    Pain Scale: Numbers, Pretreatment  0/10 - no pain  -RC  0/10 - no pain gave no rating  -RW  0/10 - no pain  -RC    Pain Scale: Numbers,  Post-Treatment  0/10 - no pain  -RC  0/10 - no pain  -RW  0/10 - no pain  -RC    Recorded by [RC] Jossie Louis COTA/JOVANNY [RW] Don Lobo PTA [RC] Jossie Louis COTA/L    Row Name 07/02/19 0908             Pain Scale: Numbers Pre/Post-Treatment    Pain Scale: Numbers, Pretreatment  -- gave no rating  -RW      Pain Location - Side  Left  -RW      Pain Location  hip  -RW      Recorded by [RW] Don Lobo PTA      Row Name 07/02/19 1410             Upper Extremity Seated Therapeutic Exercise    Performed, Seated Upper Extremity (Therapeutic Exercise)  shoulder flexion/extension;elbow flexion/extension;shoulder external/internal rotation  -RC      Device, Seated Upper Extremity (Therapeutic Exercise)  free weights, barbell 2#  -RC      Exercise Type, Seated Upper Extremity (Therapeutic Exercise)  resistive exercise  -RC      Expected Outcomes, Seated Upper Extremity (Therapeutic Exercise)  improve functional tolerance, self-care activity;improve performance, BADLs  -RC      Sets/Reps Detail, Seated Upper Extremity (Therapeutic Exercise)  15x3  -RC      Recorded by [RC] Jossie Louis COTA/L      Row Name 07/02/19 1030             Aerobic Exercise Activity    Exercise Performed (Aerobic, Therapeutic Exercise)  arm bike  -RC      Time (Aerobic, Therapeutic Exercise)  15  -RC      Recorded by [RC] Jossie Louis COTA/L      Row Name 07/02/19 0908             Lower Extremity Seated Therapeutic Exercise    Performed, Seated Lower Extremity (Therapeutic Exercise)  LAQ (long arc quad), knee extension;hip abduction/adduction;plantarflexor stretch;knee flexion/extension  -RW      Expected Outcomes, Seated Lower Extremity (Therapeutic Exercise)  improve functional tolerance, self-care activity;improve performance, gait skills;improve performance, transfer skills  -RW      Sets/Reps Detail, Seated Lower Extremity (Therapeutic Exercise)  20  -RW      Recorded by [RW] Don Lobo PTA      Row Name 07/02/19  0908             Lower Extremity Supine Therapeutic Exercise    Performed, Supine Lower Extremity (Therapeutic Exercise)  quadriceps sets;heel slides;ankle pumps  -RW      Exercise Type, Supine Lower Extremity (Therapeutic Exercise)  AROM (active range of motion)  -RW      Expected Outcomes, Supine Lower Extremity (Therapeutic Exercise)  improve functional tolerance, self-care activity;improve performance, transfer skills;improve performance, gait skills  -RW      Restrictions, Supine Lower Extremity (Therapeutic Exercise)  20  -RW      Recorded by [RW] Don Lobo PTA      Row Name 07/02/19 1410 07/02/19 1331 07/02/19 1030       Vital Signs    Pre Systolic BP Rehab  --  116  -RW  --    Pre Treatment Diastolic BP  --  70  -RW  --    Intra Systolic BP Rehab  --  --  99  -RC    Intra Treatment Diastolic BP  --  --  53  -RC    Post Systolic BP Rehab  125  -RC  132  -RW  --    Post Treatment Diastolic BP  70  -RC  73  -RW  --    Intratreatment Heart Rate (beats/min)  --  --  76  -RC    Intra SpO2 (%)  --  --  96  -RC    O2 Delivery Intra Treatment  --  --  room air  -RC    Recorded by [RC] Jossie Louis COTA/JOVANNY [RW] Don Lobo PTA [RC] Jossie Louis COTA/L    Row Name 07/02/19 0908             Vital Signs    Pre Systolic BP Rehab  81  -RW      Pre Treatment Diastolic BP  58  -RW      Post Systolic BP Rehab  104  -RW      Post Treatment Diastolic BP  68  -RW      Recorded by [RW] Don Lobo PTA      Row Name 07/02/19 1410 07/02/19 1331 07/02/19 1030       Positioning and Restraints    Pre-Treatment Position  sitting in chair/recliner  -RC  in bed  -RW  in bed  -RC    Post Treatment Position  bed  -RC  wheelchair  -RW  bed  -RC    In Bed  exit alarm on;encouraged to call for assist;call light within reach;with family/caregiver  -RC  --  call light within reach;encouraged to call for assist;exit alarm on  -RC    Recorded by [RC] Jossie Louis COTA/L [RW] Don Lobo PTA [RC] Jossie Louis  JUAN PABLO LONGO/L    Row Name 19 0908             Positioning and Restraints    Pre-Treatment Position  sitting in chair/recliner  -RW      Post Treatment Position  bed  -RW      In Bed  exit alarm on;encouraged to call for assist;call light within reach  -RW      Recorded by [RW] Don Lobo PTA      Row Name 19 1331 19 0908          Daily Summary of Progress (PT)    Functional Goal Overall Progress: Physical Therapy  progressing toward functional goals as expected  -RW  progressing toward functional goals as expected  -RW     Recorded by [RW] Don Lobo PTA [RW] Don Lobo PTA     Row Name 19 1410 19 1030          Daily Summary of Progress (OT)    Functional Goal Overall Progress: Occupational Therapy  progressing toward functional goals as expected  -RC  progressing toward functional goals as expected  -     Recommendations: Occupational Therapy  cont poc   -RC  --     Recorded by [RC] Jossie Louis LONGO/L [RC] Jossie Louis LONGO/L     Row Name 19 0938             Nursing Weekly Outcome Summary    Weekly Progress Summary: Nursing  improving, more independent in daily activity  -      Weekly Outcome Statement: Nursing  eatin; toiletin; bladder: 5/6: bowel: 5/6  -      Recorded by [] Kristen Rogel RN        User Key  (r) = Recorded By, (t) = Taken By, (c) = Cosigned By    Initials Name Effective Dates     Kristen Rogel RN 10/17/16 -      Don Lobo PTA 18 -      Jossie Louis LONGO/JOVANNY 18 -           Wound 19 0914 Left hip incision (Active)   Dressing Appearance intact;dry 2019  7:50 AM   Closure Adhesive closure strips 2019  3:15 PM   Base dressing in place, unable to visualize 2019  7:50 AM   Periwound ecchymotic 2019  7:50 AM   Drainage Characteristics/Odor serous 2019  7:50 AM   Drainage Amount small 2019  7:50 AM   Care, Wound cleansed with 2019  3:15 PM   Staples Removed  Intact Yes 7/2/2019  3:15 PM         OT Recommendation and Plan         Plan of Care Review  Plan of Care Reviewed With: patient  Daily Summary of Progress (OT)  Functional Goal Overall Progress: Occupational Therapy: progressing toward functional goals as expected  Recommendations: Occupational Therapy: cont poc   Plan of Care Reviewed With: patient  IRF Plan of Care Review: progress ongoing, continue  Progress, Functional Goals: demonstrating adequate progress  Outcome Summary: working well w/ OT cga to sba for transfers, ue ex today, MD shower will offer tomorrow.  cont poc    OT IRF GOALS     Row Name 07/02/19 1410 07/01/19 1716 07/01/19 1141       Transfer FIM Goals (OT-IRF)    Tub-Shower Transfer FIM Goal (OT-IRF)  Score of 6 (FIM)  -RC  Score of 6 (FIM)  -LM  Score of 6 (FIM)  -LM    Toilet Transfer FIM Goal (OT-IRF)  Score of 6 (FIM)  -RC  Score of 6 (FIM)  -LM  Score of 6 (FIM)  -LM    Time Frame (Transfer FIM Goals 1, OT-IRF)  long term goal (LTG);by discharge  -RC  long term goal (LTG);by discharge  -LM  long term goal (LTG);by discharge  -LM    Progress/Outcomes (Transfer FIM Goals, OT-IRF)  goal not met;continuing progress toward goal  -RC  goal not met;continuing progress toward goal  -LM  goal not met;continuing progress toward goal  -LM       Bathing FIM Goal (OT-IRF)    Bathing FIM Goal (OT-IRF)  Score of 6 (FIM)  -RC  Score of 6 (FIM)  -LM  Score of 6 (FIM)  -LM    Time Frame (Bathing FIM Goal, OT-IRF)  long term goal (LTG);by discharge  -RC  long term goal (LTG);by discharge  -LM  long term goal (LTG);by discharge  -LM    Progress/Outcomes (Bathing FIM Goal, OT-IRF)  goal not met;continuing progress toward goal  -RC  goal not met;continuing progress toward goal  -LM  goal not met;continuing progress toward goal  -LM       UB Dressing FIM Goal (OT-IRF)    Upper Body Dressing, FIM Goal, OT-IRF  Score of 7 (FIM)  -RC  Score of 7 (FIM)  -LM  Score of 7 (FIM)  -LM    Time Frame (UB Dressing FIM Goal,  OT-IRF)  long term goal (LTG);by discharge  -RC  long term goal (LTG);by discharge  -LM  long term goal (LTG);by discharge  -LM    Progress/Outcomes (UB Dressing FIM Goal, OT-IRF)  goal not met;continuing progress toward goal  -RC  goal not met;continuing progress toward goal  -LM  goal not met;continuing progress toward goal  -LM       LB Dressing FIM Goal (OT-IRF)    Lower Body Dressing, FIM Goal, OT-IRF  Score of 6 (FIM)  -RC  Score of 6 (FIM)  -LM  Score of 6 (FIM)  -LM    Time Frame (LB Dressing FIM Goal, OT-IRF)  long term goal (LTG);by discharge  -RC  long term goal (LTG);by discharge  -LM  long term goal (LTG);by discharge  -LM    Progress/Outcomes (LB Dressing FIM Goal, OT-IRF)  goal not met;continuing progress toward goal  -RC  goal not met;continuing progress toward goal  -LM  goal not met;continuing progress toward goal  -LM       Toileting FIM Goal (OT-IRF)    Toileting FIM Goal (OT-IRF)  Score of 6 (FIM)  -RC  Score of 6 (FIM)  -LM  Score of 6 (FIM)  -LM    Time Frame (Toileting FIM Goal, OT-IRF)  long term goal (LTG);by discharge  -RC  long term goal (LTG);by discharge  -LM  long term goal (LTG);by discharge  -LM    Progress/Outcomes (Toileting FIM Goal, OT-IRF)  goal not met;continuing progress toward goal  -RC  goal not met;continuing progress toward goal  -LM  goal not met;continuing progress toward goal  -LM    Row Name 06/29/19 0740 06/29/19 0728 06/29/19 0600       Transfer FIM Goals (OT-IRF)    Tub-Shower Transfer FIM Goal (OT-IRF)  Score of 6 (FIM)  -LW  Score of 6 (FIM)  -LW  Score of 6 (FIM)  -LW    Toilet Transfer FIM Goal (OT-IRF)  Score of 6 (FIM)  -LW  Score of 6 (FIM)  -LW  Score of 6 (FIM)  -LW    Time Frame (Transfer FIM Goals 1, OT-IRF)  long term goal (LTG);by discharge  -LW  long term goal (LTG);by discharge  -LW  long term goal (LTG);by discharge  -LW    Progress/Outcomes (Transfer FIM Goals, OT-IRF)  goal not met;continuing progress toward goal  -LW  goal not met;continuing  progress toward goal  -LW  goal not met;continuing progress toward goal  -LW       Bathing FIM Goal (OT-IRF)    Bathing FIM Goal (OT-IRF)  Score of 6 (FIM)  -LW  Score of 6 (FIM)  -LW  Score of 6 (FIM)  -LW    Time Frame (Bathing FIM Goal, OT-IRF)  long term goal (LTG);by discharge  -LW  long term goal (LTG);by discharge  -LW  long term goal (LTG);by discharge  -LW    Progress/Outcomes (Bathing FIM Goal, OT-IRF)  goal not met;continuing progress toward goal  -LW  goal not met;continuing progress toward goal  -LW  goal not met;continuing progress toward goal  -LW       UB Dressing FIM Goal (OT-IRF)    Upper Body Dressing, FIM Goal, OT-IRF  Score of 7 (FIM)  -LW  Score of 7 (FIM)  -LW  Score of 7 (FIM)  -LW    Time Frame (UB Dressing FIM Goal, OT-IRF)  long term goal (LTG);by discharge  -LW  long term goal (LTG);by discharge  -LW  long term goal (LTG);by discharge  -LW    Progress/Outcomes (UB Dressing FIM Goal, OT-IRF)  goal not met;continuing progress toward goal  -LW  goal not met;continuing progress toward goal  -LW  goal not met;continuing progress toward goal  -LW       LB Dressing FIM Goal (OT-IRF)    Lower Body Dressing, FIM Goal, OT-IRF  Score of 6 (FIM)  -LW  Score of 6 (FIM)  -LW  Score of 6 (FIM)  -LW    Time Frame (LB Dressing FIM Goal, OT-IRF)  long term goal (LTG);by discharge  -LW  long term goal (LTG);by discharge  -LW  long term goal (LTG);by discharge  -LW    Progress/Outcomes (LB Dressing FIM Goal, OT-IRF)  goal not met;continuing progress toward goal  -LW  goal not met;continuing progress toward goal  -LW  goal not met;continuing progress toward goal  -LW       Toileting FIM Goal (OT-IRF)    Toileting FIM Goal (OT-IRF)  Score of 6 (FIM)  -LW  Score of 6 (FIM)  -LW  Score of 6 (FIM)  -LW    Time Frame (Toileting FIM Goal, OT-IRF)  long term goal (LTG);by discharge  -LW  long term goal (LTG);by discharge  -LW  long term goal (LTG);by discharge  -LW    Progress/Outcomes (Toileting FIM Goal, OT-IRF)   goal not met;continuing progress toward goal  -LW  goal not met;continuing progress toward goal  -LW  goal not met;continuing progress toward goal  -LW    Row Name 06/28/19 0735 06/27/19 0820          Transfer FIM Goals (OT-IRF)    Tub-Shower Transfer FIM Goal (OT-IRF)  Score of 6 (FIM)  -RC  Score of 6 (FIM)  -MR     Toilet Transfer FIM Goal (OT-IRF)  Score of 6 (FIM)  -RC  Score of 6 (FIM)  -MR     Time Frame (Transfer FIM Goals 1, OT-IRF)  long term goal (LTG);by discharge  -RC  long term goal (LTG);by discharge  -MR     Progress/Outcomes (Transfer FIM Goals, OT-IRF)  goal not met;continuing progress toward goal  -RC  goal not met  -MR        Bathing FIM Goal (OT-IRF)    Bathing FIM Goal (OT-IRF)  Score of 6 (FIM)  -RC  Score of 6 (FIM)  -MR     Time Frame (Bathing FIM Goal, OT-IRF)  long term goal (LTG);by discharge  -RC  long term goal (LTG);by discharge  -MR     Progress/Outcomes (Bathing FIM Goal, OT-IRF)  goal not met;continuing progress toward goal  -RC  goal not met  -MR        UB Dressing FIM Goal (OT-IRF)    Upper Body Dressing, FIM Goal, OT-IRF  Score of 7 (FIM)  -RC  Score of 7 (FIM)  -MR     Time Frame (UB Dressing FIM Goal, OT-IRF)  long term goal (LTG);by discharge  -RC  long term goal (LTG);by discharge  -MR     Progress/Outcomes (UB Dressing FIM Goal, OT-IRF)  goal not met;continuing progress toward goal  -RC  goal not met  -MR        LB Dressing FIM Goal (OT-IRF)    Lower Body Dressing, FIM Goal, OT-IRF  Score of 6 (FIM)  -RC  Score of 6 (FIM)  -MR     Time Frame (LB Dressing FIM Goal, OT-IRF)  long term goal (LTG);by discharge  -RC  long term goal (LTG);by discharge  -MR     Progress/Outcomes (LB Dressing FIM Goal, OT-IRF)  goal not met;continuing progress toward goal  -RC  goal not met  -MR        Toileting FIM Goal (OT-IRF)    Toileting FIM Goal (OT-IRF)  Score of 6 (FIM)  -RC  Score of 6 (FIM)  -MR     Time Frame (Toileting FIM Goal, OT-IRF)  long term goal (LTG);by discharge  -  long term  goal (LTG);by discharge  -MR     Progress/Outcomes (Toileting FIM Goal, OT-IRF)  goal not met;continuing progress toward goal  -RC  goal not met  -MR       User Key  (r) = Recorded By, (t) = Taken By, (c) = Cosigned By    Initials Name Provider Type    Jossie George, LONGO/L Occupational Therapy Assistant    Aye Stephenson LONGO/L Occupational Therapy Assistant    Jazz Melendez LONGO/L Occupational Therapy Assistant    Alycia Estrada, OT Occupational Therapist          Occupational Therapy Education     Title: PT OT SLP Therapies (In Progress)     Topic: Occupational Therapy (In Progress)     Point: ADL training (Done)     Description: Instruct learner(s) on proper safety adaptation and remediation techniques during self care or transfers.   Instruct in proper use of assistive devices.    Learning Progress Summary           Patient Acceptance, E, VU by  at 7/1/2019 11:48 AM    Acceptance, E,TB,D, VU by LW at 6/29/2019  7:29 AM    Comment:  Educated pt on use of sock aid and reacher for dressing.    Acceptance, E, NR by RC at 6/28/2019  9:52 AM    Acceptance, E,TB, VU,NR by MR at 6/27/2019  1:15 PM    Comment:  Role of OT and POC.Benefit of activity, safety with t/f, AD/AE utilization to increase functional independence with ADLs.                   Point: Home exercise program (Done)     Description: Instruct learner(s) on appropriate technique for monitoring, assisting and/or progressing therapeutic exercises/activities.    Learning Progress Summary           Patient Acceptance, E, VU by NAUN at 7/1/2019 11:48 AM    Acceptance, E,TB,D, VU by LW at 6/29/2019  7:29 AM    Comment:  Educated pt on use of sock aid and reacher for dressing.                   Point: Precautions (In Progress)     Description: Instruct learner(s) on prescribed precautions during self-care and functional transfers.    Learning Progress Summary           Patient Acceptance, E, NR by RC at 7/2/2019  3:57 PM     Acceptance, E, VU by LM at 7/1/2019 11:48 AM    Acceptance, E,TB,D, VU by LW at 6/29/2019  7:29 AM    Comment:  Educated pt on use of sock aid and reacher for dressing.    Acceptance, E, NR by  at 6/28/2019  9:52 AM    Acceptance, E,TB, VU,NR by MR at 6/27/2019  1:15 PM    Comment:  Role of OT and POC.Benefit of activity, safety with t/f, AD/AE utilization to increase functional independence with ADLs.                   Point: Body mechanics (In Progress)     Description: Instruct learner(s) on proper positioning and spine alignment during self-care, functional mobility activities and/or exercises.    Learning Progress Summary           Patient Acceptance, E, NR by  at 7/2/2019  3:57 PM    Acceptance, E, VU by  at 7/1/2019 11:48 AM    Acceptance, E,TB,D, VU by LW at 6/29/2019  7:29 AM    Comment:  Educated pt on use of sock aid and reacher for dressing.    Acceptance, E, NR by  at 6/28/2019  9:52 AM    Acceptance, E,TB, VU,NR by MR at 6/27/2019  1:15 PM    Comment:  Role of OT and POC.Benefit of activity, safety with t/f, AD/AE utilization to increase functional independence with ADLs.                               User Key     Initials Effective Dates Name Provider Type Discipline     03/07/18 -  Jossie Louis COTA/L Occupational Therapy Assistant OT     03/07/18 -  Aye Almeida COTA/L Occupational Therapy Assistant OT     03/07/18 -  Jazz Nunn COTA/L Occupational Therapy Assistant OT    MR 04/03/18 -  Alycia Rich OT Occupational Therapist OT                Outcome Measures     Row Name 07/02/19 1410 07/02/19 1000 07/01/19 0900       How much help from another person do you currently need...    Turning from your back to your side while in flat bed without using bedrails?  --  3  -RW  3  -RW    Moving from lying on back to sitting on the side of a flat bed without bedrails?  --  3  -RW  3  -RW    Moving to and from a bed to a chair (including a wheelchair)?  --  3  -RW  3   -RW    Standing up from a chair using your arms (e.g., wheelchair, bedside chair)?  --  3  -RW  3  -RW    Climbing 3-5 steps with a railing?  --  3  -RW  3  -RW    To walk in hospital room?  --  3  -RW  3  -RW    AM-PAC 6 Clicks Score (PT)  --  18  -RW  18  -RW       How much help from another is currently needed...    Putting on and taking off regular lower body clothing?  3  -RC  --  --    Bathing (including washing, rinsing, and drying)  3  -RC  --  --    Toileting (which includes using toilet bed pan or urinal)  3  -RC  --  --    Putting on and taking off regular upper body clothing  3  -RC  --  --    Taking care of personal grooming (such as brushing teeth)  3  -RC  --  --    Eating meals  4  -RC  --  --    AM-PAC 6 Clicks Score (OT)  19  -RC  --  --      User Key  (r) = Recorded By, (t) = Taken By, (c) = Cosigned By    Initials Name Provider Type    RW Don Lobo, PTA Physical Therapy Assistant     Jossie Louis LONGO/L Occupational Therapy Assistant             Time Calculation:     Time Calculation- OT     Row Name 07/02/19 1600 07/02/19 1559          Time Calculation- OT    OT Start Time  1410  -  1030  -     OT Stop Time  1455  -  1115  -     OT Time Calculation (min)  45 min  -  45 min  -     OT Received On  07/02/19  -  07/02/19  -       User Key  (r) = Recorded By, (t) = Taken By, (c) = Cosigned By    Initials Name Provider Type     Jossie Louis LONGO/L Occupational Therapy Assistant          Therapy Charges for Today     Code Description Service Date Service Provider Modifiers Qty    04278890415 HC OT THER PROC EA 15 MIN 7/2/2019 Jossie Louis LONGO/L GO 1    97948802651 HC OT THERAPEUTIC ACT EA 15 MIN 7/2/2019 Jossie Louis LONGO/L GO 2    01340310951 HC OT THER PROC EA 15 MIN 7/2/2019 Jossie Louis LONGO/L GO 1    84311705882 HC OT THERAPEUTIC ACT EA 15 MIN 7/2/2019 Jossie Louis COTA/L GO 1    87178511067 HC OT SELF CARE/MGMT/TRAIN EA 15 MIN 7/2/2019  Jossie Louis, LONGO/L GO 1             Eval FIM FIM Goal  Discharge FIM Daily FIM           Grooming    5    Bathing    5    Dressing - Upper Body    5    Dressing - Lower Body    4    Toilet    4    Tub, Shower    0    Problem Solving    6     Social Interaction     6            Bed, Chair, W/C Transfer        Walking        Wheelchair Locomotion        Stairs                Comprehension        Expression        Memory                      DONTA Up/JOVANNY  7/2/2019

## 2019-07-02 NOTE — PATIENT CARE CONFERENCE
Attendance of weekly team conference:   Jossie Louis, Dr. Rogers Tompkins, Anju Springer, Don Lobo, Rudy Lombardi, Munira Dimas, Maria Ines Ty, Rosario Jansen and Alycia Rutledge           Patient's progress reviewed, FIMs reviewed, discharge date discussed and equipment needs addressed.     Expected Discharge Date: To be determined   Anticipated discharge orders/equipment: To be determined

## 2019-07-02 NOTE — PROGRESS NOTES
LOS: 6 days   Patient Care Team:  Mike Draper MD as PCP - General    Chief Complaint:   Closed left hip fracture (CMS/HCC) postop day 16          Interval History:     SUBJECTIVE: Good spirits today.  Said he slept well.  He is eating well.  Pain is well controlled.  Would like to have his staples out of possible  Working well with therapy  History taken from: patient chart RN    Objective     Vital Signs  Temp:  [97.2 °F (36.2 °C)-97.6 °F (36.4 °C)] 97.2 °F (36.2 °C)  Heart Rate:  [61-75] 75  Resp:  [16-18] 18  BP: (115-122)/(60-63) 122/60      06/26/19  1404   Weight: 104 kg (229 lb)         Physical Exam:     General Appearance:    Alert, cooperative, in no acute distress   Head:    Normocephalic, without obvious abnormality, atraumatic   Eyes:            Lids and lashes normal, conjunctivae and sclerae normal, no   icterus, no pallor, corneas clear, PERRLA   Throat:   No oral lesions, no thrush, oral mucosa moist, throat not as hoarse as it has been   Neck:   No adenopathy, supple, trachea midline, no thyromegaly, no   carotid bruit, no JVD       Lungs:     Clear to auscultation,respirations regular, even and                  Unlabored good bilateral air movement    Heart:    Regular rhythm and normal rate, normal S1 and S2, no            murmur, no gallop, no rub, no click no ectopy   Chest Wall:    No abnormalities observed   Abdomen:     Normal bowel sounds, no masses, no organomegaly, soft        non-tender, non-distended, no guarding, no rebound                Tenderness normal exam   Extremities:   Moves all extremities well, trace to 1+ edema left leg , no cyanosis, no             Redness incision in the left lateral thigh is well-healed.  There is no drainage.  No redness incision looks good       Skin:   No bleeding, bruising or rash   Lymph nodes:   No palpable adenopathy   Neurologic:   Cranial nerves 2 - 12 grossly intact, sensation intact, DTR       present and equal bilaterally         RESULTS REVIEW:     Lab Results (last 24 hours)     ** No results found for the last 24 hours. **        Imaging Results (last 72 hours)     ** No results found for the last 72 hours. **          Assessment/Plan     Active Hospital Problems    Diagnosis   • **Closed left hip fracture (CMS/HCC)   • Primary insomnia     This is a compliant at home prior to admission as well     • Dry mouth     Due to prior salivary gland excision  This is a chronic problem at home prior to admission     • Pneumonia involving left lung   • Pacemaker     [employer] dual-chamber pacemaker implanted December 2016 followed by Dr. Grullon     • Delirium   • Stage 3 chronic kidney disease (CMS/HCC)   • Acute respiratory failure with hypoxia and hypercapnia (CMS/HCC)     Improved at the time of admission to acute rehab.  Still using oxygen 2 L per nasal cannula.  On a tapering dose of prednisone over 8 days.  Started with 40 mg every morning x2 days and then decreasing every 2 days     • Coronary artery disease   • Frequent falls           PLAN: He is participating with therapy and is making progress.  He is walking up to 50 feet.  Blood pressure was better yesterday when therapy.  Pain was well controlled.  He is work with PT and OT.  Will DC the staples as this is day 16  Renal function is stable blood pressure stable  Delirium has resolved  Continue intensive therapy we will recheck lab later in the week  He is doing well        This document has been electronically signed by Rogers Tompkins MD on July 2, 2019 9:40 AM

## 2019-07-02 NOTE — THERAPY TREATMENT NOTE
Inpatient Rehabilitation - Physical Therapy Treatment Note  [facility]     Patient Name: Kuldip Nevarez  : 1945  MRN: [id number]    Today's Date: 2019       Date of Referral to PT: 19         Admit Date: 2019      Visit Dx:      ICD-10-CM ICD-9-CM   1. Symbolic dysfunction R48.9 784.60   2. Impaired physical mobility Z74.09 781.99   3. Impaired mobility and activities of daily living Z74.09 799.89       Patient Active Problem List   Diagnosis   • S/p left hip fracture   • Closed fracture of left hip (CMS/HCC)   • Frequent falls   • Closed nondisplaced intertrochanteric fracture of left femur with routine healing   • Delirium   • Pneumonia involving left lung   • Pacemaker   • Coronary artery disease   • Disease of thyroid gland   • Stage 3 chronic kidney disease (CMS/HCC)   • Acute respiratory failure with hypoxia and hypercapnia (CMS/HCC)   • Closed left hip fracture (CMS/HCC)   • Primary insomnia   • Dry mouth   • Chronic gouty arthropathy       Therapy Treatment    IRF Treatment Summary     Row Name 19 1410 19 1331 19 1030       Evaluation/Treatment Time and Intent    Subjective Information  no complaints  -RC  --  no complaints  -RC    Existing Precautions/Restrictions  fall  -RC  fall  -RW  fall  -RC    Document Type  therapy note (daily note)  -RC  therapy note (daily note)  -RW  therapy note (daily note)  -RC    Mode of Treatment  occupational therapy;individual therapy  -RC  physical therapy  -RW  occupational therapy;individual therapy  -RC    Patient/Family Observations  just finished PT   -RC  --  in bed just had breathing tx   -RC    Start Time (Evaluation/Treatment)  1410  -RC  1331  -RW  1030  -RC    Stop Time (Evaluation/Treatment)  1455  -RC  1409  -RW  1115  -RC    Recorded by [RC] Jossie Louis COTA/JOVANNY [RW] Don Lobo PTA [RC] Jossie Louis COTA/L    Row Name 19 0908             Evaluation/Treatment Time and Intent    Subjective  Information  no complaints  -RW      Existing Precautions/Restrictions  fall  -RW      Document Type  therapy note (daily note)  -RW      Mode of Treatment  physical therapy  -RW      Patient/Family Observations  in wc  -RW      Start Time (Evaluation/Treatment)  0908  -RW      Stop Time (Evaluation/Treatment)  1003  -RW      Recorded by [RW] Don Lobo PTA      Row Name 07/02/19 1410 07/02/19 1331 07/02/19 1030       Relationship/Environment    Primary Source of Support/Comfort  child(lynn);extended family  -RC  child(lynn)  -RW  child(lynn);friend  -RC    Lives With  spouse  -RC  --  --    Recorded by [RC] Jossie Louis LONGO/L [RW] Don Lobo PTA [RC] Jossie Louis LONGO/L    Row Name 07/02/19 1030             Resource/Environmental Concerns    Current Living Arrangements  home/apartment/condo  -RC      Recorded by [RC] Jossie Louis LONGO/L      Row Name 07/02/19 1331 07/02/19 1030 07/02/19 0908       Cognition/Psychosocial- PT/OT    Affect/Mental Status (Cognitive)  WFL  -RW  WFL  -RC  WFL  -RW    Orientation Status (Cognition)  oriented x 4  -RW  oriented x 4  -RC  oriented x 4  -RW    Follows Commands (Cognition)  WFL  -RW  --  WFL  -RW    Personal Safety Interventions  gait belt;nonskid shoes/slippers when out of bed  -RW  --  --    Recorded by [RW] Don Lobo PTA [RC] Jossie Louis, LONGO/L [RW] Don Lobo PTA    Row Name 07/02/19 1331 07/02/19 0908          Mobility    Extremity Weight-bearing Status  left lower extremity  -RW  left lower extremity  -RW     Left Lower Extremity (Weight-bearing Status)  weight-bearing as tolerated (WBAT)  -RW  weight-bearing as tolerated (WBAT)  -RW     Recorded by [RW] Don Lobo PTA [RW] Don Lobo PTA     Row Name 07/02/19 1030 07/02/19 0908          Bed Mobility Assessment/Treatment    Supine-Sit [place] (Bed Mobility)  conditional independence  -RC  conditional independence;supervision  -RW     Sit-Supine [place]  (Bed Mobility)  --  conditional independence;supervision  -RW     Recorded by [RC] Jossie Louis LONGO/L [RW] Don Lobo, PTA     Row Name 07/02/19 1331 07/02/19 0908          Transfer Assessment/Treatment    Transfer Assessment/Treatment  sit-stand transfer;stand-sit transfer  -RW  sit-stand transfer;stand-sit transfer  -RW     Recorded by [RW] Don Lobo, PTA [RW] Don Lobo, PTA     Row Name 07/02/19 1030             Bed-Chair Transfer    Bed-Chair [place] (Transfers)  contact guard  -RC      Assistive Device (Bed-Chair Transfers)  walker, front-wheeled  -RC      Recorded by [RC] Jossie Louis LONGO/L      Row Name 07/02/19 1030             Chair-Bed Transfer    Chair-Bed [place] (Transfers)  contact guard  -RC      Assistive Device (Chair-Bed Transfers)  walker, front-wheeled  -RC      Recorded by [RC] Jossie Louis LONGO/L      Row Name 07/02/19 1331 07/02/19 1030 07/02/19 0908       Sit-Stand Transfer    Sit-Stand [place] (Transfers)  contact guard  -RW  contact guard  -RC  contact guard  -RW    Assistive Device (Sit-Stand Transfers)  walker, front-wheeled  -RW  walker, front-wheeled  -RC  walker, front-wheeled  -RW    Recorded by [RW] Don Lobo, KIT [RC] Jossie Louis, LONGO/L [RW] Don Lobo, PTA    Row Name 07/02/19 1331 07/02/19 1030 07/02/19 0908       Stand-Sit Transfer    Stand-Sit [place] (Transfers)  contact guard  -RW  contact guard  -RC  contact guard  -RW    Assistive Device (Stand-Sit Transfers)  walker, front-wheeled  -RW  walker, front-wheeled  -RC  walker, front-wheeled  -RW    Recorded by [RW] Don Lobo, KIT [RC] Jossie Louis, LONGO/L [RW] Don Lobo, PTA    Row Name 07/02/19 1331 07/02/19 0908          Gait/Stairs Assessment/Training    [place] Level (Gait)  verbal cues  -RW  contact guard;verbal cues  -RW     Assistive Device (Gait)  walker, front-wheeled  -RW  walker, front-wheeled  -RW     Distance in Feet (Gait)   74, 80, 81  -RW  2  -RW     Pattern (Gait)  step-through  -RW  step-to  -RW     Deviations/Abnormal Patterns (Gait)  antalgic  -RW  antalgic  -RW     Right Sided Gait Deviations  --  heel strike decreased tends to drag right le due to ue weakness in unloading left  -RW     Recorded by [RW] Don Lobo PTA [RW] Don Lobo, KIT     Row Name 07/02/19 0908             Wheelchair Mobility/Management    Forward Propulsion [place] (Wheelchair)  conditional independence  -RW      Distance Propelled in Feet (Wheelchair)  150  -RW      Recorded by [RW] Don Lobo PTA      Row Name 07/02/19 1331 07/02/19 1030 07/02/19 0908       Pain Scale: Numbers Pre/Post-Treatment    Pain Scale: Numbers, Pretreatment  0/10 - no pain gave no rating  -RW  0/10 - no pain  -RC  -- gave no rating  -RW    Pain Scale: Numbers, Post-Treatment  0/10 - no pain  -RW  0/10 - no pain  -RC  --    Pain Location - Side  --  --  Left  -RW    Pain Location  --  --  hip  -RW    Recorded by [RW] Don Lobo PTA [RC] Jossie Louis COTA/JOVANNY [RW] Don Lobo PTA    Row Name 07/02/19 1030             Aerobic Exercise Activity    Exercise Performed (Aerobic, Therapeutic Exercise)  arm bike  -RC      Time (Aerobic, Therapeutic Exercise)  15  -RC      Recorded by [RC] Jossie Louis LONGO/L      Row Name 07/02/19 0908             Lower Extremity Seated Therapeutic Exercise    Performed, Seated Lower Extremity (Therapeutic Exercise)  LAQ (long arc quad), knee extension;hip abduction/adduction;plantarflexor stretch;knee flexion/extension  -RW      Expected Outcomes, Seated Lower Extremity (Therapeutic Exercise)  improve functional tolerance, self-care activity;improve performance, gait skills;improve performance, transfer skills  -RW      Sets/Reps Detail, Seated Lower Extremity (Therapeutic Exercise)  20  -RW      Recorded by [RW] Don Lobo PTA      Row Name 07/02/19 0908             Lower Extremity Supine Therapeutic Exercise     Performed, Supine Lower Extremity (Therapeutic Exercise)  quadriceps sets;heel slides;ankle pumps  -RW      Exercise Type, Supine Lower Extremity (Therapeutic Exercise)  AROM (active range of motion)  -RW      Expected Outcomes, Supine Lower Extremity (Therapeutic Exercise)  improve functional tolerance, self-care activity;improve performance, transfer skills;improve performance, gait skills  -RW      Restrictions, Supine Lower Extremity (Therapeutic Exercise)  20  -RW      Recorded by [RW] Don Lobo PTA      Row Name 07/02/19 1331 07/02/19 1030 07/02/19 0908       Vital Signs    Pre Systolic BP Rehab  116  -RW  --  81  -RW    Pre Treatment Diastolic BP  70  -RW  --  58  -RW    Intra Systolic BP Rehab  --  99  -RC  --    Intra Treatment Diastolic BP  --  53  -RC  --    Post Systolic BP Rehab  132  -RW  --  104  -RW    Post Treatment Diastolic BP  73  -RW  --  68  -RW    Intratreatment Heart Rate (beats/min)  --  76  -RC  --    Intra SpO2 (%)  --  96  -RC  --    O2 Delivery Intra Treatment  --  room air  -RC  --    Recorded by [RW] Don Lobo PTA [RC] Jossie Louis COTA/JOVANNY [RW] Don Lobo PTA    Row Name 07/02/19 1331 07/02/19 0908          Positioning and Restraints    Pre-Treatment Position  in bed  -RW  sitting in chair/recliner  -RW     Post Treatment Position  wheelchair  -RW  bed  -RW     In Bed  --  exit alarm on;encouraged to call for assist;call light within reach  -RW     Recorded by [RW] Don Lobo PTA [RW] Don Lobo PTA     Row Name 07/02/19 1331 07/02/19 0908          Daily Summary of Progress (PT)    Functional Goal Overall Progress: Physical Therapy  progressing toward functional goals as expected  -RW  progressing toward functional goals as expected  -RW     Recorded by [RW] Don Lobo PTA [RW] Don Lobo PTA     Row Name 07/02/19 1030             Daily Summary of Progress (OT)    Functional Goal Overall Progress: Occupational Therapy  progressing  toward functional goals as expected  -      Recorded by [RC] Jossie Louis, LONGO/L      Row Name 19 0938             Nursing Weekly Outcome Summary    Weekly Progress Summary: Nursing  improving, more independent in daily activity  -      Weekly Outcome Statement: Nursing  eatin; toiletin; bladder: 5/6: bowel: 5/6  -      Recorded by [] Kristen Rogel, RN        User Key  (r) = Recorded By, (t) = Taken By, (c) = Cosigned By    Initials Name Effective Dates     Kristen Rogel RN 10/17/16 -     Don Pandey, PTA 18 -     RC Jossie Louis, LONGO/L 18 -         Wound 19 0914 Left hip incision (Active)   Dressing Appearance intact;dry 2019  7:50 AM   Closure NOLVIA 2019  7:50 AM   Base dressing in place, unable to visualize 2019  7:50 AM   Periwound ecchymotic 2019  7:50 AM   Drainage Characteristics/Odor serous 2019  7:50 AM   Drainage Amount small 2019  7:50 AM     Physical Therapy Education     Title: PT OT SLP Therapies (In Progress)     Topic: Physical Therapy (In Progress)     Point: Mobility training (Done)     Learning Progress Summary           Patient Acceptance, E, VU,NR by  at 2019  1:06 PM    Comment:  Educated on proper technique with bed mobility, proper hand placement with transfers, and proper gait mechanics.                   Point: Home exercise program (In Progress)     Learning Progress Summary           Patient Acceptance, E, NR by  at 2019  2:15 PM    Comment:  Educated on supine ther ex to increase LLE strength and ROM.                               User Key     Initials Effective Dates Name Provider Type Discipline     18 -  Rudy Lombardi, PT Physical Therapist PT                  PT Recommendation and Plan     Plan of Care Reviewed With: patient  Daily Summary of Progress (PT)  Functional Goal Overall Progress: Physical Therapy: progressing toward functional goals as expected  IRF Plan of  Care Review: progress ongoing, continue  Progress, Functional Goals: demonstrating adequate progress  Outcome Summary: pt is making progress with increasing gt distance. stands with cga and gt cga x 80' fww. cont with rehab.      PT IRF GOALS     Row Name 07/02/19 1331             Bed Mobility Goal 1 (PT-IRF)    Activity/Assistive Device (Bed Mobility Goal 1, PT-IRF)  sit to supine/supine to sit  -RW      [place] Level (Bed Mobility Goal 1, PT-IRF)  conditional independence  -RW      Time Frame (Bed Mobility Goal 1, PT-IRF)  2 weeks  -RW      Barriers (Bed Mobility Goal 1, PT-IRF)  L hip fx: ORIF, WBAT  -RW      Progress/Outcomes (Bed Mobility Goal 1, PT-IRF)  goal not met  -RW         Transfer Goal 1 (PT-IRF)    Activity/Assistive Device (Transfer Goal 1, PT-IRF)  sit-to-stand/stand-to-sit;bed-to-chair/chair-to-bed  -RW      [place] Level (Transfer Goal 1, PT-IRF)  conditional independence  -RW      Time Frame (Transfer Goal 1, PT-IRF)  5 - 7 days  -RW      Barriers (Transfer Goal 1, PT-IRF)  L hip fx: ORIF, WBAT  -RW      Progress/Outcomes (Transfer Goal 1, PT-IRF)  goal not met  -RW         Gait/Walking Locomotion Goal 1 (PT-IRF)    Activity/Assistive Device (Gait/Walking Locomotion Goal 1, PT-IRF)  walker, rolling  -RW      Gait/Walking Locomotion Distance Goal 1 (PT-IRF)  150'X1  -RW      [place] Level (Gait/Walking Locomotion Goal 1, PT-IRF)  conditional independence  -RW      Time Frame (Gait/Walking Locomotion Goal 1, PT-IRF)  long term goal (LTG);by discharge  -RW      Barriers (Gait/Walking Locomotion Goal 1, PT-IRF)  L hip fx: ORIF, WBAT  -RW      Progress/Outcomes (Gait/Walking Locomotion Goal 1, PT-IRF)  goal not met  -RW         Stairs Goal 1 (PT-IRF)    Activity/Assistive Device (Stairs Goal 1, PT-IRF)  ascending stairs;descending stairs  -RW      Number of Stairs (Stairs Goal 1, PT-IRF)  2 steps, no railing.   -RW      [place] Level (Stairs Goal 1, PT-IRF)  supervision required   -RW      Time Frame (Stairs Goal 1, PT-IRF)  long term goal (LTG);by discharge  -RW      Barriers (Stairs Goal 1, PT-IRF)  L hip fx: ORIF, WBAT  -RW      Progress/Outcomes (Stairs Goal 1, PT-IRF)  goal not met  -RW        User Key  (r) = Recorded By, (t) = Taken By, (c) = Cosigned By    Initials Name Provider Type    Don Pandey PTA Physical Therapy Assistant        Outcome Measures     Row Name 07/02/19 1000 07/01/19 0900          How much help from another person do you currently need...    Turning from your back to your side while in flat bed without using bedrails?  3  -RW  3  -RW     Moving from lying on back to sitting on the side of a flat bed without bedrails?  3  -RW  3  -RW     Moving to and from a bed to a chair (including a wheelchair)?  3  -RW  3  -RW     Standing up from a chair using your arms (e.g., wheelchair, bedside chair)?  3  -RW  3  -RW     Climbing 3-5 steps with a railing?  3  -RW  3  -RW     To walk in hospital room?  3  -RW  3  -RW     AM-PAC 6 Clicks Score (PT)  18  -RW  18  -RW       User Key  (r) = Recorded By, (t) = Taken By, (c) = Cosigned By    Initials Name Provider Type    Don Pandey PTA Physical Therapy Assistant             Time Calculation:     PT Charges     Row Name 07/02/19 1450 07/02/19 1039          Time Calculation    Start Time  1331  -RW  0908  -RW     Stop Time  1409  -RW  1003  -RW     Time Calculation (min)  38 min  -RW  55 min  -RW        Time Calculation- PT    Total Timed Code Minutes- PT  38 minute(s)  -RW  55 minute(s)  -RW       User Key  (r) = Recorded By, (t) = Taken By, (c) = Cosigned By    Initials Name Provider Type    Don Pandey PTA Physical Therapy Assistant          Therapy Charges for Today     Code Description Service Date Service Provider Modifiers Qty    25865178362 HC GAIT TRAINING EA 15 MIN 7/1/2019 Don Lobo, PTA GP 1    94048764735 HC PT THER PROC EA 15 MIN 7/1/2019 Don Lobo, KIT GP 1    58445311802 HC PT  THERAPEUTIC ACT EA 15 MIN 7/1/2019 Don Lobo, PTA GP 1    24720921443 HC GAIT TRAINING EA 15 MIN 7/1/2019 Don Lobo, PTA GP 1    66459641829 HC PT THER PROC EA 15 MIN 7/1/2019 Don Lobo, PTA GP 1    67578441963 HC PT THERAPEUTIC ACT EA 15 MIN 7/1/2019 Don Lobo, PTA GP 1    80318896733 HC PT THER PROC EA 15 MIN 7/2/2019 Don Lobo, PTA GP 3    34694826524 HC PT THERAPEUTIC ACT EA 15 MIN 7/2/2019 Don Lobo, PTA GP 1    07457167483 HC GAIT TRAINING EA 15 MIN 7/2/2019 Don Lobo, PTA GP 1    18385836077 HC PT THERAPEUTIC ACT EA 15 MIN 7/2/2019 Don Lobo, PTA GP 2            PT G-Codes  Outcome Measure Options: AM-PAC 6 Clicks Basic Mobility (PT)  AM-PAC 6 Clicks Score (PT): 18  AM-PAC 6 Clicks Score (OT): 19      Don Lobo PTA  7/2/2019

## 2019-07-02 NOTE — PLAN OF CARE
Problem: Patient Care Overview  Goal: Plan of Care Review  Outcome: Ongoing (interventions implemented as appropriate)   07/02/19 1420   Patient Care Overview   IRF Plan of Care Review progress ongoing, continue   Progress, Functional Goals demonstrating adequate progress   OTHER   Outcome Summary pt working well with therapy. no s/s of uncontrolled pain or distress. VSS. Staples out today.   Coping/Psychosocial   Plan of Care Reviewed With patient     Goal: Individualization and Mutuality  Outcome: Ongoing (interventions implemented as appropriate)    Goal: Discharge Needs Assessment  Outcome: Ongoing (interventions implemented as appropriate)    Goal: Home Safety Plan  Outcome: Ongoing (interventions implemented as appropriate)    Goal: Coping Plan  Outcome: Ongoing (interventions implemented as appropriate)    Goal: Community Reintegration Plan  Outcome: Ongoing (interventions implemented as appropriate)      Problem: Fractured Hip (Adult)  Goal: Signs and Symptoms of Listed Potential Problems Will be Absent, Minimized or Managed (Fractured Hip)  Outcome: Ongoing (interventions implemented as appropriate)      Problem: Fall Risk (Adult)  Goal: Absence of Fall  Outcome: Ongoing (interventions implemented as appropriate)      Problem: Functional Mobility Impairment (IRF) (Adult)  Goal: Optimal/Safe Level of [place] with Mobility  Outcome: Ongoing (interventions implemented as appropriate)      Problem: Skin Injury Risk (Adult)  Goal: Identify Related Risk Factors and Signs and Symptoms  Outcome: Ongoing (interventions implemented as appropriate)    Goal: Skin Health and Integrity  Outcome: Ongoing (interventions implemented as appropriate)

## 2019-07-02 NOTE — PLAN OF CARE
Problem: Patient Care Overview  Goal: Plan of Care Review  Outcome: Ongoing (interventions implemented as appropriate)   07/02/19 1446   Patient Care Overview   IRF Plan of Care Review progress ongoing, continue   Progress, Functional Goals demonstrating adequate progress      07/02/19 1446   Patient Care Overview   IRF Plan of Care Review progress ongoing, continue   Progress, Functional Goals demonstrating adequate progress   OTHER   Outcome Summary pt is making progress with increasing gt distance. stands with cga and gt cga x 80' fww. cont with rehab.

## 2019-07-03 NOTE — THERAPY TREATMENT NOTE
Inpatient Rehabilitation - Occupational Therapy Treatment Note    [facility]     Patient Name: Kuldip Nevarez  : 1945  MRN: [id number]    Today's Date: 7/3/2019                 Admit Date: 2019      Visit Dx:    ICD-10-CM ICD-9-CM   1. Symbolic dysfunction R48.9 784.60   2. Impaired physical mobility Z74.09 781.99   3. Impaired mobility and activities of daily living Z74.09 799.89       Patient Active Problem List   Diagnosis   • S/p left hip fracture   • Closed fracture of left hip (CMS/HCC)   • Frequent falls   • Closed nondisplaced intertrochanteric fracture of left femur with routine healing   • Delirium   • Pneumonia involving left lung   • Pacemaker   • Coronary artery disease   • Disease of thyroid gland   • Stage 3 chronic kidney disease (CMS/HCC)   • Acute respiratory failure with hypoxia and hypercapnia (CMS/HCC)   • Closed left hip fracture (CMS/HCC)   • Primary insomnia   • Dry mouth   • Chronic gouty arthropathy         Therapy Treatment    IRF Treatment Summary     Row Name 19 1315 19 0845 19 0735       Evaluation/Treatment Time and Intent    Subjective Information  no complaints  -RC  complains of;pain  -CZ  complains of not slleeping   -RC    Existing Precautions/Restrictions  fall  -RC  fall  -CZ  fall  -RC    Document Type  therapy note (daily note)  -RC  therapy note (daily note)  -CZ  therapy note (daily note)  -RC    Mode of Treatment  occupational therapy;individual therapy  -RC  individual therapy;physical therapy  -CZ  occupational therapy;individual therapy  -RC    Patient/Family Observations  in w/c   -RC  Seated in w/c, RA, no apparent distress.   -CZ  in w/c   -RC    Start Time (Evaluation/Treatment)  1315  -RC  0845  -CZ  0735  -RC    Stop Time (Evaluation/Treatment)  1340  -RC  0955  -CZ  0845  -RC    Recorded by [RC] Jossie Louis COTA/JOVANNY [CZ] Rudy Lombardi, PT [RC] Jossie Louis COTA/L    Row Name 19 1317              Relationship/Environment    Primary Source of Support/Comfort  child(lynn)  -RC      Recorded by [RC] Jossie Louis, LONGO/L      Row Name 07/03/19 1315 07/03/19 0845 07/03/19 0735       Cognition/Psychosocial- PT/OT    Affect/Mental Status (Cognitive)  WFL  -RC  WFL  -CZ  WFL  -RC    Orientation Status (Cognition)  oriented x 4  -RC  oriented x 4  -CZ  --    Recorded by [RC] Jossie Louis LONGO/L [CZ] Rudy Lombardi, PT [RC] Jossie Louis, LONGO/L    Row Name 07/03/19 0845             Bed Mobility Assessment/Treatment    Franklin Level (Bed Mobility)  contact guard assist  -CZ      Assistive Device (Bed Mobility)  bed rails  -CZ      Comment (Bed Mobility)  Patient was provided cues for proper hand placement.  -CZ      Recorded by [CZ] Rudy Lombardi, PT      Row Name 07/03/19 0845             Bed-Chair Transfer    Bed-Chair Franklin (Transfers)  contact guard  -CZ      Assistive Device (Bed-Chair Transfers)  walker, front-wheeled  -CZ      Recorded by [CZ] Rudy Lombardi, PT      Row Name 07/03/19 0845             Chair-Bed Transfer    Chair-Bed Franklin (Transfers)  contact guard  -CZ      Assistive Device (Chair-Bed Transfers)  walker, front-wheeled  -CZ      Recorded by [CZ] Rudy Lombardi, PT      Row Name 07/03/19 0845             Sit-Stand Transfer    Sit-Stand Franklin (Transfers)  contact guard  -CZ      Assistive Device (Sit-Stand Transfers)  walker, front-wheeled  -CZ      Recorded by [CZ] Rudy Lombardi, PT      Row Name 07/03/19 0845             Stand-Sit Transfer    Stand-Sit Franklin (Transfers)  contact guard  -CZ      Assistive Device (Stand-Sit Transfers)  walker, front-wheeled  -CZ      Recorded by [CZ] Rudy Lombardi, PT      Row Name 07/03/19 0735             Shower Transfer    Type (Shower Transfer)  stand pivot/stand step  -RC      Franklin Level (Shower Transfer)  contact guard  -RC      Assistive Device (Shower Transfer)  walker, mattei;grab bars/tub  rail;shower chair  -RC      Recorded by [RC] Jossie Louis COTA/L      Row Name 07/03/19 0845             Gait/Stairs Assessment/Training    Crosby Level (Gait)  contact guard  -CZ      Assistive Device (Gait)  walker, front-wheeled  -CZ      Distance in Feet (Gait)  80'x1  -CZ      Pattern (Gait)  swing-through  -CZ      Comment (Gait/Stairs)  Patient demonstrates decreased stance time on L  This was due to LLD with the LLE being longer than the R, and also due to decreased L knee extension AROM and pain.  Patient was able to improve gait pattern with addition of heel lift into R shoe and with passive stretching of the L knee to improve extension.  -CZ      Recorded by [CZ] Rudy Lombardi, PT      Row Name 07/03/19 0845 07/03/19 0712          Wheelchair Mobility/Management    Method of Wheelchair Locomotion (Mobility)  bimanual (upper extremity) propulsion  -CZ  bimanual (upper extremity) propulsion  -RC     Mobility Activities (Wheelchair)  --  -RC,CZ2  forward propulsion  -RC     Mobility Crosby Level (Wheelchair)  supervision  -CZ  supervision  -RC     Distance Propelled in Feet (Wheelchair)  --  50  -RC     Management Activities (Wheelchair)  -- Simultaneous filing. User may not have seen previous data.  -CZ  --     Comment, Parts Management (Wheelchair)  Patient was able to propel w/c with supervision only.  Patient was cued to steer away from the walls in the hallway on the R by instructed him to push more with his R arm to improve his overall saftety awareness.   -CZ  --     Recorded by [CZ] Rudy Lombardi, PT  [RC,CZ2] Jossie Louis COTA/JOVANNY (r) Rudy Lombardi, PT (t) [RC] Jossie Louis LONGO/L     Row Name 07/03/19 0734             Bathing Assessment/Treatment    Bathing Crosby Level  bathing skills;set up;supervision  -RC      Assistive Device (Bathing)  shower chair;hand held shower spray hose;grab bar/tub rail;long-handled sponge  -RC      Bathing Position  supported  sitting  -RC      Recorded by [RC] Jossie Louis COTA/L      Row Name 07/03/19 0735             Upper Body Dressing Assessment/Treatment    Upper Body Dressing Task  set up assistance;doff;don;pull over garment  -RC      Upper Body Dressing Position  supported sitting  -RC      Recorded by [RC] Jossie Louis COTA/L      Row Name 07/03/19 0735             Lower Body Dressing Assessment/Treatment    Lower Body Dressing Garrard Level  minimum assist (75% patient effort);doff;don;pants/bottoms;shoes/slippers;socks;underwear  -RC      Lower Body Dressing Dressing Device  reacher;sock-aid  -RC      Lower Body Dressing Position  supported sitting;supported standing  -RC      Comment (Lower Body Dressing)  sock aid not wide enough for pt foot   -RC      Recorded by [RC] Jossie Louis COTA/L      Row Name 07/03/19 0735             Grooming Assessment/Treatment    Grooming [place]arrard Level  grooming skills;conditional independence  -RC      Recorded by [RC] Jossie Louis COTA/L      Row Name 07/03/19 0845             Pain Scale: Numbers Pre/Post-Treatment    Pain Scale: Numbers, Pretreatment  4/10  -CZ      Pain Scale: Numbers, Post-Treatment  1/10  -CZ      Pain Location - Side  Left  -CZ      Pain Location - Orientation  lower  -CZ      Pain Location  extremity  -CZ      Pain Intervention(s)  Ambulation/increased activity;Distraction  -CZ      Recorded by [CZ] Rudy Lombardi, PT      Row Name 07/03/19 0895             Orthotic Management    Orthosis Location  --  -RC,CZ      Recorded by [RC,CZ] Jossie Louis COTA/L (r) Rudy Lombardi, PT (t)      Row Name 07/03/19 1317             Upper Extremity Seated Therapeutic Exercise    Performed, Seated Upper Extremity (Therapeutic Exercise)  shoulder flexion/extension;shoulder external/internal rotation;elbow flexion/extension  -RC      Device, Seated Upper Extremity (Therapeutic Exercise)  free weights, barbell 2#  -RC      Exercise Type, Seated  Upper Extremity (Therapeutic Exercise)  resistive exercise  -RC      Expected Outcomes, Seated Upper Extremity (Therapeutic Exercise)  improve functional tolerance, self-care activity;improve performance, BADLs  -RC      Sets/Reps Detail, Seated Upper Extremity (Therapeutic Exercise)  15x2  -RC      Recorded by [RC] Jossie Louis, LONGO/L      Row Name 07/03/19 0735             Aerobic Exercise Activity    Exercise Performed (Aerobic, Therapeutic Exercise)  arm bike  -RC      Expected Outcome (Aerobic, Therapeutic Exercise)  improve functional tolerance, self-care activity;improve performance, BADLs  -RC      Time (Aerobic, Therapeutic Exercise)  10  -RC      Recorded by [RC] Jossie Louis LONGO/L      Row Name 07/03/19 0845             Lower Extremity Seated Therapeutic Exercise    Comment, Seated Lower Extremity (Therapeutic Exercise)  Seating AROM LAQ 5sec x20 RLE; AAROM LAQ 5sec x10 LLE; add/abd 5sec x15 w/ ball/yellow tband; seated marching 15x; Patient tolerated ther ex session well.   -CZ      Recorded by [CZ] Rudy Lombardi, PT      Row Name 07/03/19 1315 07/03/19 0845 07/03/19 0735       Vital Signs    Pre Systolic BP Rehab  101  -RC  101  -CZ  113  -RC    Pre Treatment Diastolic BP  56  -RC  57  -CZ  60  -RC    Intra Systolic BP Rehab  --  73  -CZ  --    Intra Treatment Diastolic BP  --  50  -CZ  --    Post Systolic BP Rehab  --  95  -CZ  --    Post Treatment Diastolic BP  --  58  -CZ  --    Pretreatment Heart Rate (beats/min)  63  -RC  69  -CZ  67  -RC    Intratreatment Heart Rate (beats/min)  --  73  -CZ  --    Posttreatment Heart Rate (beats/min)  --  63  -CZ  --    Pre SpO2 (%)  98  -RC  96  -CZ  --    O2 Delivery Pre Treatment  room air  -RC  room air  -CZ  --    Intra SpO2 (%)  --  94  -CZ  --    O2 Delivery Intra Treatment  --  room air  -CZ  --    Post SpO2 (%)  --  97  -CZ  --    O2 Delivery Post Treatment  --  room air  -CZ  --    Pre Patient Position  --  Sitting W/C  -CZ  --    Post  Patient Position  --  Sitting W/C  -CZ  --    Recorded by [RC] Jossie Louis COTA/JOVANNY [CZ] Rudy Lombardi, PT [] Jossie Louis LONGO/L    Row Name 07/03/19 1315 07/03/19 0845 07/03/19 0735       Positioning and Restraints    Pre-Treatment Position  sitting in chair/recliner  -RC  sitting in chair/recliner  -CZ  sitting in chair/recliner  -RC    Post Treatment Position  wheelchair  -RC  wheelchair  -CZ  wheelchair  -RC    In Chair  exit alarm on;encouraged to call for assist;call light within reach;with family/caregiver  -RC  --  --    In Wheelchair  --  sitting;call light within reach;encouraged to call for assist;exit alarm on;notified nsg  -CZ  with PT;encouraged to call for assist  -RC    Recorded by [RC] Jossie Louis LONGO/JOVANNY [CZ] Rudy Lombardi, PT [] Jossie Louis LONGO/L    Row Name 07/03/19 1315             Daily Summary of Progress (OT)    Functional Goal Overall Progress: Occupational Therapy  progressing toward functional goals as expected  -      Recommendations: Occupational Therapy  cont poc   -RC      Recorded by [] Jossie Louis COTA/L        User Key  (r) = Recorded By, (t) = Taken By, (c) = Cosigned By    Initials Name Effective Dates     Jsosie Louis COTA/L 03/07/18 -     CZ Rudy Lombardi, PT 04/03/18 -           Wound 06/16/19 0914 Left hip incision (Active)   Dressing Appearance intact;dry 7/3/2019  7:55 AM   Closure Approximated;Adhesive closure strips 7/3/2019  7:55 AM   Periwound ecchymotic 7/3/2019  7:55 AM   Drainage Amount none 7/3/2019  7:55 AM   Care, Wound cleansed with 7/2/2019  3:15 PM   Adhesive Closure Strips Applied 7/2/2019  7:30 PM   Staples Removed Intact Yes 7/2/2019  3:15 PM         OT Recommendation and Plan         Plan of Care Review  Plan of Care Reviewed With: patient  Daily Summary of Progress (OT)  Functional Goal Overall Progress: Occupational Therapy: progressing toward functional goals as expected  Recommendations: Occupational  Therapy: cont poc   Plan of Care Reviewed With: patient  IRF Plan of Care Review: progress ongoing, continue  Progress, Functional Goals: demonstrating adequate progress  Outcome Summary: shower today, pt marley well w/ grayson for bathing and ub dressing, min @ was required for lb dressing 2* sock aid was to narrow for pt's foot.  cga for transfers. cont poc     OT IRF GOALS     Row Name 07/03/19 1315 07/02/19 1410 07/01/19 1716       Transfer FIM Goals (OT-IRF)    Tub-Shower Transfer FIM Goal (OT-IRF)  Score of 6 (FIM)  -RC  Score of 6 (FIM)  -RC  Score of 6 (FIM)  -LM    Toilet Transfer FIM Goal (OT-IRF)  Score of 6 (FIM)  -RC  Score of 6 (FIM)  -RC  Score of 6 (FIM)  -LM    Time Frame (Transfer FIM Goals 1, OT-IRF)  long term goal (LTG);by discharge  -RC  long term goal (LTG);by discharge  -RC  long term goal (LTG);by discharge  -LM    Progress/Outcomes (Transfer FIM Goals, OT-IRF)  goal not met;continuing progress toward goal  -RC  goal not met;continuing progress toward goal  -RC  goal not met;continuing progress toward goal  -LM       Bathing FIM Goal (OT-IRF)    Bathing FIM Goal (OT-IRF)  Score of 6 (FIM)  -RC  Score of 6 (FIM)  -RC  Score of 6 (FIM)  -LM    Time Frame (Bathing FIM Goal, OT-IRF)  long term goal (LTG);by discharge  -RC  long term goal (LTG);by discharge  -RC  long term goal (LTG);by discharge  -LM    Progress/Outcomes (Bathing FIM Goal, OT-IRF)  goal not met;continuing progress toward goal  -RC  goal not met;continuing progress toward goal  -RC  goal not met;continuing progress toward goal  -LM       UB Dressing FIM Goal (OT-IRF)    Upper Body Dressing, FIM Goal, OT-IRF  Score of 7 (FIM)  -RC  Score of 7 (FIM)  -RC  Score of 7 (FIM)  -LM    Time Frame (UB Dressing FIM Goal, OT-IRF)  long term goal (LTG);by discharge  -RC  long term goal (LTG);by discharge  -RC  long term goal (LTG);by discharge  -LM    Progress/Outcomes (UB Dressing FIM Goal, OT-IRF)  goal not met;continuing progress toward goal  -RC   goal not met;continuing progress toward goal  -RC  goal not met;continuing progress toward goal  -LM       LB Dressing FIM Goal (OT-IRF)    Lower Body Dressing, FIM Goal, OT-IRF  Score of 6 (FIM)  -RC  Score of 6 (FIM)  -RC  Score of 6 (FIM)  -LM    Time Frame (LB Dressing FIM Goal, OT-IRF)  long term goal (LTG);by discharge  -RC  long term goal (LTG);by discharge  -RC  long term goal (LTG);by discharge  -LM    Progress/Outcomes (LB Dressing FIM Goal, OT-IRF)  goal not met;continuing progress toward goal  -RC  goal not met;continuing progress toward goal  -RC  goal not met;continuing progress toward goal  -LM       Toileting FIM Goal (OT-IRF)    Toileting FIM Goal (OT-IRF)  Score of 6 (FIM)  -RC  Score of 6 (FIM)  -RC  Score of 6 (FIM)  -LM    Time Frame (Toileting FIM Goal, OT-IRF)  long term goal (LTG);by discharge  -RC  long term goal (LTG);by discharge  -RC  long term goal (LTG);by discharge  -LM    Progress/Outcomes (Toileting FIM Goal, OT-IRF)  goal not met;continuing progress toward goal  -RC  goal not met;continuing progress toward goal  -RC  goal not met;continuing progress toward goal  -LM    Row Name 07/01/19 1141 06/29/19 0740 06/29/19 0728       Transfer FIM Goals (OT-IRF)    Tub-Shower Transfer FIM Goal (OT-IRF)  Score of 6 (FIM)  -LM  Score of 6 (FIM)  -LW  Score of 6 (FIM)  -LW    Toilet Transfer FIM Goal (OT-IRF)  Score of 6 (FIM)  -LM  Score of 6 (FIM)  -LW  Score of 6 (FIM)  -LW    Time Frame (Transfer FIM Goals 1, OT-IRF)  long term goal (LTG);by discharge  -LM  long term goal (LTG);by discharge  -LW  long term goal (LTG);by discharge  -LW    Progress/Outcomes (Transfer FIM Goals, OT-IRF)  goal not met;continuing progress toward goal  -LM  goal not met;continuing progress toward goal  -LW  goal not met;continuing progress toward goal  -LW       Bathing FIM Goal (OT-IRF)    Bathing FIM Goal (OT-IRF)  Score of 6 (FIM)  -LM  Score of 6 (FIM)  -LW  Score of 6 (FIM)  -LW    Time Frame (Bathing FIM  Goal, OT-IRF)  long term goal (LTG);by discharge  -LM  long term goal (LTG);by discharge  -LW  long term goal (LTG);by discharge  -LW    Progress/Outcomes (Bathing FIM Goal, OT-IRF)  goal not met;continuing progress toward goal  -LM  goal not met;continuing progress toward goal  -LW  goal not met;continuing progress toward goal  -LW       UB Dressing FIM Goal (OT-IRF)    Upper Body Dressing, FIM Goal, OT-IRF  Score of 7 (FIM)  -LM  Score of 7 (FIM)  -LW  Score of 7 (FIM)  -LW    Time Frame (UB Dressing FIM Goal, OT-IRF)  long term goal (LTG);by discharge  -LM  long term goal (LTG);by discharge  -LW  long term goal (LTG);by discharge  -LW    Progress/Outcomes (UB Dressing FIM Goal, OT-IRF)  goal not met;continuing progress toward goal  -LM  goal not met;continuing progress toward goal  -LW  goal not met;continuing progress toward goal  -LW       LB Dressing FIM Goal (OT-IRF)    Lower Body Dressing, FIM Goal, OT-IRF  Score of 6 (FIM)  -LM  Score of 6 (FIM)  -LW  Score of 6 (FIM)  -LW    Time Frame (LB Dressing FIM Goal, OT-IRF)  long term goal (LTG);by discharge  -LM  long term goal (LTG);by discharge  -LW  long term goal (LTG);by discharge  -LW    Progress/Outcomes (LB Dressing FIM Goal, OT-IRF)  goal not met;continuing progress toward goal  -LM  goal not met;continuing progress toward goal  -LW  goal not met;continuing progress toward goal  -LW       Toileting FIM Goal (OT-IRF)    Toileting FIM Goal (OT-IRF)  Score of 6 (FIM)  -LM  Score of 6 (FIM)  -LW  Score of 6 (FIM)  -LW    Time Frame (Toileting FIM Goal, OT-IRF)  long term goal (LTG);by discharge  -LM  long term goal (LTG);by discharge  -LW  long term goal (LTG);by discharge  -LW    Progress/Outcomes (Toileting FIM Goal, OT-IRF)  goal not met;continuing progress toward goal  -LM  goal not met;continuing progress toward goal  -LW  goal not met;continuing progress toward goal  -LW    Row Name 06/29/19 0600 06/28/19 0735 06/27/19 0820       Transfer FIM Goals  (OT-IRF)    Tub-Shower Transfer FIM Goal (OT-IRF)  Score of 6 (FIM)  -LW  Score of 6 (FIM)  -RC  Score of 6 (FIM)  -MR    Toilet Transfer FIM Goal (OT-IRF)  Score of 6 (FIM)  -LW  Score of 6 (FIM)  -RC  Score of 6 (FIM)  -MR    Time Frame (Transfer FIM Goals 1, OT-IRF)  long term goal (LTG);by discharge  -LW  long term goal (LTG);by discharge  -RC  long term goal (LTG);by discharge  -MR    Progress/Outcomes (Transfer FIM Goals, OT-IRF)  goal not met;continuing progress toward goal  -LW  goal not met;continuing progress toward goal  -RC  goal not met  -MR       Bathing FIM Goal (OT-IRF)    Bathing FIM Goal (OT-IRF)  Score of 6 (FIM)  -LW  Score of 6 (FIM)  -RC  Score of 6 (FIM)  -MR    Time Frame (Bathing FIM Goal, OT-IRF)  long term goal (LTG);by discharge  -LW  long term goal (LTG);by discharge  -RC  long term goal (LTG);by discharge  -MR    Progress/Outcomes (Bathing FIM Goal, OT-IRF)  goal not met;continuing progress toward goal  -LW  goal not met;continuing progress toward goal  -RC  goal not met  -MR       UB Dressing FIM Goal (OT-IRF)    Upper Body Dressing, FIM Goal, OT-IRF  Score of 7 (FIM)  -LW  Score of 7 (FIM)  -RC  Score of 7 (FIM)  -MR    Time Frame (UB Dressing FIM Goal, OT-IRF)  long term goal (LTG);by discharge  -LW  long term goal (LTG);by discharge  -RC  long term goal (LTG);by discharge  -MR    Progress/Outcomes (UB Dressing FIM Goal, OT-IRF)  goal not met;continuing progress toward goal  -LW  goal not met;continuing progress toward goal  -RC  goal not met  -MR       LB Dressing FIM Goal (OT-IRF)    Lower Body Dressing, FIM Goal, OT-IRF  Score of 6 (FIM)  -LW  Score of 6 (FIM)  -RC  Score of 6 (FIM)  -MR    Time Frame (LB Dressing FIM Goal, OT-IRF)  long term goal (LTG);by discharge  -LW  long term goal (LTG);by discharge  -RC  long term goal (LTG);by discharge  -MR    Progress/Outcomes (LB Dressing FIM Goal, OT-IRF)  goal not met;continuing progress toward goal  -LW  goal not met;continuing  progress toward goal  -RC  goal not met  -MR       Toileting FIM Goal (OT-IRF)    Toileting FIM Goal (OT-IRF)  Score of 6 (FIM)  -LW  Score of 6 (FIM)  -RC  Score of 6 (FIM)  -MR    Time Frame (Toileting FIM Goal, OT-IRF)  long term goal (LTG);by discharge  -LW  long term goal (LTG);by discharge  -RC  long term goal (LTG);by discharge  -MR    Progress/Outcomes (Toileting FIM Goal, OT-IRF)  goal not met;continuing progress toward goal  -LW  goal not met;continuing progress toward goal  -RC  goal not met  -MR      User Key  (r) = Recorded By, (t) = Taken By, (c) = Cosigned By    Initials Name Provider Type    Jossie George LONGO/L Occupational Therapy Assistant    Aye Stephenson LONGO/L Occupational Therapy Assistant    Jazz Melendez LONGO/L Occupational Therapy Assistant    Alycia Estrada, OT Occupational Therapist          Occupational Therapy Education     Title: PT OT SLP Therapies (In Progress)     Topic: Occupational Therapy (In Progress)     Point: ADL training (In Progress)     Description: Instruct learner(s) on proper safety adaptation and remediation techniques during self care or transfers.   Instruct in proper use of assistive devices.    Learning Progress Summary           Patient Acceptance, E, NR by RC at 7/3/2019  2:03 PM    Acceptance, E, VU by NAUN at 7/1/2019 11:48 AM    Acceptance, E,TB,D, VU by LW at 6/29/2019  7:29 AM    Comment:  Educated pt on use of sock aid and reacher for dressing.    Acceptance, E, NR by RC at 6/28/2019  9:52 AM    Acceptance, E,TB, VU,NR by MR at 6/27/2019  1:15 PM    Comment:  Role of OT and POC.Benefit of activity, safety with t/f, AD/AE utilization to increase functional independence with ADLs.                   Point: Home exercise program (Done)     Description: Instruct learner(s) on appropriate technique for monitoring, assisting and/or progressing therapeutic exercises/activities.    Learning Progress Summary           Patient Acceptance,  E, VU by LM at 7/1/2019 11:48 AM    Acceptance, E,TB,D, VU by LW at 6/29/2019  7:29 AM    Comment:  Educated pt on use of sock aid and reacher for dressing.                   Point: Precautions (In Progress)     Description: Instruct learner(s) on prescribed precautions during self-care and functional transfers.    Learning Progress Summary           Patient Acceptance, E, NR by RC at 7/3/2019  2:03 PM    Acceptance, E, NR by RC at 7/2/2019  3:57 PM    Acceptance, E, VU by LM at 7/1/2019 11:48 AM    Acceptance, E,TB,D, VU by LW at 6/29/2019  7:29 AM    Comment:  Educated pt on use of sock aid and reacher for dressing.    Acceptance, E, NR by RC at 6/28/2019  9:52 AM    Acceptance, E,TB, VU,NR by MR at 6/27/2019  1:15 PM    Comment:  Role of OT and POC.Benefit of activity, safety with t/f, AD/AE utilization to increase functional independence with ADLs.                   Point: Body mechanics (In Progress)     Description: Instruct learner(s) on proper positioning and spine alignment during self-care, functional mobility activities and/or exercises.    Learning Progress Summary           Patient Acceptance, E, NR by RC at 7/3/2019  2:03 PM    Acceptance, E, NR by RC at 7/2/2019  3:57 PM    Acceptance, E, VU by LM at 7/1/2019 11:48 AM    Acceptance, E,TB,D, VU by LW at 6/29/2019  7:29 AM    Comment:  Educated pt on use of sock aid and reacher for dressing.    Acceptance, E, NR by RC at 6/28/2019  9:52 AM    Acceptance, E,TB, VU,NR by MR at 6/27/2019  1:15 PM    Comment:  Role of OT and POC.Benefit of activity, safety with t/f, AD/AE utilization to increase functional independence with ADLs.                               User Key     Initials Effective Dates Name Provider Type Discipline     03/07/18 -  Jossie Louis COTA/L Occupational Therapy Assistant OT    LM 03/07/18 -  Aye Almeida COTA/L Occupational Therapy Assistant OT    LW 03/07/18 -  Jazz Nunn COTA/L Occupational Therapy Assistant OT     MR 04/03/18 -  Alycia Rich, OT Occupational Therapist OT                Outcome Measures     Row Name 07/03/19 1315 07/03/19 0845 07/02/19 1410       How much help from another person do you currently need...    Turning from your back to your side while in flat bed without using bedrails?  --  3  -CZ  --    Moving from lying on back to sitting on the side of a flat bed without bedrails?  --  3  -CZ  --    Moving to and from a bed to a chair (including a wheelchair)?  --  3  -CZ  --    Standing up from a chair using your arms (e.g., wheelchair, bedside chair)?  --  3  -CZ  --    Climbing 3-5 steps with a railing?  --  3  -CZ  --    To walk in hospital room?  --  3  -CZ  --    AM-PAC 6 Clicks Score (PT)  --  18  -CZ  --       How much help from another is currently needed...    Putting on and taking off regular lower body clothing?  3  -RC  --  3  -RC    Bathing (including washing, rinsing, and drying)  3  -RC  --  3  -RC    Toileting (which includes using toilet bed pan or urinal)  3  -RC  --  3  -RC    Putting on and taking off regular upper body clothing  3  -RC  --  3  -RC    Taking care of personal grooming (such as brushing teeth)  3  -RC  --  3  -RC    Eating meals  4  -RC  --  4  -RC    AM-PAC 6 Clicks Score (OT)  19  -RC  --  19  -RC       Functional Assessment    Outcome Measure Options  --  AM-PAC 6 Clicks Basic Mobility (PT)  -CZ  --    Row Name 07/02/19 1000 07/01/19 0900          How much help from another person do you currently need...    Turning from your back to your side while in flat bed without using bedrails?  3  -RW  3  -RW     Moving from lying on back to sitting on the side of a flat bed without bedrails?  3  -RW  3  -RW     Moving to and from a bed to a chair (including a wheelchair)?  3  -RW  3  -RW     Standing up from a chair using your arms (e.g., wheelchair, bedside chair)?  3  -RW  3  -RW     Climbing 3-5 steps with a railing?  3  -RW  3  -RW     To walk in hospital room?  3  -RW   3  -RW     AM-PAC 6 Clicks Score (PT)  18  -RW  18  -RW       User Key  (r) = Recorded By, (t) = Taken By, (c) = Cosigned By    Initials Name Provider Type    RW Don Lobo, PTA Physical Therapy Assistant    RC Jossie Louis G, LONGO/L Occupational Therapy Assistant    CZ Rudy Lombardi, PT Physical Therapist             Time Calculation:     Time Calculation- OT     Row Name 07/03/19 1407 07/03/19 1406          Time Calculation- OT    OT Start Time  1315  -RC  0735  -RC     OT Stop Time  1340  -RC  0845  -RC     OT Time Calculation (min)  25 min  -RC  70 min  -RC     Total Timed Code Minutes- OT  25 minute(s)  -RC  70 minute(s)  -RC     OT Received On  07/03/19  -RC  07/03/19  -       User Key  (r) = Recorded By, (t) = Taken By, (c) = Cosigned By    Initials Name Provider Type     Soumya Louisa G, LONGO/L Occupational Therapy Assistant          Therapy Charges for Today     Code Description Service Date Service Provider Modifiers Qty    45430798702 HC OT THER PROC EA 15 MIN 7/2/2019 Missy, Jossie G, LONGO/L GO 1    08029380716 HC OT THERAPEUTIC ACT EA 15 MIN 7/2/2019 Missy, Jossie G, LONGO/L GO 2    47957016069 HC OT THER PROC EA 15 MIN 7/2/2019 MissySoumyaa G, LONGO/L GO 1    20568599864 HC OT THERAPEUTIC ACT EA 15 MIN 7/2/2019 Missy, Jossie G, LONGO/L GO 1    00429266746 HC OT SELF CARE/MGMT/TRAIN EA 15 MIN 7/2/2019 Missy Jossie G, LONGO/L GO 1    39695672137 HC OT SELF CARE/MGMT/TRAIN EA 15 MIN 7/3/2019 Missy, Jossie G, LONGO/L GO 4    10150912050 HC OT THER PROC EA 15 MIN 7/3/2019 Missy, Jossie G, LONGO/L GO 1    92991015800 HC OT THER PROC EA 15 MIN 7/3/2019 Missy, Jossie G, LONGO/L GO 2                   Jossie G Missy, LONGO/L  7/3/2019

## 2019-07-03 NOTE — PLAN OF CARE
Problem: Patient Care Overview  Goal: Plan of Care Review  Outcome: Ongoing (interventions implemented as appropriate)   07/03/19 9184   OTHER   Outcome Summary PT treament: Patient was alert and cooperative for this morning's therapy session. Patient was CGA with bed mobility and CGA with FWW for transers and gait. Patient ambulated 80'x1, but initially displayed decreased stance time during gait on the L side which was due to LLD with LLE being longer than the R. Heel lift was inserted into R shoe to address LLD and thus improved patient's overall gait mechanics. No new goals met, continue PT POC.    Coping/Psychosocial   Plan of Care Reviewed With patient

## 2019-07-03 NOTE — PROGRESS NOTES
LOS: 7 days   Patient Care Team:  Mike Draper MD as PCP - General    Chief Complaint:   Closed left hip fracture (CMS/HCC)          Interval History:     SUBJECTIVE: Good spirits this morning.  His pain is well controlled.  Says he did not sleep well last night because of a late supper.  Insomnia is a chronic problem for him but he does not want medication.  Does not complain of hoarseness today and voices better  History taken from: patient chart family RN    Objective     Vital Signs  Temp:  [96.6 °F (35.9 °C)-98.6 °F (37 °C)] 96.6 °F (35.9 °C)  Heart Rate:  [60-76] 61  Resp:  [18-20] 20  BP: (116-118)/(64-69) 116/64      06/26/19  1404 07/03/19  0500   Weight: 104 kg (229 lb) 104 kg (229 lb)         Physical Exam:     General Appearance:    Alert, cooperative, in no acute distress   Head:    Normocephalic, without obvious abnormality, atraumatic   Eyes:            Lids and lashes normal, conjunctivae and sclerae normal, no   icterus, no pallor, corneas clear, PERRLA   Throat:   No oral lesions, no thrush, oral mucosa moist   Neck:   No adenopathy, supple, trachea midline, no thyromegaly, no   carotid bruit, no JVD       Lungs:     Clear to auscultation,respirations regular, even and                  Unlabored good bilateral air movement no rales rhonchi or wheezes    Heart:    Regular rhythm and normal rate, normal S1 and S2, no            murmur, no gallop, no rub, no click no ectopy   Chest Wall:    No abnormalities observed   Abdomen:     Normal bowel sounds, no masses, no organomegaly, soft        non-tender, non-distended, no guarding, no rebound                Tenderness normal exam   Extremities:   Moves all extremities well, trace edema, no cyanosis, no             Redness        Skin:   No bleeding, bruising or rash   Lymph nodes:   No palpable adenopathy   Neurologic:   Cranial nerves 2 - 12 grossly intact, sensation intact, DTR       present and equal bilaterally        RESULTS  REVIEW:     Lab Results (last 24 hours)     Procedure Component Value Units Date/Time    Renal Function Panel [[id number]]  (Abnormal) Collected:  07/03/19 0626    Specimen:  Blood Updated:  07/03/19 0733     Glucose 111 mg/dL      BUN 33 mg/dL      Creatinine 1.62 mg/dL      Sodium 143 mmol/L      Potassium 4.1 mmol/L      Chloride 108 mmol/L      CO2 24.0 mmol/L      Calcium 9.0 mg/dL      Albumin 3.20 g/dL      Phosphorus 2.6 mg/dL      Anion Gap 11.0 mmol/L      BUN/Creatinine Ratio 20.4     eGFR Non African Amer 42 mL/min/1.73     Narrative:       GFR Normal >60  Chronic Kidney Disease <60  Kidney Failure <15    CBC (No Diff) [[id number]]  (Abnormal) Collected:  07/03/19 0626    Specimen:  Blood Updated:  07/03/19 0711     WBC 10.50 10*3/mm3      RBC 3.73 10*6/mm3      Hemoglobin 12.5 g/dL      Hematocrit 37.6 %      .8 fL      MCH 33.5 pg      MCHC 33.2 g/dL      RDW 14.6 %      RDW-SD 53.1 fl      MPV 11.5 fL      Platelets 154 10*3/mm3         Imaging Results (last 72 hours)     ** No results found for the last 72 hours. **          Assessment/Plan     Active Hospital Problems    Diagnosis   • **Closed left hip fracture (CMS/HCC)   • Primary insomnia     This is a compliant at home prior to admission as well     • Dry mouth     Due to prior salivary gland excision  This is a chronic problem at home prior to admission     • Pneumonia involving left lung   • Pacemaker     [employer] dual-chamber pacemaker implanted December 2016 followed by Dr. Grullon     • Delirium   • Stage 3 chronic kidney disease (CMS/HCC)   • Acute respiratory failure with hypoxia and hypercapnia (CMS/HCC)     Improved at the time of admission to acute rehab.  Still using oxygen 2 L per nasal cannula.  On a tapering dose of prednisone over 8 days.  Started with 40 mg every morning x2 days and then decreasing every 2 days     • Coronary artery disease   • Frequent falls           PLAN: Participating well with therapy and making  progress.  He continues to improve.  Her blood pressure stable  Renal function baseline  Continue intensive therapy continue current medicines recheck renal function in 2 days        This document has been electronically signed by Rogers Tompkins MD on July 3, 2019 8:48 AM

## 2019-07-03 NOTE — PLAN OF CARE
Problem: Patient Care Overview  Goal: Plan of Care Review  Outcome: Ongoing (interventions implemented as appropriate)   07/03/19 1403   Patient Care Overview   IRF Plan of Care Review progress ongoing, continue   Progress, Functional Goals demonstrating adequate progress   OTHER   Outcome Summary shower today, pt did well w/ grayson for bathing and ub dressing, min @ was required for lb dressing 2* sock aid was to narrow for pt's foot. cga for transfers. cont poc    Coping/Psychosocial   Plan of Care Reviewed With patient

## 2019-07-03 NOTE — PLAN OF CARE
Problem: Patient Care Overview  Goal: Discharge Needs Assessment  Outcome: Ongoing (interventions implemented as appropriate)  Ongoing assessment for discharge needs.    Problem: Fractured Hip (Adult)  Goal: Signs and Symptoms of Listed Potential Problems Will be Absent, Minimized or Managed (Fractured Hip)  Outcome: Ongoing (interventions implemented as appropriate)  Pt ambulates with walker and stand by assist.    Problem: Fall Risk (Adult)  Goal: Absence of Fall  Outcome: Ongoing (interventions implemented as appropriate)  No falls at this time. Gait steady.    Problem: Functional Mobility Impairment (IRF) (Adult)  Goal: Optimal/Safe Level of [place] with Mobility  Outcome: Ongoing (interventions implemented as appropriate)      Problem: Skin Injury Risk (Adult)  Goal: Skin Health and Integrity  Outcome: Ongoing (interventions implemented as appropriate)  No skin breakdown noted.

## 2019-07-03 NOTE — PLAN OF CARE
Problem: Patient Care Overview  Goal: Plan of Care Review  Outcome: Ongoing (interventions implemented as appropriate)   07/03/19 1526   Patient Care Overview   IRF Plan of Care Review progress ongoing, continue   Progress, Functional Goals demonstrating adequate progress   OTHER   Outcome Summary Pt. able to transfer sit-stand-sit CGA, amb. 42', 46', 73' with RW CGA with focus on qaulity & decreasing antalgic gt., discussed with family regarding d/c and need for 24/7 care upon d/c. Cont. to mobilize as pt. able    Coping/Psychosocial   Plan of Care Reviewed With patient;family     Goal: Discharge Needs Assessment  Outcome: Ongoing (interventions implemented as appropriate)   06/26/19 1400 06/27/19 0820 07/01/19 1120   Disability   Equipment Currently Used at Home --  none  (pt has standard walker, shower chair) --    Discharge Needs Assessment   Patient/Family Anticipates Transition to home --  --    Patient/Family Anticipated Services at Transition home health care --  --    Transportation Concerns --  --  car, none   Transportation Anticipated family or friend will provide --  --

## 2019-07-03 NOTE — THERAPY TREATMENT NOTE
Inpatient Rehabilitation - Physical Therapy Treatment Note  [facility]     Patient Name: Kuldip Nevarez  : 1945  MRN: [id number]    Today's Date: 7/3/2019       Date of Referral to PT: 19         Admit Date: 2019      Visit Dx:      ICD-10-CM ICD-9-CM   1. Symbolic dysfunction R48.9 784.60   2. Impaired physical mobility Z74.09 781.99   3. Impaired mobility and activities of daily living Z74.09 799.89       Patient Active Problem List   Diagnosis   • S/p left hip fracture   • Closed fracture of left hip (CMS/HCC)   • Frequent falls   • Closed nondisplaced intertrochanteric fracture of left femur with routine healing   • Delirium   • Pneumonia involving left lung   • Pacemaker   • Coronary artery disease   • Disease of thyroid gland   • Stage 3 chronic kidney disease (CMS/HCC)   • Acute respiratory failure with hypoxia and hypercapnia (CMS/HCC)   • Closed left hip fracture (CMS/HCC)   • Primary insomnia   • Dry mouth   • Chronic gouty arthropathy       Therapy Treatment    IRF Treatment Summary     Row Name 19 1315 19 0845 19 0735       Evaluation/Treatment Time and Intent    Subjective Information  no complaints  -RC  complains of;pain  -CZ  complains of not slleeping   -RC    Existing Precautions/Restrictions  fall  -RC  fall  -CZ  fall  -RC    Document Type  therapy note (daily note)  -RC  therapy note (daily note)  -CZ  therapy note (daily note)  -RC    Mode of Treatment  occupational therapy;individual therapy  -RC  individual therapy;physical therapy  -CZ  occupational therapy;individual therapy  -RC    Patient/Family Observations  in w/c   -RC  Seated in w/c, RA, no apparent distress.   -CZ  in w/c   -RC    Start Time (Evaluation/Treatment)  1315  -RC  0845  -CZ  0735  -RC    Stop Time (Evaluation/Treatment)  1340  -RC  0955  -CZ  0845  -RC    Recorded by [RC] Jossie Louis COTA/JOVANNY [CZ] Rudy Lombardi, PT [RC] Jossie Louis COTA/L    Row Name 19 1317              Relationship/Environment    Primary Source of Support/Comfort  child(lynn)  -RC      Recorded by [RC] Jossie Louis, LONGO/L      Row Name 07/03/19 1315 07/03/19 0845 07/03/19 0735       Cognition/Psychosocial- PT/OT    Affect/Mental Status (Cognitive)  WFL  -RC  WFL  -CZ  WFL  -RC    Orientation Status (Cognition)  oriented x 4  -RC  oriented x 4  -CZ  --    Recorded by [RC] Jossie Louis LONGO/L [CZ] Rudy Lombardi, PT [RC] Jossie Louis, LONGO/L    Row Name 07/03/19 0845             Bed Mobility Assessment/Treatment    [place] Level (Bed Mobility)  contact guard assist  -CZ      Assistive Device (Bed Mobility)  bed rails  -CZ      Comment (Bed Mobility)  Patient was provided cues for proper hand placement.  -CZ      Recorded by [CZ] Rudy Lombardi, PT      Row Name 07/03/19 0845             Bed-Chair Transfer    Bed-Chair [place] (Transfers)  contact guard  -CZ      Assistive Device (Bed-Chair Transfers)  walker, front-wheeled  -CZ      Recorded by [CZ] Rudy Lombardi, PT      Row Name 07/03/19 0845             Chair-Bed Transfer    Chair-Bed [place] (Transfers)  contact guard  -CZ      Assistive Device (Chair-Bed Transfers)  walker, front-wheeled  -CZ      Recorded by [CZ] Rudy Lombardi, PT      Row Name 07/03/19 0845             Sit-Stand Transfer    Sit-Stand [place] (Transfers)  contact guard  -CZ      Assistive Device (Sit-Stand Transfers)  walker, front-wheeled  -CZ      Recorded by [CZ] Rudy Lombardi, PT      Row Name 07/03/19 0845             Stand-Sit Transfer    Stand-Sit [place] (Transfers)  contact guard  -CZ      Assistive Device (Stand-Sit Transfers)  walker, front-wheeled  -CZ      Recorded by [CZ] Rudy Lombardi, PT      Row Name 07/03/19 0735             Shower Transfer    Type (Shower Transfer)  stand pivot/stand step  -RC      [place] Level (Shower Transfer)  contact guard  -RC      Assistive Device (Shower Transfer)  walker,  mattie;grab bars/tub rail;shower chair  -RC      Recorded by [RC] Jossie Louis COTA/L      Row Name 07/03/19 0845             Gait/Stairs Assessment/Training    [place] Level (Gait)  contact guard  -CZ      Assistive Device (Gait)  walker, front-wheeled  -CZ      Distance in Feet (Gait)  80'x1  -CZ      Pattern (Gait)  swing-through  -CZ      Comment (Gait/Stairs)  Patient demonstrates decreased stance time on L  This was due to LLD with the LLE being longer than the R, and also due to decreased L knee extension AROM and pain.  Patient was able to improve gait pattern with addition of heel lift into R shoe and with passive stretching of the L knee to improve extension.  -CZ      Recorded by [CZ] Rudy Lombardi, PT      Row Name 07/03/19 0845 07/03/19 0735          Wheelchair Mobility/Management    Method of Wheelchair Locomotion (Mobility)  bimanual (upper extremity) propulsion  -CZ  bimanual (upper extremity) propulsion  -RC     Mobility Activities (Wheelchair)  --  -RC,CZ2  forward propulsion  -RC     Mobility [place] Level (Wheelchair)  supervision  -CZ  supervision  -RC     Distance Propelled in Feet (Wheelchair)  --  50  -RC     Management Activities (Wheelchair)  -- Simultaneous filing. User may not have seen previous data.  -CZ  --     Comment, Parts Management (Wheelchair)  Patient was able to propel w/c with supervision only.  Patient was cued to steer away from the walls in the hallway on the R by instructed him to push more with his R arm to improve his overall saftety awareness.   -CZ  --     Recorded by [CZ] Rudy Lombardi, PT  [RC,CZ2] Jossie Louis COTA/L (r) Rudy Lombardi, PT (t) [RC] Jossie Louis COTA/L     Row Name 07/03/19 0742             Bathing Assessment/Treatment    Bathing [place] Level  bathing skills;set up;supervision  -RC      Assistive Device (Bathing)  shower chair;hand held shower spray hose;grab bar/tub rail;long-handled sponge  -RC      Bathing  Position  supported sitting  -RC      Recorded by [RC] Jossie Louis COTA/L      Row Name 07/03/19 0735             Upper Body Dressing Assessment/Treatment    Upper Body Dressing Task  set up assistance;doff;don;pull over garment  -RC      Upper Body Dressing Position  supported sitting  -RC      Recorded by [RC] Jossie Louis COTA/L      Row Name 07/03/19 0735             Lower Body Dressing Assessment/Treatment    Lower Body Dressing [place] Level  minimum assist (75% patient effort);doff;don;pants/bottoms;shoes/slippers;socks;underwear  -RC      Lower Body Dressing Dressing Device  reacher;sock-aid  -RC      Lower Body Dressing Position  supported sitting;supported standing  -RC      Comment (Lower Body Dressing)  sock aid not wide enough for pt foot   -RC      Recorded by [RC] Jossie Louis COTA/L      Row Name 07/03/19 0735             Grooming Assessment/Treatment    Grooming [place] Level  grooming skills;conditional independence  -RC      Recorded by [RC] Jossie Louis COTA/L      Row Name 07/03/19 0845             Pain Scale: Numbers Pre/Post-Treatment    Pain Scale: Numbers, Pretreatment  4/10  -CZ      Pain Scale: Numbers, Post-Treatment  1/10  -CZ      Pain Location - Side  Left  -CZ      Pain Location - Orientation  lower  -CZ      Pain Location  extremity  -CZ      Pain Intervention(s)  Ambulation/increased activity;Distraction  -CZ      Recorded by [CZ] Rudy Lombardi, PT      Row Name 07/03/19 0845             Orthotic Management    Orthosis Location  --  -RC,CZ      Recorded by [RC,CZ] Jossie Louis COTA/L (r) Rudy Lombardi, PT (t)      Row Name 07/03/19 1315             Upper Extremity Seated Therapeutic Exercise    Performed, Seated Upper Extremity (Therapeutic Exercise)  shoulder flexion/extension;shoulder external/internal rotation;elbow flexion/extension  -RC      Device, Seated Upper Extremity (Therapeutic Exercise)  free weights, barbell 2#  -       Exercise Type, Seated Upper Extremity (Therapeutic Exercise)  resistive exercise  -RC      Expected Outcomes, Seated Upper Extremity (Therapeutic Exercise)  improve functional tolerance, self-care activity;improve performance, BADLs  -RC      Sets/Reps Detail, Seated Upper Extremity (Therapeutic Exercise)  15x2  -RC      Recorded by [RC] Jossie Louis LONGO/L      Row Name 07/03/19 0735             Aerobic Exercise Activity    Exercise Performed (Aerobic, Therapeutic Exercise)  arm bike  -RC      Expected Outcome (Aerobic, Therapeutic Exercise)  improve functional tolerance, self-care activity;improve performance, BADLs  -RC      Time (Aerobic, Therapeutic Exercise)  10  -RC      Recorded by [RC] Jossie Louis LONGO/L      Row Name 07/03/19 0845             Lower Extremity Seated Therapeutic Exercise    Comment, Seated Lower Extremity (Therapeutic Exercise)  Seating AROM LAQ 5sec x20 RLE; AAROM LAQ 5sec x10 LLE; add/abd 5sec x15 w/ ball/yellow tband; seated marching 15x; Patient tolerated ther ex session well.   -CZ      Recorded by [CZ] Rudy Lombardi, PT      Row Name 07/03/19 1315 07/03/19 0845 07/03/19 0735       Vital Signs    Pre Systolic BP Rehab  101  -RC  101  -CZ  113  -RC    Pre Treatment Diastolic BP  56  -RC  57  -CZ  60  -RC    Intra Systolic BP Rehab  --  73  -CZ  --    Intra Treatment Diastolic BP  --  50  -CZ  --    Post Systolic BP Rehab  --  95  -CZ  --    Post Treatment Diastolic BP  --  58  -CZ  --    Pretreatment Heart Rate (beats/min)  63  -RC  69  -CZ  67  -RC    Intratreatment Heart Rate (beats/min)  --  73  -CZ  --    Posttreatment Heart Rate (beats/min)  --  63  -CZ  --    Pre SpO2 (%)  98  -RC  96  -CZ  --    O2 Delivery Pre Treatment  room air  -RC  room air  -CZ  --    Intra SpO2 (%)  --  94  -CZ  --    O2 Delivery Intra Treatment  --  room air  -CZ  --    Post SpO2 (%)  --  97  -CZ  --    O2 Delivery Post Treatment  --  room air  -CZ  --    Pre Patient Position  --  Sitting W/C   -CZ  --    Post Patient Position  --  Sitting W/C  -CZ  --    Recorded by [RC] Jossie Louis COTA/JOVANNY [CZ] Rudy Lombardi, PT [RC] MissyJossie JUAN PABLO LONGO/L    Row Name 07/03/19 1315 07/03/19 0845 07/03/19 0735       Positioning and Restraints    Pre-Treatment Position  sitting in chair/recliner  -RC  sitting in chair/recliner  -CZ  sitting in chair/recliner  -RC    Post Treatment Position  wheelchair  -RC  wheelchair  -CZ  wheelchair  -RC    In Chair  exit alarm on;encouraged to call for assist;call light within reach;with family/caregiver  -RC  --  --    In Wheelchair  --  sitting;call light within reach;encouraged to call for assist;exit alarm on;notified nsg  -CZ  with PT;encouraged to call for assist  -RC    Recorded by [RC] Jossie Louis COTA/JOVANNY [CZ] Rudy Lombardi, PT [RC] Jossie Louis LONGO/L    Row Name 07/03/19 1315             Daily Summary of Progress (OT)    Functional Goal Overall Progress: Occupational Therapy  progressing toward functional goals as expected  -      Recommendations: Occupational Therapy  cont poc   -RC      Recorded by [] Jossie Louis LONGO/L        User Key  (r) = Recorded By, (t) = Taken By, (c) = Cosigned By    Initials Name Effective Dates     Jossie Louis COTA/JOVANNY 03/07/18 -     CZ Rudy Lombardi, PT 04/03/18 -         Wound 06/16/19 0914 Left hip incision (Active)   Dressing Appearance intact;dry 7/3/2019  7:55 AM   Closure Approximated;Adhesive closure strips 7/3/2019  7:55 AM   Periwound ecchymotic 7/3/2019  7:55 AM   Drainage Amount none 7/3/2019  7:55 AM   Care, Wound cleansed with 7/2/2019  3:15 PM   Adhesive Closure Strips Applied 7/2/2019  7:30 PM   Staples Removed Intact Yes 7/2/2019  3:15 PM     Physical Therapy Education     Title: PT OT SLP Therapies (In Progress)     Topic: Physical Therapy (In Progress)     Point: Mobility training (Done)     Learning Progress Summary           Patient Acceptance, E, VU,NR by FADUMO at 7/3/2019  2:03 PM     Comment:  Patient educated on LLD and how the heel lift inserted into his shoe will help improve his gait. Patient educated on proper w/c propulsion to improve saftely awareness. Patient instructed on proper hand placement during transfers with FWW.    Acceptance, E, VU,NR by  at 6/27/2019  1:06 PM    Comment:  Educated on proper technique with bed mobility, proper hand placement with transfers, and proper gait mechanics.                   Point: Home exercise program (In Progress)     Learning Progress Summary           Patient Acceptance, E, NR by  at 6/27/2019  2:15 PM    Comment:  Educated on supine ther ex to increase LLE strength and ROM.                               User Key     Initials Effective Dates Name Provider Type Discipline     04/03/18 -  Rudy Lombardi, PT Physical Therapist PT                  PT Recommendation and Plan  Planned Therapy Interventions (PT Eval): balance training, bed mobility training, gait training, home exercise program, patient/family education, ROM (range of motion), stair training, strengthening, stretching, transfer training  Frequency of Treatment (PT Eval): 5 times per week  Plan of Care Reviewed With: patient  Outcome Summary: PT treament:  Patient was alert and cooperative for this morning's therapy session.  Patient was CGA with bed mobility and CGA with FWW for transers and gait.  Patient initially displayed decreased stance time during gait on the L side, which was due to LLD with LLE being longer than the R.  Heel lift was inserted into R shoe to address LLD and thus improved patient's overall gait mechanics. No new goals met, continue PT POC.         PT IRF GOALS     Row Name 07/03/19 0845             Bed Mobility Goal 1 (PT-IRF)    Activity/Assistive Device (Bed Mobility Goal 1, PT-IRF)  sit to supine/supine to sit  -CZ      [place] Level (Bed Mobility Goal 1, PT-IRF)  conditional independence  -CZ      Time Frame (Bed Mobility Goal 1, PT-IRF)  2 weeks   -CZ      Barriers (Bed Mobility Goal 1, PT-IRF)  L hip fx: ORIF, WBAT  -CZ      Progress/Outcomes (Bed Mobility Goal 1, PT-IRF)  goal not met  -CZ         Transfer Goal 1 (PT-IRF)    Activity/Assistive Device (Transfer Goal 1, PT-IRF)  sit-to-stand/stand-to-sit;bed-to-chair/chair-to-bed  -CZ      [place] Level (Transfer Goal 1, PT-IRF)  conditional independence  -CZ      Time Frame (Transfer Goal 1, PT-IRF)  5 - 7 days  -CZ      Barriers (Transfer Goal 1, PT-IRF)  L hip fx: ORIF, WBAT  -CZ      Progress/Outcomes (Transfer Goal 1, PT-IRF)  goal not met  -CZ         Gait/Walking Locomotion Goal 1 (PT-IRF)    Activity/Assistive Device (Gait/Walking Locomotion Goal 1, PT-IRF)  walker, rolling  -CZ      Gait/Walking Locomotion Distance Goal 1 (PT-IRF)  150'X1  -CZ      [place] Level (Gait/Walking Locomotion Goal 1, PT-IRF)  conditional independence  -CZ      Time Frame (Gait/Walking Locomotion Goal 1, PT-IRF)  long term goal (LTG);by discharge  -CZ      Barriers (Gait/Walking Locomotion Goal 1, PT-IRF)  L hip fx: ORIF, WBAT  -CZ      Progress/Outcomes (Gait/Walking Locomotion Goal 1, PT-IRF)  goal not met  -CZ         Stairs Goal 1 (PT-IRF)    Activity/Assistive Device (Stairs Goal 1, PT-IRF)  ascending stairs;descending stairs  -CZ      Number of Stairs (Stairs Goal 1, PT-IRF)  2 steps, no railing.   -CZ      [place] Level (Stairs Goal 1, PT-IRF)  supervision required  -CZ      Time Frame (Stairs Goal 1, PT-IRF)  long term goal (LTG);by discharge  -CZ      Barriers (Stairs Goal 1, PT-IRF)  L hip fx: ORIF, WBAT  -CZ      Progress/Outcomes (Stairs Goal 1, PT-IRF)  goal not met  -CZ        User Key  (r) = Recorded By, (t) = Taken By, (c) = Cosigned By    Initials Name Provider Type    CZ Rudy Lombardi, PT Physical Therapist        Outcome Measures     Row Name 07/03/19 1315 07/03/19 0848 07/02/19 1410       How much help from another person do you currently need...    Turning from your back to your side  while in flat bed without using bedrails?  --  3  -CZ  --    Moving from lying on back to sitting on the side of a flat bed without bedrails?  --  3  -CZ  --    Moving to and from a bed to a chair (including a wheelchair)?  --  3  -CZ  --    Standing up from a chair using your arms (e.g., wheelchair, bedside chair)?  --  3  -CZ  --    Climbing 3-5 steps with a railing?  --  3  -CZ  --    To walk in hospital room?  --  3  -CZ  --    AM-PAC 6 Clicks Score (PT)  --  18  -CZ  --       How much help from another is currently needed...    Putting on and taking off regular lower body clothing?  3  -RC  --  3  -RC    Bathing (including washing, rinsing, and drying)  3  -RC  --  3  -RC    Toileting (which includes using toilet bed pan or urinal)  3  -RC  --  3  -RC    Putting on and taking off regular upper body clothing  3  -RC  --  3  -RC    Taking care of personal grooming (such as brushing teeth)  3  -RC  --  3  -RC    Eating meals  4  -RC  --  4  -RC    AM-PAC 6 Clicks Score (OT)  19  -RC  --  19  -RC       Functional Assessment    Outcome Measure Options  --  AM-PAC 6 Clicks Basic Mobility (PT)  -CZ  --    Row Name 07/02/19 1000 07/01/19 0900          How much help from another person do you currently need...    Turning from your back to your side while in flat bed without using bedrails?  3  -RW  3  -RW     Moving from lying on back to sitting on the side of a flat bed without bedrails?  3  -RW  3  -RW     Moving to and from a bed to a chair (including a wheelchair)?  3  -RW  3  -RW     Standing up from a chair using your arms (e.g., wheelchair, bedside chair)?  3  -RW  3  -RW     Climbing 3-5 steps with a railing?  3  -RW  3  -RW     To walk in hospital room?  3  -RW  3  -RW     AM-PAC 6 Clicks Score (PT)  18  -RW  18  -RW       User Key  (r) = Recorded By, (t) = Taken By, (c) = Cosigned By    Initials Name Provider Type    Don Pandey, PTA Physical Therapy Assistant    RC Missy, Jossie G, LONGO/JOVANNY Occupational  Therapy Assistant    CZ Rudy Lombardi, PT Physical Therapist             Time Calculation:     PT Charges     Row Name 07/03/19 1431             Time Calculation    Start Time  0845  -CZ      Stop Time  0953  -CZ      Time Calculation (min)  68 min  -CZ      PT Received On  07/03/19  -CZ         Time Calculation- PT    Total Timed Code Minutes- PT  68 minute(s)  -CZ         Timed Charges    56149 - PT Therapeutic Exercise Minutes  25  -CZ      73574 - Gait Training Minutes   25  -CZ      33198 - PT Therapeutic Activity Minutes  18  -CZ        User Key  (r) = Recorded By, (t) = Taken By, (c) = Cosigned By    Initials Name Provider Type    CZ Rudy Lombardi, PT Physical Therapist          Therapy Charges for Today     Code Description Service Date Service Provider Modifiers Qty    37966736594 HC PT THER PROC EA 15 MIN 7/3/2019 Rudy Lombardi, PT GP 2    22633120785 HC GAIT TRAINING EA 15 MIN 7/3/2019 Rudy Lombardi, PT GP 2    87861816493 HC PT THERAPEUTIC ACT EA 15 MIN 7/3/2019 Rudy Lombardi, PT GP 1            PT G-Codes  Outcome Measure Options: AM-PAC 6 Clicks Basic Mobility (PT)  AM-PAC 6 Clicks Score (PT): 18  AM-PAC 6 Clicks Score (OT): 19      Rudy Lombardi PT  7/3/2019

## 2019-07-03 NOTE — THERAPY TREATMENT NOTE
Inpatient Rehabilitation - Physical Therapy Treatment Note  [facility]     Patient Name: Kuldip Nevarez  : 1945  MRN: [id number]    Today's Date: 7/3/2019       Date of Referral to PT: 19         Admit Date: 2019      Visit Dx:      ICD-10-CM ICD-9-CM   1. Symbolic dysfunction R48.9 784.60   2. Impaired physical mobility Z74.09 781.99   3. Impaired mobility and activities of daily living Z74.09 799.89       Patient Active Problem List   Diagnosis   • S/p left hip fracture   • Closed fracture of left hip (CMS/HCC)   • Frequent falls   • Closed nondisplaced intertrochanteric fracture of left femur with routine healing   • Delirium   • Pneumonia involving left lung   • Pacemaker   • Coronary artery disease   • Disease of thyroid gland   • Stage 3 chronic kidney disease (CMS/HCC)   • Acute respiratory failure with hypoxia and hypercapnia (CMS/HCC)   • Closed left hip fracture (CMS/HCC)   • Primary insomnia   • Dry mouth   • Chronic gouty arthropathy       Therapy Treatment    IRF Treatment Summary     Row Name 19 1526 19 1315 19 0845       Evaluation/Treatment Time and Intent    Subjective Information  no complaints  -JA  no complaints  -RC  complains of;pain  -CZ    Existing Precautions/Restrictions  fall  -JA  fall  -RC  fall  -CZ    Document Type  therapy note (daily note)  -JA  therapy note (daily note)  -  therapy note (daily note)  -CZ    Mode of Treatment  individual therapy;physical therapy  -JA  occupational therapy;individual therapy  -RC  individual therapy;physical therapy  -CZ    Patient/Family Observations  in w/c   -JA  in w/c   -RC  Seated in w/c, RA, no apparent distress.   -CZ    Start Time (Evaluation/Treatment)  1526  -JA  1315  -  0845  -CZ    Stop Time (Evaluation/Treatment)  1600  -JA  1340  -  0955  -CZ    Recorded by [JA] Syd Fraga, KIT [RC] Jossie Louis COTA/OJVANNY [CZ] Rudy Lombardi, PT    Row Name 19 0759              Evaluation/Treatment Time and Intent    Subjective Information  complains of not slleeping   -RC      Existing Precautions/Restrictions  fall  -RC      Document Type  therapy note (daily note)  -RC      Mode of Treatment  occupational therapy;individual therapy  -RC      Patient/Family Observations  in w/c   -RC      Start Time (Evaluation/Treatment)  0735  -RC      Stop Time (Evaluation/Treatment)  0845  -RC      Recorded by [RC] Jossie Louis LONGO/L      Row Name 07/03/19 1315             Relationship/Environment    Primary Source of Support/Comfort  child(lynn)  -RC      Recorded by [RC] Jossie Louis LONGO/L      Row Name 07/03/19 1526 07/03/19 1315 07/03/19 0845       Cognition/Psychosocial- PT/OT    Affect/Mental Status (Cognitive)  WFL  -JA  WFL  -RC  WFL  -CZ    Orientation Status (Cognition)  oriented x 4  -JA  oriented x 4  -RC  oriented x 4  -CZ    Recorded by [JA] Syd Fraga PTA [RC] Jossie Louis LONGO/L [CZ] Rudy Lombardi, PT    Row Name 07/03/19 0735             Cognition/Psychosocial- PT/OT    Affect/Mental Status (Cognitive)  WFL  -RC      Recorded by [RC] Jossie Louis LONGO/L      Row Name 07/03/19 0845             Bed Mobility Assessment/Treatment    [place] Level (Bed Mobility)  contact guard assist  -CZ      Assistive Device (Bed Mobility)  bed rails  -CZ      Comment (Bed Mobility)  Patient was provided cues for proper hand placement.  -CZ      Recorded by [CZ] Rudy Lombardi, PT      Row Name 07/03/19 0845             Bed-Chair Transfer    Bed-Chair [place] (Transfers)  contact guard  -CZ      Assistive Device (Bed-Chair Transfers)  walker, front-wheeled  -CZ      Recorded by [CZ] Rudy Lombardi, PT      Row Name 07/03/19 0845             Chair-Bed Transfer    Chair-Bed [place] (Transfers)  contact guard  -CZ      Assistive Device (Chair-Bed Transfers)  walker, front-wheeled  -CZ      Recorded by [CZ] Rudy Lombardi, PT      Row Name 07/03/19 1526  07/03/19 0845          Sit-Stand Transfer    Sit-Stand [place] (Transfers)  contact guard  -JA  contact guard  -CZ     Assistive Device (Sit-Stand Transfers)  walker, front-wheeled  -JA  walker, front-wheeled  -CZ     Recorded by [JA] Syd Fraga, PTA [CZ] Rudy Lombardi, PT     Row Name 07/03/19 1526 07/03/19 0845          Stand-Sit Transfer    Stand-Sit [place] (Transfers)  contact guard  -JA  contact guard  -CZ     Assistive Device (Stand-Sit Transfers)  walker, front-wheeled  -JA  walker, front-wheeled  -CZ     Recorded by [JA] Syd Fraga, PTA [CZ] Rudy Lombardi, PT     Row Name 07/03/19 0735             Shower Transfer    Type (Shower Transfer)  stand pivot/stand step  -RC      [place] Level (Shower Transfer)  contact guard  -RC      Assistive Device (Shower Transfer)  walker, mattie;grab bars/tub rail;shower chair  -RC      Recorded by [RC] Jossie Louis COTA/L      Row Name 07/03/19 1526 07/03/19 0845          Gait/Stairs Assessment/Training    [place] Level (Gait)  contact guard  -JA  contact guard  -CZ     Assistive Device (Gait)  walker, front-wheeled  -JA  walker, front-wheeled  -CZ     Distance in Feet (Gait)  42', 46', 73'  -JA  80'x1  -CZ     Pattern (Gait)  step-through;step-to Pt. initial step-through, but encouraged step-to  -JA  swing-through  -CZ     Deviations/Abnormal Patterns (Gait)  antalgic  -JA  --     Bilateral Gait Deviations  forward flexed posture  -  --     Comment (Gait/Stairs)  Pt. initially performed step-through, but poor gt. qaulity and pt. encouraged to perform step-to and educated on proper tech. and 2 additonal gt. trips pt. performed improved quality  -JA  Patient demonstrates decreased stance time on L  This was due to LLD with the LLE being longer than the R, and also due to decreased L knee extension AROM and pain.  Patient was able to improve gait pattern with addition of heel lift into R shoe and with passive stretching of the  L knee to improve extension.  -CZ     Recorded by [JA] Syd Fraga, PTA [CZ] Rudy Lombardi, PT     Row Name 07/03/19 0845 07/03/19 0735          Wheelchair Mobility/Management    Method of Wheelchair Locomotion (Mobility)  bimanual (upper extremity) propulsion  -CZ  bimanual (upper extremity) propulsion  -RC     Mobility Activities (Wheelchair)  --  -RC,CZ2  forward propulsion  -RC     Mobility [place] Level (Wheelchair)  supervision  -CZ  supervision  -RC     Distance Propelled in Feet (Wheelchair)  --  50  -RC     Management Activities (Wheelchair)  -- Simultaneous filing. User may not have seen previous data.  -CZ  --     Comment, Parts Management (Wheelchair)  Patient was able to propel w/c with supervision only.  Patient was cued to steer away from the walls in the hallway on the R by instructed him to push more with his R arm to improve his overall saftety awareness.   -CZ  --     Recorded by [CZ] Rudy Lombardi, PT  [RC,CZ2] Jossie Louis COTA/JOVANNY (r) Rudy Lombardi, PT (t) [RC] Jossie Louis LONGO/L     Row Name 07/03/19 0735             Bathing Assessment/Treatment    Bathing [place] Level  bathing skills;set up;supervision  -RC      Assistive Device (Bathing)  shower chair;hand held shower spray hose;grab bar/tub rail;long-handled sponge  -RC      Bathing Position  supported sitting  -RC      Recorded by [RC] Jossie Louis COTA/L      Row Name 07/03/19 0735             Upper Body Dressing Assessment/Treatment    Upper Body Dressing Task  set up assistance;doff;don;pull over garment  -RC      Upper Body Dressing Position  supported sitting  -RC      Recorded by [RC] Jossie Louis COTA/L      Row Name 07/03/19 0735             Lower Body Dressing Assessment/Treatment    Lower Body Dressing [place] Level  minimum assist (75% patient effort);doff;don;pants/bottoms;shoes/slippers;socks;underwear  -      Lower Body Dressing Dressing Device  reacher;sock-aid  -       Lower Body Dressing Position  supported sitting;supported standing  -RC      Comment (Lower Body Dressing)  sock aid not wide enough for pt foot   -RC      Recorded by [RC] Jossie Louis COTA/L      Row Name 07/03/19 0735             Grooming Assessment/Treatment    Grooming [place] Level  grooming skills;conditional independence  -RC      Recorded by [RC] Jossie Louis COTA/L      Row Name 07/03/19 1526 07/03/19 0845          Pain Scale: Numbers Pre/Post-Treatment    Pain Scale: Numbers, Pretreatment  0/10 - no pain  -JA  4/10  -CZ     Pain Scale: Numbers, Post-Treatment  0/10 - no pain  -JA  1/10  -CZ     Pain Location - Side  --  Left  -CZ     Pain Location - Orientation  --  lower  -CZ     Pain Location  --  extremity  -CZ     Pain Intervention(s)  --  Ambulation/increased activity;Distraction  -CZ     Recorded by [JA] Syd Fraga PTA [CZ] Rudy Lombardi, PT     Row Name 07/03/19 0845             Orthotic Management    Orthosis Location  --  -RC,CZ      Recorded by [RC,CZ] Jossie Louis COTA/L (r) Rudy Lombardi, PT (t)      Row Name 07/03/19 1315             Upper Extremity Seated Therapeutic Exercise    Performed, Seated Upper Extremity (Therapeutic Exercise)  shoulder flexion/extension;shoulder external/internal rotation;elbow flexion/extension  -RC      Device, Seated Upper Extremity (Therapeutic Exercise)  free weights, barbell 2#  -RC      Exercise Type, Seated Upper Extremity (Therapeutic Exercise)  resistive exercise  -RC      Expected Outcomes, Seated Upper Extremity (Therapeutic Exercise)  improve functional tolerance, self-care activity;improve performance, BADLs  -RC      Sets/Reps Detail, Seated Upper Extremity (Therapeutic Exercise)  15x2  -RC      Recorded by [RC] Jossie Louis COTA/L      Row Name 07/03/19 0735             Aerobic Exercise Activity    Exercise Performed (Aerobic, Therapeutic Exercise)  arm bike  -RC      Expected Outcome (Aerobic, Therapeutic  Exercise)  improve functional tolerance, self-care activity;improve performance, [facility]  -      Time (Aerobic, Therapeutic Exercise)  10  -RC      Recorded by [RC] Jossie Louis COTA/L      Row Name 07/03/19 1526 07/03/19 0845          Lower Extremity Seated Therapeutic Exercise    Performed, Seated Lower Extremity (Therapeutic Exercise)  hip abduction/adduction  -JA  --     Exercise Type, Seated Lower Extremity (Therapeutic Exercise)  isometric contraction, static  -JA  --     Sets/Reps Detail, Seated Lower Extremity (Therapeutic Exercise)  x20  -JA  --     Comment, Seated Lower Extremity (Therapeutic Exercise)  --  Seating AROM LAQ 5sec x20 RLE; AAROM LAQ 5sec x10 LLE; add/abd 5sec x15 w/ ball/yellow tband; seated marching 15x; Patient tolerated ther ex session well.   -CZ     Recorded by [JA] Syd Fraga PTA [CZ] Rudy Lombardi J, PT     Row Name 07/03/19 1526 07/03/19 1315 07/03/19 0845       Vital Signs    Pre Systolic BP Rehab  103  -  101  -RC  101  -CZ    Pre Treatment Diastolic BP  76  -JA  56  -RC  57  -CZ    Intra Systolic BP Rehab  --  --  73  -CZ    Intra Treatment Diastolic BP  --  --  50  -CZ    Post Systolic BP Rehab  --  --  95  -CZ    Post Treatment Diastolic BP  --  --  58  -CZ    Pretreatment Heart Rate (beats/min)  --  63  -RC  69  -CZ    Intratreatment Heart Rate (beats/min)  --  --  73  -CZ    Posttreatment Heart Rate (beats/min)  --  --  63  -CZ    Pre SpO2 (%)  --  98  -  96  -CZ    O2 Delivery Pre Treatment  --  room air  -RC  room air  -CZ    Intra SpO2 (%)  --  --  94  -CZ    O2 Delivery Intra Treatment  --  --  room air  -CZ    Post SpO2 (%)  --  --  97  -CZ    O2 Delivery Post Treatment  --  --  room air  -CZ    Pre Patient Position  Sitting  -JA  --  Sitting W/C  -CZ    Intra Patient Position  Standing  -JA  --  --    Post Patient Position  Sitting  -JA  --  Sitting W/C  -CZ    Recorded by [JA] Syd Fraga PTA [RC] Jossie Louis COTA/JOVANNY [CZ] Rudy Lombardi  AME, PT    Row Name 07/03/19 0735             Vital Signs    Pre Systolic BP Rehab  113  -RC      Pre Treatment Diastolic BP  60  -RC      Pretreatment Heart Rate (beats/min)  67  -RC      Recorded by [RC] Jossie Louis COTA/L      Row Name 07/03/19 1526 07/03/19 1315 07/03/19 0845       Positioning and Restraints    Pre-Treatment Position  sitting in chair/recliner  -JA  sitting in chair/recliner  -RC  sitting in chair/recliner  -CZ    Post Treatment Position  wheelchair  -JA  wheelchair  -RC  wheelchair  -CZ    In Chair  --  exit alarm on;encouraged to call for assist;call light within reach;with family/caregiver  -RC  --    In Wheelchair  sitting;with family/caregiver;with nsg;exit alarm on  -  --  sitting;call light within reach;encouraged to call for assist;exit alarm on;notified nsg  -CZ    Recorded by [JA] Syd Fraga, PTA [RC] Jossie Louis COTA/JOVANNY [CZ] Rudy Lombardi, PT    Row Name 07/03/19 0735             Positioning and Restraints    Pre-Treatment Position  sitting in chair/recliner  -RC      Post Treatment Position  wheelchair  -RC      In Wheelchair  with PT;encouraged to call for assist  -RC      Recorded by [RC] Jossie Louis LONGO/L      Row Name 07/03/19 1526             Daily Summary of Progress (PT)    Functional Goal Overall Progress: Physical Therapy  progressing toward functional goals as expected  -FRANCISCO JAVIER      Recommendations: Physical Therapy  Cont. to mobilize as pt. able   -JA      Recorded by [JA] Syd Fraga PTA      Row Name 07/03/19 1315             Daily Summary of Progress (OT)    Functional Goal Overall Progress: Occupational Therapy  progressing toward functional goals as expected  -      Recommendations: Occupational Therapy  cont poc   -RC      Recorded by [RC] Jossie Louis LONGO/L        User Key  (r) = Recorded By, (t) = Taken By, (c) = Cosigned By    Initials Name Effective Dates    Syd Álvarez PTA 03/07/18 -      Jossie Louis,  LONGO/L 03/07/18 -      Rudy Lombardi, PT 04/03/18 -         Wound 06/16/19 0914 Left hip incision (Active)   Dressing Appearance intact;dry 7/3/2019  7:55 AM   Closure Approximated;Adhesive closure strips 7/3/2019  7:55 AM   Periwound ecchymotic 7/3/2019  7:55 AM   Drainage Amount none 7/3/2019  7:55 AM   Adhesive Closure Strips Applied 7/2/2019  7:30 PM     Physical Therapy Education     Title: PT OT SLP Therapies (In Progress)     Topic: Physical Therapy (In Progress)     Point: Mobility training (Done)     Learning Progress Summary           Patient Acceptance, E, VU,NR by  at 7/3/2019  2:03 PM    Comment:  Patient educated on LLD and how the heel lift inserted into his shoe will help improve his gait. Patient educated on proper w/c propulsion to improve saftely awareness. Patient instructed on proper hand placement during transfers with FWW.    Acceptance, E, VU,NR by  at 6/27/2019  1:06 PM    Comment:  Educated on proper technique with bed mobility, proper hand placement with transfers, and proper gait mechanics.                   Point: Home exercise program (In Progress)     Learning Progress Summary           Patient Acceptance, E, NR by  at 6/27/2019  2:15 PM    Comment:  Educated on supine ther ex to increase LLE strength and ROM.                               User Key     Initials Effective Dates Name Provider Type Discipline     04/03/18 -  Rudy Lombardi, PT Physical Therapist PT                  PT Recommendation and Plan     Plan of Care Reviewed With: patient, family  Daily Summary of Progress (PT)  Functional Goal Overall Progress: Physical Therapy: progressing toward functional goals as expected  Recommendations: Physical Therapy: Cont. to mobilize as pt. able   IRF Plan of Care Review: progress ongoing, continue  Progress, Functional Goals: demonstrating adequate progress  Outcome Summary: Pt. able to transfer sit-stand-sit CGA, amb. 42', 46', 73' with RW CGA with focus on qaulity &  decreasing antalgic gt., discussed with family regarding d/c and need for 24/7 care upon d/c. Cont. to mobilize as pt. able       PT IRF GOALS     Row Name 07/03/19 1526 07/03/19 0845          Bed Mobility Goal 1 (PT-IRF)    Activity/Assistive Device (Bed Mobility Goal 1, PT-IRF)  sit to supine/supine to sit  -JA  sit to supine/supine to sit  -CZ     [place] Level (Bed Mobility Goal 1, PT-IRF)  conditional independence  -JA  conditional independence  -CZ     Time Frame (Bed Mobility Goal 1, PT-IRF)  2 weeks  -JA  2 weeks  -CZ     Barriers (Bed Mobility Goal 1, PT-IRF)  L hip fx: ORIF, WBAT  -JA  L hip fx: ORIF, WBAT  -CZ     Progress/Outcomes (Bed Mobility Goal 1, PT-IRF)  goal not met  -JA  goal not met  -CZ        Transfer Goal 1 (PT-IRF)    Activity/Assistive Device (Transfer Goal 1, PT-IRF)  sit-to-stand/stand-to-sit;bed-to-chair/chair-to-bed  -JA  sit-to-stand/stand-to-sit;bed-to-chair/chair-to-bed  -CZ     [place] Level (Transfer Goal 1, PT-IRF)  conditional independence  -JA  conditional independence  -CZ     Time Frame (Transfer Goal 1, PT-IRF)  5 - 7 days  -JA  5 - 7 days  -CZ     Barriers (Transfer Goal 1, PT-IRF)  L hip fx: ORIF, WBAT  -JA  L hip fx: ORIF, WBAT  -CZ     Progress/Outcomes (Transfer Goal 1, PT-IRF)  goal not met  -JA  goal not met  -CZ        Gait/Walking Locomotion Goal 1 (PT-IRF)    Activity/Assistive Device (Gait/Walking Locomotion Goal 1, PT-IRF)  walker, rolling  -JA  walker, rolling  -CZ     Gait/Walking Locomotion Distance Goal 1 (PT-IRF)  150'X1  -JA  150'X1  -CZ     [place] Level (Gait/Walking Locomotion Goal 1, PT-IRF)  conditional independence  -JA  conditional independence  -CZ     Time Frame (Gait/Walking Locomotion Goal 1, PT-IRF)  long term goal (LTG);by discharge  -JA  long term goal (LTG);by discharge  -CZ     Barriers (Gait/Walking Locomotion Goal 1, PT-IRF)  L hip fx: ORIF, WBAT  -JA  L hip fx: ORIF, WBAT  -CZ     Progress/Outcomes (Gait/Walking Locomotion  Goal 1, PT-IRF)  goal not met  -JA  goal not met  -CZ        Stairs Goal 1 (PT-IRF)    Activity/Assistive Device (Stairs Goal 1, PT-IRF)  ascending stairs;descending stairs  -JA  ascending stairs;descending stairs  -CZ     Number of Stairs (Stairs Goal 1, PT-IRF)  2 steps, no railing.   -JA  2 steps, no railing.   -CZ     [place] Level (Stairs Goal 1, PT-IRF)  supervision required  -JA  supervision required  -CZ     Time Frame (Stairs Goal 1, PT-IRF)  long term goal (LTG);by discharge  -JA  long term goal (LTG);by discharge  -CZ     Barriers (Stairs Goal 1, PT-IRF)  L hip fx: ORIF, WBAT  -JA  L hip fx: ORIF, WBAT  -CZ     Progress/Outcomes (Stairs Goal 1, PT-IRF)  goal not met  -JA  goal not met  -CZ       User Key  (r) = Recorded By, (t) = Taken By, (c) = Cosigned By    Initials Name Provider Type    Syd Álvarez, PTA Physical Therapy Assistant    CZ Rudy Lombardi, PT Physical Therapist        Outcome Measures     Row Name 07/03/19 1315 07/03/19 0845 07/02/19 1410       How much help from another person do you currently need...    Turning from your back to your side while in flat bed without using bedrails?  --  3  -CZ  --    Moving from lying on back to sitting on the side of a flat bed without bedrails?  --  3  -CZ  --    Moving to and from a bed to a chair (including a wheelchair)?  --  3  -CZ  --    Standing up from a chair using your arms (e.g., wheelchair, bedside chair)?  --  3  -CZ  --    Climbing 3-5 steps with a railing?  --  3  -CZ  --    To walk in hospital room?  --  3  -CZ  --    AM-PAC 6 Clicks Score (PT)  --  18  -CZ  --       How much help from another is currently needed...    Putting on and taking off regular lower body clothing?  3  -RC  --  3  -RC    Bathing (including washing, rinsing, and drying)  3  -RC  --  3  -RC    Toileting (which includes using toilet bed pan or urinal)  3  -RC  --  3  -RC    Putting on and taking off regular upper body clothing  3  -RC  --  3  -RC     Taking care of personal grooming (such as brushing teeth)  3  -RC  --  3  -RC    Eating meals  4  -RC  --  4  -RC    AM-PAC 6 Clicks Score (OT)  19  -RC  --  19  -RC       Functional Assessment    Outcome Measure Options  --  AM-PAC 6 Clicks Basic Mobility (PT)  -CZ  --    Row Name 07/02/19 1000 07/01/19 0900          How much help from another person do you currently need...    Turning from your back to your side while in flat bed without using bedrails?  3  -RW  3  -RW     Moving from lying on back to sitting on the side of a flat bed without bedrails?  3  -RW  3  -RW     Moving to and from a bed to a chair (including a wheelchair)?  3  -RW  3  -RW     Standing up from a chair using your arms (e.g., wheelchair, bedside chair)?  3  -RW  3  -RW     Climbing 3-5 steps with a railing?  3  -RW  3  -RW     To walk in hospital room?  3  -RW  3  -RW     AM-PAC 6 Clicks Score (PT)  18  -RW  18  -RW       User Key  (r) = Recorded By, (t) = Taken By, (c) = Cosigned By    Initials Name Provider Type    Don Pandey PTA Physical Therapy Assistant    Jossie George COTA/L Occupational Therapy Assistant    CZ Rudy Lombardi, PT Physical Therapist             Time Calculation:     PT Charges     Row Name 07/03/19 1615 07/03/19 1431          Time Calculation    Start Time  1526  -  0845  -     Stop Time  1600  -  0953  [facility]     Time Calculation (min)  34 min  -  68 min  -     PT Received On  --  07/03/19  -        Time Calculation- PT    Total Timed Code Minutes- PT  34 minute(s)  -  68 minute(s)  -CZ        Timed Charges    09363 - PT Therapeutic Exercise Minutes  --  25  -     17370 - Gait Training Minutes   34  -JA  25  -     11382 - PT Therapeutic Activity Minutes  --  18  -CZ       User Key  (r) = Recorded By, (t) = Taken By, (c) = Cosigned By    Initials Name Provider Type    Syd Álvarez PTA Physical Therapy Assistant    CZ Rudy Lombardi, PT Physical Therapist          Therapy  Charges for Today     Code Description Service Date Service Provider Modifiers Qty    [id number] HC GAIT TRAINING EA 15 MIN 7/3/2019 Syd Fraga, PTA GP 2            PT G-Codes  Outcome Measure Options: AM-PAC 6 Clicks Basic Mobility (PT)  AM-PAC 6 Clicks Score (PT): 18  AM-PAC 6 Clicks Score (OT): 19      Syd Fraga PTA  7/3/2019

## 2019-07-03 NOTE — PLAN OF CARE
Problem: Patient Care Overview  Goal: Plan of Care Review  Outcome: Ongoing (interventions implemented as appropriate)   07/03/19 3347   Patient Care Overview   IRF Plan of Care Review progress ongoing, continue   Progress, Functional Goals demonstrating adequate progress   OTHER   Outcome Summary pt has worked well with therapy.he had some hypotension in therapy today but was asymptomatic so he kept working with PT and did fine.md aware,fluids encouraged,will cont to monitor   Coping/Psychosocial   Plan of Care Reviewed With patient       Problem: Fractured Hip (Adult)  Goal: Signs and Symptoms of Listed Potential Problems Will be Absent, Minimized or Managed (Fractured Hip)  Outcome: Ongoing (interventions implemented as appropriate)      Problem: Fall Risk (Adult)  Goal: Absence of Fall  Outcome: Ongoing (interventions implemented as appropriate)

## 2019-07-04 NOTE — THERAPY TREATMENT NOTE
Inpatient Rehabilitation - Physical Therapy Treatment Note  [facility]     Patient Name: Kuldip Nevarez  : 1945  MRN: [id number]    Today's Date: 2019       Date of Referral to PT: 19         Admit Date: 2019      Visit Dx:      ICD-10-CM ICD-9-CM   1. Symbolic dysfunction R48.9 784.60   2. Impaired physical mobility Z74.09 781.99   3. Impaired mobility and activities of daily living Z74.09 799.89       Patient Active Problem List   Diagnosis   • S/p left hip fracture   • Closed fracture of left hip (CMS/HCC)   • Frequent falls   • Closed nondisplaced intertrochanteric fracture of left femur with routine healing   • Delirium   • Pneumonia involving left lung   • Pacemaker   • Coronary artery disease   • Disease of thyroid gland   • Stage 3 chronic kidney disease (CMS/HCC)   • Acute respiratory failure with hypoxia and hypercapnia (CMS/HCC)   • Closed left hip fracture (CMS/HCC)   • Primary insomnia   • Dry mouth   • Chronic gouty arthropathy       Therapy Treatment    IRF Treatment Summary     Row Name 19 1300 19 1010 19 0750       Evaluation/Treatment Time and Intent    Subjective Information  no complaints  -JW  no complaints  -  no complaints  -JW    Existing Precautions/Restrictions  fall  -JW  fall  -RC  fall  -JW    Document Type  therapy note (daily note)  -JW  therapy note (daily note)  -  therapy note (daily note)  -    Mode of Treatment  individual therapy;physical therapy  -JW  occupational therapy;individual therapy  -RC  individual therapy;physical therapy  -JW    Patient/Family Observations  --  in bed   -  --    Start Time (Evaluation/Treatment)  1300  -JW  1010  -  0750  -JW    Stop Time (Evaluation/Treatment)  1345  -JW  1057  -  0839  -JW    Recorded by [JW] Devin Almonte PTA [RC] Jossie Louis COTA/JOVANNY [JW] Devin Almonte PTA    Row Name 19 0622             Evaluation/Treatment Time and Intent    Subjective Information   no complaints  -RC      Existing Precautions/Restrictions  fall  -RC      Document Type  therapy note (daily note)  -RC      Mode of Treatment  occupational therapy;individual therapy  -RC      Patient/Family Observations  in w/c   -RC      Start Time (Evaluation/Treatment)  0622  -RC      Stop Time (Evaluation/Treatment)  0705  -RC      Recorded by [RC] Jossie Louis LONGO/L      Row Name 07/04/19 1300 07/04/19 1010 07/04/19 0750       Cognition/Psychosocial- PT/OT    Affect/Mental Status (Cognitive)  WFL  -JW  WFL  -RC  WFL  -JW    Orientation Status (Cognition)  oriented x 4  -JW  oriented x 4  -RC  oriented x 4  -JW    Recorded by [JW] Devin Almonte PTA [RC] Jossie Louis LONGO/JOVANNY [JW] Devin Almonte PTA    Row Name 07/04/19 0622             Cognition/Psychosocial- PT/OT    Affect/Mental Status (Cognitive)  WFL  -RC      Orientation Status (Cognition)  oriented x 4  -RC      Recorded by [RC] Jossie Louis LONGO/L      Row Name 07/04/19 1300 07/04/19 1010          Bed Mobility Assessment/Treatment    Supine-Sit [place] (Bed Mobility)  conditional independence  -JW  conditional independence  -RC     Sit-Supine [place] (Bed Mobility)  conditional independence  -JW  conditional independence  -RC     Recorded by [JW] Devin Almonte PTA [RC] Jossie Louis, LONGO/L     Row Name 07/04/19 1300 07/04/19 0750          Sit-Stand Transfer    Sit-Stand [place] (Transfers)  contact guard  -JW  contact guard  -JW     Assistive Device (Sit-Stand Transfers)  walker, front-wheeled  -JW  walker, front-wheeled  -JW     Recorded by [JW] Devin Almonte PTA [JW] Devin Almonte PTA     Row Name 07/04/19 1300 07/04/19 0750          Stand-Sit Transfer    Stand-Sit [place] (Transfers)  contact guard  -JW  contact guard  -JW     Assistive Device (Stand-Sit Transfers)  walker, front-wheeled  -JW  walker, front-wheeled  -JW     Recorded by [JW] Devin Almonte PTA [JW] Devin Almonte, PTA      Row Name 07/04/19 1300 07/04/19 0750          Gait/Stairs Assessment/Training    [place] Level (Gait)  contact guard  -JW  contact guard  -JW     Assistive Device (Gait)  walker, front-wheeled  -JW  walker, front-wheeled  -JW     Distance in Feet (Gait)  92  -JW  65  -JW     Pattern (Gait)  step-to  -JW  step-to  -JW     Deviations/Abnormal Patterns (Gait)  antalgic  -JW  antalgic  -JW     Bilateral Gait Deviations  forward flexed posture  -JW  forward flexed posture  -JW     Recorded by [JW] Devin Almonte PTA [JW] Devin Almonte PTA     Row Name 07/04/19 1300 07/04/19 0750 07/04/19 0622       Wheelchair Mobility/Management    Method of Wheelchair Locomotion (Mobility)  bimanual (upper extremity) propulsion  -JW  bimanual (upper extremity) propulsion  -JW  bimanual (upper extremity) propulsion  -RC    Mobility Activities (Wheelchair)  forward propulsion  -JW  forward propulsion  -JW  forward propulsion  -RC    Mobility [place] Level (Wheelchair)  conditional independence  -JW  conditional independence  -JW  conditional independence  -RC    Distance Propelled in Feet (Wheelchair)  100  -JW  100  -JW  150  -RC    Recorded by [JW] Devin Almonte PTA [JW] Devin Almonte PTA [RC] Jossie Louis COTA/L    Row Name 07/04/19 1300 07/04/19 1010 07/04/19 0750       Pain Scale: Numbers Pre/Post-Treatment    Pain Scale: Numbers, Pretreatment  0/10 - no pain  -JW  0/10 - no pain  -RC  0/10 - no pain  -JW    Pain Scale: Numbers, Post-Treatment  0/10 - no pain  -JW  0/10 - no pain  -RC  0/10 - no pain  -JW    Recorded by [JW] Devin Almonte PTA [RC] Jossie Louis COTA/JOVANNY [JW] Devin Almonte PTA    Row Name 07/04/19 0622             Pain Scale: Numbers Pre/Post-Treatment    Pain Scale: Numbers, Pretreatment  0/10 - no pain  -RC      Pain Scale: Numbers, Post-Treatment  0/10 - no pain  -RC      Recorded by [RC] Jossie Louis COTA/L      Row Name 07/04/19 1300 07/04/19 0750          Therapeutic  Exercise    Therapeutic Exercise  seated, lower extremities  -  seated, lower extremities  -     Additional Documentation  --  Therapeutic Exercise (Row)  -     Recorded by [JW] Devin Almonte PTA [JW] Devin Almonte PTA     Row Name 07/04/19 1010             Upper Extremity Supine Therapeutic Exercise    Performed, Supine Upper Extremity (Therapeutic Exercise)  shoulder flexion/extension;shoulder external/internal rotation;elbow flexion/extension  -      Device, Supine Upper Extremity (Therapeutic Exercise)  free weights, barbell 2#  -      Exercise Type, Supine Upper Extremity (Therapeutic Exercise)  resistive exercise  -      Expected Outcomes, Supine Upper Extremity (Therapeutic Exercise)  improve functional tolerance, self-care activity;improve performance, BAD  -      Sets/Reps Detail, Supine Upper Extremity (Therapeutic Exercise)  15x3  -RC      Recorded by [RC] Jossie Louis COTA/L      Row Name 07/04/19 0622             Aerobic Exercise Activity    Exercise Performed (Aerobic, Therapeutic Exercise)  arm bike  -      Expected Outcome (Aerobic, Therapeutic Exercise)  improve functional tolerance, self-care activity;improve performance, BAD  -      Time (Aerobic, Therapeutic Exercise)  15  -RC      Recorded by [RC] Jossie Louis COTA/L      Row Name 07/04/19 1300 07/04/19 0750          Lower Extremity Seated Therapeutic Exercise    Performed, Seated Lower Extremity (Therapeutic Exercise)  ankle dorsiflexion/plantarflexion;knee flexion/extension;LAQ (long arc quad), knee extension;hamstring stretch;hip flexion/extension Seated HS curls, hip abd isometric  -  ankle dorsiflexion/plantarflexion;LAQ (long arc quad), knee extension;hamstring stretch;hip abduction/adduction Seated HS curls,  -     Device, Seated Lower Extremity (Therapeutic Exercise)  elastic bands/tubing;free weights, cuff  -  elastic bands/tubing;free weights, cuff 2# cuff wt for R LAQ, red tb for HS curls   -JW     Exercise Type, Seated Lower Extremity (Therapeutic Exercise)  AROM (active range of motion);static stretching;isometric contraction, static  -JW  AROM (active range of motion);static stretching  -JW     Expected Outcomes, Seated Lower Extremity (Therapeutic Exercise)  improve functional stability;improve performance, gait skills;improve performance, transfer skills  -JW  improve functional stability;improve performance, gait skills;improve performance, transfer skills  -     Sets/Reps Detail, Seated Lower Extremity (Therapeutic Exercise)  2/10-20  -JW  2/10-20  -JW     Recorded by [JW] Devin Almonte PTA [JW] Devni Almonte PTA     Row Name 07/04/19 1300 07/04/19 0750 07/04/19 0622       Vital Signs    Pre Systolic BP Rehab  113  -JW  103  -JW  92  -RC    Pre Treatment Diastolic BP  67  -JW  66  -JW  52  -RC    Intra Systolic BP Rehab  --  --  113  -RC    Intra Treatment Diastolic BP  --  --  66  -RC    Post Systolic BP Rehab  123  -JW  93  -JW  --    Post Treatment Diastolic BP  60  -JW  60  -JW  --    Pretreatment Heart Rate (beats/min)  76  -JW  67  -JW  79  -RC    Intratreatment Heart Rate (beats/min)  --  --  82  -RC    Posttreatment Heart Rate (beats/min)  76  -JW  72  -JW  --    Pre SpO2 (%)  94  -JW  99  -JW  94  -RC    O2 Delivery Pre Treatment  room air  -JW  room air  -  room air  -    Intra SpO2 (%)  --  --  98  -RC    O2 Delivery Intra Treatment  --  --  room air  -    Post SpO2 (%)  98  -JW  98  -JW  --    O2 Delivery Post Treatment  room air  -  room air  -  --    Recorded by [JW] Devin Almonte PTA [JW] Devin Almonte PTA [RC] Jossie Louis COTA/L    Row Name 07/04/19 1300 07/04/19 1010          Positioning and Restraints    Pre-Treatment Position  in bed  -JW  in bed  -     Post Treatment Position  wheelchair  -  bed  -     In Bed  call light within reach;encouraged to call for assist  -  call light within reach;encouraged to call for assist;exit alarm on   -RC     Recorded by [JW] Devin Almonte PTA [RC] Jossie Louis LONGO/L     Row Name 07/04/19 1300 07/04/19 1010 07/04/19 0750       Daily Summary of Progress (PT)    Functional Goal Overall Progress: Physical Therapy  progressing toward functional goals as expected  -JW  progressing toward functional goals as expected  -RC  progressing toward functional goals as expected  -JW    Recommendations: Physical Therapy  Cont PT per POC  -JW  cont poc   -RC  Cont PT per POC.  -JW    Recorded by [JW] Devin Almonte PTA [RC] Josise Louis LONGO/JOVANNY [JW] Devin Almonte PTA    Row Name 07/04/19 0622             Daily Summary of Progress (OT)    Functional Goal Overall Progress: Occupational Therapy  progressing toward functional goals as expected  -RC      Recommendations: Occupational Therapy  cont poc   -RC      Recorded by [RC] Jossie Louis COTA/L        User Key  (r) = Recorded By, (t) = Taken By, (c) = Cosigned By    Initials Name Effective Dates     Devin Almonte PTA 03/07/18 -     RC Jossie Louis COTA/JOVANNY 03/07/18 -         Wound 06/16/19 0914 Left hip incision (Active)   Dressing Appearance intact;dry 7/4/2019  7:11 AM   Closure Approximated;Adhesive closure strips 7/4/2019  7:11 AM   Periwound ecchymotic 7/4/2019  7:11 AM   Drainage Amount none 7/4/2019  7:11 AM     Physical Therapy Education     Title: PT OT SLP Therapies (In Progress)     Topic: Physical Therapy (In Progress)     Point: Mobility training (Done)     Learning Progress Summary           Patient Acceptance, E, VU,NR by FADUMO at 7/3/2019  2:03 PM    Comment:  Patient educated on LLD and how the heel lift inserted into his shoe will help improve his gait. Patient educated on proper w/c propulsion to improve saftely awareness. Patient instructed on proper hand placement during transfers with FWW.    Acceptance, E, VU,NR by FADUMO at 6/27/2019  1:06 PM    Comment:  Educated on proper technique with bed mobility, proper hand placement with  transfers, and proper gait mechanics.                   Point: Home exercise program (In Progress)     Learning Progress Summary           Patient Acceptance, E, NR by FADUMO at 6/27/2019  2:15 PM    Comment:  Educated on supine ther ex to increase LLE strength and ROM.                               User Key     Initials Effective Dates Name Provider Type Discipline    FADUMO 04/03/18 -  Rudy Lombardi, PT Physical Therapist PT                  PT Recommendation and Plan        Daily Summary of Progress (PT)  Functional Goal Overall Progress: Physical Therapy: progressing toward functional goals as expected  Recommendations: Physical Therapy: Cont PT per POC  Outcome Summary: Pt sup-sit-sup cond Ind. Sit-stand-sit CGA. Pt performed B LE ther ex in sitting. Amb with FWW, CGA, 92' and 67'. Hip pain decreased throughout treatments. Will cont PT per POC.       PT IRF GOALS     Row Name 07/04/19 1300             Bed Mobility Goal 1 (PT-IRF)    Activity/Assistive Device (Bed Mobility Goal 1, PT-IRF)  sit to supine/supine to sit  -JW      [place] Level (Bed Mobility Goal 1, PT-IRF)  conditional independence  -JW      Time Frame (Bed Mobility Goal 1, PT-IRF)  2 weeks  -JW      Barriers (Bed Mobility Goal 1, PT-IRF)  L hip fx: ORIF, WBAT  -JW      Progress/Outcomes (Bed Mobility Goal 1, PT-IRF)  goal not met  -JW         Transfer Goal 1 (PT-IRF)    Activity/Assistive Device (Transfer Goal 1, PT-IRF)  sit-to-stand/stand-to-sit;bed-to-chair/chair-to-bed  -JW      [place] Level (Transfer Goal 1, PT-IRF)  conditional independence  -JW      Time Frame (Transfer Goal 1, PT-IRF)  5 - 7 days  -JW      Barriers (Transfer Goal 1, PT-IRF)  L hip fx: ORIF, WBAT  -JW      Progress/Outcomes (Transfer Goal 1, PT-IRF)  goal not met  -JW         Gait/Walking Locomotion Goal 1 (PT-IRF)    Activity/Assistive Device (Gait/Walking Locomotion Goal 1, PT-IRF)  walker, rolling  -JW      Gait/Walking Locomotion Distance Goal 1 (PT-IRF)  150'X1   -JW      [place] Level (Gait/Walking Locomotion Goal 1, PT-IRF)  conditional independence  -JW      Time Frame (Gait/Walking Locomotion Goal 1, PT-IRF)  long term goal (LTG);by discharge  -JW      Barriers (Gait/Walking Locomotion Goal 1, PT-IRF)  L hip fx: ORIF, WBAT  -JW      Progress/Outcomes (Gait/Walking Locomotion Goal 1, PT-IRF)  goal not met  -JW         Stairs Goal 1 (PT-IRF)    Activity/Assistive Device (Stairs Goal 1, PT-IRF)  ascending stairs;descending stairs  -JW      Number of Stairs (Stairs Goal 1, PT-IRF)  2 steps, no railing.   -JW      [place] Level (Stairs Goal 1, PT-IRF)  supervision required  -JW      Time Frame (Stairs Goal 1, PT-IRF)  long term goal (LTG);by discharge  -JW      Barriers (Stairs Goal 1, PT-IRF)  L hip fx: ORIF, WBAT  -JW      Progress/Outcomes (Stairs Goal 1, PT-IRF)  goal not met  -        User Key  (r) = Recorded By, (t) = Taken By, (c) = Cosigned By    Initials Name Provider Type    Devin Mejia, KIT Physical Therapy Assistant        Outcome Measures     Row Name 07/04/19 1400 07/04/19 1010 07/03/19 1315       How much help from another person do you currently need...    Turning from your back to your side while in flat bed without using bedrails?  3  -JW  --  --    Moving from lying on back to sitting on the side of a flat bed without bedrails?  3  -JW  --  --    Moving to and from a bed to a chair (including a wheelchair)?  3  -JW  --  --    Standing up from a chair using your arms (e.g., wheelchair, bedside chair)?  3  -JW  --  --    Climbing 3-5 steps with a railing?  3  -JW  --  --    To walk in hospital room?  3  -JW  --  --    AM-PAC 6 Clicks Score (PT)  18  -JW  --  --       How much help from another is currently needed...    Putting on and taking off regular lower body clothing?  --  3  -RC  3  -RC    Bathing (including washing, rinsing, and drying)  --  3  -RC  3  -RC    Toileting (which includes using toilet bed pan or urinal)  --  3  -RC  3   -RC    Putting on and taking off regular upper body clothing  --  3  -RC  3  -RC    Taking care of personal grooming (such as brushing teeth)  --  3  -RC  3  -RC    Eating meals  --  4  -RC  4  -RC    AM-PAC 6 Clicks Score (OT)  --  19  -RC  19  -RC       Functional Assessment    Outcome Measure Options  AM-PAC 6 Clicks Basic Mobility (PT)  -  --  --    Row Name 07/03/19 0845 07/02/19 1410 07/02/19 1000       How much help from another person do you currently need...    Turning from your back to your side while in flat bed without using bedrails?  3  -CZ  --  3  -RW    Moving from lying on back to sitting on the side of a flat bed without bedrails?  3  -CZ  --  3  -RW    Moving to and from a bed to a chair (including a wheelchair)?  3  -CZ  --  3  -RW    Standing up from a chair using your arms (e.g., wheelchair, bedside chair)?  3  -CZ  --  3  -RW    Climbing 3-5 steps with a railing?  3  -CZ  --  3  -RW    To walk in hospital room?  3  -CZ  --  3  -RW    AM-PAC 6 Clicks Score (PT)  18  -CZ  --  18  -RW       How much help from another is currently needed...    Putting on and taking off regular lower body clothing?  --  3  -RC  --    Bathing (including washing, rinsing, and drying)  --  3  -RC  --    Toileting (which includes using toilet bed pan or urinal)  --  3  -RC  --    Putting on and taking off regular upper body clothing  --  3  -RC  --    Taking care of personal grooming (such as brushing teeth)  --  3  -RC  --    Eating meals  --  4  -RC  --    AM-PAC 6 Clicks Score (OT)  --  19  -RC  --       Functional Assessment    Outcome Measure Options  AM-PAC 6 Clicks Basic Mobility (PT)  -  --  --      User Key  (r) = Recorded By, (t) = Taken By, (c) = Cosigned By    Initials Name Provider Type    Devin Mejia, PTA Physical Therapy Assistant    RW Don Lobo, PTA Physical Therapy Assistant    Jossie George, LONGO/L Occupational Therapy Assistant    Rudy Najera, PT Physical Therapist              Time Calculation:     PT Charges     Row Name 07/04/19 1434 07/04/19 0844          Time Calculation    Start Time  1300  -JW  0750  -JW     Stop Time  1345  -JW  0839  -JW     Time Calculation (min)  45 min  -JW  49 min  -JW     PT Received On  07/04/19  -JW  07/04/19  -JW        Time Calculation- PT    Total Timed Code Minutes- PT  45 minute(s)  -JW  49 minute(s)  -JW       User Key  (r) = Recorded By, (t) = Taken By, (c) = Cosigned By    Initials Name Provider Type    Devin Mejia PTA Physical Therapy Assistant          Therapy Charges for Today     Code Description Service Date Service Provider Modifiers Qty    26134529460 HC GAIT TRAINING EA 15 MIN 7/4/2019 Devin Almonte, PTA GP 1    65746265287 HC PT THER PROC EA 15 MIN 7/4/2019 Devin Almonte, KIT GP 2    16223202583 HC GAIT TRAINING EA 15 MIN 7/4/2019 Devin Almonte, PTA GP 1    55832992335 HC PT THER PROC EA 15 MIN 7/4/2019 Devin Almonte, PTA GP 1    38502978344 HC PT THERAPEUTIC ACT EA 15 MIN 7/4/2019 Devin Almonte, PTA GP 1            PT G-Codes  Outcome Measure Options: AM-PAC 6 Clicks Basic Mobility (PT)  AM-PAC 6 Clicks Score (PT): 18  AM-PAC 6 Clicks Score (OT): 19      Devin Almonte PTA  7/4/2019

## 2019-07-04 NOTE — PLAN OF CARE
Problem: Patient Care Overview  Goal: Plan of Care Review  Outcome: Ongoing (interventions implemented as appropriate)   07/04/19 1300   OTHER   Outcome Summary Pt sup-sit-sup cond Ind. Sit-stand-sit CGA. Pt performed B LE ther ex in sitting. Amb with FWW, CGA, 92' and 67'. Hip pain decreased throughout treatments. Will cont PT per POC.

## 2019-07-04 NOTE — PROGRESS NOTES
LOS: 8 days   Patient Care Team:  Mike Draper MD as PCP - General    Chief Complaint:   Closed left hip fracture (CMS/HCC) postop day 18          Interval History:     SUBJECTIVE: Does not mention pain.  Does not mention hoarseness.  Does say that he did not sleep well.  Has this problem at home also  He is eating well  He feels it is improving and would will like to go home next Tuesday as scheduled  History taken from: patient chart RN    Objective     Vital Signs  Temp:  [97.4 °F (36.3 °C)-97.9 °F (36.6 °C)] 97.9 °F (36.6 °C)  Heart Rate:  [62-89] 73  Resp:  [18-20] 18  BP: (105-109)/(58-65) 105/58      06/26/19  1404 07/03/19  0500 07/04/19  0600   Weight: 104 kg (229 lb) 104 kg (229 lb) 103 kg (227 lb 14.4 oz)         Physical Exam:     General Appearance:    Alert, cooperative, in no acute distress   Head:    Normocephalic, without obvious abnormality, atraumatic   Eyes:            Lids and lashes normal, conjunctivae and sclerae normal, no   icterus, no pallor, corneas clear, PERRLA   Throat:   No oral lesions, no thrush, oral mucosa moist   Neck:   No adenopathy, supple, trachea midline, no thyromegaly, no   carotid bruit, no JVD       Lungs:     Clear to auscultation,respirations regular, even and                  Unlabored good bilateral air movement    Heart:    Regular rhythm and normal rate, normal S1 and S2, no            murmur, no gallop, no rub, no click no ectopy   Chest Wall:    No abnormalities observed   Abdomen:     Normal bowel sounds, no masses, no organomegaly, soft        non-tender, non-distended, no guarding, no rebound                Tenderness    Extremities:   Moves all extremities well, 1+ edema left hip edema, no cyanosis, no             Redness incision healed       Skin:   No bleeding, bruising or rash   Lymph nodes:   No palpable adenopathy   Neurologic:   Cranial nerves 2 - 12 grossly intact, sensation intact, DTR       present and equal bilaterally        RESULTS  REVIEW:     Lab Results (last 24 hours)     ** No results found for the last 24 hours. **        Imaging Results (last 72 hours)     ** No results found for the last 72 hours. **          Assessment/Plan     Active Hospital Problems    Diagnosis   • **Closed left hip fracture (CMS/HCC)   • Primary insomnia     This is a compliant at home prior to admission as well     • Dry mouth     Due to prior salivary gland excision  This is a chronic problem at home prior to admission     • Pneumonia involving left lung   • Pacemaker     [employer] dual-chamber pacemaker implanted December 2016 followed by Dr. Grullon     • Delirium   • Stage 3 chronic kidney disease (CMS/HCC)   • Acute respiratory failure with hypoxia and hypercapnia (CMS/HCC)     Improved at the time of admission to acute rehab.  Still using oxygen 2 L per nasal cannula.  On a tapering dose of prednisone over 8 days.  Started with 40 mg every morning x2 days and then decreasing every 2 days     • Coronary artery disease   • Frequent falls           PLAN: Participating with therapy and continues to improve  DVT prophylaxis continues  A.m. blood pressures are somewhat low but he is asymptomatic  He is not on antihypertensive medicines  He has completed his corticosteroids that he was started on after respiratory failure  He is not using oxygen  Delirium has resolved  Renal function stable and will be rechecked tomorrow.  Continue to encourage fluids  Daily weights are stable now.  Noted that from the date of admission his weight is down 25 pounds.  This was on a bed scale as they all have been.  His weight is the same as it was 1 day after admission to the acute hospital.        This document has been electronically signed by Rogers Tompkins MD on July 4, 2019 8:40 AM

## 2019-07-04 NOTE — PROGRESS NOTES
Pain controlled  Vitals:    07/04/19 0732   BP: 105/58   Pulse: 73   Resp: 18   Temp: 97.9 °F (36.6 °C)   SpO2: 94%     More coherent  Left hip / thigh incision healing well, no erythema, no draiange  No tenderness with palpation  Progress weight bearing as tolerated left leg.  Continue intensive physical therapy  Orthopaedic surgery follow up evaluation in 3 weeks.

## 2019-07-04 NOTE — PLAN OF CARE
Problem: Patient Care Overview  Goal: Plan of Care Review  Outcome: Ongoing (interventions implemented as appropriate)   07/04/19 1333   Patient Care Overview   IRF Plan of Care Review progress ongoing, continue   Progress, Functional Goals demonstrating adequate progress   Coping/Psychosocial   Plan of Care Reviewed With patient       Problem: Fractured Hip (Adult)  Goal: Signs and Symptoms of Listed Potential Problems Will be Absent, Minimized or Managed (Fractured Hip)  Outcome: Ongoing (interventions implemented as appropriate)      Problem: Fall Risk (Adult)  Goal: Absence of Fall  Outcome: Ongoing (interventions implemented as appropriate)      Problem: Functional Mobility Impairment (IRF) (Adult)  Goal: Optimal/Safe Level of [place] with Mobility  Outcome: Ongoing (interventions implemented as appropriate)      Problem: Skin Injury Risk (Adult)  Goal: Skin Health and Integrity  Outcome: Ongoing (interventions implemented as appropriate)

## 2019-07-04 NOTE — PLAN OF CARE
Problem: Fractured Hip (Adult)  Goal: Signs and Symptoms of Listed Potential Problems Will be Absent, Minimized or Managed (Fractured Hip)  Outcome: Ongoing (interventions implemented as appropriate)  Steri strips to left hip intact.    Problem: Fall Risk (Adult)  Goal: Absence of Fall  Outcome: Ongoing (interventions implemented as appropriate)  No falls at this time.    Problem: Functional Mobility Impairment (IRF) (Adult)  Goal: Optimal/Safe Level of [place] with Mobility  Outcome: Ongoing (interventions implemented as appropriate)  Pt continues to work with PT and OT

## 2019-07-04 NOTE — PLAN OF CARE
Problem: Patient Care Overview  Goal: Plan of Care Review  Outcome: Ongoing (interventions implemented as appropriate)   07/04/19 1422   Patient Care Overview   IRF Plan of Care Review progress ongoing, continue   Progress, Functional Goals demonstrating adequate progress   OTHER   Outcome Summary working well w/ ot cga for transfers, participated in ue ex/act. modified I w/ sup<>sit. cont poc    Coping/Psychosocial   Plan of Care Reviewed With patient

## 2019-07-04 NOTE — THERAPY TREATMENT NOTE
Inpatient Rehabilitation - Occupational Therapy Treatment Note    [facility]     Patient Name: Kuldip Nevarez  : 1945  MRN: [id number]    Today's Date: 2019                 Admit Date: 2019      Visit Dx:    ICD-10-CM ICD-9-CM   1. Symbolic dysfunction R48.9 784.60   2. Impaired physical mobility Z74.09 781.99   3. Impaired mobility and activities of daily living Z74.09 799.89       Patient Active Problem List   Diagnosis   • S/p left hip fracture   • Closed fracture of left hip (CMS/HCC)   • Frequent falls   • Closed nondisplaced intertrochanteric fracture of left femur with routine healing   • Delirium   • Pneumonia involving left lung   • Pacemaker   • Coronary artery disease   • Disease of thyroid gland   • Stage 3 chronic kidney disease (CMS/HCC)   • Acute respiratory failure with hypoxia and hypercapnia (CMS/HCC)   • Closed left hip fracture (CMS/HCC)   • Primary insomnia   • Dry mouth   • Chronic gouty arthropathy         Therapy Treatment    IRF Treatment Summary     Row Name 19 1300 19 1010 19 0750       Evaluation/Treatment Time and Intent    Subjective Information  no complaints  -  no complaints  -  no complaints  -    Existing Precautions/Restrictions  fall  -JW  fall  -RC  fall  -    Document Type  therapy note (daily note)  -JW  therapy note (daily note)  -  therapy note (daily note)  -    Mode of Treatment  individual therapy;physical therapy  -JW  occupational therapy;individual therapy  -RC  individual therapy;physical therapy  -JW    Patient/Family Observations  --  in bed   -  --    Start Time (Evaluation/Treatment)  1300  -JW  1010  -  0750  -    Stop Time (Evaluation/Treatment)  --  1057  -  0839  -    Recorded by [JW] Devin Almonte PTA [RC] Jossie Louis COTA/JOVANNY [JW] Devin Almonte PTA    Row Name 19 0622             Evaluation/Treatment Time and Intent    Subjective Information  no complaints  -      Existing  Precautions/Restrictions  fall  -RC      Document Type  therapy note (daily note)  -RC      Mode of Treatment  occupational therapy;individual therapy  -RC      Patient/Family Observations  in w/c   -RC      Start Time (Evaluation/Treatment)  0622  -RC      Stop Time (Evaluation/Treatment)  0705  -RC      Recorded by [RC] Jossie Louis COTA/L      Row Name 07/04/19 1300 07/04/19 1010 07/04/19 0750       Cognition/Psychosocial- PT/OT    Affect/Mental Status (Cognitive)  WFL  -JW  WFL  -RC  WFL  -JW    Orientation Status (Cognition)  oriented x 4  -JW  oriented x 4  -RC  oriented x 4  -JW    Recorded by [JW] Devin Almonte PTA [RC] Jossie Louis COTA/JOVANNY [JW] Devin Almonte PTA    Row Name 07/04/19 0622             Cognition/Psychosocial- PT/OT    Affect/Mental Status (Cognitive)  WFL  -RC      Orientation Status (Cognition)  oriented x 4  -RC      Recorded by [RC] Jossie Louis COTA/L      Row Name 07/04/19 1010             Bed Mobility Assessment/Treatment    Supine-Sit [place] (Bed Mobility)  conditional independence  -RC      Sit-Supine [place] (Bed Mobility)  conditional independence  -RC      Recorded by [RC] Jossie Louis LONGO/L      Row Name 07/04/19 1300 07/04/19 0750          Sit-Stand Transfer    Sit-Stand [place] (Transfers)  contact guard  -JW  contact guard  -JW     Assistive Device (Sit-Stand Transfers)  walker, front-wheeled  -JW  walker, front-wheeled  -JW     Recorded by [JW] Devin Almonte PTA [JW] Devin Almonte PTA     Row Name 07/04/19 1300 07/04/19 0750          Stand-Sit Transfer    Stand-Sit [place] (Transfers)  contact guard  -JW  contact guard  -JW     Assistive Device (Stand-Sit Transfers)  walker, front-wheeled  -JW  walker, front-wheeled  -JW     Recorded by [JW] Devin Almonte PTA [JW] Devin Almonte PTA     Row Name 07/04/19 1300 07/04/19 0750          Gait/Stairs Assessment/Training    [place] Level (Gait)  contact guard  -JW   contact guard  -JW     Assistive Device (Gait)  walker, front-wheeled  -JW  walker, front-wheeled  -JW     Distance in Feet (Gait)  --  65  -JW     Pattern (Gait)  step-to  -JW  step-to  -JW     Deviations/Abnormal Patterns (Gait)  antalgic  -JW  antalgic  -JW     Bilateral Gait Deviations  forward flexed posture  -JW  forward flexed posture  -JW     Recorded by [JW] Devin Almonte PTA [] Devin Almonte PTA     Row Name 07/04/19 1300 07/04/19 0750 07/04/19 0622       Wheelchair Mobility/Management    Method of Wheelchair Locomotion (Mobility)  bimanual (upper extremity) propulsion  -JW  bimanual (upper extremity) propulsion  -JW  bimanual (upper extremity) propulsion  -RC    Mobility Activities (Wheelchair)  forward propulsion  -JW  forward propulsion  -JW  forward propulsion  -RC    Mobility [place] Level (Wheelchair)  conditional independence  -JW  conditional independence  -JW  conditional independence  -RC    Distance Propelled in Feet (Wheelchair)  --  100  -JW  150  -RC    Recorded by [JW] Devin Almonte PTA [JW] Devin Almonte PTA [RC] Jossie Louis LONGO/L    Row Name 07/04/19 1300 07/04/19 1010 07/04/19 0750       Pain Scale: Numbers Pre/Post-Treatment    Pain Scale: Numbers, Pretreatment  0/10 - no pain  -JW  0/10 - no pain  -RC  0/10 - no pain  -JW    Pain Scale: Numbers, Post-Treatment  0/10 - no pain  -JW  0/10 - no pain  -RC  0/10 - no pain  -JW    Recorded by [JW] Devin Almonte PTA [RC] Jossie Louis LONGO/L [JW] Devin Almonte PTA    Row Name 07/04/19 0622             Pain Scale: Numbers Pre/Post-Treatment    Pain Scale: Numbers, Pretreatment  0/10 - no pain  -RC      Pain Scale: Numbers, Post-Treatment  0/10 - no pain  -RC      Recorded by [RC] Jossie Louis LONGO/L      Row Name 07/04/19 1300 07/04/19 0750          Therapeutic Exercise    Therapeutic Exercise  seated, lower extremities  -JW  seated, lower extremities  -JW     Additional Documentation  --   Therapeutic Exercise (Row)  -     Recorded by [JW] Devin Almonte PTA [JW] Devin Almonte PTA     Row Name 07/04/19 1010             Upper Extremity Supine Therapeutic Exercise    Performed, Supine Upper Extremity (Therapeutic Exercise)  shoulder flexion/extension;shoulder external/internal rotation;elbow flexion/extension  -      Device, Supine Upper Extremity (Therapeutic Exercise)  free weights, barbell 2#  -RC      Exercise Type, Supine Upper Extremity (Therapeutic Exercise)  resistive exercise  -RC      Expected Outcomes, Supine Upper Extremity (Therapeutic Exercise)  improve functional tolerance, self-care activity;improve performance, BADLs  -      Sets/Reps Detail, Supine Upper Extremity (Therapeutic Exercise)  15x3  -RC      Recorded by [RC] Jossie Louis COTA/L      Row Name 07/04/19 0622             Aerobic Exercise Activity    Exercise Performed (Aerobic, Therapeutic Exercise)  arm bike  -      Expected Outcome (Aerobic, Therapeutic Exercise)  improve functional tolerance, self-care activity;improve performance, BADLs  -      Time (Aerobic, Therapeutic Exercise)  15  -RC      Recorded by [RC] Jossie Louis COTA/L      Row Name 07/04/19 1300 07/04/19 0750          Lower Extremity Seated Therapeutic Exercise    Performed, Seated Lower Extremity (Therapeutic Exercise)  ankle dorsiflexion/plantarflexion;knee flexion/extension;LAQ (long arc quad), knee extension;hamstring stretch;hip flexion/extension Seated HS curls, hip abd isometric  -JW  ankle dorsiflexion/plantarflexion;LAQ (long arc quad), knee extension;hamstring stretch;hip abduction/adduction Seated HS curls,  -JW     Device, Seated Lower Extremity (Therapeutic Exercise)  elastic bands/tubing;free weights, cuff  -JW  elastic bands/tubing;free weights, cuff 2# cuff wt for R LAQ, red tb for HS curls  -JW     Exercise Type, Seated Lower Extremity (Therapeutic Exercise)  AROM (active range of motion);static stretching;isometric  contraction, static  -JW  AROM (active range of motion);static stretching  -     Expected Outcomes, Seated Lower Extremity (Therapeutic Exercise)  improve functional stability;improve performance, gait skills;improve performance, transfer skills  -  improve functional stability;improve performance, gait skills;improve performance, transfer skills  -     Sets/Reps Detail, Seated Lower Extremity (Therapeutic Exercise)  2/10-20  -JW  2/10-20  -JW     Recorded by [JW] Devin Almonte PTA [JW] Devin Almonte PTA     Row Name 07/04/19 1300 07/04/19 0750 07/04/19 0622       Vital Signs    Pre Systolic BP Rehab  113  -JW  103  -JW  92  -RC    Pre Treatment Diastolic BP  67  -JW  66  -JW  52  -RC    Intra Systolic BP Rehab  --  --  113  -RC    Intra Treatment Diastolic BP  --  --  66  -RC    Post Systolic BP Rehab  --  93  -JW  --    Post Treatment Diastolic BP  --  60  -JW  --    Pretreatment Heart Rate (beats/min)  76  -JW  67  -JW  79  -RC    Intratreatment Heart Rate (beats/min)  --  --  82  -RC    Posttreatment Heart Rate (beats/min)  --  72  -JW  --    Pre SpO2 (%)  94  -JW  99  -JW  94  -RC    O2 Delivery Pre Treatment  --  room air  -  room air  -RC    Intra SpO2 (%)  --  --  98  -    O2 Delivery Intra Treatment  --  --  room air  -RC    Post SpO2 (%)  --  98  -JW  --    O2 Delivery Post Treatment  --  room air  -  --    Recorded by [JW] Devin Almonte PTA [JW] Devin Almonte PTA [RC] Jossie Louis COTA/L    Row Name 07/04/19 1010             Positioning and Restraints    Pre-Treatment Position  in bed  -RC      Post Treatment Position  bed  -RC      In Bed  call light within reach;encouraged to call for assist;exit alarm on  -RC      Recorded by [RC] Jossie Louis COTA/L      Row Name 07/04/19 1010 07/04/19 0750          Daily Summary of Progress (PT)    Functional Goal Overall Progress: Physical Therapy  progressing toward functional goals as expected  -  progressing toward  functional goals as expected  -     Recommendations: Physical Therapy  cont poc   -RC  Cont PT per POC.  -JW     Recorded by [RC] Jossie Louis LONGO/L [JW] Devin Almonte, PTA     Row Name 07/04/19 0622             Daily Summary of Progress (OT)    Functional Goal Overall Progress: Occupational Therapy  progressing toward functional goals as expected  -      Recommendations: Occupational Therapy  cont poc   -RC      Recorded by [RC] Jossie Louis LONGO/L        User Key  (r) = Recorded By, (t) = Taken By, (c) = Cosigned By    Initials Name Effective Dates    JW Devin Almonte, PTA 03/07/18 -     RC Jossie Louis LONGO/JOVANNY 03/07/18 -           Wound 06/16/19 0914 Left hip incision (Active)   Dressing Appearance intact;dry 7/4/2019  7:11 AM   Closure Approximated;Adhesive closure strips 7/4/2019  7:11 AM   Periwound ecchymotic 7/4/2019  7:11 AM   Drainage Amount none 7/4/2019  7:11 AM         OT Recommendation and Plan         Plan of Care Review  Plan of Care Reviewed With: patient  Daily Summary of Progress (OT)  Functional Goal Overall Progress: Occupational Therapy: progressing toward functional goals as expected  Recommendations: Occupational Therapy: cont poc   Plan of Care Reviewed With: patient  IRF Plan of Care Review: progress ongoing, continue  Progress, Functional Goals: demonstrating adequate progress  Outcome Summary: working well w/ ot cga for transfers, participated in  ue ex/act. modified I w/ sup<>sit. cont poc     OT IRF GOALS     Row Name 07/04/19 1010 07/03/19 1315 07/02/19 1410       Transfer FIM Goals (OT-IRF)    Tub-Shower Transfer FIM Goal (OT-IRF)  Score of 6 (FIM)  -RC  Score of 6 (FIM)  -RC  Score of 6 (FIM)  -RC    Toilet Transfer FIM Goal (OT-IRF)  Score of 6 (FIM)  -RC  Score of 6 (FIM)  -RC  Score of 6 (FIM)  -RC    Time Frame (Transfer FIM Goals 1, OT-IRF)  long term goal (LTG);by discharge  -RC  long term goal (LTG);by discharge  -RC  long term goal (LTG);by discharge   -RC    Progress/Outcomes (Transfer FIM Goals, OT-IRF)  goal not met;continuing progress toward goal  -RC  goal not met;continuing progress toward goal  -RC  goal not met;continuing progress toward goal  -RC       Bathing FIM Goal (OT-IRF)    Bathing FIM Goal (OT-IRF)  Score of 6 (FIM)  -RC  Score of 6 (FIM)  -RC  Score of 6 (FIM)  -RC    Time Frame (Bathing FIM Goal, OT-IRF)  long term goal (LTG);by discharge  -RC  long term goal (LTG);by discharge  -RC  long term goal (LTG);by discharge  -RC    Progress/Outcomes (Bathing FIM Goal, OT-IRF)  goal not met;continuing progress toward goal  -RC  goal not met;continuing progress toward goal  -RC  goal not met;continuing progress toward goal  -RC       UB Dressing FIM Goal (OT-IRF)    Upper Body Dressing, FIM Goal, OT-IRF  Score of 7 (FIM)  -RC  Score of 7 (FIM)  -RC  Score of 7 (FIM)  -RC    Time Frame (UB Dressing FIM Goal, OT-IRF)  long term goal (LTG);by discharge  -RC  long term goal (LTG);by discharge  -RC  long term goal (LTG);by discharge  -RC    Progress/Outcomes (UB Dressing FIM Goal, OT-IRF)  goal not met;continuing progress toward goal  -RC  goal not met;continuing progress toward goal  -RC  goal not met;continuing progress toward goal  -RC       LB Dressing FIM Goal (OT-IRF)    Lower Body Dressing, FIM Goal, OT-IRF  Score of 6 (FIM)  -RC  Score of 6 (FIM)  -RC  Score of 6 (FIM)  -RC    Time Frame (LB Dressing FIM Goal, OT-IRF)  long term goal (LTG);by discharge  -RC  long term goal (LTG);by discharge  -RC  long term goal (LTG);by discharge  -RC    Progress/Outcomes (LB Dressing FIM Goal, OT-IRF)  goal not met;continuing progress toward goal  -RC  goal not met;continuing progress toward goal  -RC  goal not met;continuing progress toward goal  -RC       Toileting FIM Goal (OT-IRF)    Toileting FIM Goal (OT-IRF)  Score of 6 (FIM)  -RC  Score of 6 (FIM)  -RC  Score of 6 (FIM)  -RC    Time Frame (Toileting FIM Goal, OT-IRF)  long term goal (LTG);by discharge  -RC   long term goal (LTG);by discharge  -RC  long term goal (LTG);by discharge  -RC    Progress/Outcomes (Toileting FIM Goal, OT-IRF)  goal not met;continuing progress toward goal  -RC  goal not met;continuing progress toward goal  -RC  goal not met;continuing progress toward goal  -RC    Row Name 07/01/19 1716 07/01/19 1141 06/29/19 0740       Transfer FIM Goals (OT-IRF)    Tub-Shower Transfer FIM Goal (OT-IRF)  Score of 6 (FIM)  -LM  Score of 6 (FIM)  -LM  Score of 6 (FIM)  -LW    Toilet Transfer FIM Goal (OT-IRF)  Score of 6 (FIM)  -LM  Score of 6 (FIM)  -LM  Score of 6 (FIM)  -LW    Time Frame (Transfer FIM Goals 1, OT-IRF)  long term goal (LTG);by discharge  -LM  long term goal (LTG);by discharge  -LM  long term goal (LTG);by discharge  -LW    Progress/Outcomes (Transfer FIM Goals, OT-IRF)  goal not met;continuing progress toward goal  -LM  goal not met;continuing progress toward goal  -LM  goal not met;continuing progress toward goal  -LW       Bathing FIM Goal (OT-IRF)    Bathing FIM Goal (OT-IRF)  Score of 6 (FIM)  -LM  Score of 6 (FIM)  -LM  Score of 6 (FIM)  -LW    Time Frame (Bathing FIM Goal, OT-IRF)  long term goal (LTG);by discharge  -LM  long term goal (LTG);by discharge  -LM  long term goal (LTG);by discharge  -LW    Progress/Outcomes (Bathing FIM Goal, OT-IRF)  goal not met;continuing progress toward goal  -LM  goal not met;continuing progress toward goal  -LM  goal not met;continuing progress toward goal  -LW       UB Dressing FIM Goal (OT-IRF)    Upper Body Dressing, FIM Goal, OT-IRF  Score of 7 (FIM)  -LM  Score of 7 (FIM)  -LM  Score of 7 (FIM)  -LW    Time Frame (UB Dressing FIM Goal, OT-IRF)  long term goal (LTG);by discharge  -LM  long term goal (LTG);by discharge  -LM  long term goal (LTG);by discharge  -LW    Progress/Outcomes (UB Dressing FIM Goal, OT-IRF)  goal not met;continuing progress toward goal  -LM  goal not met;continuing progress toward goal  -LM  goal not met;continuing progress  toward goal  -LW       LB Dressing FIM Goal (OT-IRF)    Lower Body Dressing, FIM Goal, OT-IRF  Score of 6 (FIM)  -LM  Score of 6 (FIM)  -LM  Score of 6 (FIM)  -LW    Time Frame (LB Dressing FIM Goal, OT-IRF)  long term goal (LTG);by discharge  -LM  long term goal (LTG);by discharge  -LM  long term goal (LTG);by discharge  -LW    Progress/Outcomes (LB Dressing FIM Goal, OT-IRF)  goal not met;continuing progress toward goal  -LM  goal not met;continuing progress toward goal  -LM  goal not met;continuing progress toward goal  -LW       Toileting FIM Goal (OT-IRF)    Toileting FIM Goal (OT-IRF)  Score of 6 (FIM)  -LM  Score of 6 (FIM)  -LM  Score of 6 (FIM)  -LW    Time Frame (Toileting FIM Goal, OT-IRF)  long term goal (LTG);by discharge  -LM  long term goal (LTG);by discharge  -LM  long term goal (LTG);by discharge  -LW    Progress/Outcomes (Toileting FIM Goal, OT-IRF)  goal not met;continuing progress toward goal  -LM  goal not met;continuing progress toward goal  -LM  goal not met;continuing progress toward goal  -LW    Row Name 06/29/19 0728 06/29/19 0600 06/28/19 0735       Transfer FIM Goals (OT-IRF)    Tub-Shower Transfer FIM Goal (OT-IRF)  Score of 6 (FIM)  -LW  Score of 6 (FIM)  -LW  Score of 6 (FIM)  -RC    Toilet Transfer FIM Goal (OT-IRF)  Score of 6 (FIM)  -LW  Score of 6 (FIM)  -LW  Score of 6 (FIM)  -RC    Time Frame (Transfer FIM Goals 1, OT-IRF)  long term goal (LTG);by discharge  -LW  long term goal (LTG);by discharge  -LW  long term goal (LTG);by discharge  -RC    Progress/Outcomes (Transfer FIM Goals, OT-IRF)  goal not met;continuing progress toward goal  -LW  goal not met;continuing progress toward goal  -LW  goal not met;continuing progress toward goal  -RC       Bathing FIM Goal (OT-IRF)    Bathing FIM Goal (OT-IRF)  Score of 6 (FIM)  -LW  Score of 6 (FIM)  -LW  Score of 6 (FIM)  -RC    Time Frame (Bathing FIM Goal, OT-IRF)  long term goal (LTG);by discharge  -LW  long term goal (LTG);by  discharge  -LW  long term goal (LTG);by discharge  -RC    Progress/Outcomes (Bathing FIM Goal, OT-IRF)  goal not met;continuing progress toward goal  -LW  goal not met;continuing progress toward goal  -LW  goal not met;continuing progress toward goal  -RC       UB Dressing FIM Goal (OT-IRF)    Upper Body Dressing, FIM Goal, OT-IRF  Score of 7 (FIM)  -LW  Score of 7 (FIM)  -LW  Score of 7 (FIM)  -RC    Time Frame (UB Dressing FIM Goal, OT-IRF)  long term goal (LTG);by discharge  -LW  long term goal (LTG);by discharge  -LW  long term goal (LTG);by discharge  -RC    Progress/Outcomes (UB Dressing FIM Goal, OT-IRF)  goal not met;continuing progress toward goal  -LW  goal not met;continuing progress toward goal  -LW  goal not met;continuing progress toward goal  -RC       LB Dressing FIM Goal (OT-IRF)    Lower Body Dressing, FIM Goal, OT-IRF  Score of 6 (FIM)  -LW  Score of 6 (FIM)  -LW  Score of 6 (FIM)  -RC    Time Frame (LB Dressing FIM Goal, OT-IRF)  long term goal (LTG);by discharge  -LW  long term goal (LTG);by discharge  -LW  long term goal (LTG);by discharge  -RC    Progress/Outcomes (LB Dressing FIM Goal, OT-IRF)  goal not met;continuing progress toward goal  -LW  goal not met;continuing progress toward goal  -LW  goal not met;continuing progress toward goal  -RC       Toileting FIM Goal (OT-IRF)    Toileting FIM Goal (OT-IRF)  Score of 6 (FIM)  -LW  Score of 6 (FIM)  -LW  Score of 6 (FIM)  -RC    Time Frame (Toileting FIM Goal, OT-IRF)  long term goal (LTG);by discharge  -LW  long term goal (LTG);by discharge  -LW  long term goal (LTG);by discharge  -RC    Progress/Outcomes (Toileting FIM Goal, OT-IRF)  goal not met;continuing progress toward goal  -LW  goal not met;continuing progress toward goal  -LW  goal not met;continuing progress toward goal  -RC    Row Name 06/27/19 0820             Transfer FIM Goals (OT-IRF)    Tub-Shower Transfer FIM Goal (OT-IRF)  Score of 6 (FIM)  -MR      Toilet Transfer FIM Goal  (OT-IRF)  Score of 6 (FIM)  -MR      Time Frame (Transfer FIM Goals 1, OT-IRF)  long term goal (LTG);by discharge  -MR      Progress/Outcomes (Transfer FIM Goals, OT-IRF)  goal not met  -MR         Bathing FIM Goal (OT-IRF)    Bathing FIM Goal (OT-IRF)  Score of 6 (FIM)  -MR      Time Frame (Bathing FIM Goal, OT-IRF)  long term goal (LTG);by discharge  -MR      Progress/Outcomes (Bathing FIM Goal, OT-IRF)  goal not met  -MR         UB Dressing FIM Goal (OT-IRF)    Upper Body Dressing, FIM Goal, OT-IRF  Score of 7 (FIM)  -MR      Time Frame (UB Dressing FIM Goal, OT-IRF)  long term goal (LTG);by discharge  -MR      Progress/Outcomes (UB Dressing FIM Goal, OT-IRF)  goal not met  -MR         LB Dressing FIM Goal (OT-IRF)    Lower Body Dressing, FIM Goal, OT-IRF  Score of 6 (FIM)  -MR      Time Frame (LB Dressing FIM Goal, OT-IRF)  long term goal (LTG);by discharge  -MR      Progress/Outcomes (LB Dressing FIM Goal, OT-IRF)  goal not met  -MR         Toileting FIM Goal (OT-IRF)    Toileting FIM Goal (OT-IRF)  Score of 6 (FIM)  -MR      Time Frame (Toileting FIM Goal, OT-IRF)  long term goal (LTG);by discharge  -MR      Progress/Outcomes (Toileting FIM Goal, OT-IRF)  goal not met  -MR        User Key  (r) = Recorded By, (t) = Taken By, (c) = Cosigned By    Initials Name Provider Type    Jossie George LONGO/L Occupational Therapy Assistant    Aye Stephenson LONGO/L Occupational Therapy Assistant    Jazz Melendez LONGO/L Occupational Therapy Assistant    MR Alycia Rich, OT Occupational Therapist          Occupational Therapy Education     Title: PT OT SLP Therapies (In Progress)     Topic: Occupational Therapy (In Progress)     Point: ADL training (In Progress)     Description: Instruct learner(s) on proper safety adaptation and remediation techniques during self care or transfers.   Instruct in proper use of assistive devices.    Learning Progress Summary           Patient Acceptance, E, NR by MAYRA  at 7/3/2019  2:03 PM    Acceptance, E, VU by LM at 7/1/2019 11:48 AM    Acceptance, E,TB,D, VU by LW at 6/29/2019  7:29 AM    Comment:  Educated pt on use of sock aid and reacher for dressing.    Acceptance, E, NR by RC at 6/28/2019  9:52 AM    Acceptance, E,TB, VU,NR by MR at 6/27/2019  1:15 PM    Comment:  Role of OT and POC.Benefit of activity, safety with t/f, AD/AE utilization to increase functional independence with ADLs.                   Point: Home exercise program (Done)     Description: Instruct learner(s) on appropriate technique for monitoring, assisting and/or progressing therapeutic exercises/activities.    Learning Progress Summary           Patient Acceptance, E, VU by LM at 7/1/2019 11:48 AM    Acceptance, E,TB,D, VU by LW at 6/29/2019  7:29 AM    Comment:  Educated pt on use of sock aid and reacher for dressing.                   Point: Precautions (In Progress)     Description: Instruct learner(s) on prescribed precautions during self-care and functional transfers.    Learning Progress Summary           Patient Acceptance, E, NR by RC at 7/4/2019  2:22 PM    Acceptance, E, NR by RC at 7/3/2019  2:03 PM    Acceptance, E, NR by RC at 7/2/2019  3:57 PM    Acceptance, E, VU by LM at 7/1/2019 11:48 AM    Acceptance, E,TB,D, VU by LW at 6/29/2019  7:29 AM    Comment:  Educated pt on use of sock aid and reacher for dressing.    Acceptance, E, NR by RC at 6/28/2019  9:52 AM    Acceptance, E,TB, VU,NR by MR at 6/27/2019  1:15 PM    Comment:  Role of OT and POC.Benefit of activity, safety with t/f, AD/AE utilization to increase functional independence with ADLs.                   Point: Body mechanics (In Progress)     Description: Instruct learner(s) on proper positioning and spine alignment during self-care, functional mobility activities and/or exercises.    Learning Progress Summary           Patient Acceptance, E, NR by RC at 7/4/2019  2:22 PM    Acceptance, E, NR by RC at 7/3/2019  2:03 PM     Acceptance, E, NR by  at 7/2/2019  3:57 PM    Acceptance, E, VU by LM at 7/1/2019 11:48 AM    Acceptance, E,TB,D, VU by LW at 6/29/2019  7:29 AM    Comment:  Educated pt on use of sock aid and reacher for dressing.    Acceptance, E, NR by  at 6/28/2019  9:52 AM    Acceptance, E,TB, VU,NR by MR at 6/27/2019  1:15 PM    Comment:  Role of OT and POC.Benefit of activity, safety with t/f, AD/AE utilization to increase functional independence with ADLs.                               User Key     Initials Effective Dates Name Provider Type Discipline     03/07/18 -  Jossie Louis COTA/L Occupational Therapy Assistant OT     03/07/18 -  Aye Almeida COTA/L Occupational Therapy Assistant OT    LW 03/07/18 -  Jazz Nunn COTA/L Occupational Therapy Assistant OT    MR 04/03/18 -  Alycia Rich, OT Occupational Therapist OT                Outcome Measures     Row Name 07/04/19 1010 07/03/19 1315 07/03/19 0845       How much help from another person do you currently need...    Turning from your back to your side while in flat bed without using bedrails?  --  --  3  -CZ    Moving from lying on back to sitting on the side of a flat bed without bedrails?  --  --  3  -CZ    Moving to and from a bed to a chair (including a wheelchair)?  --  --  3  -CZ    Standing up from a chair using your arms (e.g., wheelchair, bedside chair)?  --  --  3  -CZ    Climbing 3-5 steps with a railing?  --  --  3  -CZ    To walk in hospital room?  --  --  3  -CZ    AM-PAC 6 Clicks Score (PT)  --  --  18  -CZ       How much help from another is currently needed...    Putting on and taking off regular lower body clothing?  3  -RC  3  -RC  --    Bathing (including washing, rinsing, and drying)  3  -RC  3  -RC  --    Toileting (which includes using toilet bed pan or urinal)  3  -RC  3  -RC  --    Putting on and taking off regular upper body clothing  3  -RC  3  -RC  --    Taking care of personal grooming (such as brushing teeth)   3  -RC  3  -RC  --    Eating meals  4  -RC  4  -RC  --    AM-PAC 6 Clicks Score (OT)  19  -RC  19  -RC  --       Functional Assessment    Outcome Measure Options  --  --  AM-PAC 6 Clicks Basic Mobility (PT)  -CZ    Row Name 07/02/19 1410 07/02/19 1000          How much help from another person do you currently need...    Turning from your back to your side while in flat bed without using bedrails?  --  3  -RW     Moving from lying on back to sitting on the side of a flat bed without bedrails?  --  3  -RW     Moving to and from a bed to a chair (including a wheelchair)?  --  3  -RW     Standing up from a chair using your arms (e.g., wheelchair, bedside chair)?  --  3  -RW     Climbing 3-5 steps with a railing?  --  3  -RW     To walk in hospital room?  --  3  -RW     AM-PAC 6 Clicks Score (PT)  --  18  -RW        How much help from another is currently needed...    Putting on and taking off regular lower body clothing?  3  -RC  --     Bathing (including washing, rinsing, and drying)  3  -RC  --     Toileting (which includes using toilet bed pan or urinal)  3  -RC  --     Putting on and taking off regular upper body clothing  3  -RC  --     Taking care of personal grooming (such as brushing teeth)  3  -RC  --     Eating meals  4  -RC  --     AM-PAC 6 Clicks Score (OT)  19  -RC  --       User Key  (r) = Recorded By, (t) = Taken By, (c) = Cosigned By    Initials Name Provider Type    RW Don Lobo, PTA Physical Therapy Assistant    Jossie George, DONTA/L Occupational Therapy Assistant    CZ Rudy Lombardi, PT Physical Therapist             Time Calculation:     Time Calculation- OT     Row Name 07/04/19 1114 07/04/19 0704          Time Calculation- OT    OT Start Time  1010  -RC  0622  -RC     OT Stop Time  1057  -  0705  -     OT Time Calculation (min)  47 min  -RC  43 min  -RC     Total Timed Code Minutes- OT  47 minute(s)  -RC  43 minute(s)  -RC     OT Received On  07/04/19  -RC  07/04/19  -        User Key  (r) = Recorded By, (t) = Taken By, (c) = Cosigned By    Initials Name Provider Type     Jossie Louis, LONGO/L Occupational Therapy Assistant          Therapy Charges for Today     Code Description Service Date Service Provider Modifiers Qty    54075237019 HC OT SELF CARE/MGMT/TRAIN EA 15 MIN 7/3/2019 Jossie Louis, LONGO/L GO 4    02683015142 HC OT THER PROC EA 15 MIN 7/3/2019 Jossie Louis, LONGO/L GO 1    39236546382 HC OT THER PROC EA 15 MIN 7/3/2019 Jossie Louis, LONGO/L GO 2    30730280641 HC OT THER PROC EA 15 MIN 7/4/2019 Jossie Louis, LONGO/L GO 1    75636765574 HC OT THERAPEUTIC ACT EA 15 MIN 7/4/2019 Jossie Louis, OLNGO/L GO 2    74216850260 HC OT THERAPEUTIC ACT EA 15 MIN 7/4/2019 Jossie Louis, LONGO/L GO 1    68629375601 HC OT THER PROC EA 15 MIN 7/4/2019 Jossie Louis, LONGO/L GO 2             Eval FIM FIM Goal  Discharge FIM Daily FIM           Grooming    6    Bathing    5    Dressing - Upper Body    5    Dressing - Lower Body    4    Toilet    4    Tub, Shower    4    Problem Solving    6     Social Interaction     6            Bed, Chair, W/C Transfer        Walking        Wheelchair Locomotion        Stairs                Comprehension        Expression        Memory                      Jossie Louis, LONGO/L  7/4/2019

## 2019-07-05 NOTE — PLAN OF CARE
Problem: Patient Care Overview  Goal: Plan of Care Review  Outcome: Ongoing (interventions implemented as appropriate)   07/05/19 1618   Patient Care Overview   IRF Plan of Care Review progress ongoing, continue   Progress, Functional Goals demonstrating adequate progress   OTHER   Outcome Summary PT treatment performed. Pt presents with improved gait mechanics and limited distance due to pain. Progressing toward goals. Tentative d/c next Tuesday 7/9/19.   Coping/Psychosocial   Plan of Care Reviewed With patient

## 2019-07-05 NOTE — THERAPY TREATMENT NOTE
Inpatient Rehabilitation - Occupational Therapy Treatment Note    [facility]     Patient Name: Kuldip Nevarez  : 1945  MRN: [id number]    Today's Date: 2019                 Admit Date: 2019      Visit Dx:    ICD-10-CM ICD-9-CM   1. Symbolic dysfunction R48.9 784.60   2. Impaired physical mobility Z74.09 781.99   3. Impaired mobility and activities of daily living Z74.09 799.89       Patient Active Problem List   Diagnosis   • S/p left hip fracture   • Closed fracture of left hip (CMS/HCC)   • Frequent falls   • Closed nondisplaced intertrochanteric fracture of left femur with routine healing   • Delirium   • Pneumonia involving left lung   • Pacemaker   • Coronary artery disease   • Disease of thyroid gland   • Stage 3 chronic kidney disease (CMS/HCC)   • Acute respiratory failure with hypoxia and hypercapnia (CMS/HCC)   • Closed left hip fracture (CMS/HCC)   • Primary insomnia   • Dry mouth   • Chronic gouty arthropathy         Therapy Treatment    IRF Treatment Summary     Row Name 19 14219 1000          Evaluation/Treatment Time and Intent    Subjective Information  no complaints  -RC  no complaints  -RC     Existing Precautions/Restrictions  fall  -RC  fall  -RC     Document Type  therapy note (daily note)  -RC  therapy note (daily note)  -RC     Mode of Treatment  occupational therapy;individual therapy  -RC  occupational therapy;individual therapy  -RC     Patient/Family Observations  in w/c   -RC  in bed   -RC     Start Time (Evaluation/Treatment)  1420  -RC  1000  -RC     Stop Time (Evaluation/Treatment)  1450  -RC  1100  -RC     Recorded by [RC] Jossie Louis COTA/L [RC] Jossie Louis COTA/L     Row Name 19 1000             Relationship/Environment    Primary Source of Support/Comfort  child(lynn)  -RC      Recorded by [RC] Jossie Louis COTA/L      Row Name 19 1420 19 1000          Cognition/Psychosocial- PT/OT    Affect/Mental Status  (Cognitive)  WFL  -RC  WFL  -RC     Orientation Status (Cognition)  oriented x 4  -RC  oriented x 4  -RC     Recorded by [RC] Jossie Louis COTA/L [RC] Jossie Louis COTA/L     Row Name 07/05/19 1000             Functional Mobility    Functional Mobility- Ind. Level  contact guard assist  -RC      Functional Mobility- Device  rolling walker  -RC      Functional Mobility-Distance (Feet)  6  -RC      Recorded by [RC] Jossie Louis COTA/L      Row Name 07/05/19 1000             Bed-Chair Transfer    Bed-Chair Dickson (Transfers)  contact guard  -RC      Assistive Device (Bed-Chair Transfers)  walker, front-wheeled  -RC      Recorded by [RC] Jossie Louis COTA/L      Row Name 07/05/19 1000             Sit-Stand Transfer    Sit-Stand Dickson (Transfers)  contact guard  -RC      Assistive Device (Sit-Stand Transfers)  walker, front-wheeled  -RC      Recorded by [RC] Jossie Louis COTA/L      Row Name 07/05/19 1000             Stand-Sit Transfer    Stand-Sit Dickson (Transfers)  contact guard  -RC      Assistive Device (Stand-Sit Transfers)  walker, front-wheeled  -RC      Recorded by [RC] Jossie Louis COTA/L      Row Name 07/05/19 1420 07/05/19 1000          Wheelchair Mobility/Management    Method of Wheelchair Locomotion (Mobility)  bimanual (upper extremity) propulsion  -RC  bimanual (upper extremity) propulsion  -RC     Mobility Activities (Wheelchair)  forward propulsion  -RC  forward propulsion  -RC     Mobility Dickson Level (Wheelchair)  conditional independence  -RC  conditional independence  -RC     Distance Propelled in Feet (Wheelchair)  20  -RC  100  -RC     Recorded by [RC] Jossie Louis COTA/L [RC] Jossie Louis LONGO/L     Row Name 07/05/19 1000             Lower Body Dressing Assessment/Treatment    Lower Body Dressing Dickson Level  minimum assist (75% patient effort);shoes/slippers;don to tie   -RC      Lower Body Dressing Position  edge of bed  sitting  -RC      Recorded by [RC] Jossie Louis COTA/L      Row Name 07/05/19 1420 07/05/19 1000          Pain Scale: Numbers Pre/Post-Treatment    Pain Scale: Numbers, Pretreatment  0/10 - no pain  -RC  0/10 - no pain  -RC     Pain Scale: Numbers, Post-Treatment  0/10 - no pain  -RC  0/10 - no pain  -RC     Recorded by [RC] Jossie Louis COTA/L [RC] Jossie Louis COTA/L     Row Name 07/05/19 1420             Upper Extremity Seated Therapeutic Exercise    Performed, Seated Upper Extremity (Therapeutic Exercise)  shoulder flexion/extension;elbow flexion/extension;shoulder external/internal rotation upright row x 15 only   -RC      Device, Seated Upper Extremity (Therapeutic Exercise)  free weights, barbell 3#  -RC      Exercise Type, Seated Upper Extremity (Therapeutic Exercise)  resistive exercise  -RC      Expected Outcomes, Seated Upper Extremity (Therapeutic Exercise)  improve performance, BADLs;improve functional tolerance, self-care activity  -RC      Sets/Reps Detail, Seated Upper Extremity (Therapeutic Exercise)  15x3  -RC      Recorded by [RC] Jossie Louis COTA/L      Row Name 07/05/19 1000             Aerobic Exercise Activity    Exercise Performed (Aerobic, Therapeutic Exercise)  arm bike  -RC      Expected Outcome (Aerobic, Therapeutic Exercise)  improve functional tolerance, single extremity activity;improve performance, BADLs  -RC      Time (Aerobic, Therapeutic Exercise)  15  -RC      Comment (Aerobic, Therapeutic Exercise)  balloon vollyball   -RC      Recorded by [RC] Jossie Louis COTA/L      Row Name 07/05/19 1420 07/05/19 1000          Positioning and Restraints    Pre-Treatment Position  sitting in chair/recliner  -RC  in bed  -RC     Post Treatment Position  wheelchair  -RC  wheelchair  -RC     In Wheelchair  with PT  -RC  call light within reach;encouraged to call for assist;exit alarm on  -RC     Recorded by [RC] Jossie Louis COTA/L [RC] Jossie Louis COTA/L      Row Name 07/05/19 1420             Daily Summary of Progress (OT)    Functional Goal Overall Progress: Occupational Therapy  progressing toward functional goals as expected  -RC      Recommendations: Occupational Therapy  cont poc   -RC      Recorded by [RC] Jossie Louis COTA/L        User Key  (r) = Recorded By, (t) = Taken By, (c) = Cosigned By    Initials Name Effective Dates    RC Jossie Louis LONGO/L 03/07/18 -           Wound 06/16/19 0914 Left hip incision (Active)   Dressing Appearance intact;dry 7/5/2019  8:50 AM   Closure Approximated;Adhesive closure strips 7/5/2019  8:50 AM   Periwound ecchymotic 7/5/2019  8:50 AM   Drainage Amount none 7/5/2019  8:50 AM         OT Recommendation and Plan         Plan of Care Review  Plan of Care Reviewed With: patient  Daily Summary of Progress (OT)  Functional Goal Overall Progress: Occupational Therapy: progressing toward functional goals as expected  Recommendations: Occupational Therapy: cont poc   Plan of Care Reviewed With: patient  IRF Plan of Care Review: progress ongoing, continue  Progress, Functional Goals: demonstrating adequate progress  Outcome Summary: participated w/OT well today, ue ex and act to increase I w/ adl's, fx transfer w/ cga. Offer shower at next tx . cont poc     OT IRF GOALS     Row Name 07/05/19 1420 07/04/19 1010 07/03/19 1315       Transfer FIM Goals (OT-IRF)    Tub-Shower Transfer FIM Goal (OT-IRF)  Score of 6 (FIM)  -RC  Score of 6 (FIM)  -RC  Score of 6 (FIM)  -RC    Toilet Transfer FIM Goal (OT-IRF)  Score of 6 (FIM)  -RC  Score of 6 (FIM)  -RC  Score of 6 (FIM)  -RC    Time Frame (Transfer FIM Goals 1, OT-IRF)  long term goal (LTG);by discharge  -RC  long term goal (LTG);by discharge  -RC  long term goal (LTG);by discharge  -RC    Progress/Outcomes (Transfer FIM Goals, OT-IRF)  goal not met;continuing progress toward goal  -RC  goal not met;continuing progress toward goal  -RC  goal not met;continuing progress toward goal   -RC       Bathing FIM Goal (OT-IRF)    Bathing FIM Goal (OT-IRF)  Score of 6 (FIM)  -RC  Score of 6 (FIM)  -RC  Score of 6 (FIM)  -RC    Time Frame (Bathing FIM Goal, OT-IRF)  long term goal (LTG);by discharge  -RC  long term goal (LTG);by discharge  -RC  long term goal (LTG);by discharge  -RC    Progress/Outcomes (Bathing FIM Goal, OT-IRF)  goal not met;continuing progress toward goal  -RC  goal not met;continuing progress toward goal  -RC  goal not met;continuing progress toward goal  -RC       UB Dressing FIM Goal (OT-IRF)    Upper Body Dressing, FIM Goal, OT-IRF  Score of 7 (FIM)  -RC  Score of 7 (FIM)  -RC  Score of 7 (FIM)  -RC    Time Frame (UB Dressing FIM Goal, OT-IRF)  long term goal (LTG);by discharge  -RC  long term goal (LTG);by discharge  -RC  long term goal (LTG);by discharge  -RC    Progress/Outcomes (UB Dressing FIM Goal, OT-IRF)  goal not met;continuing progress toward goal  -RC  goal not met;continuing progress toward goal  -RC  goal not met;continuing progress toward goal  -RC       LB Dressing FIM Goal (OT-IRF)    Lower Body Dressing, FIM Goal, OT-IRF  Score of 6 (FIM)  -RC  Score of 6 (FIM)  -RC  Score of 6 (FIM)  -RC    Time Frame (LB Dressing FIM Goal, OT-IRF)  long term goal (LTG);by discharge  -RC  long term goal (LTG);by discharge  -RC  long term goal (LTG);by discharge  -RC    Progress/Outcomes (LB Dressing FIM Goal, OT-IRF)  goal not met;continuing progress toward goal  -RC  goal not met;continuing progress toward goal  -RC  goal not met;continuing progress toward goal  -RC       Toileting FIM Goal (OT-IRF)    Toileting FIM Goal (OT-IRF)  Score of 6 (FIM)  -RC  Score of 6 (FIM)  -RC  Score of 6 (FIM)  -RC    Time Frame (Toileting FIM Goal, OT-IRF)  long term goal (LTG);by discharge  -RC  long term goal (LTG);by discharge  -RC  long term goal (LTG);by discharge  -RC    Progress/Outcomes (Toileting FIM Goal, OT-IRF)  goal not met;continuing progress toward goal  -RC  goal not  met;continuing progress toward goal  -RC  goal not met;continuing progress toward goal  -RC    Row Name 07/02/19 1410 07/01/19 1716 07/01/19 1141       Transfer FIM Goals (OT-IRF)    Tub-Shower Transfer FIM Goal (OT-IRF)  Score of 6 (FIM)  -RC  Score of 6 (FIM)  -LM  Score of 6 (FIM)  -LM    Toilet Transfer FIM Goal (OT-IRF)  Score of 6 (FIM)  -RC  Score of 6 (FIM)  -LM  Score of 6 (FIM)  -LM    Time Frame (Transfer FIM Goals 1, OT-IRF)  long term goal (LTG);by discharge  -RC  long term goal (LTG);by discharge  -LM  long term goal (LTG);by discharge  -LM    Progress/Outcomes (Transfer FIM Goals, OT-IRF)  goal not met;continuing progress toward goal  -RC  goal not met;continuing progress toward goal  -LM  goal not met;continuing progress toward goal  -LM       Bathing FIM Goal (OT-IRF)    Bathing FIM Goal (OT-IRF)  Score of 6 (FIM)  -RC  Score of 6 (FIM)  -LM  Score of 6 (FIM)  -LM    Time Frame (Bathing FIM Goal, OT-IRF)  long term goal (LTG);by discharge  -RC  long term goal (LTG);by discharge  -LM  long term goal (LTG);by discharge  -LM    Progress/Outcomes (Bathing FIM Goal, OT-IRF)  goal not met;continuing progress toward goal  -RC  goal not met;continuing progress toward goal  -LM  goal not met;continuing progress toward goal  -LM       UB Dressing FIM Goal (OT-IRF)    Upper Body Dressing, FIM Goal, OT-IRF  Score of 7 (FIM)  -RC  Score of 7 (FIM)  -LM  Score of 7 (FIM)  -LM    Time Frame (UB Dressing FIM Goal, OT-IRF)  long term goal (LTG);by discharge  -RC  long term goal (LTG);by discharge  -LM  long term goal (LTG);by discharge  -LM    Progress/Outcomes (UB Dressing FIM Goal, OT-IRF)  goal not met;continuing progress toward goal  -RC  goal not met;continuing progress toward goal  -LM  goal not met;continuing progress toward goal  -LM       LB Dressing FIM Goal (OT-IRF)    Lower Body Dressing, FIM Goal, OT-IRF  Score of 6 (FIM)  -RC  Score of 6 (FIM)  -LM  Score of 6 (FIM)  -LM    Time Frame (LB Dressing FIM  Goal, OT-IRF)  long term goal (LTG);by discharge  -RC  long term goal (LTG);by discharge  -LM  long term goal (LTG);by discharge  -LM    Progress/Outcomes (LB Dressing FIM Goal, OT-IRF)  goal not met;continuing progress toward goal  -RC  goal not met;continuing progress toward goal  -LM  goal not met;continuing progress toward goal  -LM       Toileting FIM Goal (OT-IRF)    Toileting FIM Goal (OT-IRF)  Score of 6 (FIM)  -RC  Score of 6 (FIM)  -LM  Score of 6 (FIM)  -LM    Time Frame (Toileting FIM Goal, OT-IRF)  long term goal (LTG);by discharge  -RC  long term goal (LTG);by discharge  -LM  long term goal (LTG);by discharge  -LM    Progress/Outcomes (Toileting FIM Goal, OT-IRF)  goal not met;continuing progress toward goal  -RC  goal not met;continuing progress toward goal  -LM  goal not met;continuing progress toward goal  -LM    Row Name 06/29/19 0740 06/29/19 0728 06/29/19 0600       Transfer FIM Goals (OT-IRF)    Tub-Shower Transfer FIM Goal (OT-IRF)  Score of 6 (FIM)  -LW  Score of 6 (FIM)  -LW  Score of 6 (FIM)  -LW    Toilet Transfer FIM Goal (OT-IRF)  Score of 6 (FIM)  -LW  Score of 6 (FIM)  -LW  Score of 6 (FIM)  -LW    Time Frame (Transfer FIM Goals 1, OT-IRF)  long term goal (LTG);by discharge  -LW  long term goal (LTG);by discharge  -LW  long term goal (LTG);by discharge  -LW    Progress/Outcomes (Transfer FIM Goals, OT-IRF)  goal not met;continuing progress toward goal  -LW  goal not met;continuing progress toward goal  -LW  goal not met;continuing progress toward goal  -LW       Bathing FIM Goal (OT-IRF)    Bathing FIM Goal (OT-IRF)  Score of 6 (FIM)  -LW  Score of 6 (FIM)  -LW  Score of 6 (FIM)  -LW    Time Frame (Bathing FIM Goal, OT-IRF)  long term goal (LTG);by discharge  -LW  long term goal (LTG);by discharge  -LW  long term goal (LTG);by discharge  -LW    Progress/Outcomes (Bathing FIM Goal, OT-IRF)  goal not met;continuing progress toward goal  -LW  goal not met;continuing progress toward goal   -LW  goal not met;continuing progress toward goal  -LW       UB Dressing FIM Goal (OT-IRF)    Upper Body Dressing, FIM Goal, OT-IRF  Score of 7 (FIM)  -LW  Score of 7 (FIM)  -LW  Score of 7 (FIM)  -LW    Time Frame (UB Dressing FIM Goal, OT-IRF)  long term goal (LTG);by discharge  -LW  long term goal (LTG);by discharge  -LW  long term goal (LTG);by discharge  -LW    Progress/Outcomes (UB Dressing FIM Goal, OT-IRF)  goal not met;continuing progress toward goal  -LW  goal not met;continuing progress toward goal  -LW  goal not met;continuing progress toward goal  -LW       LB Dressing FIM Goal (OT-IRF)    Lower Body Dressing, FIM Goal, OT-IRF  Score of 6 (FIM)  -LW  Score of 6 (FIM)  -LW  Score of 6 (FIM)  -LW    Time Frame (LB Dressing FIM Goal, OT-IRF)  long term goal (LTG);by discharge  -LW  long term goal (LTG);by discharge  -LW  long term goal (LTG);by discharge  -LW    Progress/Outcomes (LB Dressing FIM Goal, OT-IRF)  goal not met;continuing progress toward goal  -LW  goal not met;continuing progress toward goal  -LW  goal not met;continuing progress toward goal  -LW       Toileting FIM Goal (OT-IRF)    Toileting FIM Goal (OT-IRF)  Score of 6 (FIM)  -LW  Score of 6 (FIM)  -LW  Score of 6 (FIM)  -LW    Time Frame (Toileting FIM Goal, OT-IRF)  long term goal (LTG);by discharge  -LW  long term goal (LTG);by discharge  -LW  long term goal (LTG);by discharge  -LW    Progress/Outcomes (Toileting FIM Goal, OT-IRF)  goal not met;continuing progress toward goal  -LW  goal not met;continuing progress toward goal  -LW  goal not met;continuing progress toward goal  -LW    Row Name 06/28/19 0735 06/27/19 0820          Transfer FIM Goals (OT-IRF)    Tub-Shower Transfer FIM Goal (OT-IRF)  Score of 6 (FIM)  -RC  Score of 6 (FIM)  -MR     Toilet Transfer FIM Goal (OT-IRF)  Score of 6 (FIM)  -RC  Score of 6 (FIM)  -MR     Time Frame (Transfer FIM Goals 1, OT-IRF)  long term goal (LTG);by discharge  -RC  long term goal (LTG);by  discharge  -MR     Progress/Outcomes (Transfer FIM Goals, OT-IRF)  goal not met;continuing progress toward goal  -RC  goal not met  -MR        Bathing FIM Goal (OT-IRF)    Bathing FIM Goal (OT-IRF)  Score of 6 (FIM)  -RC  Score of 6 (FIM)  -MR     Time Frame (Bathing FIM Goal, OT-IRF)  long term goal (LTG);by discharge  -RC  long term goal (LTG);by discharge  -MR     Progress/Outcomes (Bathing FIM Goal, OT-IRF)  goal not met;continuing progress toward goal  -RC  goal not met  -MR        UB Dressing FIM Goal (OT-IRF)    Upper Body Dressing, FIM Goal, OT-IRF  Score of 7 (FIM)  -RC  Score of 7 (FIM)  -MR     Time Frame (UB Dressing FIM Goal, OT-IRF)  long term goal (LTG);by discharge  -RC  long term goal (LTG);by discharge  -MR     Progress/Outcomes (UB Dressing FIM Goal, OT-IRF)  goal not met;continuing progress toward goal  -RC  goal not met  -MR        LB Dressing FIM Goal (OT-IRF)    Lower Body Dressing, FIM Goal, OT-IRF  Score of 6 (FIM)  -RC  Score of 6 (FIM)  -MR     Time Frame (LB Dressing FIM Goal, OT-IRF)  long term goal (LTG);by discharge  -RC  long term goal (LTG);by discharge  -MR     Progress/Outcomes (LB Dressing FIM Goal, OT-IRF)  goal not met;continuing progress toward goal  -RC  goal not met  -MR        Toileting FIM Goal (OT-IRF)    Toileting FIM Goal (OT-IRF)  Score of 6 (FIM)  -RC  Score of 6 (FIM)  -MR     Time Frame (Toileting FIM Goal, OT-IRF)  long term goal (LTG);by discharge  -RC  long term goal (LTG);by discharge  -MR     Progress/Outcomes (Toileting FIM Goal, OT-IRF)  goal not met;continuing progress toward goal  -RC  goal not met  -MR       User Key  (r) = Recorded By, (t) = Taken By, (c) = Cosigned By    Initials Name Provider Type    RC Jossie Louis LONGO/L Occupational Therapy Assistant    Aye Stephenson LONGO/L Occupational Therapy Assistant    LW Jazz Nunn LONGO/L Occupational Therapy Assistant    MR Rich, Alycia E, OT Occupational Therapist          Occupational  Therapy Education     Title: PT OT SLP Therapies (In Progress)     Topic: Occupational Therapy (In Progress)     Point: ADL training (In Progress)     Description: Instruct learner(s) on proper safety adaptation and remediation techniques during self care or transfers.   Instruct in proper use of assistive devices.    Learning Progress Summary           Patient Acceptance, E, NR by RC at 7/3/2019  2:03 PM    Acceptance, E, VU by LM at 7/1/2019 11:48 AM    Acceptance, E,TB,D, VU by LW at 6/29/2019  7:29 AM    Comment:  Educated pt on use of sock aid and reacher for dressing.    Acceptance, E, NR by RC at 6/28/2019  9:52 AM    Acceptance, E,TB, VU,NR by MR at 6/27/2019  1:15 PM    Comment:  Role of OT and POC.Benefit of activity, safety with t/f, AD/AE utilization to increase functional independence with ADLs.                   Point: Home exercise program (Done)     Description: Instruct learner(s) on appropriate technique for monitoring, assisting and/or progressing therapeutic exercises/activities.    Learning Progress Summary           Patient Acceptance, E, VU by LM at 7/1/2019 11:48 AM    Acceptance, E,TB,D, VU by LW at 6/29/2019  7:29 AM    Comment:  Educated pt on use of sock aid and reacher for dressing.                   Point: Precautions (In Progress)     Description: Instruct learner(s) on prescribed precautions during self-care and functional transfers.    Learning Progress Summary           Patient Acceptance, E, NR by RC at 7/5/2019  3:14 PM    Acceptance, E, NR by RC at 7/4/2019  2:22 PM    Acceptance, E, NR by RC at 7/3/2019  2:03 PM    Acceptance, E, NR by RC at 7/2/2019  3:57 PM    Acceptance, E, VU by LM at 7/1/2019 11:48 AM    Acceptance, E,TB,D, VU by LW at 6/29/2019  7:29 AM    Comment:  Educated pt on use of sock aid and reacher for dressing.    Acceptance, E, NR by RC at 6/28/2019  9:52 AM    Acceptance, E,TB, VU,NR by MR at 6/27/2019  1:15 PM    Comment:  Role of OT and POC.Benefit of  activity, safety with t/f, AD/AE utilization to increase functional independence with ADLs.                   Point: Body mechanics (In Progress)     Description: Instruct learner(s) on proper positioning and spine alignment during self-care, functional mobility activities and/or exercises.    Learning Progress Summary           Patient Acceptance, E, NR by  at 7/5/2019  3:14 PM    Acceptance, E, NR by  at 7/4/2019  2:22 PM    Acceptance, E, NR by  at 7/3/2019  2:03 PM    Acceptance, E, NR by RC at 7/2/2019  3:57 PM    Acceptance, E, VU by LM at 7/1/2019 11:48 AM    Acceptance, E,TB,D, VU by LW at 6/29/2019  7:29 AM    Comment:  Educated pt on use of sock aid and reacher for dressing.    Acceptance, E, NR by  at 6/28/2019  9:52 AM    Acceptance, E,TB, VU,NR by MR at 6/27/2019  1:15 PM    Comment:  Role of OT and POC.Benefit of activity, safety with t/f, AD/AE utilization to increase functional independence with ADLs.                               User Key     Initials Effective Dates Name Provider Type Discipline     03/07/18 -  Jossie Louis COTA/L Occupational Therapy Assistant OT    LM 03/07/18 -  Aye Almeida COTA/L Occupational Therapy Assistant OT    LW 03/07/18 -  Jazz Nunn COTA/L Occupational Therapy Assistant OT    MR 04/03/18 -  Alycia Rich, OT Occupational Therapist OT                Outcome Measures     Row Name 07/05/19 1420 07/04/19 1400 07/04/19 1010       How much help from another person do you currently need...    Turning from your back to your side while in flat bed without using bedrails?  --  3  -JW  --    Moving from lying on back to sitting on the side of a flat bed without bedrails?  --  3  -JW  --    Moving to and from a bed to a chair (including a wheelchair)?  --  3  -JW  --    Standing up from a chair using your arms (e.g., wheelchair, bedside chair)?  --  3  -JW  --    Climbing 3-5 steps with a railing?  --  3  -JW  --    To walk in hospital room?  --   3  -JW  --    AM-PAC 6 Clicks Score (PT)  --  18  -JW  --       How much help from another is currently needed...    Putting on and taking off regular lower body clothing?  3  -RC  --  3  -RC    Bathing (including washing, rinsing, and drying)  3  -RC  --  3  -RC    Toileting (which includes using toilet bed pan or urinal)  3  -RC  --  3  -RC    Putting on and taking off regular upper body clothing  3  -RC  --  3  -RC    Taking care of personal grooming (such as brushing teeth)  3  -RC  --  3  -RC    Eating meals  4  -RC  --  4  -RC    AM-PAC 6 Clicks Score (OT)  19  -RC  --  19  -RC       Functional Assessment    Outcome Measure Options  --  AM-PAC 6 Clicks Basic Mobility (PT)  -JW  --    Row Name 07/03/19 1315 07/03/19 0845          How much help from another person do you currently need...    Turning from your back to your side while in flat bed without using bedrails?  --  3  -CZ     Moving from lying on back to sitting on the side of a flat bed without bedrails?  --  3  -CZ     Moving to and from a bed to a chair (including a wheelchair)?  --  3  -CZ     Standing up from a chair using your arms (e.g., wheelchair, bedside chair)?  --  3  -CZ     Climbing 3-5 steps with a railing?  --  3  -CZ     To walk in hospital room?  --  3  -CZ     AM-PAC 6 Clicks Score (PT)  --  18  -CZ        How much help from another is currently needed...    Putting on and taking off regular lower body clothing?  3  -RC  --     Bathing (including washing, rinsing, and drying)  3  -RC  --     Toileting (which includes using toilet bed pan or urinal)  3  -RC  --     Putting on and taking off regular upper body clothing  3  -RC  --     Taking care of personal grooming (such as brushing teeth)  3  -RC  --     Eating meals  4  -RC  --     AM-PAC 6 Clicks Score (OT)  19  -RC  --        Functional Assessment    Outcome Measure Options  --  AM-PAC 6 Clicks Basic Mobility (PT)  -CZ       User Key  (r) = Recorded By, (t) = Taken By, (c) =  Cosigned By    Initials Name Provider Type    Devin Mejia, PTA Physical Therapy Assistant    RC Jossie Louis G, LONGO/L Occupational Therapy Assistant    CZ Rudy Lombardi, PT Physical Therapist             Time Calculation:     Time Calculation- OT     Row Name 07/05/19 1531 07/05/19 1530 07/05/19 1516       Time Calculation- OT    OT Start Time  1420  -RC  1000  -RC  1000  -RC    OT Stop Time  1450  -RC  1100  -RC  --    OT Time Calculation (min)  30 min  -RC  60 min  -RC  --    Total Timed Code Minutes- OT  30 minute(s)  -RC  60 minute(s)  -RC  --    OT Received On  07/05/19  -RC  07/05/19  -RC  --      User Key  (r) = Recorded By, (t) = Taken By, (c) = Cosigned By    Initials Name Provider Type    RC Jossie Louis G, LONGO/L Occupational Therapy Assistant          Therapy Charges for Today     Code Description Service Date Service Provider Modifiers Qty    90616691210 HC OT THER PROC EA 15 MIN 7/4/2019 MissyJossie lynn G, LONGO/L GO 1    21547230089 HC OT THERAPEUTIC ACT EA 15 MIN 7/4/2019 MissyJossie lynn G, LONGO/L GO 2    17906938031 HC OT THERAPEUTIC ACT EA 15 MIN 7/4/2019 MissyJossie lynn G, LONGO/L GO 1    22313938638 HC OT THER PROC EA 15 MIN 7/4/2019 MissyJossie lynn G, LONGO/L GO 2    73394652796 HC OT THER PROC EA 15 MIN 7/5/2019 Missy, Jossie G, LONGO/L GO 2    49229631509 HC OT THERAPEUTIC ACT EA 15 MIN 7/5/2019 Missy, Jossie G, LONGO/L GO 1    29535264653 HC OT SELF CARE/MGMT/TRAIN EA 15 MIN 7/5/2019 MissyJossie lynn G, LONGO/L GO 1    12233789849 HC OT THER PROC EA 15 MIN 7/5/2019 Missy, Jossie G, LONGO/L GO 2                   Jossie G Missy, LONGO/L  7/5/2019

## 2019-07-05 NOTE — THERAPY TREATMENT NOTE
Inpatient Rehabilitation - Physical Therapy Treatment Note  [facility]     Patient Name: Kuldip Nevarez  : 1945  MRN: [id number]    Today's Date: 2019       Date of Referral to PT: 19         Admit Date: 2019      Visit Dx:      ICD-10-CM ICD-9-CM   1. Symbolic dysfunction R48.9 784.60   2. Impaired physical mobility Z74.09 781.99   3. Impaired mobility and activities of daily living Z74.09 799.89       Patient Active Problem List   Diagnosis   • S/p left hip fracture   • Closed fracture of left hip (CMS/HCC)   • Frequent falls   • Closed nondisplaced intertrochanteric fracture of left femur with routine healing   • Delirium   • Pneumonia involving left lung   • Pacemaker   • Coronary artery disease   • Disease of thyroid gland   • Stage 3 chronic kidney disease (CMS/HCC)   • Acute respiratory failure with hypoxia and hypercapnia (CMS/HCC)   • Closed left hip fracture (CMS/HCC)   • Primary insomnia   • Dry mouth   • Chronic gouty arthropathy       Therapy Treatment    IRF Treatment Summary     Row Name 19 1451 19 1420 19 1123       Evaluation/Treatment Time and Intent    Subjective Information  complains of;pain  -BS  no complaints  -RC  no complaints  -BS    Existing Precautions/Restrictions  fall  -BS  fall  -RC  fall  -BS    Document Type  therapy note (daily note)  -BS  therapy note (daily note)  -RC  therapy note (daily note)  -BS    Mode of Treatment  individual therapy;physical therapy  -BS  occupational therapy;individual therapy  -RC  individual therapy;physical therapy  -BS    Patient/Family Observations  sitting in w/c  -BS  in w/c   -RC  in w/c  -BS    Start Time (Evaluation/Treatment)  1451  -BS  1420  -RC  1123  -BS    Stop Time (Evaluation/Treatment)  1555  -BS  1450  -RC  1155  -BS    Recorded by [BS] Freedom Landin, PT [RC] Jossie Louis COTA/JOVANNY [BS] Freedom Landin, PT    Row Name 19 1000             Evaluation/Treatment Time and Intent     Subjective Information  no complaints  -RC      Existing Precautions/Restrictions  fall  -RC      Document Type  therapy note (daily note)  -RC      Mode of Treatment  occupational therapy;individual therapy  -RC      Patient/Family Observations  in bed   -RC      Start Time (Evaluation/Treatment)  1000  -RC      Stop Time (Evaluation/Treatment)  1100  -RC      Recorded by [RC] Jossie Louis COTA/L      Row Name 07/05/19 1123 07/05/19 1000          Relationship/Environment    Primary Source of Support/Comfort  child(lynn)  -BS  child(lynn)  -RC     Lives With  spouse  -BS  --     Recorded by [BS] Freedom Landin, PT [RC] Jossie Louis COTA/L     Row Name 07/05/19 1451 07/05/19 1420 07/05/19 1123       Cognition/Psychosocial- PT/OT    Affect/Mental Status (Cognitive)  WFL  -BS  WFL  -RC  sad/depressed affect;emotionally labile  -BS    Orientation Status (Cognition)  oriented x 4  -BS  oriented x 4  -RC  oriented x 4  -BS    Follows Commands (Cognition)  WFL  -BS  --  WFL  -BS    Personal Safety Interventions  gait belt;supervised activity;nonskid shoes/slippers when out of bed  -BS  --  --    Safety Deficit (Cognitive)  mild deficit  -BS  --  --    Recorded by [BS] Freedom Landin, PT [RC] Jossie Louis COTA/JOVANNY [BS] Freedom Landin, PT    Row Name 07/05/19 1000             Cognition/Psychosocial- PT/OT    Affect/Mental Status (Cognitive)  WFL  -RC      Orientation Status (Cognition)  oriented x 4  -RC      Recorded by [RC] Jossie Louis COTA/L      Row Name 07/05/19 1451 07/05/19 1123          Bed Mobility Assessment/Treatment    Sit-Supine [place] (Bed Mobility)  conditional independence  -BS  --     Comment (Bed Mobility)  --  NT, pt sitting in w/c at bedside  -BS     Recorded by [BS] Freedom Landin, PT [BS] Freedom Landin, PT     Row Name 07/05/19 1000             Functional Mobility    Functional Mobility- Ind. Level  contact guard assist  -RC      Functional Mobility- Device  rolling walker  -RC       Functional Mobility-Distance (Feet)  6  -RC      Recorded by [RC] Jossie Louis LONGO/L      Row Name 07/05/19 1000             Bed-Chair Transfer    Bed-Chair [place] (Transfers)  contact guard  -RC      Assistive Device (Bed-Chair Transfers)  walker, front-wheeled  -RC      Recorded by [RC] Jossie Louis LONGO/L      Row Name 07/05/19 1451 07/05/19 1123 07/05/19 1000       Sit-Stand Transfer    Sit-Stand [place] (Transfers)  contact guard  -BS  contact guard  -BS  contact guard  -RC    Assistive Device (Sit-Stand Transfers)  walker, front-wheeled  -BS  walker, front-wheeled  -BS  walker, front-wheeled  -RC    Recorded by [BS] Freedom Landin, PT [BS] Freedom Landin, PT [RC] Jossie Louis LONGO/L    Row Name 07/05/19 1451 07/05/19 1123 07/05/19 1000       Stand-Sit Transfer    Stand-Sit [place] (Transfers)  contact guard  -BS  contact guard  -BS  contact guard  -RC    Assistive Device (Stand-Sit Transfers)  walker, front-wheeled  -BS  walker, front-wheeled  -BS  walker, front-wheeled  -RC    Recorded by [BS] Freedom Landin, PT [BS] Freedom Landin, PT [RC] Jossie Louis LONGO/L    Row Name 07/05/19 1451 07/05/19 1123          Gait/Stairs Assessment/Training    Gait/Stairs Assessment/Training  --  gait/ambulation assistive device  -BS     [place] Level (Gait)  contact guard  -BS  contact guard  -BS     Assistive Device (Gait)  walker, front-wheeled  -BS  walker, front-wheeled  -BS     Distance in Feet (Gait)  70' x 1  -BS  72', 46'  -BS     Pattern (Gait)  step-to  -BS  step-to  -BS     Deviations/Abnormal Patterns (Gait)  antalgic  -BS  antalgic  -BS     Left Sided Gait Deviations  weight shift ability decreased  -BS  weight shift ability decreased  -BS     Recorded by [BS] Freedom Landin, PT [BS] Freedom Landin, PT     Row Name 07/05/19 1420 07/05/19 1000          Wheelchair Mobility/Management    Method of Wheelchair Locomotion (Mobility)  bimanual (upper extremity) propulsion   -RC  bimanual (upper extremity) propulsion  -RC     Mobility Activities (Wheelchair)  forward propulsion  -RC  forward propulsion  -RC     Mobility [place] Level (Wheelchair)  conditional independence  -RC  conditional independence  -RC     Distance Propelled in Feet (Wheelchair)  20  -RC  100  -RC     Recorded by [RC] Jossie Louis COTA/L [RC] Jossie Louis COTA/L     Row Name 07/05/19 1000             Lower Body Dressing Assessment/Treatment    Lower Body Dressing [place] Level  minimum assist (75% patient effort);shoes/slippers;don to tie   -RC      Lower Body Dressing Position  edge of bed sitting  -RC      Recorded by [RC] Jossie Louis COTA/L      Row Name 07/05/19 1123             Pain Assessment    Additional Documentation  Pain Scale: Numbers Pre/Post-Treatment (Group)  -BS      Recorded by [BS] Freedom Landin, PT      Row Name 07/05/19 1451 07/05/19 1420 07/05/19 1123       Pain Scale: Numbers Pre/Post-Treatment    Pain Scale: Numbers, Pretreatment  1/10  -BS  0/10 - no pain  -RC  0/10 - no pain  -BS    Pain Scale: Numbers, Post-Treatment  --  0/10 - no pain  -RC  0/10 - no pain  -BS    Pain Location - Side  --  --  Left  -BS    Pain Location  --  --  hip  -BS    Pre/Post Treatment Pain Comment  5-6/10 L hip pain-intermittent  -BS  --  --    Pain Intervention(s)  Repositioned  -BS  --  --    Recorded by [BS] Freedom Landin, PT [RC] Jossie Louis COTA/JOVANNY [BS] Freedom Landin, PT    Row Name 07/05/19 1000             Pain Scale: Numbers Pre/Post-Treatment    Pain Scale: Numbers, Pretreatment  0/10 - no pain  -RC      Pain Scale: Numbers, Post-Treatment  0/10 - no pain  -RC      Recorded by [RC] Jossie Louis COTA/L      Row Name 07/05/19 1420             Upper Extremity Seated Therapeutic Exercise    Performed, Seated Upper Extremity (Therapeutic Exercise)  shoulder flexion/extension;elbow flexion/extension;shoulder external/internal rotation upright row x 15 only   -       Device, Seated Upper Extremity (Therapeutic Exercise)  free weights, barbell 3#  -RC      Exercise Type, Seated Upper Extremity (Therapeutic Exercise)  resistive exercise  -RC      Expected Outcomes, Seated Upper Extremity (Therapeutic Exercise)  improve performance, BADLs;improve functional tolerance, self-care activity  -RC      Sets/Reps Detail, Seated Upper Extremity (Therapeutic Exercise)  15x3  -RC      Recorded by [RC] Jossie Louis COTA/L      Row Name 07/05/19 1000             Aerobic Exercise Activity    Exercise Performed (Aerobic, Therapeutic Exercise)  arm bike  -RC      Expected Outcome (Aerobic, Therapeutic Exercise)  improve functional tolerance, single extremity activity;improve performance, BADLs  -RC      Time (Aerobic, Therapeutic Exercise)  15  -RC      Comment (Aerobic, Therapeutic Exercise)  balloon vollyball   -RC      Recorded by [RC] Jossie Louis COTA/L      Row Name 07/05/19 1451             Lower Extremity Standing Therapeutic Exercise    Comment, Standing Lower Extremity (Therapeutic Exercise)  standing w/ RW: R hip abd/ext 1 x 10 reps  -BS      Recorded by [BS] Freedom Landin, PT      Row Name 07/05/19 1451 07/05/19 1123          Lower Extremity Seated Therapeutic Exercise    Performed, Seated Lower Extremity (Therapeutic Exercise)  LAQ (long arc quad), knee extension;hip flexion/extension;knee flexion/extension  -BS  --     Sets/Reps Detail, Seated Lower Extremity (Therapeutic Exercise)  1/10  -BS  --     Comment, Seated Lower Extremity (Therapeutic Exercise)  red TB resisted B hip abd x 10, R hip add x 10 resisted w/ red TB  -BS  seated HR/TR x 20, iso B hip add x 10, red TB resisted B knee flex x 10, red TB resisted B hip abd x 10   -BS     Recorded by [BS] Freedom Landin, PT [BS] Freedom Landin, PT     Row Name 07/05/19 1451             Lower Extremity Supine Therapeutic Exercise    Performed, Supine Lower Extremity (Therapeutic Exercise)  SAQ (short arc quad) over  bolster;ankle dorsiflexion/plantarflexion;hamstring sets  -BS      Sets/Reps Detail, Supine Lower Extremity (Therapeutic Exercise)  1/10  -BS      Comment, Supine Lower Extremity (Therapeutic Exercise)  GS x 20, L hip abd x 10, heel slides L x 10  -BS      Recorded by [BS] Freedom Landin PT      Row Name 07/05/19 1420 07/05/19 1000          Positioning and Restraints    Pre-Treatment Position  sitting in chair/recliner  -RC  in bed  -RC     Post Treatment Position  wheelchair  -RC  wheelchair  -RC     In Wheelchair  with PT  -RC  call light within reach;encouraged to call for assist;exit alarm on  -RC     Recorded by [RC] Jossie Louis COTA/L [RC] Jossie Louis LONGO/L     Row Name 07/05/19 1420             Daily Summary of Progress (OT)    Functional Goal Overall Progress: Occupational Therapy  progressing toward functional goals as expected  -      Recommendations: Occupational Therapy  cont poc   -RC      Recorded by [RC] Jossie Louis LONGO/L        User Key  (r) = Recorded By, (t) = Taken By, (c) = Cosigned By    Initials Name Effective Dates     Jossie Louis LONGO/L 03/07/18 -     BS Freedom Landin, PT 04/08/18 -         Wound 06/16/19 0914 Left hip incision (Active)   Dressing Appearance intact;dry 7/5/2019  8:50 AM   Closure Approximated;Adhesive closure strips 7/5/2019  8:50 AM   Periwound ecchymotic 7/5/2019  8:50 AM   Drainage Amount none 7/5/2019  8:50 AM     Physical Therapy Education     Title: PT OT SLP Therapies (In Progress)     Topic: Physical Therapy (In Progress)     Point: Mobility training (Done)     Learning Progress Summary           Patient Acceptance, E, VU,NR by FADUMO at 7/3/2019  2:03 PM    Comment:  Patient educated on LLD and how the heel lift inserted into his shoe will help improve his gait. Patient educated on proper w/c propulsion to improve saftely awareness. Patient instructed on proper hand placement during transfers with FWW.    Acceptance, E, VU,NR by FADUMO at  6/27/2019  1:06 PM    Comment:  Educated on proper technique with bed mobility, proper hand placement with transfers, and proper gait mechanics.                   Point: Home exercise program (In Progress)     Learning Progress Summary           Patient Acceptance, E, NR by FADUMO at 6/27/2019  2:15 PM    Comment:  Educated on supine ther ex to increase LLE strength and ROM.                               User Key     Initials Effective Dates Name Provider Type Discipline     04/03/18 -  Rudy Lombardi, PT Physical Therapist PT                  PT Recommendation and Plan                 Outcome Measures     Row Name 07/05/19 1420 07/04/19 1400 07/04/19 1010       How much help from another person do you currently need...    Turning from your back to your side while in flat bed without using bedrails?  --  3  -JW  --    Moving from lying on back to sitting on the side of a flat bed without bedrails?  --  3  -JW  --    Moving to and from a bed to a chair (including a wheelchair)?  --  3  -JW  --    Standing up from a chair using your arms (e.g., wheelchair, bedside chair)?  --  3  -JW  --    Climbing 3-5 steps with a railing?  --  3  -JW  --    To walk in hospital room?  --  3  -JW  --    AM-PAC 6 Clicks Score (PT)  --  18  -JW  --       How much help from another is currently needed...    Putting on and taking off regular lower body clothing?  3  -RC  --  3  -RC    Bathing (including washing, rinsing, and drying)  3  -RC  --  3  -RC    Toileting (which includes using toilet bed pan or urinal)  3  -RC  --  3  -RC    Putting on and taking off regular upper body clothing  3  -RC  --  3  -RC    Taking care of personal grooming (such as brushing teeth)  3  -RC  --  3  -RC    Eating meals  4  -RC  --  4  -RC    AM-PAC 6 Clicks Score (OT)  19  -RC  --  19  -RC       Functional Assessment    Outcome Measure Options  --  AM-PAC 6 Clicks Basic Mobility (PT)  -JW  --    Row Name 07/03/19 1315 07/03/19 0845          How much help  from another person do you currently need...    Turning from your back to your side while in flat bed without using bedrails?  --  3  -CZ     Moving from lying on back to sitting on the side of a flat bed without bedrails?  --  3  -CZ     Moving to and from a bed to a chair (including a wheelchair)?  --  3  -CZ     Standing up from a chair using your arms (e.g., wheelchair, bedside chair)?  --  3  -CZ     Climbing 3-5 steps with a railing?  --  3  -CZ     To walk in hospital room?  --  3  -CZ     AM-PAC 6 Clicks Score (PT)  --  18  -CZ        How much help from another is currently needed...    Putting on and taking off regular lower body clothing?  3  -RC  --     Bathing (including washing, rinsing, and drying)  3  -RC  --     Toileting (which includes using toilet bed pan or urinal)  3  -RC  --     Putting on and taking off regular upper body clothing  3  -RC  --     Taking care of personal grooming (such as brushing teeth)  3  -RC  --     Eating meals  4  -RC  --     AM-PAC 6 Clicks Score (OT)  19  -RC  --        Functional Assessment    Outcome Measure Options  --  AM-PAC 6 Clicks Basic Mobility (PT)  -CZ       User Key  (r) = Recorded By, (t) = Taken By, (c) = Cosigned By    Initials Name Provider Type    Devin Mejia, PTA Physical Therapy Assistant    RC Jossie Louis, DONTA/L Occupational Therapy Assistant    CZ Rudy Lombardi, PT Physical Therapist             Time Calculation:     PT Charges     Row Name 07/05/19 1616 07/05/19 1615          Time Calculation    Start Time  1451  -BS  1123  -BS     Stop Time  1555  -BS  1155  -BS     Time Calculation (min)  64 min  -BS  32 min  -BS     PT Received On  07/05/19  -BS  07/05/19  -BS     PT Goal Re-Cert Due Date  07/10/19  -BS  07/10/19  -BS       User Key  (r) = Recorded By, (t) = Taken By, (c) = Cosigned By    Initials Name Provider Type    Freedom Angulo, PT Physical Therapist          Therapy Charges for Today     Code Description Service Date  Service Provider Modifiers Qty    39205185015 HC PT THER PROC EA 15 MIN 7/5/2019 Freedom Landin, PT GP 1    00102559713 HC GAIT TRAINING EA 15 MIN 7/5/2019 Freedom Landin, PT GP 1    24448748761 HC PT THER PROC EA 15 MIN 7/5/2019 Freedom Landin, PT GP 1    37045041287 HC GAIT TRAINING EA 15 MIN 7/5/2019 Freedom Landin, PT GP 1    77091248893 HC PT THERAPEUTIC ACT EA 15 MIN 7/5/2019 Freedom Landin, PT GP 2            PT G-Codes  Outcome Measure Options: AM-PAC 6 Clicks Basic Mobility (PT)  AM-PAC 6 Clicks Score (PT): 18  AM-PAC 6 Clicks Score (OT): 19      Freedom Landin, PT  7/5/2019

## 2019-07-05 NOTE — PLAN OF CARE
Problem: Patient Care Overview  Goal: Plan of Care Review  Outcome: Ongoing (interventions implemented as appropriate)   07/05/19 1516   Patient Care Overview   IRF Plan of Care Review progress ongoing, continue   Progress, Functional Goals demonstrating adequate progress   OTHER   Outcome Summary patient is working well with therapy. no s/s of uncontrolled pain or distress. B/p can be low at times. Will continue to work towards goals. Plan is to d/c tuesday.    Coping/Psychosocial   Plan of Care Reviewed With patient     Goal: Individualization and Mutuality  Outcome: Ongoing (interventions implemented as appropriate)    Goal: Discharge Needs Assessment  Outcome: Ongoing (interventions implemented as appropriate)    Goal: Home Safety Plan  Outcome: Ongoing (interventions implemented as appropriate)    Goal: Coping Plan  Outcome: Ongoing (interventions implemented as appropriate)    Goal: Community Reintegration Plan  Outcome: Ongoing (interventions implemented as appropriate)      Problem: Fractured Hip (Adult)  Goal: Signs and Symptoms of Listed Potential Problems Will be Absent, Minimized or Managed (Fractured Hip)  Outcome: Ongoing (interventions implemented as appropriate)      Problem: Fall Risk (Adult)  Goal: Absence of Fall  Outcome: Ongoing (interventions implemented as appropriate)      Problem: Functional Mobility Impairment (IRF) (Adult)  Goal: Optimal/Safe Level of [place] with Mobility  Outcome: Ongoing (interventions implemented as appropriate)      Problem: Skin Injury Risk (Adult)  Goal: Skin Health and Integrity  Outcome: Ongoing (interventions implemented as appropriate)

## 2019-07-05 NOTE — PLAN OF CARE
Problem: Patient Care Overview  Goal: Plan of Care Review  Outcome: Ongoing (interventions implemented as appropriate)   07/05/19 1514   Patient Care Overview   IRF Plan of Care Review progress ongoing, continue   Progress, Functional Goals demonstrating adequate progress   OTHER   Outcome Summary participated w/OT well today, ue ex and act to increase I w/ adl's, fx transfer w/ cga. Offer shower at next tx . cont poc    Coping/Psychosocial   Plan of Care Reviewed With patient

## 2019-07-05 NOTE — PLAN OF CARE
Problem: Patient Care Overview  Goal: Plan of Care Review  Outcome: Ongoing (interventions implemented as appropriate)   07/05/19 0334   Patient Care Overview   IRF Plan of Care Review progress ongoing, continue   Progress, Functional Goals demonstrating adequate progress   OTHER   Outcome Summary Patient non-complient with calling for assistance with transfers. Educated on need to call for safety. Vital signs stable. Patient reports not being able to sleep well.    Coping/Psychosocial   Plan of Care Reviewed With patient       Problem: Fractured Hip (Adult)  Goal: Signs and Symptoms of Listed Potential Problems Will be Absent, Minimized or Managed (Fractured Hip)  Outcome: Ongoing (interventions implemented as appropriate)      Problem: Fall Risk (Adult)  Goal: Absence of Fall  Outcome: Ongoing (interventions implemented as appropriate)      Problem: Functional Mobility Impairment (IRF) (Adult)  Goal: Optimal/Safe Level of [place] with Mobility  Outcome: Ongoing (interventions implemented as appropriate)      Problem: Skin Injury Risk (Adult)  Goal: Identify Related Risk Factors and Signs and Symptoms  Outcome: Outcome(s) achieved Date Met: 07/05/19    Goal: Skin Health and Integrity  Outcome: Ongoing (interventions implemented as appropriate)

## 2019-07-05 NOTE — PROGRESS NOTES
LOS: 9 days   Patient Care Team:  Mike Draper MD as PCP - General    Chief Complaint:   Closed left hip fracture (CMS/HCC)           Interval History:     SUBJECTIVE: Doing well today.  Pain continues to improve.  Breathing well no shortness of breath.  No nausea no vomiting.  Continues to complain of poor sleep eating it is difficult for him to go to sleep and stay asleep.  Same problem at home.  He does not want to start any kind of medication to help this.  He is making progress towards discharge goals  History taken from: patient chart RN Reviewed with therapy as well    Objective     Vital Signs  Temp:  [98.2 °F (36.8 °C)-98.4 °F (36.9 °C)] 98.2 °F (36.8 °C)  Heart Rate:  [56-78] 63  Resp:  [18] 18  BP: ()/(58-74) 110/62      06/26/19  1404 07/03/19  0500 07/04/19  0600   Weight: 104 kg (229 lb) 104 kg (229 lb) 103 kg (227 lb 14.4 oz)         Physical Exam:     General Appearance:    Alert, cooperative, in no acute distress   Head:    Normocephalic, without obvious abnormality, atraumatic   Eyes:            Lids and lashes normal, conjunctivae and sclerae normal, no   icterus, no pallor, corneas clear, PERRLA   Throat:   No oral lesions, no thrush, oral mucosa moist   Neck:   No adenopathy, supple, trachea midline, no thyromegaly, no   carotid bruit, no JVD       Lungs:     Clear to auscultation,respirations regular, even and                  Unlabored his lungs are clear he has good air movement no respiratory distress    Heart:    Regular rhythm and normal rate, normal S1 and S2, no            murmur, no gallop, no rub, no click no ectopy   Chest Wall:    No abnormalities observed   Abdomen:     Normal bowel sounds, no masses, no organomegaly, soft        non-tender, non-distended, no guarding, no rebound                Tenderness      Extremities:   Moves all extremities well, incision well-healed no drainage no redness       Skin:   No bleeding, bruising or rash   Lymph nodes:   No  palpable adenopathy   Neurologic:   Cranial nerves 2 - 12 grossly intact, sensation intact, DTR       present and equal bilaterally        RESULTS REVIEW:     Lab Results (last 24 hours)     Procedure Component Value Units Date/Time    Renal Function Panel [[id number]]  (Abnormal) Collected:  07/05/19 0359    Specimen:  Blood Updated:  07/05/19 0440     Glucose 87 mg/dL      BUN 37 mg/dL      Creatinine 1.53 mg/dL      Sodium 140 mmol/L      Potassium 4.5 mmol/L      Chloride 105 mmol/L      CO2 25.0 mmol/L      Calcium 8.9 mg/dL      Albumin 3.00 g/dL      Phosphorus 3.2 mg/dL      Anion Gap 10.0 mmol/L      BUN/Creatinine Ratio 24.2     eGFR Non African Amer 44 mL/min/1.73     Narrative:       GFR Normal >60  Chronic Kidney Disease <60  Kidney Failure <15        Imaging Results (last 72 hours)     ** No results found for the last 72 hours. **          Assessment/Plan     Active Hospital Problems    Diagnosis   • **Closed left hip fracture (CMS/HCC)   • Primary insomnia     This is a compliant at home prior to admission as well     • Dry mouth     Due to prior salivary gland excision  This is a chronic problem at home prior to admission     • Pneumonia involving left lung   • Pacemaker     [employer] dual-chamber pacemaker implanted December 2016 followed by Dr. Grullon     • Delirium   • Stage 3 chronic kidney disease (CMS/HCC)   • Acute respiratory failure with hypoxia and hypercapnia (CMS/HCC)     Improved at the time of admission to acute rehab.  Still using oxygen 2 L per nasal cannula.  On a tapering dose of prednisone over 8 days.  Started with 40 mg every morning x2 days and then decreasing every 2 days     • Coronary artery disease   • Frequent falls           PLAN: Participating well with therapy ambulation transfers activities of daily living all improving.  Dr. Harley's note reviewed  Continue intensive therapy  Change to PRN neb treatments  Continue to encourage fluids  Renal Function is  stable  Recheck lab work in about 2 to 3 days  Home on Tuesday        This document has been electronically signed by Rogers Tompkins MD on July 5, 2019 11:01 AM

## 2019-07-06 NOTE — PROGRESS NOTES
[facility] Medicine Services  INPATIENT PROGRESS NOTE    Length of Stay: 10  Date of Admission: 6/26/2019  Primary Care Physician: Mike Draper MD    Subjective   Chief Complaint: No complaints today.  HPI:  Mr. Nevarez is a 74 y.o. Male admitted to ARU for intensive therapy and close medical observation. He is doing well today. Denies any shorntess of breath. No nausea or vomiting. He does continue to have difficulty sleeping. He previously has denied any medication to help but is willing to try Melatonin. He feels like he might benefit more from therapy if he were getting better rest.       Review of Systems   Constitutional: Positive for activity change. Negative for fatigue.   HENT: Negative for ear pain and sore throat.    Eyes: Negative for pain and discharge.   Respiratory: Negative for cough and shortness of breath.    Cardiovascular: Negative for chest pain and palpitations.   Gastrointestinal: Negative for abdominal pain and nausea.   Endocrine: Negative for cold intolerance and heat intolerance.   Genitourinary: Negative for difficulty urinating and dysuria.   Musculoskeletal: Positive for gait problem. Negative for arthralgias.   Skin: Negative for color change and rash.   Neurological: Negative for dizziness and weakness.   Psychiatric/Behavioral: Negative for agitation and confusion.          Objective    Temp:  [98.2 °F (36.8 °C)] 98.2 °F (36.8 °C)  Heart Rate:  [60] 60  Resp:  [18] 18  BP: (98)/(62) 98/62    Physical Exam   Constitutional: He is oriented to person, place, and time. He appears well-developed and well-nourished.   HENT:   Head: Normocephalic and atraumatic.   Eyes: EOM are normal. Pupils are equal, round, and reactive to light.   Neck: Normal range of motion. Neck supple.   Cardiovascular: Normal rate and regular rhythm.   Pulmonary/Chest: Effort normal and breath sounds normal.   Abdominal: Soft. Bowel sounds are normal.    Musculoskeletal: Normal range of motion.   Neurological: He is alert and oriented to person, place, and time.   Skin: Skin is warm and dry.   Psychiatric: He has a normal mood and affect. His behavior is normal.       Results Review:  I have reviewed the labs, radiology results, and diagnostic studies.    Laboratory Data:   Results from last 7 days   Lab Units 07/05/19  0359 07/03/19  0626 07/01/19  0607   SODIUM mmol/L 140 143 141   POTASSIUM mmol/L 4.5 4.1 4.6   CHLORIDE mmol/L 105 108* 107   CO2 mmol/L 25.0 24.0 25.0   BUN mg/dL 37* 33* 32*   CREATININE mg/dL 1.53* 1.62* 1.48*   GLUCOSE mg/dL 87 111* 88   CALCIUM mg/dL 8.9 9.0 8.6   ANION GAP mmol/L 10.0 11.0 9.0     Estimated Creatinine Clearance: 51 mL/min (A) (by C-G formula based on SCr of 1.53 mg/dL (H)).  Results from last 7 days   Lab Units 07/05/19 0359 07/03/19  0626   PHOSPHORUS mg/dL 3.2 2.6         Results from last 7 days   Lab Units 07/03/19  0626 07/01/19  0607   WBC 10*3/mm3 10.50 10.28   HEMOGLOBIN g/dL 12.5* 12.6*   HEMATOCRIT % 37.6 37.4*   PLATELETS 10*3/mm3 154 169           Culture Data:   No results found for: BLOODCX  No results found for: URINECX  No results found for: RESPCX  No results found for: WOUNDCX  No results found for: STOOLCX  No components found for: BODYFLD    Radiology Data:   Imaging Results (last 24 hours)     ** No results found for the last 24 hours. **          I have reviewed the patient's current medications.     Assessment/Plan     Active Hospital Problems    Diagnosis   • **Closed left hip fracture (CMS/HCC)   • Primary insomnia     This is a compliant at home prior to admission as well     • Dry mouth     Due to prior salivary gland excision  This is a chronic problem at home prior to admission     • Pneumonia involving left lung   • Pacemaker     [employer] dual-chamber pacemaker implanted December 2016 followed by Dr. Grullon     • Delirium   • Stage 3 chronic kidney disease (CMS/HCC)   • Acute respiratory  failure with hypoxia and hypercapnia (CMS/HCC)     Improved at the time of admission to acute rehab.  Still using oxygen 2 L per nasal cannula.  On a tapering dose of prednisone over 8 days.  Started with 40 mg every morning x2 days and then decreasing every 2 days     • Coronary artery disease   • Frequent falls       Plan:  Continue current treatment plan. Will add Melatonin for sleep.       This document has been electronically signed by NORMAN Kim on July 6, 2019 11:44 AM

## 2019-07-06 NOTE — PLAN OF CARE
Problem: Patient Care Overview  Goal: Plan of Care Review   07/06/19 4982   Patient Care Overview   IRF Plan of Care Review progress ongoing, continue   Progress, Functional Goals demonstrating adequate progress   OTHER   Outcome Summary new orders for sleeep,vss,will cont to monitor   Coping/Psychosocial   Plan of Care Reviewed With patient

## 2019-07-06 NOTE — PLAN OF CARE
Problem: Patient Care Overview  Goal: Plan of Care Review  Outcome: Ongoing (interventions implemented as appropriate)   07/06/19 0338   Patient Care Overview   IRF Plan of Care Review progress ongoing, continue   Progress, Functional Goals demonstrating adequate progress   OTHER   Outcome Summary Patient has gotten up multiple times throughout the night to sit in wheelchair, no cpmplaints of pain, no acute distress. Working towards goals.   Coping/Psychosocial   Plan of Care Reviewed With patient       Problem: Fractured Hip (Adult)  Goal: Signs and Symptoms of Listed Potential Problems Will be Absent, Minimized or Managed (Fractured Hip)  Outcome: Ongoing (interventions implemented as appropriate)      Problem: Fall Risk (Adult)  Goal: Absence of Fall  Outcome: Ongoing (interventions implemented as appropriate)      Problem: Functional Mobility Impairment (IRF) (Adult)  Goal: Optimal/Safe Level of [place] with Mobility  Outcome: Ongoing (interventions implemented as appropriate)      Problem: Skin Injury Risk (Adult)  Goal: Skin Health and Integrity  Outcome: Ongoing (interventions implemented as appropriate)

## 2019-07-07 NOTE — PROGRESS NOTES
[facility] Medicine Services  INPATIENT PROGRESS NOTE    Length of Stay: 11  Date of Admission: 6/26/2019  Primary Care Physician: Mike Draper MD    Subjective   Chief Complaint: No new complaints today.    HPI: 07/07/19    Mr. Nevarez is a 74 y.o. Male admitted to ARU for intensive therapy and close medical observation. He is doing well today. Denies any shorntess of breath. No nausea or vomiting. He does continue to have difficulty sleeping. He previously has denied any medication he did try Melatonin but states that it did not help . He states his insomnia is chronic but feels it has been worse during hospitalization. He states it may be partly due to worrying about his wife at home. Encourage him to try it again tonight.         Review of Systems   Constitutional: Positive for activity change. Negative for fatigue.   HENT: Negative for ear pain and sore throat.    Eyes: Negative for pain and discharge.   Respiratory: Negative for cough and shortness of breath.    Cardiovascular: Negative for chest pain and palpitations.   Gastrointestinal: Negative for abdominal pain and nausea.   Endocrine: Negative for cold intolerance and heat intolerance.   Genitourinary: Negative for difficulty urinating and dysuria.   Musculoskeletal: Positive for gait problem. Negative for arthralgias.   Skin: Negative for color change and rash.   Neurological: Negative for dizziness and weakness.   Psychiatric/Behavioral: Negative for agitation and confusion.          Objective    Temp:  [97.1 °F (36.2 °C)-97.6 °F (36.4 °C)] 97.1 °F (36.2 °C)  Heart Rate:  [60-72] 60  Resp:  [18] 18  BP: (116-137)/(65-69) 116/65    Physical Exam   Constitutional: He is oriented to person, place, and time. He appears well-developed and well-nourished.   HENT:   Head: Normocephalic and atraumatic.   Eyes: EOM are normal. Pupils are equal, round, and reactive to light.   Neck: Normal range of motion. Neck  supple.   Cardiovascular: Normal rate and regular rhythm.   Pulmonary/Chest: Effort normal and breath sounds normal.   Abdominal: Soft. Bowel sounds are normal.   Musculoskeletal: Normal range of motion.   Neurological: He is alert and oriented to person, place, and time.   Skin: Skin is warm and dry.   Psychiatric: He has a normal mood and affect. His behavior is normal.       Results Review:  I have reviewed the labs, radiology results, and diagnostic studies.    Laboratory Data:   Results from last 7 days   Lab Units 07/07/19  0509 07/05/19  0359 07/03/19  0626   SODIUM mmol/L 139 140 143   POTASSIUM mmol/L 4.3 4.5 4.1   CHLORIDE mmol/L 105 105 108*   CO2 mmol/L 23.0 25.0 24.0   BUN mg/dL 37* 37* 33*   CREATININE mg/dL 1.66* 1.53* 1.62*   GLUCOSE mg/dL 121* 87 111*   CALCIUM mg/dL 9.0 8.9 9.0   ANION GAP mmol/L 11.0 10.0 11.0     Estimated Creatinine Clearance: 46.8 mL/min (A) (by C-G formula based on SCr of 1.66 mg/dL (H)).  Results from last 7 days   Lab Units 07/07/19  0509 07/05/19  0359 07/03/19  0626   PHOSPHORUS mg/dL 3.4 3.2 2.6         Results from last 7 days   Lab Units 07/07/19  0509 07/03/19  0626 07/01/19  0607   WBC 10*3/mm3 8.46 10.50 10.28   HEMOGLOBIN g/dL 12.4* 12.5* 12.6*   HEMATOCRIT % 37.1* 37.6 37.4*   PLATELETS 10*3/mm3 129* 154 169           Culture Data:   No results found for: BLOODCX  No results found for: URINECX  No results found for: RESPCX  No results found for: WOUNDCX  No results found for: STOOLCX  No components found for: BODYFLD    Radiology Data:   Imaging Results (last 24 hours)     ** No results found for the last 24 hours. **          I have reviewed the patient's current medications.     Assessment/Plan     Active Hospital Problems    Diagnosis   • **Closed left hip fracture (CMS/HCC)   • Primary insomnia     This is a compliant at home prior to admission as well     • Dry mouth     Due to prior salivary gland excision  This is a chronic problem at home prior to  admission     • Pneumonia involving left lung   • Pacemaker     [employer] dual-chamber pacemaker implanted December 2016 followed by Dr. Grullon     • Delirium   • Stage 3 chronic kidney disease (CMS/HCC)   • Acute respiratory failure with hypoxia and hypercapnia (CMS/HCC)     Improved at the time of admission to acute rehab.  Still using oxygen 2 L per nasal cannula.  On a tapering dose of prednisone over 8 days.  Started with 40 mg every morning x2 days and then decreasing every 2 days     • Coronary artery disease   • Frequent falls       Plan:  Continue current treatment plan.        This document has been electronically signed by NORMAN Kim on July 7, 2019 11:28 AM

## 2019-07-07 NOTE — PLAN OF CARE
Problem: Patient Care Overview  Goal: Plan of Care Review  Outcome: Ongoing (interventions implemented as appropriate)   07/07/19 1229   Patient Care Overview   IRF Plan of Care Review progress ongoing, continue   Progress, Functional Goals demonstrating adequate progress   OTHER   Outcome Summary pt has done well.no needs or complaints noted.vss,will cont to monitor   Coping/Psychosocial   Plan of Care Reviewed With patient       Problem: Fall Risk (Adult)  Goal: Absence of Fall  Outcome: Ongoing (interventions implemented as appropriate)      Problem: Skin Injury Risk (Adult)  Goal: Skin Health and Integrity  Outcome: Ongoing (interventions implemented as appropriate)

## 2019-07-07 NOTE — PLAN OF CARE
Problem: Patient Care Overview  Goal: Plan of Care Review  Outcome: Ongoing (interventions implemented as appropriate)   07/07/19 0330   Patient Care Overview   IRF Plan of Care Review progress ongoing, continue   Progress, Functional Goals demonstrating adequate progress   Coping/Psychosocial   Plan of Care Reviewed With patient       Problem: Fractured Hip (Adult)  Goal: Signs and Symptoms of Listed Potential Problems Will be Absent, Minimized or Managed (Fractured Hip)  Outcome: Ongoing (interventions implemented as appropriate)      Problem: Fall Risk (Adult)  Goal: Absence of Fall  Outcome: Ongoing (interventions implemented as appropriate)      Problem: Functional Mobility Impairment (IRF) (Adult)  Goal: Optimal/Safe Level of [place] with Mobility  Outcome: Ongoing (interventions implemented as appropriate)      Problem: Skin Injury Risk (Adult)  Goal: Skin Health and Integrity  Outcome: Ongoing (interventions implemented as appropriate)

## 2019-07-08 NOTE — PROGRESS NOTES
LOS: 12 days   Patient Care Team:  Mike Draper MD as PCP - General    Chief Complaint:   Closed left hip fracture (CMS/HCC) postop day 22          Interval History:     SUBJECTIVE: Good spirits today.  He is anxious to go home tomorrow.  He is not having any shortness of breath.  He complained to the nurses of knee pain but he did not complain to me.  He is breathing well  Eating well    History taken from: patient chart family RN Reviewed with therapy as well    Objective     Vital Signs  Temp:  [97.1 °F (36.2 °C)-99 °F (37.2 °C)] 97.3 °F (36.3 °C)  Heart Rate:  [60-74] 60  Resp:  [18] 18  BP: (104-126)/(58-76) 104/58      07/06/19  0140 07/07/19  0404 07/08/19  0346   Weight: 103 kg (226 lb 14.4 oz) 102 kg (224 lb 14.4 oz) 102 kg (224 lb 12.8 oz)         Physical Exam:     General Appearance:    Alert, cooperative, in no acute distress   Head:    Normocephalic, without obvious abnormality, atraumatic   Eyes:            Lids and lashes normal, conjunctivae and sclerae normal, no   icterus, no pallor, corneas clear, PERRLA   Throat:   No oral lesions, no thrush, oral mucosa moist   Neck:   No adenopathy, supple, trachea midline, no thyromegaly, no   carotid bruit, no JVD       Lungs:     Clear to auscultation,respirations regular, even and                  Unlabored good bilateral air movement    Heart:    Regular rhythm and normal rate, normal S1 and S2, no            murmur, no gallop, no rub, no click      Chest Wall:    No abnormalities observed   Abdomen:     Normal bowel sounds, no masses, no organomegaly, soft        non-tender, non-distended, no guarding, no rebound                Tenderness    Extremities:   Moves all extremities well, no edema, no cyanosis, no             Redness incisions well-healed       Skin:   No bleeding, bruising or rash   Lymph nodes:   No palpable adenopathy   Neurologic:   Cranial nerves 2 - 12 grossly intact, sensation intact, DTR       present and equal bilaterally       RESULTS REVIEW:     Lab Results (last 24 hours)     ** No results found for the last 24 hours. **        Imaging Results (last 72 hours)     ** No results found for the last 72 hours. **          Assessment/Plan     Active Hospital Problems    Diagnosis   • **Closed left hip fracture (CMS/HCC)   • Primary insomnia     This is a compliant at home prior to admission as well     • Dry mouth     Due to prior salivary gland excision  This is a chronic problem at home prior to admission     • Pneumonia involving left lung   • Pacemaker     [employer] dual-chamber pacemaker implanted December 2016 followed by Dr. Grullon     • Delirium   • Stage 3 chronic kidney disease (CMS/HCC)   • Acute respiratory failure with hypoxia and hypercapnia (CMS/HCC)     Improved at the time of admission to acute rehab.  Still using oxygen 2 L per nasal cannula.  On a tapering dose of prednisone over 8 days.  Started with 40 mg every morning x2 days and then decreasing every 2 days     • Coronary artery disease   • Frequent falls           PLAN: Participating well with therapy and making progress  Family says that he has 2 steps to get into the house and he needs to practice with that this is related to therapy as well.  Should be able to go down to 2 steps but that will be determined today he will be  Going home tomorrow he has his rolling walker.  He will need home health initially but should progress to outpatient therapy        This document has been electronically signed by Rogers Tompkins MD on July 8, 2019 8:39 AM

## 2019-07-08 NOTE — THERAPY TREATMENT NOTE
Inpatient Rehabilitation - Occupational Therapy Treatment Note    [facility]     Patient Name: Kuldip Nevarez  : 1945  MRN: [id number]    Today's Date: 2019                 Admit Date: 2019      Visit Dx:    ICD-10-CM ICD-9-CM   1. Closed fracture of left hip with routine healing, subsequent encounter S72.002D V54.13   2. Impaired physical mobility Z74.09 781.99   3. Impaired mobility and activities of daily living Z74.09 799.89   4. Symbolic dysfunction R48.9 784.60       Patient Active Problem List   Diagnosis   • S/p left hip fracture   • Closed fracture of left hip (CMS/HCC)   • Frequent falls   • Closed nondisplaced intertrochanteric fracture of left femur with routine healing   • Delirium   • Pneumonia involving left lung   • Pacemaker   • Coronary artery disease   • Disease of thyroid gland   • Stage 3 chronic kidney disease (CMS/HCC)   • Acute respiratory failure with hypoxia and hypercapnia (CMS/HCC)   • Closed left hip fracture (CMS/HCC)   • Primary insomnia   • Dry mouth   • Chronic gouty arthropathy         Therapy Treatment    IRF Treatment Summary     Row Name 19 1426 19 1315 19 1026       Evaluation/Treatment Time and Intent    Subjective Information  no complaints  -RC  no complaints  -KW  no complaints  -KW    Existing Precautions/Restrictions  fall  -RC  fall  -KW  fall  -KW    Document Type  therapy note (daily note)  -RC  therapy note (daily note)  -KW  therapy note (daily note)  -KW    Mode of Treatment  occupational therapy;individual therapy  -RC  individual therapy;physical therapy  -KW  individual therapy  -KW    Patient/Family Observations  in w/c   -RC  sitting in w/c in room  -KW  sitting in w/c in room  -KW    Start Time (Evaluation/Treatment)  1426  -RC  1315  -KW  1026  -KW    Stop Time (Evaluation/Treatment)  1456  -RC  --  1128  -KW    Recorded by [RC] Jossie Louis COTA/JOVANNY [KW] Lana Schumacher, PT [KW] Lana Schumacher, PT    Row Name  07/08/19 0915             Evaluation/Treatment Time and Intent    Subjective Information  no complaints  -      Existing Precautions/Restrictions  fall  -      Document Type  therapy note (daily note)  -      Mode of Treatment  occupational therapy;individual therapy  -RC      Patient/Family Observations  in w/c   -      Start Time (Evaluation/Treatment)  0915  -      Stop Time (Evaluation/Treatment)  1015  -RC      Recorded by [RC] Jossie Louis COTA/L      Row Name 07/08/19 1426 07/08/19 1315 07/08/19 1026       Cognition/Psychosocial- PT/OT    Affect/Mental Status (Cognitive)  WFL  -RC  WNL  -KW  WFL  -KW    Orientation Status (Cognition)  --  oriented x 4  -KW  oriented x 4  -KW    Follows Commands (Cognition)  --  WFL  -KW  WFL  -KW    Personal Safety Interventions  --  fall prevention program maintained;gait belt;muscle strengthening facilitated;nonskid shoes/slippers when out of bed;supervised activity  -KW  fall prevention program maintained;gait belt;muscle strengthening facilitated;nonskid shoes/slippers when out of bed;supervised activity  -KW    Recorded by [RC] Jossie Louis COTA/JOVANNY [KW] Lana Schumacher, PT [KW] Lana Schumacher, PT    Row Name 07/08/19 0915             Cognition/Psychosocial- PT/OT    Affect/Mental Status (Cognitive)  [facility]  -RC      Orientation Status (Cognition)  oriented x 4  -RC      Recorded by [RC] Jossie Louis COTA/L      Row Name 07/08/19 1426 07/08/19 1026          Bed Mobility Assessment/Treatment    Sit-Supine [place] (Bed Mobility)  conditional independence  -  --     Comment (Bed Mobility)  --  NT; pt sitting in w/c and requesting to stay in w/c   -KW     Recorded by [RC] Jossie Louis COTA/JOVANNY [KW] Lana Schumacher, PT     Row Name 07/08/19 0915             Functional Mobility    Functional Mobility- Ind. Level  standby assist  -      Functional Mobility- Device  rolling walker  -      Functional Mobility-Distance (Feet)  -- 20-30 ft x 5   -RC       Recorded by [RC] Jossie Louis COTA/L      Row Name 07/08/19 1026             Transfer Assessment/Treatment    Transfer Assessment/Treatment  sit-stand transfer;stand-sit transfer  -KW      Recorded by [KW] Lana Schumacher, PT      Row Name 07/08/19 1426             Chair-Bed Transfer    Chair-Bed [place] (Transfers)  stand by assist  -      Assistive Device (Chair-Bed Transfers)  walker, front-wheeled  -RC      Recorded by [RC] Jossie Louis COTA/L      Row Name 07/08/19 1426 07/08/19 1026 07/08/19 0915       Sit-Stand Transfer    Sit-Stand [place] (Transfers)  stand by assist  -RC  contact guard  -KW  stand by assist  -    Assistive Device (Sit-Stand Transfers)  walker, front-wheeled  -RC  walker, front-wheeled  -KW  --    Recorded by [RC] Jossie Louis COTA/JOVANNY [KW] Lana Schumacher, PT [RC] Jossie Louis COTA/L    Row Name 07/08/19 1426 07/08/19 1026 07/08/19 0915       Stand-Sit Transfer    Stand-Sit [place] (Transfers)  stand by assist  -RC  contact guard  -KW  stand by assist  -RC    Assistive Device (Stand-Sit Transfers)  walker, front-wheeled  -RC  walker, front-wheeled  -KW  walker, front-wheeled  -RC    Recorded by [RC] Jossie Louis COTA/JOVANNY [KW] Lana Schumacher, PT [RC] Jossie Louis COTA/L    Row Name 07/08/19 1026             Gait/Stairs Assessment/Training    Gait/Stairs Assessment/Training  gait/ambulation assistive device  -KW      [place] Level (Gait)  contact guard  -KW      Assistive Device (Gait)  walker, front-wheeled  -KW      Distance in Feet (Gait)  50x1, 75x1  -KW      Pattern (Gait)  step-to  -KW      Deviations/Abnormal Patterns (Gait)  antalgic  -KW      Bilateral Gait Deviations  forward flexed posture  -KW      Right Sided Gait Deviations  heel strike decreased  -KW      Handrail Location (Stairs)  both sides  -KW      Number of Steps (Stairs)  4  -KW      Ascending Technique (Stairs)  step-to-step  -KW      Descending Technique (Stairs)   step-to-step  -KW      Recorded by [KW] Lana Schumacher, PT      Row Name 07/08/19 1426 07/08/19 1026 07/08/19 0915       Wheelchair Mobility/Management    Method of Wheelchair Locomotion (Mobility)  bimanual (upper extremity) propulsion  -RC  bimanual (upper extremity) propulsion  -KW  bimanual (upper extremity) propulsion  -RC    Mobility Activities (Wheelchair)  forward propulsion  -RC  forward propulsion  -KW  forward propulsion  -RC    Mobility [place] Level (Wheelchair)  conditional independence  -RC  conditional independence  -KW  conditional independence  -RC    Forward Propulsion [place] (Wheelchair)  --  conditional independence  -KW  --    Distance Propelled in Feet (Wheelchair)  100  -RC  --  100  -RC    Recorded by [RC] Jossie Louis COTA/L [KW] Lana Schumacher, PT [RC] Jossie Louis COTA/L    Row Name 07/08/19 1426 07/08/19 1026 07/08/19 0915       Pain Scale: Numbers Pre/Post-Treatment    Pain Scale: Numbers, Pretreatment  0/10 - no pain  -RC  0/10 - no pain  -KW  0/10 - no pain  -RC    Pain Scale: Numbers, Post-Treatment  0/10 - no pain  -RC  0/10 - no pain  -KW  0/10 - no pain  -RC    Recorded by [RC] Jossie Louis COTA/L [KW] Lana Schumacher, PT [RC] Jossie Louis COTA/L    Row Name 07/08/19 1426             Upper Extremity Seated Therapeutic Exercise    Performed, Seated Upper Extremity (Therapeutic Exercise)  shoulder flexion/extension;shoulder external/internal rotation;elbow flexion/extension  -RC      Device, Seated Upper Extremity (Therapeutic Exercise)  free weights, barbell 3#  -RC      Exercise Type, Seated Upper Extremity (Therapeutic Exercise)  resistive exercise  -RC      Expected Outcomes, Seated Upper Extremity (Therapeutic Exercise)  improve functional tolerance, self-care activity;improve performance, BADLs  -RC      Sets/Reps Detail, Seated Upper Extremity (Therapeutic Exercise)  15x3  -RC      Recorded by [RC] Jossie Louis COTA/L      Row Name 07/08/19  0915             Aerobic Exercise Activity    Exercise Performed (Aerobic, Therapeutic Exercise)  arm bike  -RC      Expected Outcome (Aerobic, Therapeutic Exercise)  improve functional tolerance, self-care activity;improve performance, BADLs  -RC      Time (Aerobic, Therapeutic Exercise)  20  -RC      Recorded by [RC] Jossie Louis COTA/L      Row Name 07/08/19 1026             Lower Extremity Seated Therapeutic Exercise    Performed, Seated Lower Extremity (Therapeutic Exercise)  LAQ (long arc quad), knee extension;hip flexion/extension;hip abduction/adduction;ankle dorsiflexion/plantarflexion  -KW      Device, Seated Lower Extremity (Therapeutic Exercise)  elastic bands/tubing  -KW      Exercise Type, Seated Lower Extremity (Therapeutic Exercise)  AAROM (active assistive range of motion);AROM (active range of motion)  -KW      Expected Outcomes, Seated Lower Extremity (Therapeutic Exercise)  improve performance, gait skills;improve performance, gait skills on uneven ground  -KW      Sets/Reps Detail, Seated Lower Extremity (Therapeutic Exercise)  1*20  -KW      Recorded by [KW] Lana Schumacher PT      Row Name 07/08/19 1026             Vital Signs    Pre Systolic BP Rehab  100  -KW      Pre Treatment Diastolic BP  67  -KW      Pretreatment Heart Rate (beats/min)  60  -KW      Pre SpO2 (%)  96  -KW      O2 Delivery Pre Treatment  room air  -KW      Recorded by [KW] Lana Schumacher PT      Row Name 07/08/19 1426 07/08/19 1026 07/08/19 0915       Positioning and Restraints    Pre-Treatment Position  sitting in chair/recliner  -RC  sitting in chair/recliner  -KW  -- w/c  -RC    Post Treatment Position  bed  -RC  wheelchair  -KW  wheelchair  -RC    In Bed  call light within reach;encouraged to call for assist;exit alarm on  -RC  --  --    In Wheelchair  --  sitting;call light within reach;encouraged to call for assist;exit alarm on  -KW  call light within reach;encouraged to call for assist;exit alarm on  -RC     Recorded by [RC] Jossie Louis COTA/JOVANNY [KW] Lana Schumacher, PT [RC] Jossie Louis COTA/L    Row Name 07/08/19 1026             Daily Summary of Progress (PT)    Functional Goal Overall Progress: Physical Therapy  progressing toward functional goals as expected  -KW      Recommendations: Physical Therapy  Cont, stairs, ambulation  -KW      Recorded by [KW] Lana Schumacher, PT      Row Name 07/08/19 1426 07/08/19 0915          Daily Summary of Progress (OT)    Functional Goal Overall Progress: Occupational Therapy  progressing toward functional goals as expected  -RC  progressing toward functional goals as expected  -     Recommendations: Occupational Therapy  cont poc   -RC  --     Recorded by [RC] Jossie Louis COTA/L [RC] Jossie Louis COTA/L       User Key  (r) = Recorded By, (t) = Taken By, (c) = Cosigned By    Initials Name Effective Dates    RC Jossie Louis COTA/JOVANNY 03/07/18 -     KW Lana Schumacher, PT 07/23/18 -           Wound 06/16/19 0914 Left hip incision (Active)   Dressing Appearance intact;dry 7/7/2019  7:16 PM   Closure Approximated;Adhesive closure strips 7/7/2019  7:16 PM   Periwound ecchymotic 7/7/2019  7:16 PM   Drainage Amount none 7/7/2019  7:16 PM         OT Recommendation and Plan         Plan of Care Review  Plan of Care Reviewed With: patient  Daily Summary of Progress (OT)  Functional Goal Overall Progress: Occupational Therapy: progressing toward functional goals as expected  Recommendations: Occupational Therapy: cont poc   Plan of Care Reviewed With: patient  IRF Plan of Care Review: progress ongoing, continue  Progress, Functional Goals: demonstrating adequate progress  Outcome Summary: participated in fx transfers, ue ex act to increase i w/ Iadl adl act. will offer shower tomorrow bath declined today. cont poc home tomorrow     OT IRF GOALS     Row Name 07/08/19 1426 07/05/19 1420 07/04/19 1010       Transfer FIM Goals (OT-IRF)    Tub-Shower Transfer FIM Goal (OT-IRF)   Score of 6 (FIM)  -RC  Score of 6 (FIM)  -RC  Score of 6 (FIM)  -RC    Toilet Transfer FIM Goal (OT-IRF)  Score of 6 (FIM)  -RC  Score of 6 (FIM)  -RC  Score of 6 (FIM)  -RC    Time Frame (Transfer FIM Goals 1, OT-IRF)  long term goal (LTG);by discharge  -RC  long term goal (LTG);by discharge  -RC  long term goal (LTG);by discharge  -RC    Progress/Outcomes (Transfer FIM Goals, OT-IRF)  goal not met;continuing progress toward goal  -RC  goal not met;continuing progress toward goal  -RC  goal not met;continuing progress toward goal  -RC       Bathing FIM Goal (OT-IRF)    Bathing FIM Goal (OT-IRF)  Score of 6 (FIM)  -RC  Score of 6 (FIM)  -RC  Score of 6 (FIM)  -RC    Time Frame (Bathing FIM Goal, OT-IRF)  long term goal (LTG);by discharge  -RC  long term goal (LTG);by discharge  -RC  long term goal (LTG);by discharge  -RC    Progress/Outcomes (Bathing FIM Goal, OT-IRF)  goal not met;continuing progress toward goal  -RC  goal not met;continuing progress toward goal  -RC  goal not met;continuing progress toward goal  -RC       UB Dressing FIM Goal (OT-IRF)    Upper Body Dressing, FIM Goal, OT-IRF  Score of 7 (FIM)  -RC  Score of 7 (FIM)  -RC  Score of 7 (FIM)  -RC    Time Frame (UB Dressing FIM Goal, OT-IRF)  long term goal (LTG);by discharge  -RC  long term goal (LTG);by discharge  -RC  long term goal (LTG);by discharge  -RC    Progress/Outcomes (UB Dressing FIM Goal, OT-IRF)  goal not met;continuing progress toward goal  -RC  goal not met;continuing progress toward goal  -RC  goal not met;continuing progress toward goal  -RC       LB Dressing FIM Goal (OT-IRF)    Lower Body Dressing, FIM Goal, OT-IRF  Score of 6 (FIM)  -RC  Score of 6 (FIM)  -RC  Score of 6 (FIM)  -RC    Time Frame (LB Dressing FIM Goal, OT-IRF)  long term goal (LTG);by discharge  -RC  long term goal (LTG);by discharge  -RC  long term goal (LTG);by discharge  -RC    Progress/Outcomes (LB Dressing FIM Goal, OT-IRF)  goal not met;continuing progress  toward goal  -RC  goal not met;continuing progress toward goal  -RC  goal not met;continuing progress toward goal  -RC       Toileting FIM Goal (OT-IRF)    Toileting FIM Goal (OT-IRF)  Score of 6 (FIM)  -RC  Score of 6 (FIM)  -RC  Score of 6 (FIM)  -RC    Time Frame (Toileting FIM Goal, OT-IRF)  long term goal (LTG);by discharge  -RC  long term goal (LTG);by discharge  -RC  long term goal (LTG);by discharge  -RC    Progress/Outcomes (Toileting FIM Goal, OT-IRF)  goal not met;continuing progress toward goal  -RC  goal not met;continuing progress toward goal  -RC  goal not met;continuing progress toward goal  -RC    Row Name 07/03/19 1315 07/02/19 1410 07/01/19 1716       Transfer FIM Goals (OT-IRF)    Tub-Shower Transfer FIM Goal (OT-IRF)  Score of 6 (FIM)  -RC  Score of 6 (FIM)  -RC  Score of 6 (FIM)  -LM    Toilet Transfer FIM Goal (OT-IRF)  Score of 6 (FIM)  -RC  Score of 6 (FIM)  -RC  Score of 6 (FIM)  -LM    Time Frame (Transfer FIM Goals 1, OT-IRF)  long term goal (LTG);by discharge  -RC  long term goal (LTG);by discharge  -RC  long term goal (LTG);by discharge  -LM    Progress/Outcomes (Transfer FIM Goals, OT-IRF)  goal not met;continuing progress toward goal  -RC  goal not met;continuing progress toward goal  -RC  goal not met;continuing progress toward goal  -LM       Bathing FIM Goal (OT-IRF)    Bathing FIM Goal (OT-IRF)  Score of 6 (FIM)  -RC  Score of 6 (FIM)  -RC  Score of 6 (FIM)  -LM    Time Frame (Bathing FIM Goal, OT-IRF)  long term goal (LTG);by discharge  -RC  long term goal (LTG);by discharge  -RC  long term goal (LTG);by discharge  -LM    Progress/Outcomes (Bathing FIM Goal, OT-IRF)  goal not met;continuing progress toward goal  -RC  goal not met;continuing progress toward goal  -RC  goal not met;continuing progress toward goal  -LM       UB Dressing FIM Goal (OT-IRF)    Upper Body Dressing, FIM Goal, OT-IRF  Score of 7 (FIM)  -RC  Score of 7 (FIM)  -RC  Score of 7 (FIM)  -LM    Time Frame (UB  Dressing FIM Goal, OT-IRF)  long term goal (LTG);by discharge  -RC  long term goal (LTG);by discharge  -RC  long term goal (LTG);by discharge  -LM    Progress/Outcomes (UB Dressing FIM Goal, OT-IRF)  goal not met;continuing progress toward goal  -RC  goal not met;continuing progress toward goal  -RC  goal not met;continuing progress toward goal  -LM       LB Dressing FIM Goal (OT-IRF)    Lower Body Dressing, FIM Goal, OT-IRF  Score of 6 (FIM)  -RC  Score of 6 (FIM)  -RC  Score of 6 (FIM)  -LM    Time Frame (LB Dressing FIM Goal, OT-IRF)  long term goal (LTG);by discharge  -RC  long term goal (LTG);by discharge  -RC  long term goal (LTG);by discharge  -LM    Progress/Outcomes (LB Dressing FIM Goal, OT-IRF)  goal not met;continuing progress toward goal  -RC  goal not met;continuing progress toward goal  -RC  goal not met;continuing progress toward goal  -LM       Toileting FIM Goal (OT-IRF)    Toileting FIM Goal (OT-IRF)  Score of 6 (FIM)  -RC  Score of 6 (FIM)  -RC  Score of 6 (FIM)  -LM    Time Frame (Toileting FIM Goal, OT-IRF)  long term goal (LTG);by discharge  -RC  long term goal (LTG);by discharge  -RC  long term goal (LTG);by discharge  -LM    Progress/Outcomes (Toileting FIM Goal, OT-IRF)  goal not met;continuing progress toward goal  -RC  goal not met;continuing progress toward goal  -RC  goal not met;continuing progress toward goal  -LM    Row Name 07/01/19 1141 06/29/19 0740 06/29/19 0728       Transfer FIM Goals (OT-IRF)    Tub-Shower Transfer FIM Goal (OT-IRF)  Score of 6 (FIM)  -LM  Score of 6 (FIM)  -LW  Score of 6 (FIM)  -LW    Toilet Transfer FIM Goal (OT-IRF)  Score of 6 (FIM)  -LM  Score of 6 (FIM)  -LW  Score of 6 (FIM)  -LW    Time Frame (Transfer FIM Goals 1, OT-IRF)  long term goal (LTG);by discharge  -LM  long term goal (LTG);by discharge  -LW  long term goal (LTG);by discharge  -LW    Progress/Outcomes (Transfer FIM Goals, OT-IRF)  goal not met;continuing progress toward goal  -LM  goal not  met;continuing progress toward goal  -LW  goal not met;continuing progress toward goal  -LW       Bathing FIM Goal (OT-IRF)    Bathing FIM Goal (OT-IRF)  Score of 6 (FIM)  -LM  Score of 6 (FIM)  -LW  Score of 6 (FIM)  -LW    Time Frame (Bathing FIM Goal, OT-IRF)  long term goal (LTG);by discharge  -LM  long term goal (LTG);by discharge  -LW  long term goal (LTG);by discharge  -LW    Progress/Outcomes (Bathing FIM Goal, OT-IRF)  goal not met;continuing progress toward goal  -LM  goal not met;continuing progress toward goal  -LW  goal not met;continuing progress toward goal  -LW       UB Dressing FIM Goal (OT-IRF)    Upper Body Dressing, FIM Goal, OT-IRF  Score of 7 (FIM)  -LM  Score of 7 (FIM)  -LW  Score of 7 (FIM)  -LW    Time Frame (UB Dressing FIM Goal, OT-IRF)  long term goal (LTG);by discharge  -LM  long term goal (LTG);by discharge  -LW  long term goal (LTG);by discharge  -LW    Progress/Outcomes (UB Dressing FIM Goal, OT-IRF)  goal not met;continuing progress toward goal  -LM  goal not met;continuing progress toward goal  -LW  goal not met;continuing progress toward goal  -LW       LB Dressing FIM Goal (OT-IRF)    Lower Body Dressing, FIM Goal, OT-IRF  Score of 6 (FIM)  -LM  Score of 6 (FIM)  -LW  Score of 6 (FIM)  -LW    Time Frame (LB Dressing FIM Goal, OT-IRF)  long term goal (LTG);by discharge  -LM  long term goal (LTG);by discharge  -LW  long term goal (LTG);by discharge  -LW    Progress/Outcomes (LB Dressing FIM Goal, OT-IRF)  goal not met;continuing progress toward goal  -LM  goal not met;continuing progress toward goal  -LW  goal not met;continuing progress toward goal  -LW       Toileting FIM Goal (OT-IRF)    Toileting FIM Goal (OT-IRF)  Score of 6 (FIM)  -LM  Score of 6 (FIM)  -LW  Score of 6 (FIM)  -LW    Time Frame (Toileting FIM Goal, OT-IRF)  long term goal (LTG);by discharge  -LM  long term goal (LTG);by discharge  -LW  long term goal (LTG);by discharge  -LW    Progress/Outcomes (Toileting FIM  Goal, OT-IRF)  goal not met;continuing progress toward goal  -LM  goal not met;continuing progress toward goal  -LW  goal not met;continuing progress toward goal  -LW    Row Name 06/29/19 0600 06/28/19 0735 06/27/19 0820       Transfer FIM Goals (OT-IRF)    Tub-Shower Transfer FIM Goal (OT-IRF)  Score of 6 (FIM)  -LW  Score of 6 (FIM)  -RC  Score of 6 (FIM)  -MR    Toilet Transfer FIM Goal (OT-IRF)  Score of 6 (FIM)  -LW  Score of 6 (FIM)  -RC  Score of 6 (FIM)  -MR    Time Frame (Transfer FIM Goals 1, OT-IRF)  long term goal (LTG);by discharge  -LW  long term goal (LTG);by discharge  -RC  long term goal (LTG);by discharge  -MR    Progress/Outcomes (Transfer FIM Goals, OT-IRF)  goal not met;continuing progress toward goal  -LW  goal not met;continuing progress toward goal  -RC  goal not met  -MR       Bathing FIM Goal (OT-IRF)    Bathing FIM Goal (OT-IRF)  Score of 6 (FIM)  -LW  Score of 6 (FIM)  -RC  Score of 6 (FIM)  -MR    Time Frame (Bathing FIM Goal, OT-IRF)  long term goal (LTG);by discharge  -LW  long term goal (LTG);by discharge  -RC  long term goal (LTG);by discharge  -MR    Progress/Outcomes (Bathing FIM Goal, OT-IRF)  goal not met;continuing progress toward goal  -LW  goal not met;continuing progress toward goal  -RC  goal not met  -MR       UB Dressing FIM Goal (OT-IRF)    Upper Body Dressing, FIM Goal, OT-IRF  Score of 7 (FIM)  -LW  Score of 7 (FIM)  -RC  Score of 7 (FIM)  -MR    Time Frame (UB Dressing FIM Goal, OT-IRF)  long term goal (LTG);by discharge  -LW  long term goal (LTG);by discharge  -RC  long term goal (LTG);by discharge  -MR    Progress/Outcomes (UB Dressing FIM Goal, OT-IRF)  goal not met;continuing progress toward goal  -LW  goal not met;continuing progress toward goal  -RC  goal not met  -MR       LB Dressing FIM Goal (OT-IRF)    Lower Body Dressing, FIM Goal, OT-IRF  Score of 6 (FIM)  -LW  Score of 6 (FIM)  -RC  Score of 6 (FIM)  -MR    Time Frame (LB Dressing FIM Goal, OT-IRF)  long  term goal (LTG);by discharge  -LW  long term goal (LTG);by discharge  -RC  long term goal (LTG);by discharge  -MR    Progress/Outcomes (LB Dressing FIM Goal, OT-IRF)  goal not met;continuing progress toward goal  -LW  goal not met;continuing progress toward goal  -RC  goal not met  -MR       Toileting FIM Goal (OT-IRF)    Toileting FIM Goal (OT-IRF)  Score of 6 (FIM)  -LW  Score of 6 (FIM)  -RC  Score of 6 (FIM)  -MR    Time Frame (Toileting FIM Goal, OT-IRF)  long term goal (LTG);by discharge  -LW  long term goal (LTG);by discharge  -RC  long term goal (LTG);by discharge  -MR    Progress/Outcomes (Toileting FIM Goal, OT-IRF)  goal not met;continuing progress toward goal  -LW  goal not met;continuing progress toward goal  -RC  goal not met  -MR      User Key  (r) = Recorded By, (t) = Taken By, (c) = Cosigned By    Initials Name Provider Type    Jossie George LONGO/L Occupational Therapy Assistant    Aye Stephenson LONGO/L Occupational Therapy Assistant    Jazz Melendez LONGO/L Occupational Therapy Assistant    Alycia Estrada, OT Occupational Therapist          Occupational Therapy Education     Title: PT OT SLP Therapies (In Progress)     Topic: Occupational Therapy (In Progress)     Point: ADL training (In Progress)     Description: Instruct learner(s) on proper safety adaptation and remediation techniques during self care or transfers.   Instruct in proper use of assistive devices.    Learning Progress Summary           Patient Acceptance, E, NR by MAYRA at 7/3/2019  2:03 PM    Acceptance, E, VU by NAUN at 7/1/2019 11:48 AM    Acceptance, E,TB,D, VU by LW at 6/29/2019  7:29 AM    Comment:  Educated pt on use of sock aid and reacher for dressing.    Acceptance, E, NR by MAYRA at 6/28/2019  9:52 AM    Acceptance, E,TB, VU,NR by MR at 6/27/2019  1:15 PM    Comment:  Role of OT and POC.Benefit of activity, safety with t/f, AD/AE utilization to increase functional independence with ADLs.                    Point: Home exercise program (Done)     Description: Instruct learner(s) on appropriate technique for monitoring, assisting and/or progressing therapeutic exercises/activities.    Learning Progress Summary           Patient Acceptance, E, VU by LM at 7/1/2019 11:48 AM    Acceptance, E,TB,D, VU by LW at 6/29/2019  7:29 AM    Comment:  Educated pt on use of sock aid and reacher for dressing.                   Point: Precautions (In Progress)     Description: Instruct learner(s) on prescribed precautions during self-care and functional transfers.    Learning Progress Summary           Patient Acceptance, E, NR by RC at 7/8/2019  3:06 PM    Acceptance, E, NR by RC at 7/8/2019  2:41 PM    Acceptance, E, NR by RC at 7/5/2019  3:14 PM    Acceptance, E, NR by RC at 7/4/2019  2:22 PM    Acceptance, E, NR by RC at 7/3/2019  2:03 PM    Acceptance, E, NR by RC at 7/2/2019  3:57 PM    Acceptance, E, VU by LM at 7/1/2019 11:48 AM    Acceptance, E,TB,D, VU by LW at 6/29/2019  7:29 AM    Comment:  Educated pt on use of sock aid and reacher for dressing.    Acceptance, E, NR by RC at 6/28/2019  9:52 AM    Acceptance, E,TB, VU,NR by  at 6/27/2019  1:15 PM    Comment:  Role of OT and POC.Benefit of activity, safety with t/f, AD/AE utilization to increase functional independence with ADLs.                   Point: Body mechanics (In Progress)     Description: Instruct learner(s) on proper positioning and spine alignment during self-care, functional mobility activities and/or exercises.    Learning Progress Summary           Patient Acceptance, E, NR by RC at 7/8/2019  3:06 PM    Acceptance, E, NR by RC at 7/8/2019  2:41 PM    Acceptance, E, NR by RC at 7/5/2019  3:14 PM    Acceptance, E, NR by RC at 7/4/2019  2:22 PM    Acceptance, E, NR by RC at 7/3/2019  2:03 PM    Acceptance, E, NR by RC at 7/2/2019  3:57 PM    Acceptance, E, VU by LM at 7/1/2019 11:48 AM    Acceptance, E,TB,D, VU by LW at 6/29/2019  7:29 AM    Comment:   Educated pt on use of sock aid and reacher for dressing.    Acceptance, E, NR by RC at 6/28/2019  9:52 AM    Acceptance, E,TB, VU,NR by MR at 6/27/2019  1:15 PM    Comment:  Role of OT and POC.Benefit of activity, safety with t/f, AD/AE utilization to increase functional independence with ADLs.                               User Key     Initials Effective Dates Name Provider Type Discipline     03/07/18 -  Jossie Louis COTA/L Occupational Therapy Assistant OT    LM 03/07/18 -  Aye Almeida COTA/L Occupational Therapy Assistant OT    LW 03/07/18 -  Jazz Nunn COTA/L Occupational Therapy Assistant OT    MR 04/03/18 -  Alycia Rich, OT Occupational Therapist OT                Outcome Measures     Row Name 07/08/19 1406 07/08/19 1026          How much help from another person do you currently need...    Turning from your back to your side while in flat bed without using bedrails?  --  3  -KW     Moving from lying on back to sitting on the side of a flat bed without bedrails?  --  3  -KW     Moving to and from a bed to a chair (including a wheelchair)?  --  3  -KW     Standing up from a chair using your arms (e.g., wheelchair, bedside chair)?  --  3  -KW     Climbing 3-5 steps with a railing?  --  3  -KW     To walk in hospital room?  --  3  -KW     AM-PAC 6 Clicks Score (PT)  --  18  -KW        How much help from another is currently needed...    Putting on and taking off regular lower body clothing?  3  -RC  --     Bathing (including washing, rinsing, and drying)  3  -RC  --     Toileting (which includes using toilet bed pan or urinal)  3  -RC  --     Putting on and taking off regular upper body clothing  3  -RC  --     Taking care of personal grooming (such as brushing teeth)  3  -RC  --     Eating meals  4  -RC  --     AM-PAC 6 Clicks Score (OT)  19  -RC  --        Functional Assessment    Outcome Measure Options  --  AM-PAC 6 Clicks Basic Mobility (PT)  -KW       User Key  (r) = Recorded  By, (t) = Taken By, (c) = Cosigned By    Initials Name Provider Type    RC Missy, Jossie G, LONGO/L Occupational Therapy Assistant    Lana Khan, PT Physical Therapist             Time Calculation:     Time Calculation- OT     Row Name 07/08/19 1508 07/08/19 1507          Time Calculation- OT    OT Start Time  1426  -RC  0915  -RC     OT Stop Time  1456  -RC  1015  -RC     OT Time Calculation (min)  30 min  -RC  60 min  -RC     Total Timed Code Minutes- OT  30 minute(s)  -RC  60 minute(s)  -RC     OT Received On  07/08/19  -RC  07/08/19  -RC       User Key  (r) = Recorded By, (t) = Taken By, (c) = Cosigned By    Initials Name Provider Type     Soumya Louislucina ALMEIDA, LONGO/L Occupational Therapy Assistant          Therapy Charges for Today     Code Description Service Date Service Provider Modifiers Qty    08920204284 HC OT THER PROC EA 15 MIN 7/8/2019 Jossie Louis, LONGO/L GO 2    20572636302 HC OT THERAPEUTIC ACT EA 15 MIN 7/8/2019 Jossie Louis, LONGO/L GO 1    26495376107 HC OT SELF CARE/MGMT/TRAIN EA 15 MIN 7/8/2019 Jossie Louis, LONGO/L GO 1    80965783762 HC OT THER PROC EA 15 MIN 7/8/2019 Jossie Louis G, LONGO/L GO 1    82872156190 HC OT THERAPEUTIC ACT EA 15 MIN 7/8/2019 Missy, Jossie G, LONGO/L GO 1                   Jossie JUAN PABLO Louis, LONGO/L  7/8/2019

## 2019-07-08 NOTE — PLAN OF CARE
Problem: Patient Care Overview  Goal: Plan of Care Review  Outcome: Ongoing (interventions implemented as appropriate)   07/08/19 5629   Patient Care Overview   IRF Plan of Care Review progress ongoing, continue   Progress, Functional Goals demonstrating adequate progress   OTHER   Outcome Summary Patient still not sleeping throughout the night despite melatonin, he sleeps a few hours and gets up in his chair. He did have complaint of right knee pain but nothing visually wrong noted, offered pain medication but patient refused, while in bed his feet and legs have been elevated and oftered to place ice to his knee and was refused. No pain to hip at this time.   Coping/Psychosocial   Plan of Care Reviewed With patient       Problem: Fractured Hip (Adult)  Goal: Signs and Symptoms of Listed Potential Problems Will be Absent, Minimized or Managed (Fractured Hip)  Outcome: Ongoing (interventions implemented as appropriate)      Problem: Fall Risk (Adult)  Goal: Absence of Fall  Outcome: Ongoing (interventions implemented as appropriate)      Problem: Functional Mobility Impairment (IRF) (Adult)  Goal: Optimal/Safe Level of [place] with Mobility  Outcome: Ongoing (interventions implemented as appropriate)      Problem: Skin Injury Risk (Adult)  Goal: Skin Health and Integrity  Outcome: Ongoing (interventions implemented as appropriate)

## 2019-07-08 NOTE — PLAN OF CARE
Problem: Patient Care Overview  Goal: Plan of Care Review  Outcome: Ongoing (interventions implemented as appropriate)   07/08/19 7675   OTHER   Outcome Summary Pt participating well with therapy in PM: Pt able to perform sit<>stand t/f with SBA and FW walker and ambulate 50ft and 75ft with CGA and FW walker. Pt able to ascend/ descend 4 stairs with CGA and B HR. No new goals met.

## 2019-07-08 NOTE — THERAPY TREATMENT NOTE
Inpatient Rehabilitation - Physical Therapy Treatment Note  [facility]     Patient Name: Kuldip Nevarez  : 1945  MRN: [id number]    Today's Date: 2019       Date of Referral to PT: 19         Admit Date: 2019      Visit Dx:      ICD-10-CM ICD-9-CM   1. Closed fracture of left hip with routine healing, subsequent encounter S72.002D V54.13   2. Impaired physical mobility Z74.09 781.99   3. Impaired mobility and activities of daily living Z74.09 799.89   4. Symbolic dysfunction R48.9 784.60       Patient Active Problem List   Diagnosis   • S/p left hip fracture   • Closed fracture of left hip (CMS/HCC)   • Frequent falls   • Closed nondisplaced intertrochanteric fracture of left femur with routine healing   • Delirium   • Pneumonia involving left lung   • Pacemaker   • Coronary artery disease   • Disease of thyroid gland   • Stage 3 chronic kidney disease (CMS/HCC)   • Acute respiratory failure with hypoxia and hypercapnia (CMS/HCC)   • Closed left hip fracture (CMS/HCC)   • Primary insomnia   • Dry mouth   • Chronic gouty arthropathy       Therapy Treatment    IRF Treatment Summary     Row Name 19 1426 19 1315 19 1026       Evaluation/Treatment Time and Intent    Subjective Information  no complaints  -RC  no complaints  -KW  no complaints  -KW    Existing Precautions/Restrictions  fall  -RC  fall  -KW  fall  -KW    Document Type  therapy note (daily note)  -RC  therapy note (daily note)  -KW  therapy note (daily note)  -KW    Mode of Treatment  occupational therapy;individual therapy  -RC  individual therapy;physical therapy  -KW  individual therapy  -KW    Patient/Family Observations  in w/c   -RC  sitting in w/c in room  -KW  sitting in w/c in room  -KW    Start Time (Evaluation/Treatment)  1426  -RC  1315  -KW  1026  -KW    Stop Time (Evaluation/Treatment)  1456  -RC  1353  -KW  1128  -KW    Recorded by [RC] Jossie Louis COTA/JOVANNY [KW] Lana Schumacher, PT [KW]  Lana Schumacher, PT    Row Name 07/08/19 0915             Evaluation/Treatment Time and Intent    Subjective Information  no complaints  -      Existing Precautions/Restrictions  fall  -RC      Document Type  therapy note (daily note)  -      Mode of Treatment  occupational therapy;individual therapy  -RC      Patient/Family Observations  in w/c   -      Start Time (Evaluation/Treatment)  0915  -RC      Stop Time (Evaluation/Treatment)  1015  -RC      Recorded by [RC] Jossie Louis COTA/L      Row Name 07/08/19 1426 07/08/19 1315 07/08/19 1026       Cognition/Psychosocial- PT/OT    Affect/Mental Status (Cognitive)  WFL  -RC  WNL  -KW  WFL  -KW    Orientation Status (Cognition)  --  oriented x 4  -KW  oriented x 4  -KW    Follows Commands (Cognition)  --  WFL  -KW  WFL  -KW    Personal Safety Interventions  --  fall prevention program maintained;gait belt;muscle strengthening facilitated;nonskid shoes/slippers when out of bed;supervised activity  -KW  fall prevention program maintained;gait belt;muscle strengthening facilitated;nonskid shoes/slippers when out of bed;supervised activity  -KW    Recorded by [RC] Jossie Louis COTA/JOVANNY [KW] Lana Schumacher, PT [KW] Lana Schumacher, CHERYL    Row Name 07/08/19 0915             Cognition/Psychosocial- PT/OT    Affect/Mental Status (Cognitive)  [facility]  -RC      Orientation Status (Cognition)  oriented x 4  -RC      Recorded by [RC] Jossie Louis COTA/L      Row Name 07/08/19 1426 07/08/19 1315 07/08/19 1026       Bed Mobility Assessment/Treatment    Sit-Supine [place] (Bed Mobility)  conditional independence  -  --  --    Comment (Bed Mobility)  --  NT; pt up in chair and requesting to stay in chair  -KW  NT; pt sitting in w/c and requesting to stay in w/c   -KW    Recorded by [RC] Jossie Louis COTA/JOVANNY [KW] Lana Schumacher, PT [KW] Lana Schumacher, PT    Row Name 07/08/19 0915             Functional Mobility    Functional Mobility- Ind. Level  standby assist  -       Functional Mobility- Device  rolling walker  -RC      Functional Mobility-Distance (Feet)  -- 20-30 ft x 5   -RC      Recorded by [RC] Jossie Louis COTA/L      Row Name 07/08/19 1315 07/08/19 1026          Transfer Assessment/Treatment    Transfer Assessment/Treatment  sit-stand transfer;stand-sit transfer  -KW  sit-stand transfer;stand-sit transfer  -KW     Recorded by [KW] Lana Schumacher, PT [KW] Lana Schumacher, PT     Row Name 07/08/19 1426             Chair-Bed Transfer    Chair-Bed Tensed (Transfers)  stand by assist  -      Assistive Device (Chair-Bed Transfers)  walker, front-wheeled  -RC      Recorded by [RC] Jossie Louis COTA/L      Row Name 07/08/19 1426 07/08/19 1315 07/08/19 1026       Sit-Stand Transfer    Sit-Stand Tensed (Transfers)  stand by assist  -RC  stand by assist  -KW  contact guard  -KW    Assistive Device (Sit-Stand Transfers)  walker, front-wheeled  -RC  walker, front-wheeled  -KW  walker, front-wheeled  -KW    Recorded by [RC] Jossie Louis COTA/JOVANNY [KW] Lana Schumacher, PT [KW] Lana Schumacher, PT    Row Name 07/08/19 0915             Sit-Stand Transfer    Sit-Stand Tensed (Transfers)  stand by assist  -RC      Recorded by [RC] Jossie Louis COTA/L      Row Name 07/08/19 1426 07/08/19 1315 07/08/19 1026       Stand-Sit Transfer    Stand-Sit Tensed (Transfers)  stand by assist  -RC  stand by assist  -KW  contact guard  -KW    Assistive Device (Stand-Sit Transfers)  walker, front-wheeled  -RC  walker, front-wheeled  -KW  walker, front-wheeled  -KW    Recorded by [RC] Jossie Louis COTA/JOVANNY [KW] Lana Schumacher, PT [KW] Lana Schumacher, PT    Row Name 07/08/19 0915             Stand-Sit Transfer    Stand-Sit Tensed (Transfers)  stand by assist  -RC      Assistive Device (Stand-Sit Transfers)  walker, front-wheeled  -RC      Recorded by [RC] Jossie Louis COTA/L      Row Name 07/08/19 1315 07/08/19 1026          Gait/Stairs Assessment/Training     Gait/Stairs Assessment/Training  gait/ambulation assistive device  -KW  gait/ambulation assistive device  -KW     [place] Level (Gait)  stand by assist  -KW  contact guard  -KW     Assistive Device (Gait)  walker, front-wheeled  -KW  walker, front-wheeled  -KW     Distance in Feet (Gait)  50, 75  -KW  50x1, 75x1  -KW     Pattern (Gait)  step-to  -KW  step-to  -KW     Deviations/Abnormal Patterns (Gait)  antalgic  -KW  antalgic  -KW     Bilateral Gait Deviations  forward flexed posture  -KW  forward flexed posture  -KW     Right Sided Gait Deviations  --  heel strike decreased  -KW     Handrail Location (Stairs)  both sides  -KW  both sides  -KW     Number of Steps (Stairs)  4  -KW  4  -KW     Ascending Technique (Stairs)  step-to-step  -KW  step-to-step  -KW     Descending Technique (Stairs)  step-to-step  -KW  step-to-step  -KW     Comment (Gait/Stairs)  CGA and VC for stairs  -KW  --     Recorded by [KW] Lana Schumacher, PT [KW] Lana Schumacher, PT     Row Name 07/08/19 1426 07/08/19 1315 07/08/19 1026       Wheelchair Mobility/Management    Method of Wheelchair Locomotion (Mobility)  bimanual (upper extremity) propulsion  -RC  bipedal (lower extremity) propulsion  -KW  bimanual (upper extremity) propulsion  -KW    Mobility Activities (Wheelchair)  forward propulsion  -RC  forward propulsion  -KW  forward propulsion  -KW    Mobility [place] Level (Wheelchair)  conditional independence  -RC  conditional independence  -KW  conditional independence  -KW    Forward Propulsion [place] (Wheelchair)  --  --  conditional independence  -KW    Distance Propelled in Feet (Wheelchair)  100  -RC  100  -KW  --    Recorded by [RC] Jossie Louis COTA/JOVANNY [KW] Lana Schumacher, PT [KW] Lana Schumacher, PT    Row Name 07/08/19 0915             Wheelchair Mobility/Management    Method of Wheelchair Locomotion (Mobility)  bimanual (upper extremity) propulsion  -RC      Mobility Activities (Wheelchair)  forward propulsion   -RC      Mobility [place] Level (Wheelchair)  conditional independence  -RC      Distance Propelled in Feet (Wheelchair)  100  -RC      Recorded by [RC] Jossie Louis COTA/L      Row Name 07/08/19 1426 07/08/19 1026 07/08/19 0915       Pain Scale: Numbers Pre/Post-Treatment    Pain Scale: Numbers, Pretreatment  0/10 - no pain  -RC  0/10 - no pain  -KW  0/10 - no pain  -RC    Pain Scale: Numbers, Post-Treatment  0/10 - no pain  -RC  0/10 - no pain  -KW  0/10 - no pain  -RC    Recorded by [RC] Jossie Louis COTA/L [KW] Lana Schumacher, PT [RC] Jossie Louis LONGO/L    Row Name 07/08/19 1426             Upper Extremity Seated Therapeutic Exercise    Performed, Seated Upper Extremity (Therapeutic Exercise)  shoulder flexion/extension;shoulder external/internal rotation;elbow flexion/extension  -RC      Device, Seated Upper Extremity (Therapeutic Exercise)  free weights, barbell 3#  -RC      Exercise Type, Seated Upper Extremity (Therapeutic Exercise)  resistive exercise  -RC      Expected Outcomes, Seated Upper Extremity (Therapeutic Exercise)  improve functional tolerance, self-care activity;improve performance, BADLs  -RC      Sets/Reps Detail, Seated Upper Extremity (Therapeutic Exercise)  15x3  -RC      Recorded by [RC] Jossie Louis COTA/L      Row Name 07/08/19 0915             Aerobic Exercise Activity    Exercise Performed (Aerobic, Therapeutic Exercise)  arm bike  -RC      Expected Outcome (Aerobic, Therapeutic Exercise)  improve functional tolerance, self-care activity;improve performance, BADLs  -RC      Time (Aerobic, Therapeutic Exercise)  20  -RC      Recorded by [RC] Jossie Louis COTA/L      Row Name 07/08/19 1315 07/08/19 1026          Lower Extremity Seated Therapeutic Exercise    Performed, Seated Lower Extremity (Therapeutic Exercise)  LAQ (long arc quad), knee extension;hip flexion/extension;ankle dorsiflexion/plantarflexion  -KW  LAQ (long arc quad), knee extension;hip  flexion/extension;hip abduction/adduction;ankle dorsiflexion/plantarflexion  -KW     Device, Seated Lower Extremity (Therapeutic Exercise)  --  elastic bands/tubing  -KW     Exercise Type, Seated Lower Extremity (Therapeutic Exercise)  AROM (active range of motion);AAROM (active assistive range of motion)  -KW  AAROM (active assistive range of motion);AROM (active range of motion)  -KW     Expected Outcomes, Seated Lower Extremity (Therapeutic Exercise)  improve performance, gait skills;improve performance, gait skills on uneven ground  -KW  improve performance, gait skills;improve performance, gait skills on uneven ground  -KW     Sets/Reps Detail, Seated Lower Extremity (Therapeutic Exercise)  1*15  -KW  1*20  -KW     Recorded by [KW] Lana Schumacher PT [KW] Lana Schumacher PT Row Name 07/08/19 1315 07/08/19 1026          Vital Signs    Pre Systolic BP Rehab  97  -KW  100  -KW     Pre Treatment Diastolic BP  57  -KW  67  -KW     Pretreatment Heart Rate (beats/min)  65  -KW  60  -KW     Pre SpO2 (%)  97  -KW  96  -KW     O2 Delivery Pre Treatment  room air  -KW  room air  -KW     Pre Patient Position  Sitting  -KW  --     Intra Patient Position  Standing  -KW  --     Post Patient Position  Sitting  -KW  --     Recorded by [KW] Lana Schumacher PT [KW] Lana Schumacher PT Row Name 07/08/19 1426 07/08/19 1315 07/08/19 1026       Positioning and Restraints    Pre-Treatment Position  sitting in chair/recliner  -RC  sitting in chair/recliner  -KW  sitting in chair/recliner  -KW    Post Treatment Position  bed  -RC  wheelchair  -KW  wheelchair  -KW    In Bed  call light within reach;encouraged to call for assist;exit alarm on  -RC  --  --    In Wheelchair  --  sitting;call light within reach;encouraged to call for assist;exit alarm on  -KW  sitting;call light within reach;encouraged to call for assist;exit alarm on  -KW    Recorded by [RC] Jossie Louis COTA/JOVANNY [KW] Lana Schumacher PT [KW] Lana Schumacher PT Row  Name 07/08/19 0915             Positioning and Restraints    Pre-Treatment Position  -- w/c  -RC      Post Treatment Position  wheelchair  -RC      In Wheelchair  call light within reach;encouraged to call for assist;exit alarm on  -RC      Recorded by [RC] Jossie Louis COTA/L      Row Name 07/08/19 1315 07/08/19 1026          Daily Summary of Progress (PT)    Functional Goal Overall Progress: Physical Therapy  progressing toward functional goals as expected  -KW  progressing toward functional goals as expected  -KW     Recommendations: Physical Therapy  Cont PT POC   -KW  Cont, stairs, ambulation  -KW     Recorded by [KW] Lana Schumacher, PT [KW] Lana Schumacher, PT     Row Name 07/08/19 1426 07/08/19 0915          Daily Summary of Progress (OT)    Functional Goal Overall Progress: Occupational Therapy  progressing toward functional goals as expected  -RC  progressing toward functional goals as expected  -RC     Recommendations: Occupational Therapy  cont poc   -RC  --     Recorded by [RC] Jossie Louis COTA/L [RC] Jossie Louis COTA/L       User Key  (r) = Recorded By, (t) = Taken By, (c) = Cosigned By    Initials Name Effective Dates    RC Jossie Louis COTA/JOVANNY 03/07/18 -     KW Lana Schumacher, PT 07/23/18 -         Wound 06/16/19 0914 Left hip incision (Active)   Dressing Appearance intact;dry 7/7/2019  7:16 PM   Closure Approximated;Adhesive closure strips 7/7/2019  7:16 PM   Periwound ecchymotic 7/7/2019  7:16 PM   Drainage Amount none 7/7/2019  7:16 PM     Physical Therapy Education     Title: PT OT SLP Therapies (In Progress)     Topic: Physical Therapy (In Progress)     Point: Mobility training (Done)     Learning Progress Summary           Patient Acceptance, E, VU,NR by CZ at 7/3/2019  2:03 PM    Comment:  Patient educated on LLD and how the heel lift inserted into his shoe will help improve his gait. Patient educated on proper w/c propulsion to improve saftely awareness. Patient instructed on  proper hand placement during transfers with FWW.    Acceptance, E, VU,NR by  at 6/27/2019  1:06 PM    Comment:  Educated on proper technique with bed mobility, proper hand placement with transfers, and proper gait mechanics.                   Point: Home exercise program (In Progress)     Learning Progress Summary           Patient Acceptance, E, NR by  at 6/27/2019  2:15 PM    Comment:  Educated on supine ther ex to increase LLE strength and ROM.                               User Key     Initials Effective Dates Name Provider Type Discipline     04/03/18 -  Rudy Lombardi, PT Physical Therapist PT                  PT Recommendation and Plan        Daily Summary of Progress (PT)  Functional Goal Overall Progress: Physical Therapy: progressing toward functional goals as expected  Recommendations: Physical Therapy: Cont PT POC   Outcome Summary: Pt participating well with therapy in PM: Pt able to perform sit<>stand t/f with SBA and FW walker and ambulate 50ft and 75ft with CGA and FW walker. Pt able to ascend/ descend 4 stairs with CGA and B HR. No new goals met.       PT IRF GOALS     Row Name 07/08/19 1026             Bed Mobility Goal 1 (PT-IRF)    Activity/Assistive Device (Bed Mobility Goal 1, PT-IRF)  sit to supine/supine to sit  -KW      [place] Level (Bed Mobility Goal 1, PT-IRF)  conditional independence  -KW      Time Frame (Bed Mobility Goal 1, PT-IRF)  2 weeks  -KW      Barriers (Bed Mobility Goal 1, PT-IRF)  L hip fx: ORIF, WBAT  -KW      Progress/Outcomes (Bed Mobility Goal 1, PT-IRF)  goal not met  -KW         Transfer Goal 1 (PT-IRF)    Activity/Assistive Device (Transfer Goal 1, PT-IRF)  sit-to-stand/stand-to-sit;bed-to-chair/chair-to-bed  -KW      [place] Level (Transfer Goal 1, PT-IRF)  conditional independence  -KW      Time Frame (Transfer Goal 1, PT-IRF)  5 - 7 days  -KW      Barriers (Transfer Goal 1, PT-IRF)  L hip fx: ORIF, WBAT  -KW      Progress/Outcomes (Transfer Goal 1,  PT-IRF)  goal not met  -KW         Gait/Walking Locomotion Goal 1 (PT-IRF)    Activity/Assistive Device (Gait/Walking Locomotion Goal 1, PT-IRF)  walker, rolling  -KW      Gait/Walking Locomotion Distance Goal 1 (PT-IRF)  150'X1  -KW      [place] Level (Gait/Walking Locomotion Goal 1, PT-IRF)  conditional independence  -KW      Time Frame (Gait/Walking Locomotion Goal 1, PT-IRF)  long term goal (LTG);by discharge  -KW      Barriers (Gait/Walking Locomotion Goal 1, PT-IRF)  L hip fx: ORIF, WBAT  -KW      Progress/Outcomes (Gait/Walking Locomotion Goal 1, PT-IRF)  goal not met  -KW         Stairs Goal 1 (PT-IRF)    Activity/Assistive Device (Stairs Goal 1, PT-IRF)  ascending stairs;descending stairs  -KW      Number of Stairs (Stairs Goal 1, PT-IRF)  2 steps, no railing.   -KW      [place] Level (Stairs Goal 1, PT-IRF)  supervision required  -KW      Time Frame (Stairs Goal 1, PT-IRF)  long term goal (LTG);by discharge  -KW      Barriers (Stairs Goal 1, PT-IRF)  L hip fx: ORIF, WBAT  -KW      Progress/Outcomes (Stairs Goal 1, PT-IRF)  goal not met  -KW        User Key  (r) = Recorded By, (t) = Taken By, (c) = Cosigned By    Initials Name Provider Type    Lana Khan, CHERYL Physical Therapist        Outcome Measures     Row Name 07/08/19 1406 07/08/19 1026          How much help from another person do you currently need...    Turning from your back to your side while in flat bed without using bedrails?  --  3  -KW     Moving from lying on back to sitting on the side of a flat bed without bedrails?  --  3  -KW     Moving to and from a bed to a chair (including a wheelchair)?  --  3  -KW     Standing up from a chair using your arms (e.g., wheelchair, bedside chair)?  --  3  -KW     Climbing 3-5 steps with a railing?  --  3  -KW     To walk in hospital room?  --  3  -KW     AM-PAC 6 Clicks Score (PT)  --  18  -KW        How much help from another is currently needed...    Putting on and taking off regular  lower body clothing?  3  -RC  --     Bathing (including washing, rinsing, and drying)  3  -RC  --     Toileting (which includes using toilet bed pan or urinal)  3  -RC  --     Putting on and taking off regular upper body clothing  3  -RC  --     Taking care of personal grooming (such as brushing teeth)  3  -RC  --     Eating meals  4  -RC  --     AM-PAC 6 Clicks Score (OT)  19  -RC  --        Functional Assessment    Outcome Measure Options  --  AM-PAC 6 Clicks Basic Mobility (PT)  -KW       User Key  (r) = Recorded By, (t) = Taken By, (c) = Cosigned By    Initials Name Provider Type    RC Jossie Louis COTA/L Occupational Therapy Assistant    Lana Khan, PT Physical Therapist             Time Calculation:     PT Charges     Row Name 07/08/19 1631 07/08/19 1258          Time Calculation    Start Time  1315  -KW  1026  -KW     Stop Time  1353  -KW  1128  -KW     Time Calculation (min)  38 min  -KW  62 min  -KW     PT Received On  07/08/19  -KW  07/08/19  -KW        Time Calculation- PT    Total Timed Code Minutes- PT  38 minute(s)  -KW  62 minute(s)  -KW       User Key  (r) = Recorded By, (t) = Taken By, (c) = Cosigned By    Initials Name Provider Type    Lana Khan, CHERYL Physical Therapist          Therapy Charges for Today     Code Description Service Date Service Provider Modifiers Qty    09524807488 HC PT THER PROC EA 15 MIN 7/8/2019 Lana Schumacher, PT GP 2    53980334137 HC PT THERAPEUTIC ACT EA 15 MIN 7/8/2019 Lana Schumacher, PT GP 2    02369026152 HC PT THERAPEUTIC ACT EA 15 MIN 7/8/2019 Lana Schumacher, PT GP 2    42764595255 HC PT THER PROC EA 15 MIN 7/8/2019 Lana Schumacher, PT GP 1            PT G-Codes  Outcome Measure Options: AM-PAC 6 Clicks Basic Mobility (PT)  AM-PAC 6 Clicks Score (PT): 18  AM-PAC 6 Clicks Score (OT): 19      Lana Schumacher PT  7/8/2019

## 2019-07-08 NOTE — PLAN OF CARE
Problem: Patient Care Overview  Goal: Plan of Care Review  Outcome: Ongoing (interventions implemented as appropriate)   07/08/19 8422   Patient Care Overview   IRF Plan of Care Review progress ongoing, continue   Progress, Functional Goals demonstrating adequate progress   OTHER   Outcome Summary pt has done well,vss,will cont to monitor,is ready for discgarge tuesday 7/9   Coping/Psychosocial   Plan of Care Reviewed With patient       Problem: Fractured Hip (Adult)  Goal: Signs and Symptoms of Listed Potential Problems Will be Absent, Minimized or Managed (Fractured Hip)  Outcome: Ongoing (interventions implemented as appropriate)      Problem: Fall Risk (Adult)  Goal: Absence of Fall  Outcome: Ongoing (interventions implemented as appropriate)      Problem: Skin Injury Risk (Adult)  Goal: Skin Health and Integrity  Outcome: Ongoing (interventions implemented as appropriate)

## 2019-07-08 NOTE — PLAN OF CARE
Problem: Patient Care Overview  Goal: Plan of Care Review  Outcome: Ongoing (interventions implemented as appropriate)   07/08/19 1000   OTHER   Outcome Summary PT AM tx: pt participating well; pt able to perform sit<>stand with CGA and FW walker; pt ascending/ descending stairs with CGA and B HR. Pt able to ambulate 50ft, 75ft, with CGA and FW walker. No new goals met.

## 2019-07-08 NOTE — THERAPY TREATMENT NOTE
Inpatient Rehabilitation - Physical Therapy Treatment Note  [facility]     Patient Name: Kuldip Nevarez  : 1945  MRN: [id number]    Today's Date: 2019       Date of Referral to PT: 19         Admit Date: 2019      Visit Dx:      ICD-10-CM ICD-9-CM   1. Closed fracture of left hip with routine healing, subsequent encounter S72.002D V54.13   2. Impaired physical mobility Z74.09 781.99   3. Impaired mobility and activities of daily living Z74.09 799.89   4. Symbolic dysfunction R48.9 784.60       Patient Active Problem List   Diagnosis   • S/p left hip fracture   • Closed fracture of left hip (CMS/HCC)   • Frequent falls   • Closed nondisplaced intertrochanteric fracture of left femur with routine healing   • Delirium   • Pneumonia involving left lung   • Pacemaker   • Coronary artery disease   • Disease of thyroid gland   • Stage 3 chronic kidney disease (CMS/HCC)   • Acute respiratory failure with hypoxia and hypercapnia (CMS/HCC)   • Closed left hip fracture (CMS/HCC)   • Primary insomnia   • Dry mouth   • Chronic gouty arthropathy       Therapy Treatment    IRF Treatment Summary     Row Name 19 1026             Evaluation/Treatment Time and Intent    Subjective Information  no complaints  -KW      Existing Precautions/Restrictions  fall  -KW      Document Type  therapy note (daily note)  -KW      Mode of Treatment  individual therapy  -KW      Patient/Family Observations  sitting in w/c in room  -KW      Start Time (Evaluation/Treatment)  1026  -KW      Stop Time (Evaluation/Treatment)  1128  -KW      Recorded by [KW] Lana Schumacher, PT      Row Name 19 1026             Cognition/Psychosocial- PT/OT    Affect/Mental Status (Cognitive)  WFL  -KW      Orientation Status (Cognition)  oriented x 4  -KW      Follows Commands (Cognition)  WFL  -KW      Personal Safety Interventions  fall prevention program maintained;gait belt;muscle strengthening facilitated;nonskid  shoes/slippers when out of bed;supervised activity  -KW      Recorded by [KW] Lana Schumacher, PT      Row Name 07/08/19 1026             Bed Mobility Assessment/Treatment    Comment (Bed Mobility)  NT; pt sitting in w/c and requesting to stay in w/c   -KW      Recorded by [KW] Lana Schumacher, PT      Row Name 07/08/19 1026             Transfer Assessment/Treatment    Transfer Assessment/Treatment  sit-stand transfer;stand-sit transfer  -KW      Recorded by [KW] Lana Schumacher, PT      Row Name 07/08/19 1026             Sit-Stand Transfer    Sit-Stand [place] (Transfers)  contact guard  -KW      Assistive Device (Sit-Stand Transfers)  walker, front-wheeled  -KW      Recorded by [KW] Lana Schumacher PT      Row Name 07/08/19 1026             Stand-Sit Transfer    Stand-Sit [place] (Transfers)  contact guard  -KW      Assistive Device (Stand-Sit Transfers)  walker, front-wheeled  -KW      Recorded by [KW] Lana Schumacher, PT      Row Name 07/08/19 1026             Gait/Stairs Assessment/Training    Gait/Stairs Assessment/Training  gait/ambulation assistive device  -KW      [place] Level (Gait)  contact guard  -KW      Assistive Device (Gait)  walker, front-wheeled  -KW      Distance in Feet (Gait)  50x1, 75x1  -KW      Pattern (Gait)  step-to  -KW      Deviations/Abnormal Patterns (Gait)  antalgic  -KW      Bilateral Gait Deviations  forward flexed posture  -KW      Right Sided Gait Deviations  heel strike decreased  -KW      Handrail Location (Stairs)  both sides  -KW      Number of Steps (Stairs)  4  -KW      Ascending Technique (Stairs)  step-to-step  -KW      Descending Technique (Stairs)  step-to-step  -KW      Recorded by [KW] Lana Schumacher, PT      Row Name 07/08/19 1026             Wheelchair Mobility/Management    Method of Wheelchair Locomotion (Mobility)  bimanual (upper extremity) propulsion  -KW      Mobility Activities (Wheelchair)  forward propulsion  -KW      Mobility [place] Level  (Wheelchair)  conditional independence  -KW      Forward Propulsion [place] (Wheelchair)  conditional independence  -KW      Recorded by [KW] Lana Schumacher, PT      Row Name 07/08/19 1026             Pain Scale: Numbers Pre/Post-Treatment    Pain Scale: Numbers, Pretreatment  0/10 - no pain  -KW      Pain Scale: Numbers, Post-Treatment  0/10 - no pain  -KW      Recorded by [KW] Lana Schumacher PT      Row Name 07/08/19 1026             Lower Extremity Seated Therapeutic Exercise    Performed, Seated Lower Extremity (Therapeutic Exercise)  LAQ (long arc quad), knee extension;hip flexion/extension;hip abduction/adduction;ankle dorsiflexion/plantarflexion  -KW      Device, Seated Lower Extremity (Therapeutic Exercise)  elastic bands/tubing  -KW      Exercise Type, Seated Lower Extremity (Therapeutic Exercise)  AAROM (active assistive range of motion);AROM (active range of motion)  -KW      Expected Outcomes, Seated Lower Extremity (Therapeutic Exercise)  improve performance, gait skills;improve performance, gait skills on uneven ground  -KW      Sets/Reps Detail, Seated Lower Extremity (Therapeutic Exercise)  1*20  -KW      Recorded by [KW] Lana Schumacher PT      Row Name 07/08/19 1026             Vital Signs    Pre Systolic BP Rehab  100  -KW      Pre Treatment Diastolic BP  67  -KW      Pretreatment Heart Rate (beats/min)  60  -KW      Pre SpO2 (%)  96  -KW      O2 Delivery Pre Treatment  room air  -KW      Recorded by [KW] Lana Schumacher, PT      Row Name 07/08/19 1026             Positioning and Restraints    Pre-Treatment Position  sitting in chair/recliner  -KW      Post Treatment Position  wheelchair  -KW      In Wheelchair  sitting;call light within reach;encouraged to call for assist;exit alarm on  -KW      Recorded by [KW] Lana Schumacher, PT      Row Name 07/08/19 1026             Daily Summary of Progress (PT)    Functional Goal Overall Progress: Physical Therapy  progressing toward functional goals as  expected  -KW      Recommendations: Physical Therapy  Cont, stairs, ambulation  -KW      Recorded by [KW] Lana Schumacher PT        User Key  (r) = Recorded By, (t) = Taken By, (c) = Cosigned By    Initials Name Effective Dates    Lana Khan PT 07/23/18 -         Wound 06/16/19 0914 Left hip incision (Active)   Dressing Appearance intact;dry 7/7/2019  7:16 PM   Closure Approximated;Adhesive closure strips 7/7/2019  7:16 PM   Periwound ecchymotic 7/7/2019  7:16 PM   Drainage Amount none 7/7/2019  7:16 PM     Physical Therapy Education     Title: PT OT SLP Therapies (In Progress)     Topic: Physical Therapy (In Progress)     Point: Mobility training (Done)     Learning Progress Summary           Patient Acceptance, E, VU,NR by  at 7/3/2019  2:03 PM    Comment:  Patient educated on LLD and how the heel lift inserted into his shoe will help improve his gait. Patient educated on proper w/c propulsion to improve saftely awareness. Patient instructed on proper hand placement during transfers with FWW.    Acceptance, E, VU,NR by  at 6/27/2019  1:06 PM    Comment:  Educated on proper technique with bed mobility, proper hand placement with transfers, and proper gait mechanics.                   Point: Home exercise program (In Progress)     Learning Progress Summary           Patient Acceptance, E, NR by  at 6/27/2019  2:15 PM    Comment:  Educated on supine ther ex to increase LLE strength and ROM.                               User Key     Initials Effective Dates Name Provider Type Discipline     04/03/18 -  Rudy Lombardi, PT Physical Therapist PT                  PT Recommendation and Plan        Daily Summary of Progress (PT)  Functional Goal Overall Progress: Physical Therapy: progressing toward functional goals as expected  Recommendations: Physical Therapy: Cont, stairs, ambulation  Outcome Summary: PT AM tx: pt participating well; pt able to perform sit<>stand with CGA and FW walker; pt ascending/  descending stairs with CGA and B HR. Pt able to ambulate 50ft, 75ft, with CGA and FW walker. No new goals met.       PT IRF GOALS     Row Name 07/08/19 1026             Bed Mobility Goal 1 (PT-IRF)    Activity/Assistive Device (Bed Mobility Goal 1, PT-IRF)  sit to supine/supine to sit  -KW      [place] Level (Bed Mobility Goal 1, PT-IRF)  conditional independence  -KW      Time Frame (Bed Mobility Goal 1, PT-IRF)  2 weeks  -KW      Barriers (Bed Mobility Goal 1, PT-IRF)  L hip fx: ORIF, WBAT  -KW      Progress/Outcomes (Bed Mobility Goal 1, PT-IRF)  goal not met  -KW         Transfer Goal 1 (PT-IRF)    Activity/Assistive Device (Transfer Goal 1, PT-IRF)  sit-to-stand/stand-to-sit;bed-to-chair/chair-to-bed  -KW      [place] Level (Transfer Goal 1, PT-IRF)  conditional independence  -KW      Time Frame (Transfer Goal 1, PT-IRF)  5 - 7 days  -KW      Barriers (Transfer Goal 1, PT-IRF)  L hip fx: ORIF, WBAT  -KW      Progress/Outcomes (Transfer Goal 1, PT-IRF)  goal not met  -KW         Gait/Walking Locomotion Goal 1 (PT-IRF)    Activity/Assistive Device (Gait/Walking Locomotion Goal 1, PT-IRF)  walker, rolling  -KW      Gait/Walking Locomotion Distance Goal 1 (PT-IRF)  150'X1  -KW      [place] Level (Gait/Walking Locomotion Goal 1, PT-IRF)  conditional independence  -KW      Time Frame (Gait/Walking Locomotion Goal 1, PT-IRF)  long term goal (LTG);by discharge  -KW      Barriers (Gait/Walking Locomotion Goal 1, PT-IRF)  L hip fx: ORIF, WBAT  -KW      Progress/Outcomes (Gait/Walking Locomotion Goal 1, PT-IRF)  goal not met  -KW         Stairs Goal 1 (PT-IRF)    Activity/Assistive Device (Stairs Goal 1, PT-IRF)  ascending stairs;descending stairs  -KW      Number of Stairs (Stairs Goal 1, PT-IRF)  2 steps, no railing.   -KW      [place] Level (Stairs Goal 1, PT-IRF)  supervision required  -KW      Time Frame (Stairs Goal 1, PT-IRF)  long term goal (LTG);by discharge  -KW      Barriers (Stairs Goal 1,  PT-IRF)  L hip fx: ORIF, WBAT  -KW      Progress/Outcomes (Stairs Goal 1, PT-IRF)  goal not met  -KW        User Key  (r) = Recorded By, (t) = Taken By, (c) = Cosigned By    Initials Name Provider Type    Lana Khan, CHERYL Physical Therapist        Outcome Measures     Row Name 07/08/19 1026 07/05/19 1420          How much help from another person do you currently need...    Turning from your back to your side while in flat bed without using bedrails?  3  -KW  --     Moving from lying on back to sitting on the side of a flat bed without bedrails?  3  -KW  --     Moving to and from a bed to a chair (including a wheelchair)?  3  -KW  --     Standing up from a chair using your arms (e.g., wheelchair, bedside chair)?  3  -KW  --     Climbing 3-5 steps with a railing?  3  -KW  --     To walk in hospital room?  3  -KW  --     AM-PAC 6 Clicks Score (PT)  18  -KW  --        How much help from another is currently needed...    Putting on and taking off regular lower body clothing?  --  3  -RC     Bathing (including washing, rinsing, and drying)  --  3  -RC     Toileting (which includes using toilet bed pan or urinal)  --  3  -RC     Putting on and taking off regular upper body clothing  --  3  -RC     Taking care of personal grooming (such as brushing teeth)  --  3  -RC     Eating meals  --  4  -RC     AM-PAC 6 Clicks Score (OT)  --  19  -RC        Functional Assessment    Outcome Measure Options  AM-PAC 6 Clicks Basic Mobility (PT)  -KW  --       User Key  (r) = Recorded By, (t) = Taken By, (c) = Cosigned By    Initials Name Provider Type    Jossie George COTA/L Occupational Therapy Assistant    Lana Khan, CHERYL Physical Therapist             Time Calculation:     PT Charges     Row Name 07/08/19 1258             Time Calculation    Start Time  1026  -KW      Stop Time  1128  -KW      Time Calculation (min)  62 min  -KW      PT Received On  07/08/19  -KW         Time Calculation- PT    Total Timed Code  Minutes- PT  62 minute(s)  -WES        User Key  (r) = Recorded By, (t) = Taken By, (c) = Cosigned By    Initials Name Provider Type    KW Lana Schumacher, PT Physical Therapist          Therapy Charges for Today     Code Description Service Date Service Provider Modifiers Qty    [id number]  PT THER PROC EA 15 MIN 7/8/2019 Lana Schumacher, PT GP 2    [id number]  PT THERAPEUTIC ACT EA 15 MIN 7/8/2019 Lana Schumacher, PT GP 2            PT G-Codes  Outcome Measure Options: AM-PAC 6 Clicks Basic Mobility (PT)  AM-PAC 6 Clicks Score (PT): 18  AM-PAC 6 Clicks Score (OT): 19      Lana Schumacher PT  7/8/2019

## 2019-07-08 NOTE — PLAN OF CARE
Problem: Patient Care Overview  Goal: Plan of Care Review  Outcome: Ongoing (interventions implemented as appropriate)   07/08/19 1442   Patient Care Overview   IRF Plan of Care Review progress ongoing, continue   Progress, Functional Goals demonstrating adequate progress   OTHER   Outcome Summary participated in fx transfers, ue ex act to increase i w/ Iadl adl act. will offer shower tomorrow bath declined today. cont poc home tomorrow    Coping/Psychosocial   Plan of Care Reviewed With patient

## 2019-07-09 NOTE — THERAPY DISCHARGE NOTE
Inpatient Rehabilitation - Physical Therapy Treatment Note/Discharge  [facility]     Patient Name: Kuldip Nevarez  : 1945  MRN: [id number]  Today's Date: 2019     Date of Referral to PT: 19       Admit Date: 2019    Visit Dx:    ICD-10-CM ICD-9-CM   1. Closed fracture of left hip with routine healing, subsequent encounter S72.002D V54.13   2. Impaired physical mobility Z74.09 781.99   3. Impaired mobility and activities of daily living Z74.09 799.89   4. Symbolic dysfunction R48.9 784.60     Patient Active Problem List   Diagnosis   • S/p left hip fracture   • Closed fracture of left hip (CMS/HCC)   • Frequent falls   • Closed nondisplaced intertrochanteric fracture of left femur with routine healing   • Delirium   • Pneumonia involving left lung   • Pacemaker   • Coronary artery disease   • Disease of thyroid gland   • Stage 3 chronic kidney disease (CMS/HCC)   • Acute respiratory failure with hypoxia and hypercapnia (CMS/HCC)   • Closed left hip fracture (CMS/HCC)   • Primary insomnia   • Dry mouth   • Chronic gouty arthropathy       Physical Therapy Education     Title: PT OT SLP Therapies (Resolved)     Topic: Physical Therapy (Resolved)     Point: Mobility training (Resolved)     Learning Progress Summary           Patient Acceptance, E, VU,NR by FADUMO at 7/3/2019  2:03 PM    Comment:  Patient educated on LLD and how the heel lift inserted into his shoe will help improve his gait. Patient educated on proper w/c propulsion to improve saftely awareness. Patient instructed on proper hand placement during transfers with FWW.    Acceptance, E, VU,NR by FADUMO at 2019  1:06 PM    Comment:  Educated on proper technique with bed mobility, proper hand placement with transfers, and proper gait mechanics.                   Point: Home exercise program (Resolved)     Learning Progress Summary           Patient Acceptance, E, NR by FADUMO at 2019  2:15 PM    Comment:  Educated on supine ther  ex to increase LLE strength and ROM.                               User Key     Initials Effective Dates Name Provider Type Discipline    CZ 04/03/18 -  Rudy Lombardi, PT Physical Therapist PT              PT IRF GOALS     Row Name 07/09/19 0900             Bed Mobility Goal 1 (PT-IRF)    Activity/Assistive Device (Bed Mobility Goal 1, PT-IRF)  sit to supine/supine to sit  -RW      [place] Level (Bed Mobility Goal 1, PT-IRF)  conditional independence  -RW      Time Frame (Bed Mobility Goal 1, PT-IRF)  2 weeks  -RW      Barriers (Bed Mobility Goal 1, PT-IRF)  L hip fx: ORIF, WBAT  -RW      Progress/Outcomes (Bed Mobility Goal 1, PT-IRF)  goal met  (Significant)   -RW         Transfer Goal 1 (PT-IRF)    Activity/Assistive Device (Transfer Goal 1, PT-IRF)  sit-to-stand/stand-to-sit;bed-to-chair/chair-to-bed  -RW      [place] (Transfer Goal 1, PT-IRF)  conditional independence  -RW      Time Frame (Transfer Goal 1, PT-IRF)  5 - 7 days  -RW      Barriers (Transfer Goal 1, PT-IRF)  L hip fx: ORIF, WBAT  -RW      Progress/Outcomes (Transfer Goal 1, PT-IRF)  goal not met  -RW         Gait/Walking Locomotion Goal 1 (PT-IRF)    Activity/Assistive Device (Gait/Walking Locomotion Goal 1, PT-IRF)  walker, rolling  -RW      Gait/Walking Locomotion Distance Goal 1 (PT-IRF)  150'X1  -RW      [place] (Gait/Walking Locomotion Goal 1, PT-IRF)  conditional independence  -RW      Time Frame (Gait/Walking Locomotion Goal 1, PT-IRF)  long term goal (LTG);by discharge  -RW      Barriers (Gait/Walking Locomotion Goal 1, PT-IRF)  L hip fx: ORIF, WBAT  -RW      Progress/Outcomes (Gait/Walking Locomotion Goal 1, PT-IRF)  goal not met  -RW         Stairs Goal 1 (PT-IRF)    Activity/Assistive Device (Stairs Goal 1, PT-IRF)  ascending stairs;descending stairs  -RW      Number of Stairs (Stairs Goal 1, PT-IRF)  2 steps, no railing.   -RW      [place] (Stairs Goal 1, PT-IRF)  supervision required  -RW       Time Frame (Stairs Goal 1, PT-IRF)  long term goal (LTG);by discharge  -RW      Barriers (Stairs Goal 1, PT-IRF)  L hip fx: ORIF, WBAT  -RW      Progress/Outcomes (Stairs Goal 1, PT-IRF)  goal partially met;goal ongoing  -RW        User Key  (r) = Recorded By, (t) = Taken By, (c) = Cosigned By    Initials Name Provider Type    RW Don Lobo PTA Physical Therapy Assistant          Therapy Treatment    IRF Treatment Summary     Row Name 07/09/19 0817             Evaluation/Treatment Time and Intent    Subjective Information  no complaints  -RW      Existing Precautions/Restrictions  fall  -RW      Document Type  therapy note (daily note)  -RW      Mode of Treatment  physical therapy  -RW      Patient/Family Observations  in wc  -RW      Start Time (Evaluation/Treatment)  0817  -RW      Stop Time (Evaluation/Treatment)  0853  -RW      Recorded by [RW] Don Lobo PTA      Row Name 07/09/19 0817             Cognition/Psychosocial- PT/OT    Affect/Mental Status (Cognitive)  WNL  -RW      Orientation Status (Cognition)  oriented x 4  -RW      Follows Commands (Cognition)  WFL  -RW      Personal Safety Interventions  gait belt;nonskid shoes/slippers when out of bed  -RW      Recorded by [RW] Don Lobo PTA      Row Name 07/09/19 0817             Mobility    Left Lower Extremity (Weight-bearing Status)  weight-bearing as tolerated (WBAT)  -RW      Recorded by [RW] Don Lobo PTA      Row Name 07/09/19 0817             Bed Mobility Assessment/Treatment    Supine-Sit [place] (Bed Mobility)  conditional independence  -RW      Sit-Supine [place] (Bed Mobility)  conditional independence  -RW      Recorded by [RW] Don Lobo PTA      Row Name 07/09/19 0817             Transfer Assessment/Treatment    Transfer Assessment/Treatment  sit-stand transfer;stand-sit transfer  -RW      Recorded by [RW] Don Lobo PTA      Row Name 07/09/19 0817             Sit-Stand Transfer    Sit-Stand  [place] (Transfers)  stand by assist  -RW      Assistive Device (Sit-Stand Transfers)  walker, front-wheeled  -RW      Recorded by [RW] Don Lobo, KIT      Row Name 07/09/19 0817             Stand-Sit Transfer    Stand-Sit [place] (Transfers)  stand by assist  -RW      Assistive Device (Stand-Sit Transfers)  walker, front-wheeled  -RW      Recorded by [RW] Don Lobo PTA      Row Name 07/09/19 0817             Gait/Stairs Assessment/Training    Gait/Stairs Assessment/Training  gait/ambulation assistive device  -RW      [place] Level (Gait)  stand by assist;contact guard  -RW      Assistive Device (Gait)  walker, front-wheeled  -RW      Distance in Feet (Gait)  48  -RW      Pattern (Gait)  step-to  -RW      Deviations/Abnormal Patterns (Gait)  antalgic  -RW      Bilateral Gait Deviations  forward flexed posture  -RW      Recorded by [RW] Don Lobo PTA      Row Name 07/09/19 0817             Wheelchair Mobility/Management    Method of Wheelchair Locomotion (Mobility)  bipedal (lower extremity) propulsion  -RW      Mobility Activities (Wheelchair)  forward propulsion  -RW      Mobility [place] Level (Wheelchair)  conditional independence  -RW      Distance Propelled in Feet (Wheelchair)  150  -RW      Recorded by [RW] Don Lobo, KIT      Row Name 07/09/19 0817             Pain Scale: Numbers Pre/Post-Treatment    Pain Scale: Numbers, Pretreatment  0/10 - no pain  -RW      Pain Scale: Numbers, Post-Treatment  0/10 - no pain  -RW      Recorded by [RW] Don Lobo, KIT      Row Name 07/09/19 0817             Lower Extremity Seated Therapeutic Exercise    Performed, Seated Lower Extremity (Therapeutic Exercise)  LAQ (long arc quad), knee extension;ankle dorsiflexion/plantarflexion  -RW      Exercise Type, Seated Lower Extremity (Therapeutic Exercise)  AROM (active range of motion)  -RW      Expected Outcomes, Seated Lower Extremity (Therapeutic Exercise)  improve functional  tolerance, self-care activity;improve performance, gait skills;improve performance, transfer skills  -RW      Sets/Reps Detail, Seated Lower Extremity (Therapeutic Exercise)  20  -RW      Recorded by [RW] Don Lobo PTA      Row Name 07/09/19 0817             Lower Extremity Supine Therapeutic Exercise    Performed, Supine Lower Extremity (Therapeutic Exercise)  heel slides;quadriceps sets  -RW      Exercise Type, Supine Lower Extremity (Therapeutic Exercise)  AROM (active range of motion)  -RW      Expected Outcomes, Supine Lower Extremity (Therapeutic Exercise)  improve functional tolerance, self-care activity;improve performance, transfer skills;improve performance, gait skills  -RW      Sets/Reps Detail, Supine Lower Extremity (Therapeutic Exercise)  20  -RW      Recorded by [RW] Don Lobo PTA      Row Name 07/09/19 0817             Positioning and Restraints    Pre-Treatment Position  sitting in chair/recliner  -RW      Post Treatment Position  wheelchair  -RW      In Wheelchair  encouraged to call for assist;call light within reach;exit alarm on  -RW      Recorded by [RW] Don Lobo PTA      Row Name 07/09/19 0817             Daily Summary of Progress (PT)    Functional Goal Overall Progress: Physical Therapy  progressing toward functional goals as expected  -RW      Recorded by [RW] Don Lobo PTA        User Key  (r) = Recorded By, (t) = Taken By, (c) = Cosigned By    Initials Name Effective Dates    RW Don Lobo PTA 03/07/18 -           PT Recommendation and Plan  Planned Therapy Interventions (PT Eval): balance training, bed mobility training, gait training, home exercise program, patient/family education, ROM (range of motion), stair training, strengthening, stretching, transfer training  Therapy Frequency (PT Clinical Impression): 5 times/wk  Plan of Care Reviewed With: patient    Outcome Measures     Row Name 07/09/19 0900 07/08/19 1406 07/08/19 1026       How much help  from another person do you currently need...    Turning from your back to your side while in flat bed without using bedrails?  4  -RW  --  3  -KW    Moving from lying on back to sitting on the side of a flat bed without bedrails?  4  -RW  --  3  -KW    Moving to and from a bed to a chair (including a wheelchair)?  3  -RW  --  3  -KW    Standing up from a chair using your arms (e.g., wheelchair, bedside chair)?  3  -RW  --  3  -KW    Climbing 3-5 steps with a railing?  3  -RW  --  3  -KW    To walk in hospital room?  3  -RW  --  3  -KW    AM-PAC 6 Clicks Score (PT)  20  -RW  --  18  -KW       How much help from another is currently needed...    Putting on and taking off regular lower body clothing?  --  3  -RC  --    Bathing (including washing, rinsing, and drying)  --  3  -RC  --    Toileting (which includes using toilet bed pan or urinal)  --  3  -RC  --    Putting on and taking off regular upper body clothing  --  3  -RC  --    Taking care of personal grooming (such as brushing teeth)  --  3  -RC  --    Eating meals  --  4  -RC  --    AM-PAC 6 Clicks Score (OT)  --  19  -RC  --       Functional Assessment    Outcome Measure Options  --  --  AM-PAC 6 Clicks Basic Mobility (PT)  -      User Key  (r) = Recorded By, (t) = Taken By, (c) = Cosigned By    Initials Name Provider Type    Don Pandey PTA Physical Therapy Assistant    Jossie George COTA/L Occupational Therapy Assistant    Lana Khan, PT Physical Therapist           Time Calculation:   PT Charges     Row Name 07/09/19 0921             Time Calculation    Start Time  0819  -RW      Stop Time  0853  -RW      Time Calculation (min)  34 min  -RW         Time Calculation- PT    Total Timed Code Minutes- PT  34 minute(s)  -RW        User Key  (r) = Recorded By, (t) = Taken By, (c) = Cosigned By    Initials Name Provider Type    Don Pandey PTA Physical Therapy Assistant          Therapy Charges for Today     Code Description Service  Date Service Provider Modifiers Qty    80726102207 HC GAIT TRAINING EA 15 MIN 7/9/2019 Don Lobo, PTA GP 1    [id number] HC PT SELF CARE/MGMT/TRAIN EA 15 MIN 7/9/2019 Don Lobo, PTA GP 1         Eval FIM FIM Goal  Discharge FIM Daily FIM           Grooming        Bathing        Dressing - Upper Body        Dressing - Lower Body        Toilet        Tub, Shower        Problem Solving         Social Interaction                 Bed, Chair, W/C Transfer   5     Walking   2     Wheelchair Locomotion   6     Stairs   2             Comprehension        Expression        Memory               PT G-Codes  Outcome Measure Options: AM-PAC 6 Clicks Basic Mobility (PT)  AM-PAC 6 Clicks Score (PT): 20  AM-PAC 6 Clicks Score (OT): 19    PT Discharge Summary  Reason for Discharge: Discharge from facility, Per MD order  Outcomes Achieved: Patient able to partially acheive established goals  Discharge Destination: Home with home health    Don Lobo PTA  7/9/2019

## 2019-07-09 NOTE — PATIENT CARE CONFERENCE
Attendance of weekly team conference:   Jossie Louis, Dr. Rogers Tompkins, Anju Springer, Lili Sifuentes, Kristen oRgel, Rosario Jansen and Sarah Garland    Patient's progress reviewed, FIMs reviewed, discharge date discussed and equipment needs addressed.     Expected Discharge Date: 7-9-19  Anticipated discharge orders/equipment: Home this date. Home health PT/OT.

## 2019-07-09 NOTE — PLAN OF CARE
Problem: Patient Care Overview  Goal: Plan of Care Review  Outcome: Ongoing (interventions implemented as appropriate)   07/09/19 0410   Patient Care Overview   IRF Plan of Care Review progress ongoing, continue   Progress, Functional Goals demonstrating adequate progress   Coping/Psychosocial   Plan of Care Reviewed With patient      07/09/19 0410   Patient Care Overview   IRF Plan of Care Review progress ongoing, continue   Progress, Functional Goals demonstrating adequate progress   Coping/Psychosocial   Plan of Care Reviewed With patient       Problem: Fractured Hip (Adult)  Goal: Signs and Symptoms of Listed Potential Problems Will be Absent, Minimized or Managed (Fractured Hip)  Outcome: Ongoing (interventions implemented as appropriate)   07/09/19 0410   Goal/Outcome Evaluation   Problems Assessed (Fractured Hip) all   Problems Present (Fractured Hip) functional deficit/self-care deficit       Problem: Fall Risk (Adult)  Goal: Absence of Fall  Outcome: Ongoing (interventions implemented as appropriate)      Problem: Functional Mobility Impairment (IRF) (Adult)  Goal: Optimal/Safe Level of [place] with Mobility  Outcome: Ongoing (interventions implemented as appropriate)      Problem: Skin Injury Risk (Adult)  Goal: Skin Health and Integrity  Outcome: Ongoing (interventions implemented as appropriate)

## 2019-07-09 NOTE — PROGRESS NOTES
Pain controlled, no c/o  Vitals:    07/09/19 0902   BP: 128/64   Pulse: 70   Resp: 18   Temp: 100.1 °F (37.8 °C)   SpO2: 93%     Looks good  Working in rehab gym  Left hip / thigh incision healing well, no erythema, no draiange  No tenderness with palpation  Tolerates passive range of motion of the left hip  Weakness of left hip in flexion  Progress weight bearing as tolerated left leg.  Continue intensive physical therapy  Orthopaedic surgery follow up evaluation in 3 weeks.  Call orthopaedic surg office for appointment

## 2019-07-09 NOTE — DISCHARGE PLACEMENT REQUEST
"Kuldip Lin (74 y.o. Male)     Date of Birth Social Security Number Address Home Phone MRN    1945  [address] [facility] [address] [phone] [id number]    [employer]al Marital Status          [place]        Admission Date Admission Type Admitting Provider Attending Provider Department, Room/Bed    6/26/19 Urgent Rogers Tompkins MD Hargrove, Kenneth R, MD [facility] ACUTE REHAB, 532/1    Discharge Date Discharge Disposition Discharge Destination         Home-Health Care [facility]              Attending Provider:  Rogers Tompkins MD    Allergies:  Tape    Isolation:  None   Infection:  None   Code Status:  CPR    Ht:  182.9 cm (72\")   Wt:  102 kg (224 lb 14.4 oz)    Admission Cmt:  None   Principal Problem:  Closed left hip fracture (CMS/Formerly [facility]) [S72.002A]                 Active Insurance as of 6/26/2019     Primary Coverage     Payor Plan Insurance Group Employer/Plan Group    MEDICARE MEDICARE A & B      Payor Plan Address Payor Plan Phone Number Payor Plan Fax Number Effective Dates    [address] [phone]  9/1/2007 - None Entered    [facility] [address]       Subscriber Name Subscriber Birth Date Member ID       KULDIP LIN 1945 [id number]           Secondary Coverage     Payor Plan Insurance Group Employer/Plan Group    [employer] [place] [employer] [place] [id number]     Payor Plan Address Payor Plan Phone Number Payor Plan Fax Number Effective Dates    [address] [place] [address] [phone]  1/1/2015 - None Entered    [place] [address]       Subscriber Name Subscriber Birth Date Member ID       KULDIP LIN 1945 [id number]                 Emergency Contacts      (Rel.) Home Phone Work Phone Mobile Phone    My Lin (Spouse) [phone] -- [phone]    RoqueGurwinder tate (Son) -- -- [phone]    Lynn Lin (Relative) -- -- [phone]            Emergency Contact Information     Name Relation Home Work Mobile    " My Nevarez Spouse [phone]    Gurwinder Nevarez Son   [phone]    Lynn Nevarez Relative   [phone]          Insurance Information                MEDICARE/MEDICARE A & B Phone: [phone]    Subscriber: Kuldip Nevarez Subscriber#: [id number]    Group#:  Precert#:         [employer] [place] [place] Phone: [phone]    Subscriber: Kuldip Nevarez Subscriber#: [id number]    Group#: [id number] Precert#:              History & Physical      Rogers Tompkins MD at 6/26/2019  2:19 PM           [address], [place]. [address]  T - [phone]     H/P NOTE         SUBJECTIVE:   Patient Care Team:  Mike Draper MD as PCP - General    Chief Complaint:   Left hip fracture    Subjective     Patient is 74 y.o. male presents with recent history of a fall with a complaint fracture of the left hip.  He was admitted to the hospital evaluated by orthopedics admitted to the hospitalist team.  He underwent open reduction internal fixation of his left hip fracture by Dr. Harley.  He became hypotensive and did have acute respiratory failure with hypoxia and hypercapnia.  He was admitted to the intensive care unit and remained on the ventilator during evaluation treatment.  He was found to have a left upper lobe pneumonia and was treated with 7 days of Zosyn.  He was evaluated for pulmonary emboli also with negative work-up.  Ventilator was able to be tapered and he was extubated on June 17.  Eventually he improved and was able to be admitted to the medical floor.  He did participate with therapy.  During his stay he was evaluated by his cardiologist Dr. Grullon as well  Respiratory status has continued to improve antibiotics have been completed and he is being admitted to the acute rehab unit for intensive rehabilitation and close medical supervision.  He has been started on a tapering dose of prednisone.  IV was DC'd earlier today..       ROS/HISTORY/ CURRENT MEDICATIONS/OBJECTIVE/VS/PE:       History:     Past Medical History:   Diagnosis Date   • Arthritis    • Cancer (CMS/HCC)     colon, skin   • Coronary artery disease    • Disease of thyroid gland    • History of transfusion    • MI (myocardial infarction) (CMS/HCC)        • Pacemaker    • Sleep apnea      Past Surgical History:   Procedure Laterality Date   • APPENDECTOMY     • COLON SURGERY     • COLONOSCOPY N/A 2017    Procedure: COLONOSCOPY;  Surgeon: Barrie Jarrett DO;  Location: [facility] ENDOSCOPY;  Service:    • FEMUR OPEN REDUCTION INTERNAL FIXATION Left 2019    Procedure: FEMUR OPEN REDUCTION INTERNAL FIXATION;  Surgeon: Edward Harley MD;  Location: [facility] OR;  Service: Orthopedics   • KIDNEY STONE SURGERY     • SALIVARY GLAND EXCISION Left     Left neck due to skin cancer with resultant chronic dry mouth     Family History   Problem Relation Age of Onset   • Coronary artery disease Mother    • Coronary artery disease Father      Social History     Tobacco Use   • Smoking status: Former Smoker     Last attempt to quit:      Years since quittin.4   • Smokeless tobacco: Never Used   Substance Use Topics   • Alcohol use: No   • Drug use: No     Medications Prior to Admission   Medication Sig Dispense Refill Last Dose   • allopurinol (ZYLOPRIM) 300 MG tablet Take 300 mg by mouth Daily.   2019 at Unknown time   • apixaban (ELIQUIS) 2.5 MG tablet tablet Take 1 tablet by mouth Every 12 (Twelve) Hours. 60 tablet 0    • aspirin 81 MG chewable tablet Chew 81 mg Daily.   6/15/2019 at Unknown time   • colchicine 0.6 MG tablet Take 0.6 mg by mouth Daily.   6/15/2019 at Unknown time   • Cyanocobalamin (VITAMIN B 12 PO) Take 1 tablet by mouth Daily.   6/15/2019 at Unknown time   • docusate sodium 100 MG capsule Take 100 mg by mouth 2 (Two) Times a Day As Needed for Constipation.      • isosorbide mononitrate (IMDUR) 30 MG 24 hr tablet Take 30 mg by mouth  Daily.   6/14/2019 at Unknown time   • levothyroxine (SYNTHROID, LEVOTHROID) 75 MCG tablet Take 75 mcg by mouth Daily.      • magic mouthwash with nystatin Swish and spit 10 mL Every 4 (Four) Hours for 5 days.      • omeprazole (priLOSEC) 40 MG capsule Take 40 mg by mouth Every Other Day.   6/15/2019 at Unknown time   • predniSONE (DELTASONE) 10 MG tablet 40 mg oral daily x 2 days, then 30 mg oral daily x 2 days, then 20 mg oral daily x 2 days, then 10 mg oral daily x 2 days. 20 tablet 0    • simvastatin (ZOCOR) 40 MG tablet Take 40 mg by mouth Daily.   6/14/2019 at Unknown time     Allergies:  Tape    Current Medications:     Current Facility-Administered Medications:   •  acetaminophen (TYLENOL) tablet 650 mg, 650 mg, Oral, Q4H PRN, Rogers Tompkins MD  •  acetaminophen (TYLENOL) tablet 650 mg, 650 mg, Oral, Q4H PRN, Rogers Tompkins MD  •  [START ON 6/27/2019] allopurinol (ZYLOPRIM) tablet 300 mg, 300 mg, Oral, Daily, Rogers Tompkins MD  •  apixaban (ELIQUIS) tablet 2.5 mg, 2.5 mg, Oral, Q12H, Rogers Tompkins MD  •  [START ON 6/27/2019] atorvastatin (LIPITOR) tablet 20 mg, 20 mg, Oral, Daily, Rogers Tompkins MD  •  calcium carbonate (TUMS) chewable tablet 500 mg (200 mg elemental), 2 tablet, Oral, BID PRN, Rogers Tompkins MD  •  [START ON 6/27/2019] colchicine tablet 0.6 mg, 0.6 mg, Oral, Daily, Rogers Tompkins MD  •  docusate sodium (COLACE) capsule 100 mg, 100 mg, Oral, BID, Rogers Tompkins MD  •  ipratropium-albuterol (DUO-NEB) nebulizer solution 3 mL, 3 mL, Nebulization, Q4H PRN, Rogers Tompkins MD  •  ipratropium-albuterol (DUO-NEB) nebulizer solution 3 mL, 3 mL, Nebulization, 4x Daily - RT, Rogers Tompkins MD  •  [START ON 6/27/2019] isosorbide mononitrate (IMDUR) 24 hr tablet 30 mg, 30 mg, Oral, Q24H, Rogers Tompkins MD  •  [START ON 6/27/2019] levothyroxine (SYNTHROID, LEVOTHROID) tablet 75 mcg, 75 mcg, Oral, Q AM, Rogers Tompkins MD  •  magic  mouthwash with nystatin oral supsension 10 mL, 10 mL, Swish & Spit, Q4H, Rogers Tompkins MD  •  magnesium hydroxide (MILK OF MAGNESIA) suspension 2400 mg/10mL 10 mL, 10 mL, Oral, Daily PRN, Rogers Tompkins MD  •  ondansetron (ZOFRAN) tablet 4 mg, 4 mg, Oral, Q6H PRN **OR** ondansetron (ZOFRAN) injection 4 mg, 4 mg, Intravenous, Q6H PRN, Rogers Tompkins MD  •  ondansetron (ZOFRAN) tablet 4 mg, 4 mg, Oral, Q6H PRN, Rogers Tompkins MD  •  [START ON 6/27/2019] pantoprazole (PROTONIX) EC tablet 40 mg, 40 mg, Oral, Q AM, Rogers Tompkins MD  •  [START ON 6/27/2019] predniSONE (DELTASONE) tablet 40 mg, 40 mg, Oral, Daily With Breakfast, Rogers Tompkins MD  •  sennosides-docusate sodium (SENOKOT-S) 8.6-50 MG tablet 2 tablet, 2 tablet, Oral, Nightly, Rogers Tompkins MD      REVIEW OF SYSTEMS:  Review of Systems   Constitutional: Positive for activity change and fatigue. Negative for unexpected weight change.   HENT:        Complains of dry mouth that is a long-term complaint  Hearing is not as good as it used to be   Eyes: Negative.    Respiratory: Negative for shortness of breath, wheezing and stridor.         He is not having complaints of shortness of breath at this time   Cardiovascular: Positive for leg swelling. Negative for chest pain and palpitations.   Gastrointestinal: Positive for diarrhea.        He had a history of colon cancer with part of the distal colon removed.  He has had loose bowel movement since that time.   Endocrine: Negative.    Genitourinary: Negative.    Musculoskeletal: Positive for arthralgias, joint swelling and myalgias.   Skin: Negative.         Complains of chronic skin changes from his 30 years to be in alignment been exposed to sun   Allergic/Immunologic: Negative.    Neurological: Positive for dizziness. Negative for syncope.        Episode of dizziness since near syncop mission contributing to his fall    Hematological: Negative.     Psychiatric/Behavioral: Positive for confusion, hallucinations and sleep disturbance.        Several nights after surgery he had episodes of confusion and hallucinations.  That has improved consistently    He does complain of chronic insomnia even at home prior to this illness       Objective     Physical Exam:   Temp:  [96.1 °F (35.6 °C)-98.5 °F (36.9 °C)] 97.4 °F (36.3 °C)  Heart Rate:  [60-83] 67  Resp:  [16-20] 18  BP: (116-148)/(67-79) 120/78    Physical Exam:    Physical Exam   Constitutional: He is oriented to person, place, and time. He appears well-developed and well-nourished. No distress.   HENT:   Head: Normocephalic and atraumatic.   Mouth/Throat: No oropharyngeal exudate.   Eyes: Conjunctivae and EOM are normal. Pupils are equal, round, and reactive to light. Right eye exhibits no discharge. Left eye exhibits no discharge. No scleral icterus.   Neck: Normal range of motion. Neck supple. No JVD present. No tracheal deviation present. No thyromegaly present.   Cardiovascular: Normal rate, regular rhythm, normal heart sounds and intact distal pulses. Exam reveals no friction rub.   No murmur heard.  Pulmonary/Chest: Effort normal and breath sounds normal. No stridor. No respiratory distress. He has no wheezes. He has no rales. He exhibits no tenderness.   Abdominal: Soft. Bowel sounds are normal. He exhibits no distension. There is no tenderness. There is no guarding.   Musculoskeletal: Normal range of motion. He exhibits edema, tenderness and deformity.   Left lateral hip shows an incision that is clean and healing.  The dressing is dry wet from serous drainage from the wound.  Trace to 1+ edema left lower extremity  Examination of the joints do not show any hot red or tender joints other than tenderness in the left hip   Neurological: He is alert and oriented to person, place, and time. He displays normal reflexes. No cranial nerve deficit or sensory deficit. He exhibits normal muscle tone.  Coordination normal.   Skin: Skin is warm and dry. Capillary refill takes 2 to 3 seconds. No rash noted. He is not diaphoretic. No erythema. No pallor.   Psychiatric: He has a normal mood and affect. His behavior is normal. Judgment and thought content normal.   Nursing note and vitals reviewed.           Results Review:      Comprehensive Metabolic Panel [[id number]]  (Abnormal) Collected:  06/26/19 0716      Specimen:  Blood Updated:  06/26/19 0759       Glucose 108 mg/dL         BUN 39 mg/dL         Creatinine 1.52 mg/dL         Sodium 143 mmol/L         Potassium 4.4 mmol/L         Chloride 107 mmol/L         CO2 25.0 mmol/L         Calcium 9.2 mg/dL         Total Protein 6.0 g/dL         Albumin 3.10 g/dL         ALT (SGPT) 32 U/L         AST (SGOT) 38 U/L         Alkaline Phosphatase 111 U/L         Total Bilirubin 1.0 mg/dL         eGFR Non African Amer 45 mL/min/1.73         Globulin 2.9 gm/dL         A/G Ratio 1.1 g/dL         BUN/Creatinine Ratio 25.7       Anion Gap 11.0 mmol/L       Narrative:        GFR Normal >60  Chronic Kidney Disease <60  Kidney Failure <15     CBC & Differential [[id number]] Collected:  06/26/19 0716     Specimen:  Blood Updated:  06/26/19 0747     Narrative:        The following orders were created for panel order CBC & Differential.  Procedure                               Abnormality         Status                     ---------                               -----------         ------                     Scan Slide[[id number]]                                                                  CBC Auto Differential[[id number]]        Abnormal            Final result                  Please view results for these tests on the individual orders.     CBC Auto Differential [[id number]]  (Abnormal) Collected:  06/26/19 0716     Specimen:  Blood Updated:  06/26/19 0738       WBC 18.52 10*3/mm3         RBC 3.73 10*6/mm3         Hemoglobin 12.6 g/dL         Hematocrit 36.9 %         MCV 98.9 fL          MCH 33.8 pg         MCHC 34.1 g/dL         RDW 13.6 %         RDW-SD 48.4 fl         MPV 11.0 fL         Platelets 195 10*3/mm3         Neutrophil % 85.9 %         Lymphocyte % 4.6 %         Monocyte %                                Imaging Results (last 24 hours)     ** No results found for the last 24 hours. **      X-ray results including multiple CT scans were reviewed  X-rays from his surgery preop and postop reviewed as well    I reviewed the patient's  clinical results.  I reviewed the patient's  imaging results and agree with the interpretation.   I reviewed the therapy notes   ASSESSMENT/PLAN:   Assessment/Plan   Active Hospital Problems    Diagnosis   • **Closed left hip fracture (CMS/HCC)   • Primary insomnia     This is a compliant at home prior to admission as well     • Dry mouth     Due to prior salivary gland excision  This is a chronic problem at home prior to admission     • Chronic gouty arthropathy   • Pneumonia involving left lung   • Pacemaker     [employer] dual-chamber pacemaker implanted December 2016 followed by Dr. Grullon     • Delirium   • Stage 3 chronic kidney disease (CMS/HCC)   • Acute respiratory failure with hypoxia and hypercapnia (CMS/HCC)     Improved at the time of admission to acute rehab.  Still using oxygen 2 L per nasal cannula.  On a tapering dose of prednisone over 8 days.  Started with 40 mg every morning x2 days and then decreasing every 2 days     • Coronary artery disease   • Frequent falls       Fortunately his delirium has improved as he is gotten further from surgery and the stress of illness has improved.  He is participating with therapy.  We will continue oxygen as needed and also continue to taper his prednisone.  Continue wound care  Monitor his respiratory status closely  We will continue to monitor his renal function electrolytes are also  His major complaint at the time of admission his insomnia and will try melatonin but with the recent delirium  will avoid benzodiazepines or antidepressants for sleep.  Other complaint that he has is of a chronic dry mouth after salivary gland excision.  He uses coping measures such as water with his food.  I suspect that medication may be playing a part in his dry mouth as well  It is expected he will participate 3 hours a day, make measurable improvement, and be able to return home with his family at discharge  I discussed the patients findings and my recommendations with patient, family and nursing staff.          This document has been electronically signed by Rogers Tompkins MD on 2019 2:19 PM                   Electronically signed by Rogers Tompkins MD at 2019  1:47 PM          Physician Progress Notes (last 24 hours) (Notes from 2019  9:54 AM through 2019  9:54 AM)      Edward Harley MD at 2019  9:05 AM          Pain controlled, no c/o  Vitals:    19 0902   BP: 128/64   Pulse: 70   Resp: 18   Temp: 100.1 °F (37.8 °C)   SpO2: 93%     Looks good  Working in rehab gym  Left hip / thigh incision healing well, no erythema, no draiange  No tenderness with palpation  Tolerates passive range of motion of the left hip  Weakness of left hip in flexion  Progress weight bearing as tolerated left leg.  Continue intensive physical therapy  Orthopaedic surgery follow up evaluation in 3 weeks.  Call orthopaedic surg office for appointment        Electronically signed by Edward Harley MD at 2019  9:06 AM       [facility] ACUTE REHAB  [address] [place] [id number]  Phone:  [phone]  Fax:   Date: 2019      Ambulatory Referral to Home Health     Patient:  Kuldip Nevarez MRN:  [id number]   [address] [facility] [address] :  1945  SSN:    Phone: [phone] Sex:  M      INSURANCE PAYOR PLAN GROUP # SUBSCRIBER ID   Primary:  Secondary:    MEDICARE [employer] [place] [id number]  [id number]    [id number]  [id number]  [id number]      Referring Provider Information:  SILVIANO TOMPKINS Phone: [phone] Fax:       Referral Information:   # Visits:  1 Referral Type: Home Health [42]   Urgency:  Routine Referral Reason: Specialty Services Required   Start Date: Jul 8, 2019 End Date:  To be determined by Insurer   Diagnosis: Impaired mobility and activities of daily living (Z74.09 [ICD-10-CM] 799.89 [ICD-9-CM])  Closed fracture of left hip with routine healing, subsequent encounter (S72.002D [ICD-10-CM] V54.13 [ICD-9-CM])      Refer to Dept:   Refer to Provider:   Refer to Facility:       Face to Face Visit Date: 7/8/2019  Follow-up Provider for Plan of Care? I treated the patient in an acute care facility and will not continue treatment after discharge.  Follow-up Provider: PATY STOUT [[id number]]  Reason/Clinical Findings: hip fracture, poor mobility  Describe mobility limitations that make leaving home difficult: hip fracture post op respiratory failure  Nursing/Therapeutic Services Requested: Physical Therapy  Nursing/Therapeutic Services Requested: Occupational Therapy  Frequency: 1 Week 1     This document serves as a request of services and does not constitute Insurance authorization or approval of services.  To determine eligibility, please contact the members Insurance carrier to verify and review coverage.     If you have medical questions regarding this request for services. Please contact [facility] ACUTE REHAB at [phone] between the hours of 8:00am - 5:00pm (Mon-Fri).       Authorizing Provider:Silviano Tompkins MD  Authorizing Provider's NPI: [id number]  Order Entered By: Silviano Tompkins MD 7/8/2019  8:36 AM     Electronically signed by: Silviano Tompkins MD 7/8/2019  8:36 AM

## 2019-07-09 NOTE — PLAN OF CARE
Problem: Patient Care Overview  Goal: Plan of Care Review  Outcome: Ongoing (interventions implemented as appropriate)   07/09/19 6232   Patient Care Overview   IRF Plan of Care Review progress ongoing, continue   Progress, Functional Goals demonstrating adequate progress   OTHER   Outcome Summary pt did not meet OT goals as  sba was recommended for all transfers, standing and ambulation. pt was modified I for grooming when seated. Echevarria for ub dressing and ub bathing, sba for lb bathing and min @ for lb dressing. he is to d/c home w/ family and hhot    Coping/Psychosocial   Plan of Care Reviewed With patient

## 2019-07-09 NOTE — NURSING NOTE
FIM FIM Goal  Discharge FIM     Evaluation Score        Eating 5 6 6     Toileting 5 6 6     Bladder Management 4 6 6     Accident in Past 7 days 6 6 6     Bowel Management 4 6 6     Accident in Past 7 days: 6 6 6       Accident in Past 7 Days:   7= 0, 6=device, 5=1, 4=2, 3=3,  2=4, 1=5+

## 2019-07-09 NOTE — DISCHARGE INSTRUCTIONS
Continue eliquis for seven days after discharge then may stop it.  This is to prevent blood clots after the hip fracture

## 2019-07-09 NOTE — THERAPY DISCHARGE NOTE
Inpatient Rehabilitation - Occupational Therapy Treatment Note/Discharge  [facility]     Patient Name: Kuldip Nevarez  : 1945  MRN: [id number]  Today's Date: 2019               Admit Date: 2019    Visit Dx:     ICD-10-CM ICD-9-CM   1. Closed fracture of left hip with routine healing, subsequent encounter S72.002D V54.13   2. Impaired physical mobility Z74.09 781.99   3. Impaired mobility and activities of daily living Z74.09 799.89   4. Symbolic dysfunction R48.9 784.60     Patient Active Problem List   Diagnosis   • S/p left hip fracture   • Closed fracture of left hip (CMS/HCC)   • Frequent falls   • Closed nondisplaced intertrochanteric fracture of left femur with routine healing   • Delirium   • Pneumonia involving left lung   • Pacemaker   • Coronary artery disease   • Disease of thyroid gland   • Stage 3 chronic kidney disease (CMS/HCC)   • Acute respiratory failure with hypoxia and hypercapnia (CMS/HCC)   • Closed left hip fracture (CMS/HCC)   • Primary insomnia   • Dry mouth   • Chronic gouty arthropathy       Therapy Treatment  IRF Treatment Summary     Row Name 19 0817 19 0725          Evaluation/Treatment Time and Intent    Subjective Information  no complaints  -RW  no complaints  -RC     Existing Precautions/Restrictions  fall  -RW  fall  -RC     Document Type  therapy note (daily note)  -RW  therapy note (daily note)  -RC     Mode of Treatment  physical therapy  -RW  occupational therapy;individual therapy  -RC     Patient/Family Observations  in   -RW  in w/c   -RC     Start Time (Evaluation/Treatment)  0817  -RW  0725  -RC     Stop Time (Evaluation/Treatment)  0853  -RW  0810  -RC     Recorded by [RW] Don Lobo PTA [RC] Jossie Louis COTA/L     Row Name 19 0725             Relationship/Environment    Primary Source of Support/Comfort  child(lynn)  -RC      Recorded by [RC] Jossie Louis COTA/L      Row Name 19 0817 19 0725           Cognition/Psychosocial- PT/OT    Affect/Mental Status (Cognitive)  WNL  -RW  WNL  -RC     Orientation Status (Cognition)  oriented x 4  -RW  oriented x 4  -RC     Follows Commands (Cognition)  WFL  -RW  --     Personal Safety Interventions  gait belt;nonskid shoes/slippers when out of bed  -RW  --     Recorded by [RW] Don Lobo, PTA [RC] Missy Jossie G, LONGO/L     Row Name 07/09/19 0817             Mobility    Left Lower Extremity (Weight-bearing Status)  weight-bearing as tolerated (WBAT)  -RW      Recorded by [RW] Don Lobo, [facility]      Row Name 07/09/19 0817             Bed Mobility Assessment/Treatment    Supine-Sit [place] (Bed Mobility)  conditional independence  -RW      Sit-Supine [place] (Bed Mobility)  conditional independence  -RW      Recorded by [RW] Don Lobo, [facility]      Row Name 07/09/19 0817             Transfer Assessment/Treatment    Transfer Assessment/Treatment  sit-stand transfer;stand-sit transfer  -RW      Recorded by [RW] Don Lobo, [facility]      Row Name 07/09/19 0817             Sit-Stand Transfer    Sit-Stand [place] (Transfers)  stand by assist  -RW      Assistive Device (Sit-Stand Transfers)  walker, front-wheeled  -RW      Recorded by [RW] Don Lobo, [facility]      Row Name 07/09/19 0817             Stand-Sit Transfer    Stand-Sit [place] (Transfers)  stand by assist  -RW      Assistive Device (Stand-Sit Transfers)  walker, front-wheeled  -RW      Recorded by [RW] Don Lobo, [facility]      Row Name 07/09/19 0817             Gait/Stairs Assessment/Training    Gait/Stairs Assessment/Training  gait/ambulation assistive device  -RW      [place] Level (Gait)  stand by assist;contact guard  -RW      Assistive Device (Gait)  walker, front-wheeled  -RW      Distance in Feet (Gait)  48  -RW      Pattern (Gait)  step-to  -RW      Deviations/Abnormal Patterns (Gait)  antalgic  -RW      Bilateral Gait Deviations  forward flexed posture  -RW      Recorded  by [RW] Don Lobo, [facility]      Row Name 07/09/19 0817             Wheelchair Mobility/Management    Method of Wheelchair Locomotion (Mobility)  bipedal (lower extremity) propulsion  -RW      Mobility Activities (Wheelchair)  forward propulsion  -RW      Mobility [place] Level (Wheelchair)  conditional independence  -RW      Distance Propelled in Feet (Wheelchair)  150  -RW      Recorded by [RW] Don Lobo PTA      Row Name 07/09/19 0725             Bathing Assessment/Treatment    Comment (Bathing)  pt reports completed shower w/ nursing   -RC      Recorded by [RC] Jossie Louis, LONGO/L      Row Name 07/09/19 0817 07/09/19 0725          Pain Scale: Numbers Pre/Post-Treatment    Pain Scale: Numbers, Pretreatment  0/10 - no pain  -RW  0/10 - no pain  -RC     Pain Scale: Numbers, Post-Treatment  0/10 - no pain  -RW  0/10 - no pain  -RC     Recorded by [RW] Don Lobo PTA [RC] Jossie Louis, LONGO/L     Row Name 07/09/19 0725             Upper Extremity Seated Therapeutic Exercise    Performed, Seated Upper Extremity (Therapeutic Exercise)  shoulder flexion/extension;elbow flexion/extension  -RC      Device, Seated Upper Extremity (Therapeutic Exercise)  free weights, barbell 1#  -RC      Expected Outcomes, Seated Upper Extremity (Therapeutic Exercise)  improve functional tolerance, self-care activity;improve performance, BADLs HEP  -RC      Sets/Reps Detail, Seated Upper Extremity (Therapeutic Exercise)  15x  -RC      Recorded by [RC] Jossie Louis LONGO/L      Row Name 07/09/19 0725             Aerobic Exercise Activity    Exercise Performed (Aerobic, Therapeutic Exercise)  arm bike  -RC      Expected Outcome (Aerobic, Therapeutic Exercise)  improve functional tolerance, self-care activity;improve performance, BADLs  -RC      Time (Aerobic, Therapeutic Exercise)  15  -RC      Recorded by [RC] Jossie Louis LONGO/L      Row Name 07/09/19 0817             Lower Extremity Seated Therapeutic  Exercise    Performed, Seated Lower Extremity (Therapeutic Exercise)  LAQ (long arc quad), knee extension;ankle dorsiflexion/plantarflexion  -RW      Exercise Type, Seated Lower Extremity (Therapeutic Exercise)  AROM (active range of motion)  -RW      Expected Outcomes, Seated Lower Extremity (Therapeutic Exercise)  improve functional tolerance, self-care activity;improve performance, gait skills;improve performance, transfer skills  -RW      Sets/Reps Detail, Seated Lower Extremity (Therapeutic Exercise)  20  -RW      Recorded by [RW] Don Lobo PTA      Row Name 07/09/19 0817             Lower Extremity Supine Therapeutic Exercise    Performed, Supine Lower Extremity (Therapeutic Exercise)  heel slides;quadriceps sets  -RW      Exercise Type, Supine Lower Extremity (Therapeutic Exercise)  AROM (active range of motion)  -RW      Expected Outcomes, Supine Lower Extremity (Therapeutic Exercise)  improve functional tolerance, self-care activity;improve performance, transfer skills;improve performance, gait skills  -RW      Sets/Reps Detail, Supine Lower Extremity (Therapeutic Exercise)  20  -RW      Recorded by [RW] Don Lobo PTA      Row Name 07/09/19 0817 07/09/19 0725          Positioning and Restraints    Pre-Treatment Position  sitting in chair/recliner  -RW  sitting in chair/recliner  -RC     Post Treatment Position  wheelchair  -RW  wheelchair  -RC     In Wheelchair  encouraged to call for assist;call light within reach;exit alarm on  -RW  with PT  -RC     Recorded by [RW] Don Lobo PTA [RC] Jossie Louis COTA/L     Row Name 07/09/19 0817             Daily Summary of Progress (PT)    Functional Goal Overall Progress: Physical Therapy  progressing toward functional goals as expected  -RW      Recorded by [RW] Don Lobo PTA      Row Name 07/09/19 0725             Daily Summary of Progress (OT)    Functional Goal Overall Progress: Occupational Therapy  progressing towards functional  goals slower than expected  -RC      Recommendations: Occupational Therapy  d/c home w/ family and HH to follow   -RC      Recorded by [RC] Jossie Louis LONGO/L        User Key  (r) = Recorded By, (t) = Taken By, (c) = Cosigned By    Initials Name Effective Dates    RW Yamil Don M, PTA 03/07/18 -     RC Jossie Louis LONGO/L 03/07/18 -           Wound 06/16/19 0914 Left hip incision (Active)   Dressing Appearance intact;dry 7/9/2019  7:52 AM   Closure Approximated;Adhesive closure strips 7/9/2019  7:52 AM   Periwound ecchymotic 7/9/2019  7:52 AM   Drainage Amount none 7/9/2019  7:52 AM         OT IRF GOALS     Row Name 07/09/19 0725 07/08/19 1426 07/05/19 1420       Transfer FIM Goals (OT-IRF)    Tub-Shower Transfer FIM Goal (OT-IRF)  Score of 6 (FIM)  -RC  Score of 6 (FIM)  -RC  Score of 6 (FIM)  -RC    Toilet Transfer FIM Goal (OT-IRF)  Score of 6 (FIM)  -RC  Score of 6 (FIM)  -RC  Score of 6 (FIM)  -RC    Time Frame (Transfer FIM Goals 1, OT-IRF)  long term goal (LTG);by discharge  -RC  long term goal (LTG);by discharge  -RC  long term goal (LTG);by discharge  -RC    Progress/Outcomes (Transfer FIM Goals, OT-IRF)  goal not met;progress slower than expected  -RC  goal not met;continuing progress toward goal  -RC  goal not met;continuing progress toward goal  -RC       Bathing FIM Goal (OT-IRF)    Bathing FIM Goal (OT-IRF)  Score of 6 (FIM)  -RC  Score of 6 (FIM)  -RC  Score of 6 (FIM)  -RC    Time Frame (Bathing FIM Goal, OT-IRF)  long term goal (LTG);by discharge  -RC  long term goal (LTG);by discharge  -RC  long term goal (LTG);by discharge  -RC    Progress/Outcomes (Bathing FIM Goal, OT-IRF)  goal not met;continuing progress toward goal;progress slower than expected  -RC  goal not met;continuing progress toward goal  -RC  goal not met;continuing progress toward goal  -RC       UB Dressing FIM Goal (OT-IRF)    Upper Body Dressing, FIM Goal, OT-IRF  Score of 7 (FIM)  -RC  Score of 7 (FIM)  -RC  Score  of 7 (FIM)  -RC    Time Frame (UB Dressing FIM Goal, OT-IRF)  long term goal (LTG);by discharge  -RC  long term goal (LTG);by discharge  -RC  long term goal (LTG);by discharge  -RC    Progress/Outcomes (UB Dressing FIM Goal, OT-IRF)  goal not met;continuing progress toward goal  -RC  goal not met;continuing progress toward goal  -RC  goal not met;continuing progress toward goal  -RC       LB Dressing FIM Goal (OT-IRF)    Lower Body Dressing, FIM Goal, OT-IRF  Score of 6 (FIM)  -RC  Score of 6 (FIM)  -RC  Score of 6 (FIM)  -RC    Time Frame (LB Dressing FIM Goal, OT-IRF)  long term goal (LTG);by discharge  -RC  long term goal (LTG);by discharge  -RC  long term goal (LTG);by discharge  -RC    Progress/Outcomes (LB Dressing FIM Goal, OT-IRF)  goal not met;progress slower than expected  -RC  goal not met;continuing progress toward goal  -RC  goal not met;continuing progress toward goal  -RC       Toileting FIM Goal (OT-IRF)    Toileting FIM Goal (OT-IRF)  Score of 6 (FIM)  -RC  Score of 6 (FIM)  -RC  Score of 6 (FIM)  -RC    Time Frame (Toileting FIM Goal, OT-IRF)  long term goal (LTG);by discharge  -RC  long term goal (LTG);by discharge  -RC  long term goal (LTG);by discharge  -RC    Progress/Outcomes (Toileting FIM Goal, OT-IRF)  goal not met;progress slower than expected  -RC  goal not met;continuing progress toward goal  -RC  goal not met;continuing progress toward goal  -RC    Row Name 07/04/19 1010 07/03/19 1315 07/02/19 1410       Transfer FIM Goals (OT-IRF)    Tub-Shower Transfer FIM Goal (OT-IRF)  Score of 6 (FIM)  -RC  Score of 6 (FIM)  -RC  Score of 6 (FIM)  -RC    Toilet Transfer FIM Goal (OT-IRF)  Score of 6 (FIM)  -RC  Score of 6 (FIM)  -RC  Score of 6 (FIM)  -RC    Time Frame (Transfer FIM Goals 1, OT-IRF)  long term goal (LTG);by discharge  -RC  long term goal (LTG);by discharge  -RC  long term goal (LTG);by discharge  -RC    Progress/Outcomes (Transfer FIM Goals, OT-IRF)  goal not met;continuing  progress toward goal  -RC  goal not met;continuing progress toward goal  -RC  goal not met;continuing progress toward goal  -RC       Bathing FIM Goal (OT-IRF)    Bathing FIM Goal (OT-IRF)  Score of 6 (FIM)  -RC  Score of 6 (FIM)  -RC  Score of 6 (FIM)  -RC    Time Frame (Bathing FIM Goal, OT-IRF)  long term goal (LTG);by discharge  -RC  long term goal (LTG);by discharge  -RC  long term goal (LTG);by discharge  -RC    Progress/Outcomes (Bathing FIM Goal, OT-IRF)  goal not met;continuing progress toward goal  -RC  goal not met;continuing progress toward goal  -RC  goal not met;continuing progress toward goal  -RC       UB Dressing FIM Goal (OT-IRF)    Upper Body Dressing, FIM Goal, OT-IRF  Score of 7 (FIM)  -RC  Score of 7 (FIM)  -RC  Score of 7 (FIM)  -RC    Time Frame (UB Dressing FIM Goal, OT-IRF)  long term goal (LTG);by discharge  -RC  long term goal (LTG);by discharge  -RC  long term goal (LTG);by discharge  -RC    Progress/Outcomes (UB Dressing FIM Goal, OT-IRF)  goal not met;continuing progress toward goal  -RC  goal not met;continuing progress toward goal  -RC  goal not met;continuing progress toward goal  -RC       LB Dressing FIM Goal (OT-IRF)    Lower Body Dressing, FIM Goal, OT-IRF  Score of 6 (FIM)  -RC  Score of 6 (FIM)  -RC  Score of 6 (FIM)  -RC    Time Frame (LB Dressing FIM Goal, OT-IRF)  long term goal (LTG);by discharge  -RC  long term goal (LTG);by discharge  -RC  long term goal (LTG);by discharge  -RC    Progress/Outcomes (LB Dressing FIM Goal, OT-IRF)  goal not met;continuing progress toward goal  -RC  goal not met;continuing progress toward goal  -RC  goal not met;continuing progress toward goal  -RC       Toileting FIM Goal (OT-IRF)    Toileting FIM Goal (OT-IRF)  Score of 6 (FIM)  -RC  Score of 6 (FIM)  -RC  Score of 6 (FIM)  -RC    Time Frame (Toileting FIM Goal, OT-IRF)  long term goal (LTG);by discharge  -RC  long term goal (LTG);by discharge  -RC  long term goal (LTG);by discharge  -RC     Progress/Outcomes (Toileting FIM Goal, OT-IRF)  goal not met;continuing progress toward goal  -RC  goal not met;continuing progress toward goal  -RC  goal not met;continuing progress toward goal  -RC    Row Name 07/01/19 1716 07/01/19 1141 06/29/19 0740       Transfer FIM Goals (OT-IRF)    Tub-Shower Transfer FIM Goal (OT-IRF)  Score of 6 (FIM)  -LM  Score of 6 (FIM)  -LM  Score of 6 (FIM)  -LW    Toilet Transfer FIM Goal (OT-IRF)  Score of 6 (FIM)  -LM  Score of 6 (FIM)  -LM  Score of 6 (FIM)  -LW    Time Frame (Transfer FIM Goals 1, OT-IRF)  long term goal (LTG);by discharge  -LM  long term goal (LTG);by discharge  -LM  long term goal (LTG);by discharge  -LW    Progress/Outcomes (Transfer FIM Goals, OT-IRF)  goal not met;continuing progress toward goal  -LM  goal not met;continuing progress toward goal  -LM  goal not met;continuing progress toward goal  -LW       Bathing FIM Goal (OT-IRF)    Bathing FIM Goal (OT-IRF)  Score of 6 (FIM)  -LM  Score of 6 (FIM)  -LM  Score of 6 (FIM)  -LW    Time Frame (Bathing FIM Goal, OT-IRF)  long term goal (LTG);by discharge  -LM  long term goal (LTG);by discharge  -LM  long term goal (LTG);by discharge  -LW    Progress/Outcomes (Bathing FIM Goal, OT-IRF)  goal not met;continuing progress toward goal  -LM  goal not met;continuing progress toward goal  -LM  goal not met;continuing progress toward goal  -LW       UB Dressing FIM Goal (OT-IRF)    Upper Body Dressing, FIM Goal, OT-IRF  Score of 7 (FIM)  -LM  Score of 7 (FIM)  -LM  Score of 7 (FIM)  -LW    Time Frame (UB Dressing FIM Goal, OT-IRF)  long term goal (LTG);by discharge  -LM  long term goal (LTG);by discharge  -LM  long term goal (LTG);by discharge  -LW    Progress/Outcomes (UB Dressing FIM Goal, OT-IRF)  goal not met;continuing progress toward goal  -LM  goal not met;continuing progress toward goal  -LM  goal not met;continuing progress toward goal  -LW       LB Dressing FIM Goal (OT-IRF)    Lower Body Dressing, FIM  Goal, OT-IRF  Score of 6 (FIM)  -LM  Score of 6 (FIM)  -LM  Score of 6 (FIM)  -LW    Time Frame (LB Dressing FIM Goal, OT-IRF)  long term goal (LTG);by discharge  -LM  long term goal (LTG);by discharge  -LM  long term goal (LTG);by discharge  -LW    Progress/Outcomes (LB Dressing FIM Goal, OT-IRF)  goal not met;continuing progress toward goal  -LM  goal not met;continuing progress toward goal  -LM  goal not met;continuing progress toward goal  -LW       Toileting FIM Goal (OT-IRF)    Toileting FIM Goal (OT-IRF)  Score of 6 (FIM)  -LM  Score of 6 (FIM)  -LM  Score of 6 (FIM)  -LW    Time Frame (Toileting FIM Goal, OT-IRF)  long term goal (LTG);by discharge  -LM  long term goal (LTG);by discharge  -LM  long term goal (LTG);by discharge  -LW    Progress/Outcomes (Toileting FIM Goal, OT-IRF)  goal not met;continuing progress toward goal  -LM  goal not met;continuing progress toward goal  -LM  goal not met;continuing progress toward goal  -LW    Row Name 06/29/19 0728 06/29/19 0600 06/28/19 0735       Transfer FIM Goals (OT-IRF)    Tub-Shower Transfer FIM Goal (OT-IRF)  Score of 6 (FIM)  -LW  Score of 6 (FIM)  -LW  Score of 6 (FIM)  -RC    Toilet Transfer FIM Goal (OT-IRF)  Score of 6 (FIM)  -LW  Score of 6 (FIM)  -LW  Score of 6 (FIM)  -RC    Time Frame (Transfer FIM Goals 1, OT-IRF)  long term goal (LTG);by discharge  -LW  long term goal (LTG);by discharge  -LW  long term goal (LTG);by discharge  -RC    Progress/Outcomes (Transfer FIM Goals, OT-IRF)  goal not met;continuing progress toward goal  -LW  goal not met;continuing progress toward goal  -LW  goal not met;continuing progress toward goal  -RC       Bathing FIM Goal (OT-IRF)    Bathing FIM Goal (OT-IRF)  Score of 6 (FIM)  -LW  Score of 6 (FIM)  -LW  Score of 6 (FIM)  -RC    Time Frame (Bathing FIM Goal, OT-IRF)  long term goal (LTG);by discharge  -LW  long term goal (LTG);by discharge  -LW  long term goal (LTG);by discharge  -RC    Progress/Outcomes (Bathing FIM  Goal, OT-IRF)  goal not met;continuing progress toward goal  -LW  goal not met;continuing progress toward goal  -LW  goal not met;continuing progress toward goal  -RC       UB Dressing FIM Goal (OT-IRF)    Upper Body Dressing, FIM Goal, OT-IRF  Score of 7 (FIM)  -LW  Score of 7 (FIM)  -LW  Score of 7 (FIM)  -RC    Time Frame (UB Dressing FIM Goal, OT-IRF)  long term goal (LTG);by discharge  -LW  long term goal (LTG);by discharge  -LW  long term goal (LTG);by discharge  -RC    Progress/Outcomes (UB Dressing FIM Goal, OT-IRF)  goal not met;continuing progress toward goal  -LW  goal not met;continuing progress toward goal  -LW  goal not met;continuing progress toward goal  -RC       LB Dressing FIM Goal (OT-IRF)    Lower Body Dressing, FIM Goal, OT-IRF  Score of 6 (FIM)  -LW  Score of 6 (FIM)  -LW  Score of 6 (FIM)  -RC    Time Frame (LB Dressing FIM Goal, OT-IRF)  long term goal (LTG);by discharge  -LW  long term goal (LTG);by discharge  -LW  long term goal (LTG);by discharge  -RC    Progress/Outcomes (LB Dressing FIM Goal, OT-IRF)  goal not met;continuing progress toward goal  -LW  goal not met;continuing progress toward goal  -LW  goal not met;continuing progress toward goal  -RC       Toileting FIM Goal (OT-IRF)    Toileting FIM Goal (OT-IRF)  Score of 6 (FIM)  -LW  Score of 6 (FIM)  -LW  Score of 6 (FIM)  -RC    Time Frame (Toileting FIM Goal, OT-IRF)  long term goal (LTG);by discharge  -LW  long term goal (LTG);by discharge  -LW  long term goal (LTG);by discharge  -RC    Progress/Outcomes (Toileting FIM Goal, OT-IRF)  goal not met;continuing progress toward goal  -LW  goal not met;continuing progress toward goal  -LW  goal not met;continuing progress toward goal  -RC    Row Name 06/27/19 0820             Transfer FIM Goals (OT-IRF)    Tub-Shower Transfer FIM Goal (OT-IRF)  Score of 6 (FIM)  -MR      Toilet Transfer FIM Goal (OT-IRF)  Score of 6 (FIM)  -MR      Time Frame (Transfer FIM Goals 1, OT-IRF)  long term  goal (LTG);by discharge  -MR      Progress/Outcomes (Transfer FIM Goals, OT-IRF)  goal not met  -MR         Bathing FIM Goal (OT-IRF)    Bathing FIM Goal (OT-IRF)  Score of 6 (FIM)  -MR      Time Frame (Bathing FIM Goal, OT-IRF)  long term goal (LTG);by discharge  -MR      Progress/Outcomes (Bathing FIM Goal, OT-IRF)  goal not met  -MR         UB Dressing FIM Goal (OT-IRF)    Upper Body Dressing, FIM Goal, OT-IRF  Score of 7 (FIM)  -MR      Time Frame (UB Dressing FIM Goal, OT-IRF)  long term goal (LTG);by discharge  -MR      Progress/Outcomes (UB Dressing FIM Goal, OT-IRF)  goal not met  -MR         LB Dressing FIM Goal (OT-IRF)    Lower Body Dressing, FIM Goal, OT-IRF  Score of 6 (FIM)  -MR      Time Frame (LB Dressing FIM Goal, OT-IRF)  long term goal (LTG);by discharge  -MR      Progress/Outcomes (LB Dressing FIM Goal, OT-IRF)  goal not met  -MR         Toileting FIM Goal (OT-IRF)    Toileting FIM Goal (OT-IRF)  Score of 6 (FIM)  -MR      Time Frame (Toileting FIM Goal, OT-IRF)  long term goal (LTG);by discharge  -MR      Progress/Outcomes (Toileting FIM Goal, OT-IRF)  goal not met  -MR        User Key  (r) = Recorded By, (t) = Taken By, (c) = Cosigned By    Initials Name Provider Type    Jossie George LONGO/L Occupational Therapy Assistant    Aye Stephenson LONGO/L Occupational Therapy Assistant    Jazz Melendez LONGO/L Occupational Therapy Assistant    Alycia Estrada, OT Occupational Therapist          Occupational Therapy Education     Title: PT OT SLP Therapies (Done)     Topic: Occupational Therapy (Done)     Point: ADL training (Done)     Description: Instruct learner(s) on proper safety adaptation and remediation techniques during self care or transfers.   Instruct in proper use of assistive devices.    Learning Progress Summary           Patient Acceptance, E,H, VU by  at 7/9/2019  3:42 PM    Comment:  reviewed and  issued home safety fall prevention and HEP  handouts     Acceptance, E, NR by RC at 7/9/2019  3:39 PM    Acceptance, E, NR by RC at 7/3/2019  2:03 PM    Acceptance, E, VU by LM at 7/1/2019 11:48 AM    Acceptance, E,TB,D, VU by LW at 6/29/2019  7:29 AM    Comment:  Educated pt on use of sock aid and reacher for dressing.    Acceptance, E, NR by RC at 6/28/2019  9:52 AM    Acceptance, E,TB, VU,NR by MR at 6/27/2019  1:15 PM    Comment:  Role of OT and POC.Benefit of activity, safety with t/f, AD/AE utilization to increase functional independence with ADLs.                   Point: Home exercise program (Done)     Description: Instruct learner(s) on appropriate technique for monitoring, assisting and/or progressing therapeutic exercises/activities.    Learning Progress Summary           Patient Acceptance, E,H, VU by RC at 7/9/2019  3:42 PM    Comment:  reviewed and  issued home safety fall prevention and HEP  handouts    Acceptance, E, NR by RC at 7/9/2019  3:39 PM    Acceptance, E, VU by NAUN at 7/1/2019 11:48 AM    Acceptance, E,TB,D, VU by LW at 6/29/2019  7:29 AM    Comment:  Educated pt on use of sock aid and reacher for dressing.                   Point: Precautions (Done)     Description: Instruct learner(s) on prescribed precautions during self-care and functional transfers.    Learning Progress Summary           Patient Acceptance, E,H, VU by RC at 7/9/2019  3:42 PM    Comment:  reviewed and  issued home safety fall prevention and HEP  handouts    Acceptance, E, NR by RC at 7/9/2019  3:39 PM    Acceptance, E, NR by RC at 7/8/2019  3:06 PM    Acceptance, E, NR by RC at 7/8/2019  2:41 PM    Acceptance, E, NR by RC at 7/5/2019  3:14 PM    Acceptance, E, NR by RC at 7/4/2019  2:22 PM    Acceptance, E, NR by RC at 7/3/2019  2:03 PM    Acceptance, E, NR by RC at 7/2/2019  3:57 PM    Acceptance, E, VU by LM at 7/1/2019 11:48 AM    Acceptance, E,TB,D, VU by LW at 6/29/2019  7:29 AM    Comment:  Educated pt on use of sock aid and reacher for dressing.    Acceptance, E, NR by   at 6/28/2019  9:52 AM    Acceptance, E,TB, VU,NR by MR at 6/27/2019  1:15 PM    Comment:  Role of OT and POC.Benefit of activity, safety with t/f, AD/AE utilization to increase functional independence with ADLs.                   Point: Body mechanics (Done)     Description: Instruct learner(s) on proper positioning and spine alignment during self-care, functional mobility activities and/or exercises.    Learning Progress Summary           Patient Acceptance, E,H, VU by  at 7/9/2019  3:42 PM    Comment:  reviewed and  issued home safety fall prevention and HEP  handouts    Acceptance, E, NR by  at 7/9/2019  3:39 PM    Acceptance, E, NR by  at 7/8/2019  3:06 PM    Acceptance, E, NR by  at 7/8/2019  2:41 PM    Acceptance, E, NR by  at 7/5/2019  3:14 PM    Acceptance, E, NR by  at 7/4/2019  2:22 PM    Acceptance, E, NR by  at 7/3/2019  2:03 PM    Acceptance, E, NR by  at 7/2/2019  3:57 PM    Acceptance, E, VU by LM at 7/1/2019 11:48 AM    Acceptance, E,TB,D, VU by LW at 6/29/2019  7:29 AM    Comment:  Educated pt on use of sock aid and reacher for dressing.    Acceptance, E, NR by  at 6/28/2019  9:52 AM    Acceptance, E,TB, VU,NR by MR at 6/27/2019  1:15 PM    Comment:  Role of OT and POC.Benefit of activity, safety with t/f, AD/AE utilization to increase functional independence with ADLs.                               User Key     Initials Effective Dates Name Provider Type Discipline     03/07/18 -  Jossie Louis COTA/L Occupational Therapy Assistant OT    LM 03/07/18 -  Aye Almeida COTA/L Occupational Therapy Assistant OT    LW 03/07/18 -  Jazz Nunn COTA/L Occupational Therapy Assistant OT    MR 04/03/18 -  Alycia Rich OT Occupational Therapist OT                  OT Recommendation and Plan  Anticipated Discharge Disposition (OT): home with assist, home with home health  Plan of Care Review  Plan of Care Reviewed With: patient  Daily Summary of Progress  (OT)  Functional Goal Overall Progress: Occupational Therapy: progressing towards functional goals slower than expected  Recommendations: Occupational Therapy: d/c home w/ family and HH to follow   Plan of Care Reviewed With: patient  IRF Plan of Care Review: progress ongoing, continue  Progress, Functional Goals: demonstrating adequate progress  Outcome Summary: pt did not meet OT goals as pt sba was recommended for all transfers, standing and ambulation. pt was modified I for grooming when seated. sufor ub dressing and ub bathing, sba for lb bathing and min @ for lb dressing. he is to d/c home w/ family and hhot     Outcome Measures     Row Name 07/09/19 0900 07/09/19 0725 07/08/19 1406       How much help from another person do you currently need...    Turning from your back to your side while in flat bed without using bedrails?  4  -RW  --  --    Moving from lying on back to sitting on the side of a flat bed without bedrails?  4  -RW  --  --    Moving to and from a bed to a chair (including a wheelchair)?  3  -RW  --  --    Standing up from a chair using your arms (e.g., wheelchair, bedside chair)?  3  -RW  --  --    Climbing 3-5 steps with a railing?  3  -RW  --  --    To walk in hospital room?  3  -RW  --  --    AM-PAC 6 Clicks Score (PT)  20  -RW  --  --       How much help from another is currently needed...    Putting on and taking off regular lower body clothing?  --  3  -RC  3  -RC    Bathing (including washing, rinsing, and drying)  --  3  -RC  3  -RC    Toileting (which includes using toilet bed pan or urinal)  --  3  -RC  3  -RC    Putting on and taking off regular upper body clothing  --  3  -RC  3  -RC    Taking care of personal grooming (such as brushing teeth)  --  4  -RC  3  -RC    Eating meals  --  4  -RC  4  -RC    AM-PAC 6 Clicks Score (OT)  --  20  -RC  19  -RC    Row Name 07/08/19 1026             How much help from another person do you currently need...    Turning from your back to your  side while in flat bed without using bedrails?  3  -KW      Moving from lying on back to sitting on the side of a flat bed without bedrails?  3  -KW      Moving to and from a bed to a chair (including a wheelchair)?  3  -KW      Standing up from a chair using your arms (e.g., wheelchair, bedside chair)?  3  -KW      Climbing 3-5 steps with a railing?  3  -KW      To walk in hospital room?  3  -KW      AM-PAC 6 Clicks Score (PT)  18  -KW         Functional Assessment    Outcome Measure Options  AM-PAC 6 Clicks Basic Mobility (PT)  -KW        User Key  (r) = Recorded By, (t) = Taken By, (c) = Cosigned By    Initials Name Provider Type    RW Don Loob, PTA Physical Therapy Assistant    Jossie George, LONGO/L Occupational Therapy Assistant    KW Lana Schumacher, PT Physical Therapist           Time Calculation:    Time Calculation- OT     Row Name 07/09/19 1543             Time Calculation- OT    OT Start Time  0725  -RC      OT Stop Time  0810  -RC      OT Time Calculation (min)  45 min  -      Total Timed Code Minutes- OT  45 minute(s)  -      OT Received On  07/09/19  -        User Key  (r) = Recorded By, (t) = Taken By, (c) = Cosigned By    Initials Name Provider Type     Jossie Louis, LONGO/L Occupational Therapy Assistant          Therapy Charges for Today     Code Description Service Date Service Provider Modifiers Qty    86362478876 HC OT THER PROC EA 15 MIN 7/8/2019 Jossie Louis, LONGO/L GO 2    13970019884 HC OT THERAPEUTIC ACT EA 15 MIN 7/8/2019 Jossie Louis, LONGO/L GO 1    70830777825 HC OT SELF CARE/MGMT/TRAIN EA 15 MIN 7/8/2019 Soumya Louisa G, LONGO/L GO 1    17681056094 HC OT THER PROC EA 15 MIN 7/8/2019 Jossie Louis G, LONGO/L GO 1    45808689006 HC OT THERAPEUTIC ACT EA 15 MIN 7/8/2019 Missy, Jossie G, LONGO/L GO 1    50653722436 HC OT THER PROC EA 15 MIN 7/9/2019 Jossie Louis, LONGO/L GO 1    97654120815 HC OT SELF CARE/MGMT/TRAIN EA 15 MIN 7/9/2019 Jossie Louis,  LONGO/L GO 2               Eval FIM FIM Goal  Discharge FIM Daily FIM           Grooming   6     Bathing   5     Dressing - Upper Body   5     Dressing - Lower Body   4     Toilet   5     Tub, Shower   4     Problem Solving   6      Social Interaction    6             Bed, Chair, W/C Transfer        Walking        Wheelchair Locomotion        Stairs                Comprehension        Expression        Memory                 OT Discharge Summary  Anticipated Discharge Disposition (OT): home with assist, home with home health  Reason for Discharge: Per MD order  Outcomes Achieved: Unable to make functional progress toward goals at this time  Discharge Destination: Home with home health, Home with assist    Jossie Louis, DONTA/JOVANNY  7/9/2019

## 2019-07-09 NOTE — DISCHARGE SUMMARY
[address] [address]  T - [phone]     Rehabilitation Discharge Summary       BRIEF OVERVIEW   PATIENT NAME: Kuldip Nevarez                      PHYSICIAN: Rogers Tompkins MD  : 1945  MRN: [id number]    ADMISSION/DISCHARGE INFO   Admitting Provider: Rogers Tompkins MD  Discharge Provider: Rogers Tompkins MD  ADMISSION DATE: 2019   DISCHARGE DATE: 2019    ADMISSION DIAGNOSES: Closed left hip fracture (CMS/[facility]) [S72.002A]  DISCHARGE DIAGNOSES:   Closed left hip fracture (CMS/HCC)    Frequent falls    Delirium    Pneumonia involving left lung    Pacemaker    Coronary artery disease    Stage 3 chronic kidney disease (CMS/HCC)    Acute respiratory failure with hypoxia and hypercapnia (CMS/[facility])    Primary insomnia    Dry mouth      Primary Care Physician at Discharge: Mike Draper [phone]      Secondary Discharge Diagnosis  Patient Active Problem List   Diagnosis Code   • S/p left hip fracture Z87.81   • Closed fracture of left hip (CMS/HCC) S72.002A   • Frequent falls R29.6   • Closed nondisplaced intertrochanteric fracture of left femur with routine healing S72.145D   • Delirium R41.0   • Pneumonia involving left lung J18.9   • Pacemaker Z95.0   • Coronary artery disease I25.10   • Disease of thyroid gland E07.9   • Stage 3 chronic kidney disease (CMS/HCC) N18.3   • Acute respiratory failure with hypoxia and hypercapnia (CMS/HCC) J96.01, J96.02   • Closed left hip fracture (CMS/[facility]) S72.002A   • Primary insomnia F51.01   • Dry mouth R68.2   • Chronic gouty arthropathy M1A.00X0       Discharge Disposition  Home with family and home health       Code Status at Discharge: full code  Discharge condition:   good     CONSULTS   Consult Orders (all) (From admission, onward)    Start     Ordered    19 0838  Inpatient Case Management  Consult  Once     Comments:  Family thinks they prefer care  tenders if possible   Provider:  (Not yet assigned)    07/08/19 0837    06/26/19 1439  Inpatient Orthopedic Surgery Consult  Once     Specialty:  Orthopedic Surgery  Provider:  Edward Harley MD    06/26/19 1438    06/26/19 1405  Inpatient consult to Nutrition  Once     Provider:  (Not yet assigned)    06/26/19 1404    06/26/19 1405  Inpatient Orthopedic Surgery Consult  Once,   Status:  Canceled     Specialty:  Orthopedic Surgery  Provider:  Edward Harley MD    06/26/19 1404    06/26/19 1405  Hospitalist (on-call MD unless specified)  Once     Specialty:  Hospitalist  Provider:  Gurwinder Meeks MD    06/26/19 1404    06/26/19 1405  Inpatient Rehab Admission Consult  Once     Provider:  (Not yet assigned)    06/26/19 1404    06/26/19 1405  Orthopedics (on-call MD unless specified)  Once     Specialty:  Orthopedic Surgery  Provider:  Edward Harley MD    06/26/19 1404          DETAILS OF HOSPITAL STAY    Functional Status:                ADMIT             Discharge   Eating                                          5                        6   Grooming                                    4                        6  Bathing                                         3                        5  Dressing upper body                    5                        5  Dressing lower body                     3                        4  Toileting                                        5                        6  Bladder Management                   4                        6  Bowel Management                     4                        6  Transfers:bed chair wheelchair    2                        5  Transfers: toilet                            4                        5  Transfers: tub/shower                  0                        4  Locomotion: walking                    1                        2  Locomotion: Wheelchair              5                        6  Locomotion: stairs                        0                         2  Comprehension                           6                         6  Expression                                   6                         6  Social interaction                         5                        6  Problem solving                           4                        6  Memory                                        6                        6                                          Rehabilitation Course admitted to the acute rehab unit and he has participated well.  During his stay we continue to taper his steroids and he has completed that course of steroids.  He has been maintained on Eliquis 2.5 mg for DVT prophylaxis.  At the time of discharge he has completed 24 days of Eliquis and will continue for another week at home.  He is worked well with therapy and made good progress towards discharge goals.  Transfers ambulation and activities of daily living have all improved.  He does have 2 steps to go into his house and he has been able to go up and down those 2 steps practice.  No equipment was needed at discharge.  He does have a walker at home.  Incision has been monitored during his stay and he has healed well staples were removed on about the 16th postsurgical day.      Heart Rate: 70  Resp: 18  BP: 128/64  Temp: 99.6 °F (37.6 °C)   Weight: 102 kg (224 lb 14.4 oz)    Pertinent Test Results:    Lab Results (last 72 hours)     Procedure Component Value Units Date/Time    Renal Function Panel [[id number]]  (Abnormal) Collected:  07/07/19 0509    Specimen:  Blood Updated:  07/07/19 0714     Glucose 121 mg/dL      BUN 37 mg/dL      Creatinine 1.66 mg/dL      Sodium 139 mmol/L      Potassium 4.3 mmol/L      Chloride 105 mmol/L      CO2 23.0 mmol/L      Calcium 9.0 mg/dL      Albumin 3.00 g/dL      Phosphorus 3.4 mg/dL      Anion Gap 11.0 mmol/L      BUN/Creatinine Ratio 22.3     eGFR Non African Amer 40 mL/min/1.73     Narrative:       GFR Normal >60  Chronic  Kidney Disease <60  Kidney Failure <15    CBC & Differential [[id number]] Collected:  07/07/19 0509    Specimen:  Blood Updated:  07/07/19 0629    Narrative:       The following orders were created for panel order CBC & Differential.  Procedure                               Abnormality         Status                     ---------                               -----------         ------                     CBC Auto Differential[[id number]]        Abnormal            Final result                 Please view results for these tests on the individual orders.    CBC Auto Differential [[id number]]  (Abnormal) Collected:  07/07/19 0509    Specimen:  Blood Updated:  07/07/19 0629     WBC 8.46 10*3/mm3      RBC 3.65 10*6/mm3      Hemoglobin 12.4 g/dL      Hematocrit 37.1 %      .6 fL      MCH 34.0 pg      MCHC 33.4 g/dL      RDW 14.7 %      RDW-SD 54.8 fl      MPV 11.0 fL      Platelets 129 10*3/mm3      Neutrophil % 74.1 %      Lymphocyte % 13.2 %      Monocyte % 9.0 %      Eosinophil % 3.0 %      Basophil % 0.1 %      Immature Grans % 0.6 %      Neutrophils, Absolute 6.27 10*3/mm3      Lymphocytes, Absolute 1.12 10*3/mm3      Monocytes, Absolute 0.76 10*3/mm3      Eosinophils, Absolute 0.25 10*3/mm3      Basophils, Absolute 0.01 10*3/mm3      Immature Grans, Absolute 0.05 10*3/mm3      nRBC 0.0 /100 WBC         Imaging Results (all)     None          Presenting Problem/History of Present Illness   Kuldip Nevarez is a 74 y.o. male   Patient is 74 y.o. male presented with recent history of a fall with a  fracture of the left hip.  He was admitted to the hospital, evaluated by orthopedics,  He was  admitted to the hospitalist team.  He underwent open reduction internal fixation of his left hip fracture by Dr. Harley on 6/16/19..  He became hypotensive and did have acute respiratory failure with hypoxia and hypercapnia.  He was admitted to the intensive care unit and remained on the ventilator during evaluation  treatment.  He was found to have a left upper lobe pneumonia and was treated with 7 days of Zosyn.  He was evaluated for pulmonary emboli also with negative work-up.  Ventilator was able to be tapered and he was extubated on June 17.  Eventually he improved and was able to be admitted to the medical floor.  He did participate with therapy.  During his stay he was evaluated by his cardiologist Dr. Grullon as well  Respiratory status has continued to improve antibiotics have been completed and he is being admitted to the acute rehab unit for intensive rehabilitation and close medical supervision.  He has been started on a tapering dose of prednisone on the day of ARU admission    Physical Exam at Discharge      He is alert and oriented in no distress.  ENT unremarkable  Neck supple no carotid bruits no JVD  Lungs are clear without rales rhonchi or wheezes, he had good respiratory effort and no increased work of breathing.  Heart rate regular without murmurs gallops or rubs  Abdomen is soft nontender good bowel sounds no rebound guarding or rigidity  Moves all extremities well trace to 1+ edema on the right.  Surgical incisions on the right well-healed no drainage staples have been removed prior to discharge.  Neurologically he is intact            DISPOSITION   Home with family and home health     DISCHARGE MEDICATIONS        Your medication list      START taking these medications      Instructions Last Dose Given Next Dose Due   acetaminophen 325 MG tablet  Commonly known as:  TYLENOL      Take 2 tablets by mouth Every 4 (Four) Hours As Needed for Mild Pain  or Moderate Pain .          CONTINUE taking these medications      Instructions Last Dose Given Next Dose Due   allopurinol 300 MG tablet  Commonly known as:  ZYLOPRIM      Take 300 mg by mouth Daily.       apixaban 2.5 MG tablet tablet  Commonly known as:  ELIQUIS      Take 1 tablet by mouth Every 12 (Twelve) Hours for 7 days.       aspirin 81 MG chewable  tablet      Chew 81 mg Daily.       colchicine 0.6 MG tablet      Take 0.6 mg by mouth Daily.       docusate sodium 100 MG capsule      Take 100 mg by mouth 2 (Two) Times a Day As Needed for Constipation.       isosorbide mononitrate 30 MG 24 hr tablet  Commonly known as:  IMDUR      Take 30 mg by mouth Daily.       levothyroxine 75 MCG tablet  Commonly known as:  SYNTHROID, LEVOTHROID      Take 75 mcg by mouth Daily.       omeprazole 40 MG capsule  Commonly known as:  priLOSEC      Take 40 mg by mouth Every Other Day.       simvastatin 40 MG tablet  Commonly known as:  ZOCOR      Take 40 mg by mouth Daily.       VITAMIN B 12 PO      Take 1 tablet by mouth Daily.          STOP taking these medications    magic mouthwash with nystatin        predniSONE 10 MG tablet  Commonly known as:  DELTASONE              Where to Get Your Medications      These medications were sent to [facility], [facility] - [place], [place] - [address] - [phone]  - [phone]   [address] [facility] [place] [address]    Phone:  [phone] ·   apixaban 2.5 MG tablet tablet     Information about where to get these medications is not yet available    Ask your nurse or doctor about these medications  · acetaminophen 325 MG tablet         INSTRUCTIONS   Activity: as tolerated but no driving    Diet: cardiac    Special Instructions:   no equipment, home health ordered    FOLLOW UP   Additional Instructions for the Follow-ups that You Need to Schedule     Ambulatory Referral to Home Health   As directed      Face to Face Visit Date:  7/8/2019    Follow-up Provider for Plan of Care?:  I treated the patient in an acute care facility and will not continue treatment after discharge.    Follow-up Provider:  PATY STOUT [[id number]]    Reason/Clinical Findings:  hip fracture, poor mobility    Describe mobility limitations that make leaving home difficult:  hip fracture post op respiratory failure    Nursing/Therapeutic  Services Requested:  Physical Therapy Occupational Therapy    Frequency:  1 Week 1            Contact information for follow-up providers     Mike Draper MD Follow up in 1 week(s).    Specialty:  Internal Medicine  Contact information:  [address] [facility] [address] [phone]             Edward Harley MD Follow up in 2 week(s).    Specialty:  Orthopedic Surgery  Contact information:  [address]  [place] [address]  [phone]                   Contact information for after-discharge care     Home Medical Care     [facility] .    Service:  Home Health Services  Contact information:  [address] [place] [address] [phone]                           Future Appointments   Date Time Provider Department Center   7/22/2019 10:00 AM Edward Harley MD [facility] None                    This document has been electronically signed by Rogers Tompkins MD on July 9, 2019 10:15 AM

## 2019-07-09 NOTE — PLAN OF CARE
Problem: Patient Care Overview  Goal: Plan of Care Review  Outcome: Unable to achieve outcome(s) by discharge Date Met: 07/09/19 07/09/19 0918   Patient Care Overview   IRF Plan of Care Review discharge pending   Progress, Functional Goals preparing for discharge   OTHER   Outcome Summary pt met 1/4 goals. still with antalgic gt cga with fww x 70'. stairs with lon hr cga. HEP provided and HHPT to follow. will dc home today with family.

## 2019-07-15 NOTE — TELEPHONE ENCOUNTER
PAULINE WARREN FROM [facility] CALLED NEEDING TO KNOW IF AND WHAT ANY RESTRICTIONS ARE FOR ZURI PLEASE      MIK BACK #  [phone]

## 2019-07-15 NOTE — TELEPHONE ENCOUNTER
He has a broken femur... The restrictions should have been established clearly at the time he left the rehab facility.  He is weight bearing as tolerated

## 2019-07-22 NOTE — PROGRESS NOTES
"The patient is a 74 y.o. male who presents for followup.    Chief Complaint   Patient presents with   • Left Hip - Follow-up, Fracture       HPI: f/u left femur fx. Surgery done on 6/16/2019.   xrays done today. Patient sates pain score is at a 4 today,     74 y left intertrochanteric femur fracture, recovering from fracture surgery performed 6/16/2019, complains of low level pain when ambulating.      Current Outpatient Medications:   •  acetaminophen (TYLENOL) 325 MG tablet, Take 2 tablets by mouth Every 4 (Four) Hours As Needed for Mild Pain  or Moderate Pain ., Disp: 1 bottle, Rfl: prn  •  allopurinol (ZYLOPRIM) 300 MG tablet, Take 300 mg by mouth Daily., Disp: , Rfl:   •  aspirin 81 MG chewable tablet, Chew 81 mg Daily., Disp: , Rfl:   •  colchicine 0.6 MG tablet, Take 0.6 mg by mouth Daily., Disp: , Rfl:   •  Cyanocobalamin (VITAMIN B 12 PO), Take 1 tablet by mouth Daily., Disp: , Rfl:   •  docusate sodium 100 MG capsule, Take 100 mg by mouth 2 (Two) Times a Day As Needed for Constipation., Disp: , Rfl:   •  isosorbide mononitrate (IMDUR) 30 MG 24 hr tablet, Take 30 mg by mouth Daily., Disp: , Rfl:   •  levothyroxine (SYNTHROID, LEVOTHROID) 75 MCG tablet, Take 75 mcg by mouth Daily., Disp: , Rfl:   •  omeprazole (priLOSEC) 40 MG capsule, Take 40 mg by mouth Every Other Day., Disp: , Rfl:   •  simvastatin (ZOCOR) 40 MG tablet, Take 40 mg by mouth Daily., Disp: , Rfl:     Allergies   Allergen Reactions   • Tape Rash        ROS:  No fevers or chills.  No nausea or vomiting    PHYSICAL EXAM:    Vitals:    07/22/19 1105   Weight: 102 kg (224 lb)   Height: 182.9 cm (72\")   PainSc:   4       GAIT:     []  Normal  []  Antalgic    Assistive device: []  None  []  Walker     []  Crutches  []  Cane     []  Wheelchair  []  Stretcher    Patient is awake and alert, answers questions appropriately, and is in no apparent distress.    In wheelchair  Incision healed.      Xr Hip With Or Without Pelvis 2 - 3 View Left    Result " Date: 7/22/2019  Narrative: Ordering Provider:  Edward Harley MD Ordering Diagnosis/Indication:  Closed fracture of left hip with routine healing, subsequent encounter Procedure:  XR HIP W OR WO PELVIS 2-3 VIEW LEFT Exam Date:  7/22/19 RELEVANT PRIOR IMAGES:  XR Hip With or Without Pelvis 2 - 3 View Left 06/15/2019 [id number] Final COMPARISON:  Todays X-rays were compared to previous images dated 6/15/2019.     Impression:   Left proximal femur aligned, intertrochanteric femur fracture, fracutre appliance in good position, bone quality is decreased, alignment is good, hip joint space is preserved Bone quality: decreased Alignment: hip joint alignment is good Fracture: intertrochanteric fracture of the left femur Soft tissue: calcifications of soft tissues.       ASSESSMENT:  Diagnoses and all orders for this visit:    Closed fracture of left hip with routine healing, subsequent encounter        PLAN:    Weight bearing as tolerated with walker, may transition to cane with therapy.  Xray left hip    Return in about 6 weeks (around 9/2/2019).    Edward Harley MD

## 2019-08-19 PROBLEM — R55 NEAR SYNCOPE: Status: ACTIVE | Noted: 2019-01-01

## 2019-08-19 NOTE — ED PROVIDER NOTES
"Subjective   Patient is a 74-year-old  male presenting to [facility] ED due to fall.  Patient has CMH of left hip replacement 7 weeks ago, CAD, pacemaker.  Patient reported falling yesterday and this morning.  Patient reported that home health nurse reported that his blood pressure was low when standing up.  Patient reported that after home health physical therapy session was going to the car to get breakfast, put his walker in the vehicle, and felt \"weird\".  Patient endorsed that he fell backwards, did not hit his head or lose consciousness.    Patient denies chest pain, chest pressure, fever, chills, nausea vomiting or diarrhea at this time.            Review of Systems   Constitutional: Negative for activity change, appetite change, chills, fatigue and fever.   HENT: Negative for congestion, hearing loss, sinus pressure, sinus pain, sneezing, sore throat and trouble swallowing.    Eyes: Negative for pain, discharge and itching.   Respiratory: Negative for apnea, cough, choking, chest tightness, shortness of breath, wheezing and stridor.    Cardiovascular: Negative for chest pain, palpitations and leg swelling.   Gastrointestinal: Negative for abdominal distention, abdominal pain, anal bleeding, constipation, diarrhea, nausea and vomiting.   Genitourinary: Negative for difficulty urinating, dysuria, enuresis and flank pain.   Musculoskeletal: Positive for arthralgias. Negative for back pain and gait problem.   Skin: Negative for color change.   Neurological: Negative for dizziness, seizures, syncope, facial asymmetry, light-headedness, numbness and headaches.   Psychiatric/Behavioral: Negative for agitation and behavioral problems.       Past Medical History:   Diagnosis Date   • Arthritis    • Cancer (CMS/HCC)     colon, skin   • Coronary artery disease    • Disease of thyroid gland    • History of transfusion    • MI (myocardial infarction) (CMS/HCC)     2003   • Pacemaker    • Sleep apnea        Allergies " "  Allergen Reactions   • Tape Rash       Past Surgical History:   Procedure Laterality Date   • APPENDECTOMY     • COLON SURGERY     • COLONOSCOPY N/A 2017    Procedure: COLONOSCOPY;  Surgeon: Barrie Jarrett DO;  Location: [facility] ENDOSCOPY;  Service:    • CORONARY ANGIOPLASTY WITH STENT PLACEMENT     • FEMUR OPEN REDUCTION INTERNAL FIXATION Left 2019    Procedure: FEMUR OPEN REDUCTION INTERNAL FIXATION;  Surgeon: Edward Harley MD;  Location: [facility] OR;  Service: Orthopedics   • KIDNEY STONE SURGERY     • PACEMAKER IMPLANTATION     • SALIVARY GLAND EXCISION Left     Left neck due to skin cancer with resultant chronic dry mouth       Family History   Problem Relation Age of Onset   • Coronary artery disease Mother    • Coronary artery disease Father        Social History     Socioeconomic History   • Marital status:      Spouse name: Not on file   • Number of children: Not on file   • Years of education: Not on file   • Highest education level: Not on file   Occupational History   • Occupation: [employer]     Employer: [email or web address]     Comment: Retired   Tobacco Use   • Smoking status: Former Smoker     Years: 45.00     Last attempt to quit:      Years since quittin.6   • Smokeless tobacco: Never Used   Substance and Sexual Activity   • Alcohol use: No   • Drug use: No   • Sexual activity: Defer           Objective    BP (!) 78/53 (Patient Position: Standing)   Pulse 78   Temp 97.9 °F (36.6 °C) (Oral)   Resp 18   Ht 182.9 cm (72\")   Wt 92.9 kg (204 lb 14.4 oz)   SpO2 92%   BMI 27.79 kg/m²     Physical Exam   Constitutional: He is oriented to person, place, and time. He appears well-developed and well-nourished. No distress.   HENT:   Head: Normocephalic and atraumatic.   Right Ear: External ear normal.   Left Ear: External ear normal.   Nose: Nose normal.   Mouth/Throat: Oropharynx is clear and moist.   Eyes: EOM are normal. Pupils are equal, round, and reactive to light. " Right eye exhibits no discharge. Left eye exhibits no discharge.   Neck: Normal range of motion. Neck supple. No tracheal deviation present. No thyromegaly present.   Cardiovascular: Normal rate, regular rhythm, normal heart sounds and intact distal pulses.   Pulmonary/Chest: Effort normal and breath sounds normal. No stridor. No respiratory distress. He has no wheezes. He has no rales.   Abdominal: Soft. Bowel sounds are normal. He exhibits no distension. There is no tenderness.   Musculoskeletal: Normal range of motion. He exhibits no edema, tenderness or deformity.   Lymphadenopathy:     He has no cervical adenopathy.   Neurological: He is alert and oriented to person, place, and time. No cranial nerve deficit.   Skin: Skin is warm and dry. He is not diaphoretic.   Psychiatric: He has a normal mood and affect.       Procedures           ED Course  ED Course as of Aug 19 1607   Mon Aug 19, 2019   1504 Prior relevant exam: Left hip July 22, 2019. Marilu 15, 2019.    Healing intertrochanteric fracture left femoral neck stabilized  by a lag screw through the femoral neck attached to a lateral  compression plate. Fracture line also extends into the greater  trochanter.    The fracture is in good alignment without angulation or  deformity. The fracture line is much less pronounced in  comparison with prereduction views.    There are degenerative changes of the pubic symphysis. The bony  pelvis is otherwise unremarkable.     There is no evidence of any acute or new fractures.  There are no  acute traumatic changes.  [AM]   1537 Age-related atrophy and microvascular ischemic changes in the  periventricular white matter.  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  No evidence of intracranial hemorrhage, mass effect or large  acute infarct.    Electronically signed by:  Jeremi Duarte MD  8/19/2019 3:22 PM CDT    [AM]   1605 Positive orthostatics  [AM]   1606 Spoke with on-call hospitalist who agreed to further evaluate and  treat patient  [AM]      ED Course User Index  [AM] Igor Cline MD                  MDM  Number of Diagnoses or Management Options  Near syncope:      Amount and/or Complexity of Data Reviewed  Clinical lab tests: reviewed  Tests in the radiology section of CPT®: reviewed  Discuss the patient with other providers: yes          Final diagnoses:   Near syncope      Signed,   Igor Cline MD  Family Medicine Resident PGY2  [facility]        This document has been electronically signed by Igor Cline MD on August 19, 2019 4:07 PM           Igor Cline MD  Resident  08/19/19 [id number]

## 2019-08-19 NOTE — H&P
[facility] Medicine Admission      Date of Admission: 8/19/2019      Primary Care Physician: Mike Draper MD      Chief Complaint: dizziness, falls     HPI: 74 year old  male with past medical history of CAD, HTN, colon cancer, skin cancer, NAFISA, hypothyroidism who presented on 8-19-19 with complaints of recent falls and dizziness.  He reports that he experienced a fall yesterday in his bedroom and today on his driveway.  He denies loss of consciousness and reports that he suddenly felt dizzy and lightheaded while going to his car and fell backwards onto his bottom.  He reports that he was seen by home health today as he was scheduled for PT and OT and that home health staff checked orthostatic vital signs and noted that his blood pressure was dropping while standing.  He denies any sort of complaints such as nausea, vomiting, diarrhea, dyspnea, chest pain, palpitations.  His wife reports he has had somewhat poor oral intake recently.  He is being observed related to near syncope for further evaluation.      Concurrent Medical History:  has a past medical history of Arthritis, Cancer (CMS/[facility]), Coronary artery disease, Disease of thyroid gland, History of transfusion, MI (myocardial infarction) (CMS/[facility]), Pacemaker, and Sleep apnea.    Past Surgical History:  has a past surgical history that includes Appendectomy; Kidney stone surgery; Colon surgery; Colonoscopy (N/A, 4/19/2017); Femur Open Reduction Internal Fixation (Left, 6/16/2019); Salivary Gland Excision (Left); Coronary angioplasty with stent; and Pacemaker Implantation.    Family History: family history includes Coronary artery disease in his father and mother.    Social History:  reports that he quit smoking about 16 years ago. He quit after 45.00 years of use. He has never used smokeless tobacco. He reports that he does not drink alcohol or use drugs.    Allergies:   Allergies   Allergen Reactions    • Tape Rash       Medications:   Prior to Admission medications    Medication Sig Start Date End Date Taking? Authorizing Provider   acetaminophen (TYLENOL) 325 MG tablet Take 2 tablets by mouth Every 4 (Four) Hours As Needed for Mild Pain  or Moderate Pain . 7/9/19   Rogers Tompkins MD   allopurinol (ZYLOPRIM) 300 MG tablet Take 300 mg by mouth Daily. 2/14/17   Raleigh Lorenzana MD   aspirin 81 MG chewable tablet Chew 81 mg Daily.    Raleigh Lorenzana MD   colchicine 0.6 MG tablet Take 0.6 mg by mouth Daily. 9/29/16   Raleigh Lorenzana MD   Cyanocobalamin (VITAMIN B 12 PO) Take 1 tablet by mouth Daily.    Raleigh Lorenzana MD   docusate sodium 100 MG capsule Take 100 mg by mouth 2 (Two) Times a Day As Needed for Constipation. 6/26/19   Sheree Gracia APRN   isosorbide mononitrate (IMDUR) 30 MG 24 hr tablet Take 30 mg by mouth Daily. 9/29/16   Raleigh Lorenzana MD   levothyroxine (SYNTHROID, LEVOTHROID) 75 MCG tablet Take 75 mcg by mouth Daily.    Raleigh Lorenzana MD   omeprazole (priLOSEC) 40 MG capsule Take 40 mg by mouth Every Other Day. 2/14/17   Raleigh Lorenzana MD   simvastatin (ZOCOR) 40 MG tablet Take 40 mg by mouth Daily. 2/14/17   Raleigh Lorenzana MD       Review of Systems:  Review of Systems   Constitutional: Positive for appetite change. Negative for chills, fatigue and fever.   Respiratory: Negative for cough, shortness of breath and wheezing.    Cardiovascular: Negative for chest pain, palpitations and leg swelling.   Gastrointestinal: Negative for abdominal pain, diarrhea, nausea and vomiting.   Musculoskeletal: Negative for back pain and neck pain.   Skin: Negative for pallor.   Neurological: Positive for dizziness and light-headedness. Negative for syncope, speech difficulty, weakness and headaches.   Psychiatric/Behavioral: Negative for confusion. The patient is not nervous/anxious.             Physical Exam:   Temp:  [97.9 °F (36.6 °C)] 97.9 °F (36.6  °C)  Heart Rate:  [62-79] 79  Resp:  [16-18] 18  BP: ()/(53-89) 157/89  Physical Exam   Constitutional: He is oriented to person, place, and time. He appears well-developed and well-nourished. No distress.   HENT:   Head: Normocephalic and atraumatic.   Eyes: Conjunctivae and lids are normal.   Neck: Normal range of motion. Neck supple.   Cardiovascular: Normal rate, normal heart sounds and intact distal pulses.   Pulmonary/Chest: Effort normal and breath sounds normal. No respiratory distress. He has no wheezes.   Abdominal: Soft. Bowel sounds are normal. He exhibits no distension. There is no tenderness.   Musculoskeletal: Normal range of motion. He exhibits no edema.   Neurological: He is alert and oriented to person, place, and time.   Skin: Skin is warm and dry. He is not diaphoretic.   Psychiatric: He has a normal mood and affect. His behavior is normal.   Nursing note and vitals reviewed.        Results Reviewed:  I have personally reviewed current lab, radiology, and data and agree with results.  Lab Results (last 24 hours)     Procedure Component Value Units Date/Time    Troponin [[id number]]  (Normal) Collected:  08/19/19 1425    Specimen:  Blood Updated:  08/19/19 1557     Troponin T <0.010 ng/mL     Narrative:       Troponin T Reference Range:  <= 0.03 ng/mL-   Negative for AMI  >0.03 ng/mL-     Abnormal for myocardial necrosis.  Clinicians would have to utilize clinical acumen, EKG, Troponin and serial changes to determine if it is an Acute Myocardial Infarction or myocardial injury due to an underlying chronic condition.     [place] Draw [[id number]] Collected:  08/19/19 1425    Specimen:  Blood Updated:  08/19/19 1530    Narrative:       The following orders were created for panel order [place] Draw.  Procedure                               Abnormality         Status                     ---------                               -----------         ------                     Light Blue Top[[id number]]                                    Final result               Green Top (Gel)[[id number]]                                  Final result               Lavender Top[[id number]]                                     Final result               Gold Top - SST[[id number]]                                   Final result                 Please view results for these tests on the individual orders.    Light Blue Top [[id number]] Collected:  08/19/19 1425    Specimen:  Blood Updated:  08/19/19 1530     Extra Tube hold for add-on     Comment: Auto resulted       Green Top (Gel) [[id number]] Collected:  08/19/19 1425    Specimen:  Blood Updated:  08/19/19 1530     Extra Tube Hold for add-ons.     Comment: Auto resulted.       Lavender Top [[id number]] Collected:  08/19/19 1425    Specimen:  Blood Updated:  08/19/19 1530     Extra Tube hold for add-on     Comment: Auto resulted       Gold Top - SST [[id number]] Collected:  08/19/19 1425    Specimen:  Blood Updated:  08/19/19 1530     Extra Tube Hold for add-ons.     Comment: Auto resulted.       Comprehensive Metabolic Panel [[id number]]  (Abnormal) Collected:  08/19/19 1425    Specimen:  Blood Updated:  08/19/19 1450     Glucose 94 mg/dL      BUN 21 mg/dL      Creatinine 1.44 mg/dL      Sodium 139 mmol/L      Potassium 4.8 mmol/L      Chloride 101 mmol/L      CO2 26.0 mmol/L      Calcium 10.2 mg/dL      Total Protein 7.4 g/dL      Albumin 3.60 g/dL      ALT (SGPT) 14 U/L      AST (SGOT) 16 U/L      Alkaline Phosphatase 223 U/L      Total Bilirubin 0.7 mg/dL      eGFR Non African Amer 48 mL/min/1.73      Globulin 3.8 gm/dL      A/G Ratio 0.9 g/dL      BUN/Creatinine Ratio 14.6     Anion Gap 12.0 mmol/L     Narrative:       GFR Normal >60  Chronic Kidney Disease <60  Kidney Failure <15    CBC & Differential [[id number]] Collected:  08/19/19 1425    Specimen:  Blood Updated:  08/19/19 1431    Narrative:       The following orders were created for panel order CBC & Differential.  Procedure                                Abnormality         Status                     ---------                               -----------         ------                     CBC Auto Differential[[id number]]        Abnormal            Final result                 Please view results for these tests on the individual orders.    CBC Auto Differential [[id number]]  (Abnormal) Collected:  08/19/19 1425    Specimen:  Blood Updated:  08/19/19 1431     WBC 8.33 10*3/mm3      RBC 4.26 10*6/mm3      Hemoglobin 14.0 g/dL      Hematocrit 40.8 %      MCV 95.8 fL      MCH 32.9 pg      MCHC 34.3 g/dL      RDW 13.4 %      RDW-SD 47.2 fl      MPV 9.2 fL      Platelets 209 10*3/mm3      Neutrophil % 69.6 %      Lymphocyte % 16.2 %      Monocyte % 10.0 %      Eosinophil % 3.2 %      Basophil % 0.4 %      Immature Grans % 0.6 %      Neutrophils, Absolute 5.80 10*3/mm3      Lymphocytes, Absolute 1.35 10*3/mm3      Monocytes, Absolute 0.83 10*3/mm3      Eosinophils, Absolute 0.27 10*3/mm3      Basophils, Absolute 0.03 10*3/mm3      Immature Grans, Absolute 0.05 10*3/mm3      nRBC 0.0 /100 WBC         Imaging Results (last 24 hours)     Procedure Component Value Units Date/Time    CT Head Without Contrast [[id number]] Collected:  08/19/19 1456     Updated:  08/19/19 1523    Narrative:       EXAMINATION:  CT SCAN OF THE HEAD WITHOUT INTRAVENOUS CONTRAST    CLINICAL INFORMATION:  Injury, pain    This exam was performed using radiation doses that are as low as  reasonably achievable (ALARA).  This exam was performed according to our departmental dose  optimization program, which includes automated exposure control,  adjustment of the mA and/or KV according to patient size and/or  use of iterative reconstruction technique.    COMPARISON: 6/19/2019    TECHNIQUE:  Axial images from skull base to vertex.        FINDINGS:  There appears to be an old small infarct in the right occipital  lobe which appears unchanged. There is mild diffuse parenchymal  volume  loss.  Foci of decreased attenuation are seen in the periventricular  white matter, likely due to microvascular ischemia.    There is no evidence of intracranial hemorrhage, parenchymal  mass, midline shift, or focal mass effect.  There is no hydrocephalus or effacement of the basilar cisterns.    There is no extra-axial hemorrhage or collection identified.    The mastoid air cells and visualized paranasal sinuses appear  clear.          Impression:       Age-related atrophy and microvascular ischemic changes in the  periventricular white matter.  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  No evidence of intracranial hemorrhage, mass effect or large  acute infarct.    Electronically signed by:  Jeremi Duarte MD  8/19/2019 3:22 PM CDT  Workstation: [id number]    XR Hip With or Without Pelvis 2 - 3 View Left [[id number]] Collected:  08/19/19 1429     Updated:  08/19/19 1447    Narrative:       Procedure:  Left hip        Indication:  Trauma, pain.   .    Technique:  Two views left hip. Single frontal view pelvis.   .    Prior relevant exam: Left hip July 22, 2019. Marilu 15, 2019.    Healing intertrochanteric fracture left femoral neck stabilized  by a lag screw through the femoral neck attached to a lateral  compression plate. Fracture line also extends into the greater  trochanter.    The fracture is in good alignment without angulation or  deformity. The fracture line is much less pronounced in  comparison with prereduction views.    There are degenerative changes of the pubic symphysis. The bony  pelvis is otherwise unremarkable.     There is no evidence of any acute or new fractures.  There are no  acute traumatic changes.      Impression:       CONCLUSION: As above.    Electronically signed by:  Adriano Chavez MD  8/19/2019 2:46 PM CDT  Workstation: [id number]            Assessment:    Active Hospital Problems    Diagnosis   • Near syncope   • Pacemaker     [employer] dual-chamber pacemaker implanted December 2016 followed  by Dr. Grullon     • Coronary artery disease   • Disease of thyroid gland   • Stage 3 chronic kidney disease (CMS/HCC)             Plan:  1. Near syncope: orthostatic vital signs, 6/2019 echo reviewed.  Plan for carotid US.  Ct of head negative for any acute findings. Compression stockings.  Consider tilt table if no improvement with hydration.  2. Hx pacemaker implantation: plan for pacemaker interrogation.   3. CAD: continue ASA, Imdur, statin.  4. Hypothyroidism: continue Synthroid.  5. Stage 3 CKD: baseline creatinine of approximately 1.5-1.6.  Currently 1.4.     Further orders will depend upon hospital course.  Plan of care discussed with patient, wife, and son at bedside.  Code status confirmed and patient wishes for full code status.          This document has been electronically signed by NORMAN Mohamud on August 19, 2019 6:03 PM

## 2019-08-20 NOTE — NURSING NOTE
Orthostatic Blood Pressures  131/80 laying  97/57 sitting  72/41 standing  Pt stated he was starting to get dizzy while standing

## 2019-08-20 NOTE — DISCHARGE SUMMARY
[facility] Medicine Services  DISCHARGE SUMMARY       Date of Admission: 8/19/2019  Date of Discharge:  8/20/2019  Primary Care Physician: Mike Draper MD    Presenting Problem/History of Present Illness:  Near syncope [R55]     Final Discharge Diagnoses:  Active Hospital Problems    Diagnosis   • Near syncope   • Pacemaker     [employer] dual-chamber pacemaker implanted December 2016 followed by Dr. Grullon     • Coronary artery disease   • Disease of thyroid gland   • Stage 3 chronic kidney disease (CMS/[facility])       Consults:   Consults     Date and Time Order Name Status Description    8/19/2019 1605 Inpatient Internal Medicine Consult            Procedures Performed:                 Pertinent Test Results:   Lab Results (last 24 hours)     Procedure Component Value Units Date/Time    Urinalysis, Microscopic Only - Urine, Clean Catch [[id number]]  (Abnormal) Collected:  08/20/19 1617    Specimen:  Urine, Clean Catch Updated:  08/20/19 1848     RBC, UA 6-12 /HPF      WBC, UA 0-2 /HPF      Bacteria, UA None Seen /HPF      Squamous Epithelial Cells, UA None Seen /HPF      Hyaline Casts, UA 0-2 /LPF      Methodology Automated Microscopy    Urinalysis With Culture If Indicated - Urine, Clean Catch [[id number]]  (Abnormal) Collected:  08/20/19 1617    Specimen:  Urine, Clean Catch Updated:  08/20/19 1848     Color, UA Yellow     Appearance, UA Clear     pH, UA 6.0     Specific Gravity, UA 1.016     Glucose, UA Negative     Ketones, UA Negative     Bilirubin, UA Negative     Blood, UA Small (1+)     Protein, UA Negative     Leuk Esterase, UA Negative     Nitrite, UA Negative     Urobilinogen, UA 1.0 E.U./dL    Basic Metabolic Panel [[id number]]  (Abnormal) Collected:  08/20/19 0537    Specimen:  Blood Updated:  08/20/19 0625     Glucose 93 mg/dL      BUN 22 mg/dL      Creatinine 1.35 mg/dL      Sodium 139 mmol/L      Potassium 4.5 mmol/L      Chloride 103 mmol/L      CO2 26.0  mmol/L      Calcium 9.4 mg/dL      eGFR Non African Amer 51 mL/min/1.73      BUN/Creatinine Ratio 16.3     Anion Gap 10.0 mmol/L     Narrative:       GFR Normal >60  Chronic Kidney Disease <60  Kidney Failure <15    CBC & Differential [[id number]] Collected:  08/20/19 0537    Specimen:  Blood Updated:  08/20/19 0602    Narrative:       The following orders were created for panel order CBC & Differential.  Procedure                               Abnormality         Status                     ---------                               -----------         ------                     CBC Auto Differential[[id number]]        Abnormal            Final result                 Please view results for these tests on the individual orders.    CBC Auto Differential [[id number]]  (Abnormal) Collected:  08/20/19 0537    Specimen:  Blood Updated:  08/20/19 0602     WBC 8.49 10*3/mm3      RBC 3.74 10*6/mm3      Hemoglobin 12.2 g/dL      Hematocrit 35.4 %      MCV 94.7 fL      MCH 32.6 pg      MCHC 34.5 g/dL      RDW 13.0 %      RDW-SD 44.8 fl      MPV 9.4 fL      Platelets 213 10*3/mm3      Neutrophil % 59.4 %      Lymphocyte % 21.4 %      Monocyte % 10.1 %      Eosinophil % 8.1 %      Basophil % 0.5 %      Immature Grans % 0.5 %      Neutrophils, Absolute 5.04 10*3/mm3      Lymphocytes, Absolute 1.82 10*3/mm3      Monocytes, Absolute 0.86 10*3/mm3      Eosinophils, Absolute 0.69 10*3/mm3      Basophils, Absolute 0.04 10*3/mm3      Immature Grans, Absolute 0.04 10*3/mm3      nRBC 0.0 /100 WBC         Imaging Results (all)     Procedure Component Value Units Date/Time    US Carotid Bilateral [[id number]] Collected:  08/20/19 0929     Updated:  08/20/19 1230    Narrative:         ULTRASOUND CAROTID DUPLEX SCAN    HISTORY: Dizziness. Near syncope.    COMPARISON: None.    Duplex ultrasound of the carotid bifurcations was performed.    FINDINGS:  Real time and color flow images demonstrate bilateral hard plaque  formation.  No Doppler evidence  of significant stenosis.  The peak systolic velocity in the right internal carotid artery  is 65.8 cm/s.  End-diastolic velocity 20.4 cm/s.  Ratio peak systolic velocity right internal carotid artery to  right common carotid artery 1.0.  Peak systolic velocity right external carotid artery 104.7 cm/s.  The peak systolic velocity in left internal carotid artery is  53.1 cm/s.  End-diastolic velocity 14.7 cm/s.  Ratio peak systolic velocity left internal carotid artery to left  common carotid artery 0.9.  Peak systolic velocity left external carotid artery 61.8 cm/s.  Antegrade flow is present in each vertebral artery.      Impression:       CONCLUSION:  Less than 50% diameter reduction stenosis each internal carotid  artery.    [id number]    Electronically signed by:  Lev Lisa MD  8/20/2019 12:29 PM  CDT Workstation: [email or web address]    CT Head Without Contrast [[id number]] Collected:  08/19/19 1456     Updated:  08/19/19 1523    Narrative:       EXAMINATION:  CT SCAN OF THE HEAD WITHOUT INTRAVENOUS CONTRAST    CLINICAL INFORMATION:  Injury, pain    This exam was performed using radiation doses that are as low as  reasonably achievable (ALARA).  This exam was performed according to our departmental dose  optimization program, which includes automated exposure control,  adjustment of the mA and/or KV according to patient size and/or  use of iterative reconstruction technique.    COMPARISON: 6/19/2019    TECHNIQUE:  Axial images from skull base to vertex.        FINDINGS:  There appears to be an old small infarct in the right occipital  lobe which appears unchanged. There is mild diffuse parenchymal  volume loss.  Foci of decreased attenuation are seen in the periventricular  white matter, likely due to microvascular ischemia.    There is no evidence of intracranial hemorrhage, parenchymal  mass, midline shift, or focal mass effect.  There is no hydrocephalus or effacement of the basilar cisterns.    There is no  extra-axial hemorrhage or collection identified.    The mastoid air cells and visualized paranasal sinuses appear  clear.          Impression:       Age-related atrophy and microvascular ischemic changes in the  periventricular white matter.  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  No evidence of intracranial hemorrhage, mass effect or large  acute infarct.    Electronically signed by:  Jeremi Duarte MD  8/19/2019 3:22 PM CDT  Workstation: [id number]    XR Hip With or Without Pelvis 2 - 3 View Left [[id number]] Collected:  08/19/19 1429     Updated:  08/19/19 1447    Narrative:       Procedure:  Left hip        Indication:  Trauma, pain.   .    Technique:  Two views left hip. Single frontal view pelvis.   .    Prior relevant exam: Left hip July 22, 2019. Marilu 15, 2019.    Healing intertrochanteric fracture left femoral neck stabilized  by a lag screw through the femoral neck attached to a lateral  compression plate. Fracture line also extends into the greater  trochanter.    The fracture is in good alignment without angulation or  deformity. The fracture line is much less pronounced in  comparison with prereduction views.    There are degenerative changes of the pubic symphysis. The bony  pelvis is otherwise unremarkable.     There is no evidence of any acute or new fractures.  There are no  acute traumatic changes.      Impression:       CONCLUSION: As above.    Electronically signed by:  Adriano Chavez MD  8/19/2019 2:46 PM CDT  Workstation: [id number]            Chief Complaint on Day of Discharge: none    Hospital Course:  74 year old  male with past medical history of CAD, HTN, colon cancer, skin cancer, NAFISA, hypothyroidism who presented on 8-9-19 with complaints of recent falls and dizziness with a near syncopal episode.  Patient was placed under observation for further monitoring where he had negative cardiac enzymes, no telemetry abnormalities.  Carotid ultrasound was performed during hospital stay revealed  "no significant stenosis.  Echocardiogram was performed in 6/2019 so was not repeated during hospital stay.  The patient was noted to be orthostatic and was provided with gentle fluid hydration.  The patient's symptoms have improved with hydration and use of compression stockings.  Orthostatic hypotension has resolved on day of discharge with lying blood pressure 109/62, sitting blood pressure of 106/58, and standing blood pressure of 101/57.  The patient did have bout of confusion on the overnight shift and urinalysis was performed and was unremarkable.  The patient has been instructed to continue use of compression stockings at discharge and has been counseled on adequate hydration.  He will be discharged home today in stable condition with instructions for 1 week follow-up with his primary care provider.    Condition on Discharge:  stable    Physical Exam on Discharge:  /57 (BP Location: Left arm, Patient Position: Lying)   Pulse 64   Temp 96.3 °F (35.7 °C) (Oral)   Resp 20   Ht 182.9 cm (72\")   Wt 92.9 kg (204 lb 14.4 oz)   SpO2 93%   BMI 27.79 kg/m²   Physical Exam   Constitutional: He is oriented to person, place, and time. He appears well-developed and well-nourished. No distress.   HENT:   Head: Normocephalic and atraumatic.   Eyes: Conjunctivae and lids are normal.   Neck: Normal range of motion. Neck supple.   Cardiovascular: Normal rate, normal heart sounds and intact distal pulses.   Pulmonary/Chest: Effort normal and breath sounds normal.   Abdominal: Soft. Bowel sounds are normal.   Musculoskeletal: Normal range of motion. He exhibits no edema.   Neurological: He is alert and oriented to person, place, and time.   Skin: Skin is warm and dry. He is not diaphoretic.   Psychiatric: He has a normal mood and affect. His behavior is normal.   Nursing note and vitals reviewed.        Discharge Disposition:  Home or Self Care    Discharge Medications:     Discharge Medications      Continue These " Medications      Instructions Start Date   acetaminophen 325 MG tablet  Commonly known as:  TYLENOL   650 mg, Oral, Every 4 Hours PRN      allopurinol 300 MG tablet  Commonly known as:  ZYLOPRIM   300 mg, Oral, Daily      aspirin 81 MG chewable tablet   81 mg, Oral, Daily      colchicine 0.6 MG tablet   0.6 mg, Oral, Daily      docusate sodium 100 MG capsule   100 mg, Oral, 2 Times Daily PRN      isosorbide mononitrate 30 MG 24 hr tablet  Commonly known as:  IMDUR   30 mg, Oral, Daily      levothyroxine 75 MCG tablet  Commonly known as:  SYNTHROID, LEVOTHROID   75 mcg, Oral, Daily      omeprazole 40 MG capsule  Commonly known as:  priLOSEC   40 mg, Oral, Every Other Day      simvastatin 40 MG tablet  Commonly known as:  ZOCOR   40 mg, Oral, Daily      VITAMIN B 12 PO   1 tablet, Oral, Daily             Discharge Diet:   Diet Instructions     Diet: Cardiac; Thin      Discharge Diet:  Cardiac    Fluid Consistency:  Thin          Activity at Discharge:   Activity Instructions     Activity as Tolerated            Discharge Care Plan/Instructions: Follow up with PCP within one week.     Follow-up Appointments:   Additional Instructions for the Follow-ups that You Need to Schedule     Discharge Follow-up with PCP   As directed       Currently Documented PCP:    Mike Draper MD    PCP Phone Number:    [phone]     Follow Up Details:  one week           Follow-up Information     Mike Draper MD Follow up.    Specialty:  Internal Medicine  Contact information:  [address] [place], [place] [address] [phone]             Mike Draper MD .    Specialty:  Internal Medicine  Why:  one week  Contact information:  [address] [place], [place] [address] [phone]                   Test Results Pending at Discharge:           This document has been electronically signed by NORMAN Mohamud on August 20, 2019 6:55 PM

## 2019-08-20 NOTE — PLAN OF CARE
Problem: Patient Care Overview  Goal: Plan of Care Review  Outcome: Ongoing (interventions implemented as appropriate)   19 1051   Coping/Psychosocial   Plan of Care Reviewed With patient;family   OTHER   Outcome Summary Initial PT evaluation complete; co-evaluation with OT. Patient is alert and cooperative. He requires SPV with bed mobility, CGA with transfers, min Ax1 with gait. Patient ambulates 5'x2 with FWW, cues for proper use of walker and for upright posture. Patient initially attempted gait without an AD, but required UE support (of door frame) and was quite unsafe. BP at end of therapy session: seated: 90/60, standin/47; nurse notifed. Recommend HHPT upon return home with spouse, to lower fall risk. Goals established, continue skilled PT.

## 2019-08-20 NOTE — PLAN OF CARE
Problem: Patient Care Overview  Goal: Plan of Care Review  Outcome: Ongoing (interventions implemented as appropriate)   08/20/19 1052   Coping/Psychosocial   Plan of Care Reviewed With patient;family   OTHER   Outcome Summary OT kip completed this date, co-eval with PT. Pt was CGA for sit to stand t/f off bed and toilet (BSC over toilet). Pt min assist with toileting. Pt set up and SBA to use a wipe to wash his hands. Pt was in the middle of transfering to the toilet with CNA when therapy entered and first able to get a BP after returning to EOB. Pt BP ranged in different postions as follows: 103/69, 72/53, 90/66, 68/47 and 106/65 once supine. Pt unsteady and pt is confused this date. Pt could benefit from further therapy to address safety and independence with ADL and functional tasks however, pt BP dropping is influencing pt's safety. Recommend 24/7 care and further home health OT and PT at d/c.

## 2019-08-20 NOTE — THERAPY EVALUATION
Acute Care - Occupational Therapy Initial Evaluation  [facility]     Patient Name: Kuldip Nevarez  : 1945  MRN: [id number]  Today's Date: 2019  Onset of Illness/Injury or Date of Surgery: 19  Date of Referral to OT: 19  Referring Physician: Sheree AUSTIN    Admit Date: 2019       ICD-10-CM ICD-9-CM   1. Near syncope R55 780.2   2. Impaired mobility and ADLs Z74.09 799.89   3. Impaired physical mobility Z74.09 781.99     Patient Active Problem List   Diagnosis   • S/p left hip fracture   • Closed fracture of left hip (CMS/HCC)   • Frequent falls   • Closed nondisplaced intertrochanteric fracture of left femur with routine healing   • Delirium   • Pneumonia involving left lung   • Pacemaker   • Coronary artery disease   • Disease of thyroid gland   • Stage 3 chronic kidney disease (CMS/HCC)   • Acute respiratory failure with hypoxia and hypercapnia (CMS/HCC)   • Closed left hip fracture (CMS/HCC)   • Primary insomnia   • Dry mouth   • Chronic gouty arthropathy   • Near syncope     Past Medical History:   Diagnosis Date   • Arthritis    • Cancer (CMS/HCC)     colon, skin   • Coronary artery disease    • Disease of thyroid gland    • History of transfusion    • MI (myocardial infarction) (CMS/HCC)        • Pacemaker    • Sleep apnea      Past Surgical History:   Procedure Laterality Date   • APPENDECTOMY     • COLON SURGERY     • COLONOSCOPY N/A 2017    Procedure: COLONOSCOPY;  Surgeon: Barrie Jarrett DO;  Location: [facility] ENDOSCOPY;  Service:    • CORONARY ANGIOPLASTY WITH STENT PLACEMENT     • FEMUR OPEN REDUCTION INTERNAL FIXATION Left 2019    Procedure: FEMUR OPEN REDUCTION INTERNAL FIXATION;  Surgeon: Edward Harley MD;  Location: [facility] OR;  Service: Orthopedics   • KIDNEY STONE SURGERY     • PACEMAKER IMPLANTATION     • SALIVARY GLAND EXCISION Left     Left neck due to skin cancer with resultant chronic dry mouth          OT ASSESSMENT  FLOWSHEET (last 12 hours)      Occupational Therapy Evaluation     Row Name 08/20/19 1052                   OT Evaluation Time/Intention    Document Type  evaluation  -        Mode of Treatment  co-treatment;physical therapy;occupational therapy  -        Patient Effort  good  -        Symptoms Noted During/After Treatment  significant change in vital signs  -        Comment  Pt was being assisted by CNA then therapy entered to assist pt struggling with standing and mobility. Pt's spouse then tripped and fell in the floor. OT, OT student and PT present and assisted pt's spouse and RN made aware.   -           General Information    Patient Profile Reviewed?  yes  -        Onset of Illness/Injury or Date of Surgery  08/19/19  -        Referring Physician  Sheree Gracia APRN  -        Patient Observations  alert;cooperative;agree to therapy  -        Patient/Family Observations  spouse, son and grandson prsent   -        General Observations of Patient  Pt standing with CNA struggling getting to the toilet, IV, RA  -        Prior Level of Function  independent:;all household mobility;transfer;bed mobility;ADL's;driving;dependent:;home management spouse does IADL   -        Equipment Currently Used at Home  rollator;walker, rolling;shower chair;bath bench  -        Pertinent History of Current Functional Problem  pt several recent falls, is having low BP with larry in position   -        Existing Precautions/Restrictions  fall recent hip surgery WBAT   -        Limitations/Impairments  safety/cognitive  -        Equipment Ordered for Patient  gait belt  -        Risks Reviewed  patient and family:;LOB;dizziness;increased discomfort;change in vital signs  -        Benefits Reviewed  patient and family:;improve function;increase independence;increase strength;increase balance;increase knowledge  -           Relationship/Environment    Lives With  spouse  -        Concerns About  Impact on Relationships  Has a shower chair and bath bench, regular toilet no grab bars.   -           Resource/Environmental Concerns    Current Living Arrangements  home/apartment/condo  -           Home Main Entrance    Number of Stairs, Main Entrance  two  -        Stair Railings, Main Entrance  railings safe and in good condition  -           Cognitive Assessment/Interventions    Additional Documentation  Cognitive Assessment/Intervention (Group)  -           Cognitive Assessment/Intervention- PT/OT    Affect/Mental Status (Cognitive)  confused  -        Orientation Status (Cognition)  oriented x 4;verbal cues/prompts needed for orientation  -        Follows Commands (Cognition)  follows one step commands;75-90% accuracy;verbal cues/prompting required;repetition of directions required;physical/tactile prompts required  -        Safety Deficit (Cognitive)  moderate deficit  -        Personal Safety Interventions  fall prevention program maintained;gait belt;nonskid shoes/slippers when out of bed;supervised activity  -           Safety Issues, Functional Mobility    Safety Issues Affecting Function (Mobility)  ability to follow commands;awareness of need for assistance;impulsivity;insight into deficits/self awareness;judgment;positioning of assistive device;problem solving;safety precaution awareness;safety precautions follow-through/compliance;sequencing abilities  -        Impairments Affecting Function (Mobility)  balance;coordination;endurance/activity tolerance;motor control;motor planning;postural/trunk control;strength;cognition  -           Bed Mobility Assessment/Treatment    Bed Mobility Assessment/Treatment  sit-supine  -        Sit-Supine [place] (Bed Mobility)  supervision  -           Functional Mobility    Functional Mobility- Ind. Level  minimum assist (75% patient effort);nonverbal cues required (demo/gesture);verbal cues required  -        Functional Mobility-  Device  rolling walker  -        Functional Mobility- Comment  pt unsteady and off balance, decreased problem solving and stepping pattern.   -           Transfer Assessment/Treatment    Transfer Assessment/Treatment  stand-sit transfer;sit-stand transfer;toilet transfer  -           Sit-Stand Transfer    Sit-Stand [place] (Transfers)  contact guard;nonverbal cues (demo/gesture);verbal cues  -        Assistive Device (Sit-Stand Transfers)  walker, front-wheeled  -           Stand-Sit Transfer    Stand-Sit [place] (Transfers)  contact guard;nonverbal cues (demo/gesture);verbal cues  -        Assistive Device (Stand-Sit Transfers)  walker, front-wheeled  -           Toilet Transfer    Type (Toilet Transfer)  stand-sit;sit-stand  -        [place] Level (Toilet Transfer)  contact guard;nonverbal cues (demo/gesture);verbal cues  -        Assistive Device (Toilet Transfer)  -- BSC over toilet and RW   -           ADL Assessment/Intervention    BADL Assessment/Intervention  grooming;toileting  -           Grooming Assessment/Training    Comment (Grooming)  pt setup assist to wash off hands with a wipe  -           Toileting Assessment/Training    [place] Level (Toileting)  toileting skills;minimum assist (75% patient effort)  -           BADL Safety/Performance    Impairments, BADL Safety/Performance  balance;cognition;endurance/activity tolerance;coordination;motor control;motor planning;strength;trunk/postural control  -           General ROM    GENERAL ROM COMMENTS  BUE WFL fair digit to thumb   -           MMT (Manual Muscle Testing)    General MMT Comments  BUE  grossly 4/5; BUE grossly 4/5   -           Sensory Assessment/Intervention    Additional Documentation  Hearing Assessment (Group)  -           Light Touch Sensation Assessment    Left Upper Extremity: Light Touch Sensation Assessment  intact  -        Right Upper Extremity: Light Touch Sensation  "Assessment  intact  -           Hearing Assessment    Hearing Status  hearing impairment, right  -           Vision Assessment/Intervention    Visual Impairment/Limitations  corrective lenses for reading  -           Positioning and Restraints    Pre-Treatment Position  standing in room  -        Post Treatment Position  bed  -        In Bed  notified nsg;supine;call light within reach;encouraged to call for assist;exit alarm on;with family/caregiver;side rails up x2 HOB up with lunch tray in front of pt   -           Pain Assessment    Additional Documentation  Pain Scale: Numbers Pre/Post-Treatment (Group)  -           Pain Scale: Numbers Pre/Post-Treatment    Pain Scale: Numbers, Pretreatment  0/10 - no pain  -        Pain Scale: Numbers, Post-Treatment  0/10 - no pain \"sore where fell last time\"   -           Plan of Care Review    Plan of Care Reviewed With  patient;family  -           Clinical Impression (OT)    Date of Referral to OT  08/19/19  -        OT Diagnosis  Impaired mobility and ADL   -        Prognosis (OT Eval)  fair  -        Functional Level at Time of Evaluation (OT Eval)  pt decreasd safety and independence with ADL and functional tasks, pt BP dropping influencing his function  -        Patient/Family Goals Statement (OT Eval)  to go home  -        Criteria for Skilled Therapeutic Interventions Met (OT Eval)  yes;treatment indicated  -        Rehab Potential (OT Eval)  fair, will monitor progress closely  -        Therapy Frequency (OT Eval)  other (see comments) 5-7 days a week   -        Predicted Duration of Therapy Intervention (Therapy Eval)  until d/c   -        Care Plan Review (OT)  evaluation/treatment results reviewed;care plan/treatment goals reviewed;risks/benefits reviewed;current/potential barriers reviewed;patient/other agree to care plan  -        Care Plan Review, Other Participant (OT Eval)  family  -        Anticipated Discharge " Disposition (OT)  home with 24/7 care;home with home health  -           Vital Signs    Pre Systolic BP Rehab  103 after toileting   -BH        Pre Treatment Diastolic BP  69  -BH        Intra Systolic BP Rehab  72  -BH        Intra Treatment Diastolic BP  53  -BH        Post Systolic BP Rehab  106  -BH        Post Treatment Diastolic BP  65  -BH        Pretreatment Heart Rate (beats/min)  64  -BH        Posttreatment Heart Rate (beats/min)  64  -BH        Pre SpO2 (%)  95  -BH        O2 Delivery Pre Treatment  room air  -BH        Post SpO2 (%)  92  -BH        O2 Delivery Post Treatment  room air  -BH        Pre Patient Position  Sitting  -BH        Intra Patient Position  Standing  -BH        Post Patient Position  Supine  -BH        Recovery Time  Pt after some testing sitting on EOB 90/66; 68/47 after standing up from EOB   -           Planned OT Interventions    Planned Therapy Interventions (OT Eval)  activity tolerance training;adaptive equipment training;BADL retraining;cognitive/visual perception retraining;functional balance retraining;IADL retraining;neuromuscular control/coordination retraining;occupation/activity based interventions;patient/caregiver education/training;ROM/therapeutic exercise;strengthening exercise;transfer/mobility retraining  -           OT Goals    Transfer Goal Selection (OT)  transfer, OT goal 1  -        Bathing Goal Selection (OT)  bathing, OT goal 1  -        Dressing Goal Selection (OT)  dressing, OT goal 1  -        Toileting Goal Selection (OT)  toileting, OT goal 1  -        Functional Mobility Goal Selection (OT)  functional mobility, OT goal 1  -        Additional Documentation  Functional Mobility Selection (OT) (Row)  -           Transfer Goal 1 (OT)    Activity/Assistive Device (Transfer Goal 1, OT)  toilet  -        [place] Level/Cues Needed (Transfer Goal 1, OT)  supervision required  -        Time Frame (Transfer Goal 1, OT)  long term  goal (LTG);by discharge  -        Progress/Outcome (Transfer Goal 1, OT)  goal not met  -           Bathing Goal 1 (OT)    Activity/Assistive Device (Bathing Goal 1, OT)  bathing skills, all  -BH        [place] Level/Cues Needed (Bathing Goal 1, OT)  set-up required;verbal cues required;supervision required  -        Time Frame (Bathing Goal 1, OT)  long term goal (LTG);by discharge  -        Progress/Outcomes (Bathing Goal 1, OT)  goal not met  -           Dressing Goal 1 (OT)    Activity/Assistive Device (Dressing Goal 1, OT)  dressing skills, all  -        [place]/Cues Needed (Dressing Goal 1, OT)  set-up required;verbal cues required;supervision required  -        Time Frame (Dressing Goal 1, OT)  long term goal (LTG);by discharge  -        Progress/Outcome (Dressing Goal 1, OT)  goal not met  -           Toileting Goal 1 (OT)    Activity/Device (Toileting Goal 1, OT)  toileting skills, all  -        [place] Level/Cues Needed (Toileting Goal 1, OT)  supervision required  -        Time Frame (Toileting Goal 1, OT)  long term goal (LTG);by discharge  -        Progress/Outcome (Toileting Goal 1, OT)  goal not met  -           Functional Mobility Goal 1 (OT)    Activity/Assistive Device (Functional Mobility Goal 1, OT)  walker, rolling  -        [place] Level/Cues Needed (Functional Mobility Goal 1, OT)  supervision required  -        Distance Goal 1 (Functional Mobility, OT)  for ADL tasks with no LOB and good safety.   -        Time Frame (Functional Mobility Goal 1, OT)  long term goal (LTG);by discharge  -        Progress/Outcome (Functional Mobility Goal 1, OT)  goal not met  -           Patient Education Goal (OT)    Activity (Patient Education Goal, OT)  Pt will communicate good home safety awareness.   -        [place]/Cues/Accuracy (Memory Goal 2, OT)  verbalizes understanding  -        Time Frame (Patient Education Goal, OT)  long term goal  (LTG);by discharge  -        Progress/Outcome (Patient Education Goal, OT)  goal not met  [facility]           Living Environment    Home Accessibility  stairs to enter home;tub/shower is not walk in  -          User Key  (r) = Recorded By, (t) = Taken By, (c) = Cosigned By    Initials Name Effective Dates     Anju Villanueva, OTR/L 06/08/18 -          Occupational Therapy Education     Title: PT OT SLP Therapies (Not Started)     Topic: Occupational Therapy (In Progress)     Point: ADL training (Done)     Description: Instruct learner(s) on proper safety adaptation and remediation techniques during self care or transfers.   Instruct in proper use of assistive devices.    Learning Progress Summary           Patient Acceptance, E, VU,NR by  at 8/20/2019 12:31 PM    Comment:  Educated about OT and POC. Educated on safety throughout. Educated to call for assist.   Family Acceptance, E, VU,NR by  at 8/20/2019 12:31 PM    Comment:  Educated about OT and POC. Educated on safety throughout. Educated to call for assist.                   Point: Precautions (Done)     Description: Instruct learner(s) on prescribed precautions during self-care and functional transfers.    Learning Progress Summary           Patient Acceptance, E, VU,NR by  at 8/20/2019 12:31 PM    Comment:  Educated about OT and POC. Educated on safety throughout. Educated to call for assist.   Family Acceptance, E, VU,NR by  at 8/20/2019 12:31 PM    Comment:  Educated about OT and POC. Educated on safety throughout. Educated to call for assist.                               User Key     Initials Effective Dates Name Provider Type Discipline     06/08/18 -  Anju Villanueva OTR/L Occupational Therapist OT                  OT Recommendation and Plan  Outcome Summary/Treatment Plan (OT)  Anticipated Discharge Disposition (OT): home with 24/7 care, home with home health  Planned Therapy Interventions (OT Eval): activity tolerance training, adaptive  equipment training, BADL retraining, cognitive/visual perception retraining, functional balance retraining, IADL retraining, neuromuscular control/coordination retraining, occupation/activity based interventions, patient/caregiver education/training, ROM/therapeutic exercise, strengthening exercise, transfer/mobility retraining  Therapy Frequency (OT Eval): other (see comments)(5-7 days a week )  Plan of Care Review  Plan of Care Reviewed With: patient, family  Plan of Care Reviewed With: patient, family  Outcome Summary: OT eval completed this date, co-eval with PT. Pt was CGA for sit to stand t/f off bed and toilet (BSC over toilet). Pt min assist with toileting.  Pt set up and SBA to use a wipe to wash his hands. Pt was in the middle of transfering to the toilet with CNA when therapy entered and first able to get a BP after returning to EOB. Pt BP ranged in different postions as follows: 103/69, 72/53, 90/66, 68/47 and 106/65 once supine. Pt unsteady and pt is confused this date. Pt could benefit from further therapy to address safety and independence with ADL and functional tasks however, pt BP dropping is influencing pt's safety. Recommend 24/7 care and further home health OT and PT at d/c.     Outcome Measures     Row Name 08/20/19 1052 08/20/19 1051          How much help from another person do you currently need...    Turning from your back to your side while in flat bed without using bedrails?  --  3  -CZ     Moving from lying on back to sitting on the side of a flat bed without bedrails?  --  3  -CZ     Moving to and from a bed to a chair (including a wheelchair)?  --  3  -CZ     Standing up from a chair using your arms (e.g., wheelchair, bedside chair)?  --  3  -CZ     Climbing 3-5 steps with a railing?  --  3  -CZ     To walk in hospital room?  --  3  -CZ     AM-PAC 6 Clicks Score (PT)  --  18  -CZ        How much help from another is currently needed...    Putting on and taking off regular lower body  clothing?  2  -  --     Bathing (including washing, rinsing, and drying)  2  -  --     Toileting (which includes using toilet bed pan or urinal)  2  -  --     Putting on and taking off regular upper body clothing  3  -  --     Taking care of personal grooming (such as brushing teeth)  3  -BH  --     Eating meals  4  -BH  --     AM-PAC 6 Clicks Score (OT)  16  -  --        Functional Assessment    Outcome Measure Options  AM-PAC 6 Clicks Daily Activity (OT)  -  AM-PAC 6 Clicks Basic Mobility (PT)  -       User Key  (r) = Recorded By, (t) = Taken By, (c) = Cosigned By    Initials Name Provider Type     Anju Villanueva, OTR/L Occupational Therapist    CZ Rudy Lombardi, PT Physical Therapist          Time Calculation:   Time Calculation- OT     Row Name 08/20/19 1252             Time Calculation-     OT Start Time  1051  -      OT Stop Time  1140  -      OT Time Calculation (min)  49 min  -      OT Received On  08/20/19  -      OT Goal Re-Cert Due Date  09/02/19  -        User Key  (r) = Recorded By, (t) = Taken By, (c) = Cosigned By    Initials Name Provider Type     Anju Villanueva OTR/L Occupational Therapist        Therapy Charges for Today     Code Description Service Date Service Provider Modifiers Qty    [id number]  OT EVAL MOD COMPLEXITY 3 8/20/2019 Anju Villanueva OTR/L GO 1               CARLOS Ye/JOVANNY  8/20/2019

## 2019-08-20 NOTE — PLAN OF CARE
Problem: Patient Care Overview  Goal: Plan of Care Review  Outcome: Ongoing (interventions implemented as appropriate)   08/20/19 7756   Coping/Psychosocial   Plan of Care Reviewed With patient   Plan of Care Review   Progress no change   OTHER   Outcome Summary Orthstatic BP's improved with DAMIEN hose. Confusion continues.        Problem: Fall Risk (Adult)  Goal: Identify Related Risk Factors and Signs and Symptoms  Outcome: Ongoing (interventions implemented as appropriate)    Goal: Absence of Fall  Outcome: Ongoing (interventions implemented as appropriate)      Problem: Syncope (Adult)  Goal: Identify Related Risk Factors and Signs and Symptoms  Outcome: Ongoing (interventions implemented as appropriate)    Goal: Physical Safety/Health Maintenance  Outcome: Ongoing (interventions implemented as appropriate)    Goal: Optimal Emotional/Functional [place]  Outcome: Ongoing (interventions implemented as appropriate)

## 2019-08-20 NOTE — THERAPY EVALUATION
Acute Care - Physical Therapy Initial Evaluation  [facility]     Patient Name: Kuldip Nevarez  : 1945  MRN: [id number]  Today's Date: 2019   Onset of Illness/Injury or Date of Surgery: 19  Date of Referral to PT: 19  Referring Physician: Sheree AUSTIN      Admit Date: 2019    Visit Dx:     ICD-10-CM ICD-9-CM   1. Near syncope R55 780.2   2. Impaired mobility and ADLs Z74.09 799.89   3. Impaired physical mobility Z74.09 781.99     Patient Active Problem List   Diagnosis   • S/p left hip fracture   • Closed fracture of left hip (CMS/HCC)   • Frequent falls   • Closed nondisplaced intertrochanteric fracture of left femur with routine healing   • Delirium   • Pneumonia involving left lung   • Pacemaker   • Coronary artery disease   • Disease of thyroid gland   • Stage 3 chronic kidney disease (CMS/HCC)   • Acute respiratory failure with hypoxia and hypercapnia (CMS/HCC)   • Closed left hip fracture (CMS/HCC)   • Primary insomnia   • Dry mouth   • Chronic gouty arthropathy   • Near syncope     Past Medical History:   Diagnosis Date   • Arthritis    • Cancer (CMS/HCC)     colon, skin   • Coronary artery disease    • Disease of thyroid gland    • History of transfusion    • MI (myocardial infarction) (CMS/HCC)        • Pacemaker    • Sleep apnea      Past Surgical History:   Procedure Laterality Date   • APPENDECTOMY     • COLON SURGERY     • COLONOSCOPY N/A 2017    Procedure: COLONOSCOPY;  Surgeon: Barrie Jarrett DO;  Location: [facility] ENDOSCOPY;  Service:    • CORONARY ANGIOPLASTY WITH STENT PLACEMENT     • FEMUR OPEN REDUCTION INTERNAL FIXATION Left 2019    Procedure: FEMUR OPEN REDUCTION INTERNAL FIXATION;  Surgeon: Edward Harley MD;  Location: [facility] OR;  Service: Orthopedics   • KIDNEY STONE SURGERY     • PACEMAKER IMPLANTATION     • SALIVARY GLAND EXCISION Left     Left neck due to skin cancer with resultant chronic dry mouth        PT  ASSESSMENT (last 12 hours)      Physical Therapy Evaluation     Row Name 08/20/19 1051          PT Evaluation Time/Intention    Subjective Information  no complaints  -CZ     Document Type  evaluation  -CZ     Mode of Treatment  co-treatment;physical therapy;occupational therapy  -CZ     Patient Effort  good  -CZ     Symptoms Noted During/After Treatment  none  -CZ     Row Name 08/20/19 1051          General Information    Patient Profile Reviewed?  yes  -CZ     Onset of Illness/Injury or Date of Surgery  08/19/19  -CZ     Referring Physician  NORMAN Ding.   -CZ     Patient Observations  alert;cooperative;agree to therapy  -CZ     Patient/Family Observations  Spouse, son and grandson present.   -CZ     General Observations of Patient  Struggling to walk to bathroom, IV, RA.   -CZ     Prior Level of Function  independent:;all household mobility;community mobility;feeding;grooming;dressing;bathing  -CZ     Equipment Currently Used at Home  rollator;walker, rolling;shower chair  -CZ     Pertinent History of Current Functional Problem  To ED with multiple recent falls and c/o dizziness: orthostatic hypotension.   -CZ     Existing Precautions/Restrictions  fall  -CZ     Limitations/Impairments  safety/cognitive  -CZ     Equipment Issued to Patient  gait belt  -CZ     Benefits Reviewed  patient:;improve function;increase independence;increase strength;increase balance  -CZ     Row Name 08/20/19 1051          Relationship/Environment    Lives With  spouse  -CZ     Concerns About Impact on Relationships  Has shower chair, no grab bars, also has shower bench, regular toilet. Spouse cooks, cleans, does laundry, patient drives.    -CZ     Row Name 08/20/19 1051          Resource/Environmental Concerns    Current Living Arrangements  home/apartment/condo  -CZ     Row Name 08/20/19 1051          Living Environment    Living Arrangements  house  -CZ     Home Accessibility  stairs to enter home;tub/shower is not walk in  -CZ      Row Name 08/20/19 1051          Home Main Entrance    Number of Stairs, Main Entrance  two  -CZ     Stair Railings, Main Entrance  railings on both sides of stairs  -CZ     Row Name 08/20/19 1051          Cognitive Assessment/Intervention- PT/OT    Orientation Status (Cognition)  oriented x 4;verbal cues/prompts needed for orientation  -CZ     Follows Commands (Cognition)  follows one step commands  -CZ     Personal Safety Interventions  fall prevention program maintained;gait belt;nonskid shoes/slippers when out of bed  -CZ     Row Name 08/20/19 1051          Bed Mobility Assessment/Treatment    Bed Mobility Assessment/Treatment  supine-sit;sit-supine  -CZ     Sit-Supine [place] (Bed Mobility)  supervision  -CZ     Row Name 08/20/19 1051          Transfer Assessment/Treatment    Sit-Stand [place] (Transfers)  contact guard  -     Stand-Sit [place] (Transfers)  contact guard  -CZ     Row Name 08/20/19 1051          Sit-Stand Transfer    Assistive Device (Sit-Stand Transfers)  walker, front-wheeled  -CZ     Row Name 08/20/19 1051          Stand-Sit Transfer    Assistive Device (Stand-Sit Transfers)  walker, front-wheeled  -CZ     Row Name 08/20/19 1051          Gait/Stairs Assessment/Training    [place] Level (Gait)  minimum assist (75% patient effort)  -     Assistive Device (Gait)  walker, front-wheeled  -     Distance in Feet (Gait)  5'x2  -CZ     Comment (Gait/Stairs)  Attempted gait unsuccessfully without AD, reaches for UE support.  Much steadier with FWW.   -CZ     Row Name 08/20/19 1051          General ROM    GENERAL ROM COMMENTS  BLEs WFL  -CZ     Row Name 08/20/19 1051          MMT (Manual Muscle Testing)    General MMT Comments  RLE: 4/5, LLE: 4-/5 grossly  -CZ     Row Name 08/20/19 1051          Sensory Assessment/Intervention    Sensory General Assessment  no sensation deficits identified  -CZ     Row Name 08/20/19 1051          Hearing Assessment    Hearing Status  hearing  impairment, right  -CZ     Row Name 08/20/19 1051          Vision Assessment/Intervention    Visual Impairment/Limitations  corrective lenses for reading  -CZ     Row Name 08/20/19 1051          Pain Assessment    Additional Documentation  Pain Scale: Numbers Pre/Post-Treatment (Group)  -CZ     Row Name 08/20/19 1051          Pain Scale: Numbers Pre/Post-Treatment    Pain Scale: Numbers, Pretreatment  0/10 - no pain  -CZ     Pain Scale: Numbers, Post-Treatment  0/10 - no pain  -CZ     Row Name 08/20/19 1051          Plan of Care Review    Plan of Care Reviewed With  patient;family  -CZ     Row Name 08/20/19 1051          Physical Therapy Clinical Impression    Date of Referral to PT  08/19/19  -CZ     PT Diagnosis (PT Clinical Impression)  impaired physical mobility  -CZ     Prognosis (PT Clinical Impression)  good  -CZ     Criteria for Skilled Interventions Met (PT Clinical Impression)  yes;treatment indicated  -CZ     Pathology/Pathophysiology Noted (Describe Specifically for Each System)  musculoskeletal;cardiovascular  -CZ     Impairments Found (describe specific impairments)  aerobic capacity/endurance;circulation;gait, locomotion, and balance;muscle performance  -CZ     Rehab Potential (PT Clinical Summary)  good, to achieve stated therapy goals  -CZ     Predicted Duration of Therapy (PT)  1-3 days  -CZ     Row Name 08/20/19 1051          Vital Signs    Pre Systolic BP Rehab  103  -CZ     Pre Treatment Diastolic BP  69  -CZ     Intra Systolic BP Rehab  72  -CZ     Intra Treatment Diastolic BP  53  -CZ     Post Systolic BP Rehab  106  -CZ     Post Treatment Diastolic BP  65  -CZ     Pretreatment Heart Rate (beats/min)  64  -CZ     Posttreatment Heart Rate (beats/min)  64  -CZ     Pre SpO2 (%)  95  -CZ     O2 Delivery Pre Treatment  room air  -CZ     Post SpO2 (%)  92  -CZ     O2 Delivery Post Treatment  room air  -CZ     Pre Patient Position  Sitting  -CZ     Intra Patient Position  Standing  -CZ     Post  Patient Position  Supine  -CZ     Recovery Time  Sitting EOB: 90/66, standin/47  -CZ     Row Name 19 1051          Physical Therapy Goals    Bed Mobility Goal Selection (PT)  bed mobility, PT goal 1  -CZ     Transfer Goal Selection (PT)  transfer, PT goal 1  -CZ     Gait Training Goal Selection (PT)  gait training, PT goal 1  -CZ     Stairs Goal Selection (PT)  stairs, PT goal 1  -CZ     Additional Documentation  Stairs Goal Selection (PT) (Row)  -CZ     Row Name 19 1051          Bed Mobility Goal 1 (PT)    Activity/Assistive Device (Bed Mobility Goal 1, PT)  sit to supine/supine to sit  -CZ     [place] Level/Cues Needed (Bed Mobility Goal 1, PT)  conditional independence  -CZ     Time Frame (Bed Mobility Goal 1, PT)  long term goal (LTG);by discharge  -CZ     Progress/Outcomes (Bed Mobility Goal 1, PT)  goal not met  -CZ     Row Name 19 1051          Transfer Goal 1 (PT)    Activity/Assistive Device (Transfer Goal 1, PT)  sit-to-stand/stand-to-sit;bed-to-chair/chair-to-bed  -CZ     [place] Level/Cues Needed (Transfer Goal 1, PT)  conditional independence  -CZ     Time Frame (Transfer Goal 1, PT)  long term goal (LTG);by discharge  -CZ     Barriers (Transfers Goal 1, PT)  Orthostatic hypotension.   (Significant)   -CZ     Progress/Outcome (Transfer Goal 1, PT)  goal not met  -CZ     Row Name 19 105          Gait Training Goal 1 (PT)    Activity/Assistive Device (Gait Training Goal 1, PT)  walker, rolling  -CZ     [place] Level (Gait Training Goal 1, PT)  conditional independence  -CZ     Distance (Gait Goal 1, PT)  150'x1  -CZ     Time Frame (Gait Training Goal 1, PT)  long term goal (LTG);by discharge  -CZ     Barriers (Gait Training Goal 1, PT)  Orthostatic hypotension.   (Significant)   -CZ     Row Name 19 1051          Stairs Goal 1 (PT)    Activity/Assistive Device (Stairs Goal 1, PT)  using handrail, right;using handrail, left  -CZ     [place] Level/Cues  Needed (Stairs Goal 1, PT)  conditional independence  -     Number of Stairs (Stairs Goal 1, PT)  2 steps, B rails.   -     Time Frame (Stairs Goal 1, PT)  long term goal (LTG);by discharge  -CZ     Barriers (Stairs Goal 1, PT)  Orthostatic hypotension.   (Significant)   -     Progress/Outcome (Stairs Goal 1, PT)  goal not met  -     Row Name 08/20/19 1051          Positioning and Restraints    Pre-Treatment Position  in bed  -CZ     Post Treatment Position  bed  -CZ     In Bed  supine;sitting EOB;call light within reach;encouraged to call for assist;exit alarm on;SCD pump applied;side rails up x2;with family/caregiver;notified nsg  -       User Key  (r) = Recorded By, (t) = Taken By, (c) = Cosigned By    Initials Name Provider Type     Rudy Lombardi, PT Physical Therapist        Physical Therapy Education     Title: PT OT SLP Therapies (Not Started)     Topic: Physical Therapy (In Progress)     Point: Mobility training (Done)     Learning Progress Summary           Patient Acceptance, E, VU,NR by  at 8/20/2019  1:13 PM    Comment:  Educated on proper hand placement with transfers, proper use of FWW with gait.                               User Key     Initials Effective Dates Name Provider Type Discipline     04/03/18 -  Rudy Lombardi, PT Physical Therapist PT              PT Recommendation and Plan  Anticipated Discharge Disposition (PT): home with home health  Planned Therapy Interventions (PT Eval): balance training, bed mobility training, gait training, home exercise program, patient/family education, stair training, strengthening, stretching, transfer training  Therapy Frequency (PT Clinical Impression): other (see comments)(6x/week)  Outcome Summary/Treatment Plan (PT)  Anticipated Discharge Disposition (PT): home with home health  Plan of Care Reviewed With: patient, family  Outcome Summary: Initial PT evaluation complete; co-evaluation with OT. Patient is alert and cooperative.  He  requires SPV with bed mobility, CGA with transfers, min Ax1 with gait.  Patient ambulates 5'x2 with FWW, cues for proper use of walker and for upright posture.  Patient initially attempted gait without an AD, but required UE support (of door frame) and was quite unsafe.  BP at end of therapy session:  seated: 90/60, standin/47; nurse notifed.  Recommend HHPT upon return home with spouse, to lower fall risk.  Goals established, continue skilled PT.   Outcome Measures     Row Name 19 1052 19 1051          How much help from another person do you currently need...    Turning from your back to your side while in flat bed without using bedrails?  --  3  -CZ     Moving from lying on back to sitting on the side of a flat bed without bedrails?  --  3  -CZ     Moving to and from a bed to a chair (including a wheelchair)?  --  3  -CZ     Standing up from a chair using your arms (e.g., wheelchair, bedside chair)?  --  3  -CZ     Climbing 3-5 steps with a railing?  --  3  -CZ     To walk in hospital room?  --  3  -CZ     AM-PAC 6 Clicks Score (PT)  --  18  -CZ        How much help from another is currently needed...    Putting on and taking off regular lower body clothing?  2  -BH  --     Bathing (including washing, rinsing, and drying)  2  -BH  --     Toileting (which includes using toilet bed pan or urinal)  2  -BH  --     Putting on and taking off regular upper body clothing  3  -BH  --     Taking care of personal grooming (such as brushing teeth)  3  -BH  --     Eating meals  4  -BH  --     AM-PAC 6 Clicks Score (OT)  16  -BH  --        Functional Assessment    Outcome Measure Options  AM-PAC 6 Clicks Daily Activity (OT)  -  AM-PAC 6 Clicks Basic Mobility (PT)  -       User Key  (r) = Recorded By, (t) = Taken By, (c) = Cosigned By    Initials Name Provider Type    Anju Scott, OTR/L Occupational Therapist    CZ Rudy Lombardi, PT Physical Therapist         Time Calculation:   PT Charges      Row Name 08/20/19 1323             Time Calculation    Start Time  1051  -CZ      Stop Time  1140  -CZ      Time Calculation (min)  49 min  -CZ      PT Received On  08/20/19  -CZ      PT Goal Re-Cert Due Date  09/02/19  -CZ        User Key  (r) = Recorded By, (t) = Taken By, (c) = Cosigned By    Initials Name Provider Type    CZ Rudy Lombardi, PT Physical Therapist        Therapy Charges for Today     Code Description Service Date Service Provider Modifiers Qty    57331626154 HC PT EVAL MOD COMPLEXITY 3 8/20/2019 Rudy Lombardi, PT GP 1          PT G-Codes  Outcome Measure Options: AM-PAC 6 Clicks Daily Activity (OT)  AM-PAC 6 Clicks Score (PT): 18  AM-PAC 6 Clicks Score (OT): 16      Rudy Lombardi, PT  8/20/2019

## 2019-08-20 NOTE — PLAN OF CARE
Problem: Patient Care Overview  Goal: Plan of Care Review  Outcome: Ongoing (interventions implemented as appropriate)   08/20/19 0439   Coping/Psychosocial   Plan of Care Reviewed With patient;family   Plan of Care Review   Progress no change   OTHER   Outcome Summary vss, will continue to do orthostatic b/p, new admission from ER, will continue to monitor       Problem: Fall Risk (Adult)  Goal: Identify Related Risk Factors and Signs and Symptoms  Outcome: Ongoing (interventions implemented as appropriate)   08/20/19 0439   Fall Risk (Adult)   Related Risk Factors (Fall Risk) age-related changes   Signs and Symptoms (Fall Risk) presence of risk factors     Goal: Absence of Fall  Outcome: Ongoing (interventions implemented as appropriate)   08/20/19 0439   Fall Risk (Adult)   Absence of Fall making progress toward outcome

## 2019-08-20 NOTE — PROGRESS NOTES
[facility] Medicine Services  INPATIENT PROGRESS NOTE    Length of Stay: 0  Date of Admission: 8/19/2019  Primary Care Physician: Mike Draper MD    Subjective   Chief Complaint: dizziness, falls   HPI:  74 year old  male with past medical history of CAD, HTN, colon cancer, skin cancer, ANFISA, hypothyroidism who presented on 8-19-19 with complaints of recent falls and dizziness.  He is currently under observation for orthostatic hypotension.  He is awaiting carotid US.  His brother is at bedside and reports patient has been confused and slightly agitated this morning.     Review of Systems   Constitutional: Negative for chills and fever.   Respiratory: Negative for cough, shortness of breath and wheezing.    Cardiovascular: Negative for chest pain and palpitations.   Gastrointestinal: Negative for abdominal pain, nausea and vomiting.   Musculoskeletal: Negative for back pain and neck pain.   Skin: Negative for pallor.   Neurological: Negative for dizziness, syncope, weakness and headaches.   Psychiatric/Behavioral: Negative for confusion. The patient is not nervous/anxious.         All pertinent negatives and positives are as above. All other systems have been reviewed and are negative unless otherwise stated.     Objective    Temp:  [96.2 °F (35.7 °C)-97.7 °F (36.5 °C)] 96.2 °F (35.7 °C)  Heart Rate:  [65-79] 73  Resp:  [18] 18  BP: ()/(41-89) 106/65    Physical Exam   Constitutional: He is oriented to person, place, and time. He appears well-developed and well-nourished.   HENT:   Head: Normocephalic and atraumatic.   Eyes: Conjunctivae and lids are normal.   Neck: Normal range of motion. Neck supple.   Cardiovascular: Normal rate, normal heart sounds and intact distal pulses.   Pulmonary/Chest: Effort normal and breath sounds normal.   Abdominal: Soft. Bowel sounds are normal.   Musculoskeletal: Normal range of motion. He exhibits no edema.    Neurological: He is alert and oriented to person, place, and time.   Skin: Skin is warm and dry.   Psychiatric: He has a normal mood and affect. His behavior is normal.   Nursing note and vitals reviewed.          Results Review:  I have reviewed the labs, radiology results, and diagnostic studies.    Laboratory Data:   Results from last 7 days   Lab Units 08/20/19  0537 08/19/19  1425   SODIUM mmol/L 139 139   POTASSIUM mmol/L 4.5 4.8   CHLORIDE mmol/L 103 101   CO2 mmol/L 26.0 26.0   BUN mg/dL 22 21   CREATININE mg/dL 1.35* 1.44*   GLUCOSE mg/dL 93 94   CALCIUM mg/dL 9.4 10.2   BILIRUBIN mg/dL  --  0.7   ALK PHOS U/L  --  223*   ALT (SGPT) U/L  --  14   AST (SGOT) U/L  --  16   ANION GAP mmol/L 10.0 12.0     Estimated Creatinine Clearance: 61.2 mL/min (A) (by C-G formula based on SCr of 1.35 mg/dL (H)).          Results from last 7 days   Lab Units 08/20/19  0537 08/19/19  1425   WBC 10*3/mm3 8.49 8.33   HEMOGLOBIN g/dL 12.2* 14.0   HEMATOCRIT % 35.4* 40.8   PLATELETS 10*3/mm3 213 209           Culture Data:   No results found for: BLOODCX  No results found for: URINECX  No results found for: RESPCX  No results found for: WOUNDCX  No results found for: STOOLCX  No components found for: BODYFLD    Radiology Data:   Imaging Results (last 24 hours)     Procedure Component Value Units Date/Time    US Carotid Bilateral [[id number]] Collected:  08/20/19 0929     Updated:  08/20/19 1230    Narrative:         ULTRASOUND CAROTID DUPLEX SCAN    HISTORY: Dizziness. Near syncope.    COMPARISON: None.    Duplex ultrasound of the carotid bifurcations was performed.    FINDINGS:  Real time and color flow images demonstrate bilateral hard plaque  formation.  No Doppler evidence of significant stenosis.  The peak systolic velocity in the right internal carotid artery  is 65.8 cm/s.  End-diastolic velocity 20.4 cm/s.  Ratio peak systolic velocity right internal carotid artery to  right common carotid artery 1.0.  Peak systolic  velocity right external carotid artery 104.7 cm/s.  The peak systolic velocity in left internal carotid artery is  53.1 cm/s.  End-diastolic velocity 14.7 cm/s.  Ratio peak systolic velocity left internal carotid artery to left  common carotid artery 0.9.  Peak systolic velocity left external carotid artery 61.8 cm/s.  Antegrade flow is present in each vertebral artery.      Impression:       CONCLUSION:  Less than 50% diameter reduction stenosis each internal carotid  artery.    [id number]    Electronically signed by:  Lev Lisa MD  8/20/2019 12:29 PM  CDT Workstation: [email or web address]    CT Head Without Contrast [[id number]] Collected:  08/19/19 1456     Updated:  08/19/19 1523    Narrative:       EXAMINATION:  CT SCAN OF THE HEAD WITHOUT INTRAVENOUS CONTRAST    CLINICAL INFORMATION:  Injury, pain    This exam was performed using radiation doses that are as low as  reasonably achievable (ALARA).  This exam was performed according to our departmental dose  optimization program, which includes automated exposure control,  adjustment of the mA and/or KV according to patient size and/or  use of iterative reconstruction technique.    COMPARISON: 6/19/2019    TECHNIQUE:  Axial images from skull base to vertex.        FINDINGS:  There appears to be an old small infarct in the right occipital  lobe which appears unchanged. There is mild diffuse parenchymal  volume loss.  Foci of decreased attenuation are seen in the periventricular  white matter, likely due to microvascular ischemia.    There is no evidence of intracranial hemorrhage, parenchymal  mass, midline shift, or focal mass effect.  There is no hydrocephalus or effacement of the basilar cisterns.    There is no extra-axial hemorrhage or collection identified.    The mastoid air cells and visualized paranasal sinuses appear  clear.          Impression:       Age-related atrophy and microvascular ischemic changes in the  periventricular white matter.  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  No evidence of intracranial hemorrhage, mass effect or large  acute infarct.    Electronically signed by:  Jeremi Duarte MD  8/19/2019 3:22 PM CDT  Workstation: [id number]    XR Hip With or Without Pelvis 2 - 3 View Left [[id number]] Collected:  08/19/19 1429     Updated:  08/19/19 1447    Narrative:       Procedure:  Left hip        Indication:  Trauma, pain.   .    Technique:  Two views left hip. Single frontal view pelvis.   .    Prior relevant exam: Left hip July 22, 2019. Marilu 15, 2019.    Healing intertrochanteric fracture left femoral neck stabilized  by a lag screw through the femoral neck attached to a lateral  compression plate. Fracture line also extends into the greater  trochanter.    The fracture is in good alignment without angulation or  deformity. The fracture line is much less pronounced in  comparison with prereduction views.    There are degenerative changes of the pubic symphysis. The bony  pelvis is otherwise unremarkable.     There is no evidence of any acute or new fractures.  There are no  acute traumatic changes.      Impression:       CONCLUSION: As above.    Electronically signed by:  Adriano Chavez MD  8/19/2019 2:46 PM CDT  Workstation: [id number]          I have reviewed the patient's current medications.     Assessment/Plan     Active Hospital Problems    Diagnosis   • Near syncope   • Pacemaker     [employer] dual-chamber pacemaker implanted December 2016 followed by Dr. Grullon     • Coronary artery disease   • Disease of thyroid gland   • Stage 3 chronic kidney disease (CMS/HCC)       Plan:    1. Near syncope: orthostatic vital signs, 6/2019 echo reviewed.  Plan for carotid US.  Ct of head negative for any acute findings. Compression stockings.  Consider tilt table if no improvement with hydration.  Awaiting Pt/Ot evaluations.  2. Hx pacemaker implantation: plan for pacemaker interrogation.   3. CAD: continue ASA, Imdur, statin.  4. Hypothyroidism: continue  Synthroid.  5. Stage 3 CKD: baseline creatinine of approximately 1.5-1.6.  Currently 1.35.       Discharge Planning: I expect patient to be discharged to home in 1 days.          This document has been electronically signed by NORMAN Mohamud on August 20, 2019 1:32 PM

## 2019-09-03 NOTE — PROGRESS NOTES
Kuldip Nevarez is a 74 y.o. male is s/p       Chief Complaint   Patient presents with   • Left Hip - Follow-up, Fracture       HISTORY OF PRESENT ILLNESS: f/u left hip, repeat xrays done today.    06/16/19 (79d) Edward Harley MD     FEMUR OPEN REDUCTION INTERNAL FIXATION - Left     Doing better.  He has some occasional hypotension issues and has had some falling episodes.  He is working with his cardiologist to fine-tune his medications to help limit this phenomenon.       Allergies   Allergen Reactions   • Tape Rash         Current Outpatient Medications:   •  acetaminophen (TYLENOL) 325 MG tablet, Take 2 tablets by mouth Every 4 (Four) Hours As Needed for Mild Pain  or Moderate Pain ., Disp: 1 bottle, Rfl: prn  •  allopurinol (ZYLOPRIM) 300 MG tablet, Take 300 mg by mouth Daily., Disp: , Rfl:   •  aspirin 81 MG chewable tablet, Chew 81 mg Daily., Disp: , Rfl:   •  colchicine 0.6 MG tablet, Take 0.6 mg by mouth Daily., Disp: , Rfl:   •  Cyanocobalamin (VITAMIN B 12 PO), Take 1 tablet by mouth Daily., Disp: , Rfl:   •  docusate sodium 100 MG capsule, Take 100 mg by mouth 2 (Two) Times a Day As Needed for Constipation., Disp: , Rfl:   •  isosorbide mononitrate (IMDUR) 30 MG 24 hr tablet, Take 30 mg by mouth Daily., Disp: , Rfl:   •  levothyroxine (SYNTHROID, LEVOTHROID) 75 MCG tablet, Take 75 mcg by mouth Daily., Disp: , Rfl:   •  omeprazole (priLOSEC) 40 MG capsule, Take 40 mg by mouth Every Other Day., Disp: , Rfl:   •  simvastatin (ZOCOR) 40 MG tablet, Take 40 mg by mouth Daily., Disp: , Rfl:     No fevers or chills.  No nausea or vomiting.      PHYSICAL EXAMINATION:       Kuldip Nevarez is a 74 y.o. male    Patient is awake and alert, answers questions appropriately and is in no apparent distress.    GAIT:     []  Normal  [x]  Antalgic    Assistive device: []  None  []  Walker     []  Crutches  []  Cane     [x]  Wheelchair  []  Stretcher    Ortho Exam  Hip flexion 100 degrees  Incision clean  and dry  Good distal pulses and sensation      Ct Head Without Contrast    Result Date: 8/19/2019  Narrative: EXAMINATION:  CT SCAN OF THE HEAD WITHOUT INTRAVENOUS CONTRAST CLINICAL INFORMATION:  Injury, pain This exam was performed using radiation doses that are as low as reasonably achievable (ALARA). This exam was performed according to our departmental dose optimization program, which includes automated exposure control, adjustment of the mA and/or KV according to patient size and/or use of iterative reconstruction technique. COMPARISON: 6/19/2019 TECHNIQUE:  Axial images from skull base to vertex.  FINDINGS: There appears to be an old small infarct in the right occipital lobe which appears unchanged. There is mild diffuse parenchymal volume loss. Foci of decreased attenuation are seen in the periventricular white matter, likely due to microvascular ischemia. There is no evidence of intracranial hemorrhage, parenchymal mass, midline shift, or focal mass effect. There is no hydrocephalus or effacement of the basilar cisterns. There is no extra-axial hemorrhage or collection identified. The mastoid air cells and visualized paranasal sinuses appear clear.       Impression: Age-related atrophy and microvascular ischemic changes in the periventricular white matter. 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 No evidence of intracranial hemorrhage, mass effect or large acute infarct. Electronically signed by:  Jeremi Duarte MD  8/19/2019 3:22 PM CDT Workstation: [id number]    Us Carotid Bilateral    Result Date: 8/20/2019  Narrative: ULTRASOUND CAROTID DUPLEX SCAN HISTORY: Dizziness. Near syncope. COMPARISON: None. Duplex ultrasound of the carotid bifurcations was performed. FINDINGS: Real time and color flow images demonstrate bilateral hard plaque formation. No Doppler evidence of significant stenosis. The peak systolic velocity in the right internal carotid artery is 65.8 cm/s. End-diastolic velocity 20.4 cm/s. Ratio peak  systolic velocity right internal carotid artery to right common carotid artery 1.0. Peak systolic velocity right external carotid artery 104.7 cm/s. The peak systolic velocity in left internal carotid artery is 53.1 cm/s. End-diastolic velocity 14.7 cm/s. Ratio peak systolic velocity left internal carotid artery to left common carotid artery 0.9. Peak systolic velocity left external carotid artery 61.8 cm/s. Antegrade flow is present in each vertebral artery.     Impression: CONCLUSION: Less than 50% diameter reduction stenosis each internal carotid artery. [id number] Electronically signed by:  Lev Lisa MD  8/20/2019 12:29 PM CDT Workstation: [id number]    Xr Hip With Or Without Pelvis 2 - 3 View Left    Result Date: 9/3/2019  Narrative: Ordering Provider:  Brett Little MD Ordering Diagnosis/Indication:  Closed fracture of left hip with routine healing, subsequent encounter Procedure:  XR HIP W OR WO PELVIS 2-3 VIEW LEFT Exam Date:  9/3/19 COMPARISON:  Todays X-rays were compared to previous images dated August 19, 2019.     Impression:  AP standing of the pelvis with AP and lateral of the left hip shows acceptable position and alignment of an intertrochanteric hip fracture on the left.  There is fixation with a sliding hip screw with no change in position or alignment from prior x-ray.  There is progressive healing noted on these x-rays.  No significant arthritic change noted in the right hip.  No other acute bony abnormality. Brett Little MD 9/3/19     Xr Hip With Or Without Pelvis 2 - 3 View Left    Result Date: 8/19/2019  Narrative: Procedure:  Left hip    Indication:  Trauma, pain.   . Technique:  Two views left hip. Single frontal view pelvis.   . Prior relevant exam: Left hip July 22, 2019. Marilu 15, 2019. Healing intertrochanteric fracture left femoral neck stabilized by a lag screw through the femoral neck attached to a lateral compression plate. Fracture line also extends into the  greater trochanter. The fracture is in good alignment without angulation or deformity. The fracture line is much less pronounced in comparison with prereduction views. There are degenerative changes of the pubic symphysis. The bony pelvis is otherwise unremarkable. There is no evidence of any acute or new fractures.  There are no acute traumatic changes.     Impression: CONCLUSION: As above. Electronically signed by:  Adriano Chavez MD  8/19/2019 2:46 PM CDT Workstation: [id number]          ASSESSMENT:    Diagnoses and all orders for this visit:    Closed fracture of left hip with routine healing, subsequent encounter    Delirium          PLAN    Continue range of motion and strengthening.  Continue with endurance training.  Continue use of home health and slowly advancing as tolerated.  Recheck in 6 weeks with repeat x-rays at that time.    Return in about 6 weeks (around 10/15/2019) for Recheck with repeat xrays.    Brett Little MD

## 2019-09-19 PROBLEM — Z87.891 PERSONAL HISTORY OF TOBACCO USE, PRESENTING HAZARDS TO HEALTH: Status: ACTIVE | Noted: 2019-01-01

## 2019-09-19 NOTE — PROGRESS NOTES
"    Pulmonary Consultation    ,    Thank you for asking me to see Kuldip Nevarez for   Chief Complaint   Patient presents with   • Shortness of Breath   .    Subjective     History of Present Illness  Kuldip Nevarez is a 74 y.o. male with a PMH significant for COPD, past tobacco use, NAFISA, ASCAD s/p CAD, pacemaker, colon cancer s/p resection/ chemo/ XRT in 2004, hyperlipidemia, and GERD who presents for evaluation of COPD and dyspnea. Pt states he broke a hip and he was discharged to rehab. He did well and was sent home with PT. Pt reports he started having dizziness and syncope with exercise, so his BP was checked and noted to be going low. He was also told his oxygen level would drop. Pt went to his cardiologist and he was started on several new medications. His PCP recommended he have his lungs checked. Pt states when he was on the operating table he \"blacked out\" and had to have chest compressions. He reports he had pneumonia and confusion so he went to . He was referred to the ED but he fell in the parking lot twice suffering a hip fracture. At some point, he had x-rays that he had a spot on his lung. Pt denies prior lung nodules or breathing issues. He admits to [facility], but states it is due to not being able to exercise. Pt is not on any inhalers. He denies cough, wheeze, chest pain, or edema.  He does state if he lies on his side, the dependent nose will get stopped up. Pt admits to some snoring and EDS. He reports he was diagnosed with colon cancer in 2004 and he was treated in [place].  He states that he knows the exact moment when he was his cancer by divine intervention.  Pt previously smoked but quit in 2004. His father and brother had asthma. His brother also had lung cancer. He has done carpentry, farming,  (noncombat), and the telephone company. He may have been exposed to asbestos.      Tobacco use history:  Type: cigarettes  Amount: 1 ppd  Duration: 45 years  Cessation: 2004 "   Willing to quit: N/A      Review of Systems: History obtained from chart review and the patient.  Review of Systems   Constitutional: Positive for fatigue. Negative for fever and unexpected weight change.   HENT: Negative for congestion.    Respiratory: Negative for cough, shortness of breath and wheezing.    Cardiovascular: Negative for chest pain and leg swelling.   Gastrointestinal: Negative for abdominal pain.   Musculoskeletal: Positive for arthralgias and back pain.   Neurological: Positive for dizziness and syncope.     As described in the HPI. Otherwise, remainder of ROS (14 systems) were negative.    Patient Active Problem List   Diagnosis   • S/p left hip fracture   • Closed fracture of left hip (CMS/HCC)   • Frequent falls   • Closed nondisplaced intertrochanteric fracture of left femur with routine healing   • Delirium   • Pacemaker   • Coronary artery disease   • Disease of thyroid gland   • Stage 3 chronic kidney disease (CMS/HCC)   • Closed left hip fracture (CMS/HCC)   • Primary insomnia   • Dry mouth   • Chronic gouty arthropathy   • Near syncope   • Personal history of tobacco use, presenting hazards to health   • Physical deconditioning   • Lung nodule   • COPD mixed type (CMS/HCC)         Current Outpatient Medications:   •  acetaminophen (TYLENOL) 325 MG tablet, Take 2 tablets by mouth Every 4 (Four) Hours As Needed for Mild Pain  or Moderate Pain ., Disp: 1 bottle, Rfl: prn  •  allopurinol (ZYLOPRIM) 300 MG tablet, Take 300 mg by mouth Daily., Disp: , Rfl:   •  aspirin 81 MG chewable tablet, Chew 81 mg Daily., Disp: , Rfl:   •  Cyanocobalamin (VITAMIN B 12 PO), Take 1 tablet by mouth Daily., Disp: , Rfl:   •  fludrocortisone 0.1 MG tablet, Take 1 tablet by mouth Daily., Disp: , Rfl:   •  levothyroxine (SYNTHROID, LEVOTHROID) 75 MCG tablet, Take 75 mcg by mouth Daily., Disp: , Rfl:   •  midodrine (PROAMATINE) 5 MG tablet, Take 5 mg by mouth 3 (Three) Times a Day., Disp: , Rfl: 2  •   simvastatin (ZOCOR) 40 MG tablet, Take 40 mg by mouth Daily., Disp: , Rfl:   •  colchicine 0.6 MG tablet, Take 0.6 mg by mouth Daily., Disp: , Rfl:   •  docusate sodium 100 MG capsule, Take 100 mg by mouth 2 (Two) Times a Day As Needed for Constipation., Disp: , Rfl:   •  omeprazole (priLOSEC) 40 MG capsule, Take 40 mg by mouth Every Other Day., Disp: , Rfl:     Allergies   Allergen Reactions   • Latex Rash   • Tape Rash       Past Medical History:   Diagnosis Date   • Arthritis    • Cancer (CMS/HCC)     colon, skin   • Coronary artery disease    • Disease of thyroid gland    • History of transfusion    • MI (myocardial infarction) (CMS/HCC)        • Pacemaker    • Sleep apnea      Past Surgical History:   Procedure Laterality Date   • APPENDECTOMY     • COLON SURGERY     • COLONOSCOPY N/A 2017    Procedure: COLONOSCOPY;  Surgeon: Barrie Jarrett DO;  Location: [facility] ENDOSCOPY;  Service:    • CORONARY ANGIOPLASTY WITH STENT PLACEMENT     • FEMUR OPEN REDUCTION INTERNAL FIXATION Left 2019    Procedure: FEMUR OPEN REDUCTION INTERNAL FIXATION;  Surgeon: Edward Harley MD;  Location: [facility] OR;  Service: Orthopedics   • KIDNEY STONE SURGERY     • PACEMAKER IMPLANTATION     • SALIVARY GLAND EXCISION Left     Left neck due to skin cancer with resultant chronic dry mouth     Social History     Socioeconomic History   • Marital status:      Spouse name: Not on file   • Number of children: Not on file   • Years of education: Not on file   • Highest education level: Not on file   Occupational History   • Occupation: [employer]     Employer: [employer]     Comment: Retired   Tobacco Use   • Smoking status: Former Smoker     Years: 45.00     Last attempt to quit:      Years since quittin.7   • Smokeless tobacco: Never Used   Substance and Sexual Activity   • Alcohol use: No   • Drug use: No   • Sexual activity: Defer     Family History   Problem Relation Age of Onset   • Coronary artery  "disease Mother    • Coronary artery disease Father           Objective     Blood pressure 104/61, pulse 62, height 182.9 cm (72\"), weight 93.2 kg (205 lb 6.4 oz), SpO2 98 %.  Physical Exam   Constitutional: He is oriented to person, place, and time. Vital signs are normal. He appears well-developed and well-nourished.   HENT:   Head: Normocephalic and atraumatic.   Nose: Nose normal.   Mouth/Throat: Uvula is midline, oropharynx is clear and moist and mucous membranes are normal.   Mallampati 2   Eyes: Conjunctivae, EOM and lids are normal. Pupils are equal, round, and reactive to light.   Neck: Trachea normal and normal range of motion. No tracheal tenderness present. No thyroid mass present.   Cardiovascular: Normal rate, regular rhythm and normal heart sounds. PMI is not displaced. Exam reveals no gallop.   No murmur heard.  Pulmonary/Chest: Effort normal and breath sounds normal. No respiratory distress. He has no decreased breath sounds. He has no wheezes. He has no rhonchi. Chest wall is not dull to percussion. He exhibits no tenderness.   Abdominal: Soft. Normal appearance and bowel sounds are normal. There is no hepatomegaly. There is no tenderness.   Musculoskeletal:   In wheelchair, no extremity edema     Vascular Status -  His right foot exhibits no edema. His left foot exhibits no edema.  Lymphadenopathy:        Head (right side): No submandibular adenopathy present.        Head (left side): No submandibular adenopathy present.     He has no cervical adenopathy.        Right: No supraclavicular adenopathy present.        Left: No supraclavicular adenopathy present.   Neurological: He is alert and oriented to person, place, and time.   Skin: Skin is warm and dry. No rash noted. No cyanosis. Nails show no clubbing.   Psychiatric: He has a normal mood and affect. His speech is normal and behavior is normal. Judgment normal.   Nursing note and vitals reviewed.      PFTs: 9/20/19 (independently reviewed and " interpreted by me)  Ratio 68  FVC 3.26/ 72%  FEV1 2.23/ 68%  TLC 4.24/ 56%  DLCO 13.34/ 38%  Moderate obstruction and mild restriction.  No significant bronchodilator response.  Severely reduced diffusing capacity.  No comparative data available.    Radiology (independently reviewed and interpreted by me): CT chest without contrast 6/19/2019 showed left upper lobe pneumonitis, 1.35 cm noncalcified posterior right apical nodule abutting the pleura, several small mediastinal lymph nodes, old granulomatous disease, small left-sided effusion       Assessment/Plan     Kuldip was seen today for shortness of breath.    Diagnoses and all orders for this visit:    Dyspnea on exertion  -     Full Pulmonary Function Test With Bronchodilator    COPD mixed type (CMS/HCC)    Lung nodule    Orthostasis    Personal history of tobacco use, presenting hazards to health    Physical deconditioning         Discussion/ Recommendations:   PFTs are consistent with both obstruction and restriction.  Recent CT chest was personally reviewed and showed findings consistent with a left sided pneumonia as well as some pleural effusions.  There was a 1.3 cm pleural-based nodule at the right apex which is concerning.  I reviewed the findings with the patient as well as my recommendations.  Given his history of smoking as well as personal history of colon cancer, I am concerned about the possibility of a lung malignancy.  Given that it has been 3 months since his last imaging, I recommended a PET/CT to evaluate the nodule for FDG uptake as well as to evaluate if any other areas of concern are present.  Given the current location of the nodule, it is most amenable to a transthoracic needle aspiration by radiology.  I did review the risks/benefits of the procedure, including bleeding, infection, pneumothorax, and medication reaction.  Alternative would be more invasive thoracic surgery.  Unfortunately, the nodule is not amenable to bronchoscopic biopsy  given the peripheral location.  With regards to COPD, I have offered initiation of a bronchodilator, but the patient states that he has not had any issues with dyspnea so he wishes to hold.  I did offer a referral to [facility] for a second opinion regarding his CT.  Patient stated that he wished to consider his options and will contact us if he wishes to proceed with PET.  With regards to his oxygen saturation dropping, while it is not normal, I counseled that it is above the threshold of oxygen supplementation.  He does have underlying lung disease which likely accounts for the desaturation.  Is also possible that the oxygen saturation change secondary to his significant orthostatic hypotension which occurs at the same time.    -Recommended PET/CT to evaluate right apical nodule for FDG uptake as well as assess for other areas that may be more amenable to biopsy.  -If he wishes to proceed with diagnostic biopsy, I recommend transthoracic needle aspiration by radiology.  -Offered referral to [facility] for second opinion.  -Offered bronchodilator but patient declined  -Cautioned on standing as well as walking given recent orthostasis  -Follow-up with PCP and cardiology regarding orthostasis  -Continue efforts at rehab with physical therapy.  -Encourage patient to get annual flu vacccine    He is to call when he decides on how he wishes to proceed.  We will hold on scheduling a follow-up until that is determined.    Patient's Body mass index is 27.86 kg/m². BMI is within normal parameters. No follow-up required..    MDM  Dx/tx= 4  Data= 4  Risk= High (possibility of lung cancer which is life-threatening, discussion of risk/benefit of elective percutaneous biopsy with identifiable risk factor of heart disease and COPD)       Return if symptoms worsen or fail to improve.      Thank you for allowing me to participate in the care of Kuldip Nevarez. Please do not hesitate to contact me with any questions.         This  document has been electronically signed by Ariadne Lisa MD on September 20, 2019 3:20 PM      Dictated using Dragon

## 2019-09-20 PROBLEM — R91.1 LUNG NODULE: Status: ACTIVE | Noted: 2019-01-01

## 2019-09-20 PROBLEM — J96.01 ACUTE RESPIRATORY FAILURE WITH HYPOXIA AND HYPERCAPNIA (HCC): Status: RESOLVED | Noted: 2019-01-01 | Resolved: 2019-01-01

## 2019-09-20 PROBLEM — J44.9 COPD MIXED TYPE (HCC): Status: ACTIVE | Noted: 2019-01-01

## 2019-09-20 PROBLEM — J18.9 PNEUMONIA INVOLVING LEFT LUNG: Status: RESOLVED | Noted: 2019-01-01 | Resolved: 2019-01-01

## 2019-09-20 PROBLEM — J96.02 ACUTE RESPIRATORY FAILURE WITH HYPOXIA AND HYPERCAPNIA (HCC): Status: RESOLVED | Noted: 2019-01-01 | Resolved: 2019-01-01

## 2019-09-20 PROBLEM — R53.81 PHYSICAL DECONDITIONING: Status: ACTIVE | Noted: 2019-01-01

## 2019-09-25 NOTE — TELEPHONE ENCOUNTER
Patient called requesting that we go ahead and schedule his PET scan.  This has been scheduled for 9/27/19 @ 12:30. I explained that radiology will call him the day before his procedure to give him instructions.   A follow up appt has been scheduled for 10/1/19.

## 2019-09-26 NOTE — TELEPHONE ENCOUNTER
This was routed to Dr. Husain when in fact he is a Dr. Lisa patient.  I gave message to Alem and she called him with assistance        ----- Message from Saira Huynh sent at 9/26/2019  1:26 PM CDT -----  Contact: [phone]  Pt called with questions about PET scan.

## 2019-10-11 PROBLEM — C18.9 MALIGNANT NEOPLASM OF COLON (HCC): Status: ACTIVE | Noted: 2019-01-01

## 2019-10-11 PROBLEM — E66.09 CLASS 1 OBESITY DUE TO EXCESS CALORIES WITH SERIOUS COMORBIDITY AND BODY MASS INDEX (BMI) OF 30.0 TO 30.9 IN ADULT: Status: ACTIVE | Noted: 2019-01-01

## 2019-10-11 NOTE — PROGRESS NOTES
10/4/2019    Kuldip Nevarez  1945    Chief Complaint:    Chief Complaint   Patient presents with   • Lung Nodule       HPI:      PCP:  Mike Draper MD  Cardiology:  Dr Grullon  Pulmonology:  Dr Lisa  Orthopedic Surgery:  Dr Little    74 y.o. male with HTN(stable, increased risk stroke, rupture), Obesity(uncontrolled, increased risk cardiovascular events), COPD(stable, increased risk pulmonary complications) and Chronic Kidney Disease(stable, increased risk renal failure) , lung nodule(new, suspicious malignancy).  former smoker.  Moderate shortness of breath x 1 year.  Hip fracture in summer 2019 complicated hypotension, hypoxia.  Lung nodule noted on imaging.  Prior colon cancer (resection, XRT, chemo)..  No TIA stroke amaurosis.  No MI claudication. No other associated signs, symptoms or modifying factors.    2019 CT Chest:  RIGHT upper lobe nodule 15mm.  2019 CT Chest:  RIGHT upper lobe nodule 20mm.  2019 PET CT:  RIGHT upper lobe nodule 20mm. (SUV 4.8)  2019 PFT:  FVC 3.1 (69%), FEV1 1.9 (57%), DLCO 38%    PCI x2, PPM  2019 Echocardiogram:  EF 35%, LA 44mm, LV 61mm, RVSP 45mmHg.  Mild MR TR.    2019 EC paced, QTc 471    The following portions of the patient's history were reviewed and updated as appropriate: allergies, current medications, past family history, past medical history, past social history, past surgical history and problem list.  Recent images independently reviewed.  Available laboratory values reviewed.    PMH:  Past Medical History:   Diagnosis Date   • Arthritis    • Cancer (CMS/HCC)     colon, skin   • Coronary artery disease    • Disease of thyroid gland    • GERD (gastroesophageal reflux disease)    • History of transfusion    • Hyperlipidemia    • MI (myocardial infarction) (CMS/HCC)        • Pacemaker    • Sleep apnea      Past Surgical History:   Procedure Laterality Date   • APPENDECTOMY     • COLON SURGERY     • COLONOSCOPY N/A 2017     Procedure: COLONOSCOPY;  Surgeon: Barrie Jarrett DO;  Location: [facility] ENDOSCOPY;  Service:    • CORONARY ANGIOPLASTY WITH STENT PLACEMENT     • ENDOSCOPY     • FEMUR OPEN REDUCTION INTERNAL FIXATION Left 2019    Procedure: FEMUR OPEN REDUCTION INTERNAL FIXATION;  Surgeon: Edward Harley MD;  Location: [facility] OR;  Service: Orthopedics   • KIDNEY STONE SURGERY     • PACEMAKER IMPLANTATION     • SALIVARY GLAND EXCISION Left     Left neck due to skin cancer with resultant chronic dry mouth     Family History   Problem Relation Age of Onset   • Coronary artery disease Mother    • Coronary artery disease Father      Social History     Tobacco Use   • Smoking status: Former Smoker     Years: 45.00     Last attempt to quit:      Years since quittin.7   • Smokeless tobacco: Never Used   Substance Use Topics   • Alcohol use: No   • Drug use: No       ALLERGIES:  Allergies   Allergen Reactions   • Latex Rash   • Tape Rash         MEDICATIONS:    Current Outpatient Medications:   •  allopurinol (ZYLOPRIM) 100 MG tablet, Take 100 mg by mouth Daily., Disp: , Rfl:   •  aspirin 81 MG chewable tablet, Chew 81 mg Daily., Disp: , Rfl:   •  Cyanocobalamin (VITAMIN B 12 PO), Take 1 tablet by mouth Daily., Disp: , Rfl:   •  fludrocortisone 0.1 MG tablet, Take 1 tablet by mouth Daily., Disp: , Rfl:   •  levothyroxine (SYNTHROID, LEVOTHROID) 75 MCG tablet, Take 75 mcg by mouth Daily., Disp: , Rfl:   •  midodrine (PROAMATINE) 5 MG tablet, Take 5 mg by mouth 3 (Three) Times a Day., Disp: , Rfl: 2  •  omeprazole (priLOSEC) 40 MG capsule, Take 40 mg by mouth Every Other Day., Disp: , Rfl:   •  simvastatin (ZOCOR) 40 MG tablet, Take 40 mg by mouth Daily., Disp: , Rfl:   •  Magnesium 250 MG tablet, Take 1 tablet by mouth Daily., Disp: , Rfl:     Review of Systems   Review of Systems   Constitution: Positive for malaise/fatigue. Negative for night sweats and weight loss.   HENT: Negative for hearing loss, hoarse  "voice and stridor.    Eyes: Negative for vision loss in left eye, vision loss in right eye and visual disturbance.   Cardiovascular: Positive for dyspnea on exertion. Negative for chest pain, leg swelling and palpitations.   Respiratory: Negative for cough, hemoptysis and shortness of breath.    Hematologic/Lymphatic: Negative for adenopathy and bleeding problem. Does not bruise/bleed easily.   Skin: Negative for color change, poor wound healing and rash.   Musculoskeletal: Positive for arthritis and back pain. Negative for muscle weakness and neck pain.   Gastrointestinal: Negative for abdominal pain, dysphagia and heartburn.   Neurological: Negative for dizziness, numbness and seizures.   Psychiatric/Behavioral: Negative for altered mental status, depression and memory loss. The patient is not nervous/anxious.        Physical Exam   Vitals:    10/04/19 1104   BP: 130/70   BP Location: Right arm   Pulse: 60   Temp: 97.8 °F (36.6 °C)   TempSrc: Temporal   SpO2: 99%   Weight: 103 kg (227 lb)   Height: 182.9 cm (72\")     Physical Exam   Constitutional: He is oriented to person, place, and time. He is active and cooperative. He does not appear ill. No distress.   HENT:   Head: Atraumatic.   Right Ear: Hearing normal.   Left Ear: Hearing normal.   Nose: No nasal deformity. No epistaxis.   Mouth/Throat: He does not have dentures. Normal dentition.   Eyes: Conjunctivae and lids are normal. Right pupil is round and reactive. Left pupil is round and reactive.   Neck: No JVD present. Carotid bruit is not present. No tracheal deviation present. No thyroid mass and no thyromegaly present.   Cardiovascular: Normal rate and regular rhythm.   No murmur heard.  Pulses:       Carotid pulses are 2+ on the right side, and 2+ on the left side.       Radial pulses are 2+ on the right side, and 2+ on the left side.        Dorsalis pedis pulses are 2+ on the right side, and 2+ on the left side.   Pulmonary/Chest: Effort normal and breath " sounds normal.   Abdominal: Soft. He exhibits no distension and no mass. There is no splenomegaly or hepatomegaly. There is no tenderness.   Musculoskeletal: He exhibits no deformity.   Gait normal.    Lymphadenopathy:     He has no cervical adenopathy.        Right: No supraclavicular adenopathy present.        Left: No supraclavicular adenopathy present.   Neurological: He is alert and oriented to person, place, and time. He has normal strength.   Skin: Skin is warm and dry. No cyanosis or erythema. No pallor.   No venous staining   Psychiatric: He has a normal mood and affect. His speech is normal. Judgment and thought content normal.     Results for KULDIP LIN (MRN [id number]) as of 10/11/2019 13:06  GFR 39 Ref. Range 9/11/2019 21:21   Creatinine Latest Ref Range: 0.6 - 1.2 MG/DL 1.7 (H)   BUN Latest Ref Range: 8 - 23 MG/DL 29 (H)     ASSESSMENT:  Kuldip was seen today for lung nodule.    Diagnoses and all orders for this visit:    Lung nodule    Coronary artery disease involving native coronary artery of native heart without angina pectoris    COPD mixed type (CMS/HCC)    Closed fracture of left hip with routine healing, subsequent encounter    Stage 3 chronic kidney disease (CMS/HCC)    Malignant neoplasm of colon, unspecified part of colon (CMS/HCC)    Class 1 obesity due to excess calories with serious comorbidity and body mass index (BMI) of 30.0 to 30.9 in adult      PLAN:  Detailed discussion with Kuldip Lin regarding situation, options and plans. enlarging solitary lung nodule and PET positive lung nodule  Pulmonary resection is advisable.  Discussed with Dr Lisa, he will likely tolerate wedge resection, but probably not lobectomy.  Chemo/XRT would be difficult.    Risks:  Mortality/Major morbidity 5%  including but not limited to infection, bleeding, transfusion, pulmonary dysfunction (prolonged air leak, prolonged mechanical ventilation, need for long term oxygen therapy), renal  dysfunction.  Benefits: diagnosis and treatment, improved quality of life, survival.  Options: observation, transthoracic needle biopsy discussed.  Understands and wishes to proceed.      RIGHT VAT thoracotomy, pulmonary resection, bronchoscopy, ON-Q pain pump.  GEN.  IVAN.  MAIRA.  10/18/2019    Return after above studies complete  Obesity Class  1. Increased risk cardiovascular events, sleep and breathing disorders, joint issues, type 2 diabetes mellitus. Options for weight management, heart healthy diet, exercise programs, and associated health risks of obesity discussed.    Recommended regular physical activity, progressive walking program.  Continue current medications as directed.  Will Obtain relevant old records.    Thank you for the opportunity to participate in this patient's care.    Copy to primary care provider.    EMR Dragon/Transcription disclaimer:   Much of this encounter note is an electronic transcription/translation of spoken language to printed text. The electronic translation of spoken language may permit erroneous, or at times, nonsensical words or phrases to be inadvertently transcribed; Although I have reviewed the note for such errors, some may still exist.

## 2019-10-11 NOTE — H&P (VIEW-ONLY)
10/4/2019    Kuldip Nevarez  1945    Chief Complaint:    Chief Complaint   Patient presents with   • Lung Nodule       HPI:      PCP:  Mike Draper MD  Cardiology:  Dr Grullon  Pulmonology:  Dr Lisa  Orthopedic Surgery:  Dr Little    74 y.o. male with HTN(stable, increased risk stroke, rupture), Obesity(uncontrolled, increased risk cardiovascular events), COPD(stable, increased risk pulmonary complications) and Chronic Kidney Disease(stable, increased risk renal failure) , lung nodule(new, suspicious malignancy).  former smoker.  Moderate shortness of breath x 1 year.  Hip fracture in summer 2019 complicated hypotension, hypoxia.  Lung nodule noted on imaging.  Prior colon cancer (resection, XRT, chemo)..  No TIA stroke amaurosis.  No MI claudication. No other associated signs, symptoms or modifying factors.    2019 CT Chest:  RIGHT upper lobe nodule 15mm.  2019 CT Chest:  RIGHT upper lobe nodule 20mm.  2019 PET CT:  RIGHT upper lobe nodule 20mm. (SUV 4.8)  2019 PFT:  FVC 3.1 (69%), FEV1 1.9 (57%), DLCO 38%    PCI x2, PPM  2019 Echocardiogram:  EF 35%, LA 44mm, LV 61mm, RVSP 45mmHg.  Mild MR TR.    2019 EC paced, QTc 471    The following portions of the patient's history were reviewed and updated as appropriate: allergies, current medications, past family history, past medical history, past social history, past surgical history and problem list.  Recent images independently reviewed.  Available laboratory values reviewed.    PMH:  Past Medical History:   Diagnosis Date   • Arthritis    • Cancer (CMS/HCC)     colon, skin   • Coronary artery disease    • Disease of thyroid gland    • GERD (gastroesophageal reflux disease)    • History of transfusion    • Hyperlipidemia    • MI (myocardial infarction) (CMS/HCC)        • Pacemaker    • Sleep apnea      Past Surgical History:   Procedure Laterality Date   • APPENDECTOMY     • COLON SURGERY     • COLONOSCOPY N/A 2017     Procedure: COLONOSCOPY;  Surgeon: Barrie Jarrett DO;  Location: [facility] ENDOSCOPY;  Service:    • CORONARY ANGIOPLASTY WITH STENT PLACEMENT     • ENDOSCOPY     • FEMUR OPEN REDUCTION INTERNAL FIXATION Left 2019    Procedure: FEMUR OPEN REDUCTION INTERNAL FIXATION;  Surgeon: Edward Harley MD;  Location: [facility] OR;  Service: Orthopedics   • KIDNEY STONE SURGERY     • PACEMAKER IMPLANTATION     • SALIVARY GLAND EXCISION Left     Left neck due to skin cancer with resultant chronic dry mouth     Family History   Problem Relation Age of Onset   • Coronary artery disease Mother    • Coronary artery disease Father      Social History     Tobacco Use   • Smoking status: Former Smoker     Years: 45.00     Last attempt to quit:      Years since quittin.7   • Smokeless tobacco: Never Used   Substance Use Topics   • Alcohol use: No   • Drug use: No       ALLERGIES:  Allergies   Allergen Reactions   • Latex Rash   • Tape Rash         MEDICATIONS:    Current Outpatient Medications:   •  allopurinol (ZYLOPRIM) 100 MG tablet, Take 100 mg by mouth Daily., Disp: , Rfl:   •  aspirin 81 MG chewable tablet, Chew 81 mg Daily., Disp: , Rfl:   •  Cyanocobalamin (VITAMIN B 12 PO), Take 1 tablet by mouth Daily., Disp: , Rfl:   •  fludrocortisone 0.1 MG tablet, Take 1 tablet by mouth Daily., Disp: , Rfl:   •  levothyroxine (SYNTHROID, LEVOTHROID) 75 MCG tablet, Take 75 mcg by mouth Daily., Disp: , Rfl:   •  midodrine (PROAMATINE) 5 MG tablet, Take 5 mg by mouth 3 (Three) Times a Day., Disp: , Rfl: 2  •  omeprazole (priLOSEC) 40 MG capsule, Take 40 mg by mouth Every Other Day., Disp: , Rfl:   •  simvastatin (ZOCOR) 40 MG tablet, Take 40 mg by mouth Daily., Disp: , Rfl:   •  Magnesium 250 MG tablet, Take 1 tablet by mouth Daily., Disp: , Rfl:     Review of Systems   Review of Systems   Constitution: Positive for malaise/fatigue. Negative for night sweats and weight loss.   HENT: Negative for hearing loss, hoarse  "voice and stridor.    Eyes: Negative for vision loss in left eye, vision loss in right eye and visual disturbance.   Cardiovascular: Positive for dyspnea on exertion. Negative for chest pain, leg swelling and palpitations.   Respiratory: Negative for cough, hemoptysis and shortness of breath.    Hematologic/Lymphatic: Negative for adenopathy and bleeding problem. Does not bruise/bleed easily.   Skin: Negative for color change, poor wound healing and rash.   Musculoskeletal: Positive for arthritis and back pain. Negative for muscle weakness and neck pain.   Gastrointestinal: Negative for abdominal pain, dysphagia and heartburn.   Neurological: Negative for dizziness, numbness and seizures.   Psychiatric/Behavioral: Negative for altered mental status, depression and memory loss. The patient is not nervous/anxious.        Physical Exam   Vitals:    10/04/19 1104   BP: 130/70   BP Location: Right arm   Pulse: 60   Temp: 97.8 °F (36.6 °C)   TempSrc: Temporal   SpO2: 99%   Weight: 103 kg (227 lb)   Height: 182.9 cm (72\")     Physical Exam   Constitutional: He is oriented to person, place, and time. He is active and cooperative. He does not appear ill. No distress.   HENT:   Head: Atraumatic.   Right Ear: Hearing normal.   Left Ear: Hearing normal.   Nose: No nasal deformity. No epistaxis.   Mouth/Throat: He does not have dentures. Normal dentition.   Eyes: Conjunctivae and lids are normal. Right pupil is round and reactive. Left pupil is round and reactive.   Neck: No JVD present. Carotid bruit is not present. No tracheal deviation present. No thyroid mass and no thyromegaly present.   Cardiovascular: Normal rate and regular rhythm.   No murmur heard.  Pulses:       Carotid pulses are 2+ on the right side, and 2+ on the left side.       Radial pulses are 2+ on the right side, and 2+ on the left side.        Dorsalis pedis pulses are 2+ on the right side, and 2+ on the left side.   Pulmonary/Chest: Effort normal and breath " sounds normal.   Abdominal: Soft. He exhibits no distension and no mass. There is no splenomegaly or hepatomegaly. There is no tenderness.   Musculoskeletal: He exhibits no deformity.   Gait normal.    Lymphadenopathy:     He has no cervical adenopathy.        Right: No supraclavicular adenopathy present.        Left: No supraclavicular adenopathy present.   Neurological: He is alert and oriented to person, place, and time. He has normal strength.   Skin: Skin is warm and dry. No cyanosis or erythema. No pallor.   No venous staining   Psychiatric: He has a normal mood and affect. His speech is normal. Judgment and thought content normal.     Results for KULDIP LIN (MRN [id number]) as of 10/11/2019 13:06  GFR 39 Ref. Range 9/11/2019 21:21   Creatinine Latest Ref Range: 0.6 - 1.2 MG/DL 1.7 (H)   BUN Latest Ref Range: 8 - 23 MG/DL 29 (H)     ASSESSMENT:  Kuldip was seen today for lung nodule.    Diagnoses and all orders for this visit:    Lung nodule    Coronary artery disease involving native coronary artery of native heart without angina pectoris    COPD mixed type (CMS/HCC)    Closed fracture of left hip with routine healing, subsequent encounter    Stage 3 chronic kidney disease (CMS/HCC)    Malignant neoplasm of colon, unspecified part of colon (CMS/HCC)    Class 1 obesity due to excess calories with serious comorbidity and body mass index (BMI) of 30.0 to 30.9 in adult      PLAN:  Detailed discussion with Kuldip Lin regarding situation, options and plans. enlarging solitary lung nodule and PET positive lung nodule  Pulmonary resection is advisable.  Discussed with Dr Lisa, he will likely tolerate wedge resection, but probably not lobectomy.  Chemo/XRT would be difficult.    Risks:  Mortality/Major morbidity 5%  including but not limited to infection, bleeding, transfusion, pulmonary dysfunction (prolonged air leak, prolonged mechanical ventilation, need for long term oxygen therapy), renal  dysfunction.  Benefits: diagnosis and treatment, improved quality of life, survival.  Options: observation, transthoracic needle biopsy discussed.  Understands and wishes to proceed.      RIGHT VAT thoracotomy, pulmonary resection, bronchoscopy, ON-Q pain pump.  GEN.  IVAN.  MAIRA.  10/18/2019    Return after above studies complete  Obesity Class  1. Increased risk cardiovascular events, sleep and breathing disorders, joint issues, type 2 diabetes mellitus. Options for weight management, heart healthy diet, exercise programs, and associated health risks of obesity discussed.    Recommended regular physical activity, progressive walking program.  Continue current medications as directed.  Will Obtain relevant old records.    Thank you for the opportunity to participate in this patient's care.    Copy to primary care provider.    EMR Dragon/Transcription disclaimer:   Much of this encounter note is an electronic transcription/translation of spoken language to printed text. The electronic translation of spoken language may permit erroneous, or at times, nonsensical words or phrases to be inadvertently transcribed; Although I have reviewed the note for such errors, some may still exist.

## 2019-10-15 NOTE — PROGRESS NOTES
"The patient is a 74 y.o. male who presents for followup.    Chief Complaint   Patient presents with   • Left Hip - Follow-up, Fracture       HPI: f/u left hip fracture. Repeat xrays done today. Patient states that he has very little pain, tender when walking.     06/16/19 (4m) Edward Harley MD     FEMUR OPEN REDUCTION INTERNAL FIXATION - Left     Has had to delay PT due to multitple issues including a lung mass.  Also had changes in blood pressure with multiple falls.  Very little pain.        Current Outpatient Medications:   •  allopurinol (ZYLOPRIM) 100 MG tablet, Take 100 mg by mouth Daily., Disp: , Rfl:   •  aspirin 81 MG chewable tablet, Chew 81 mg Daily., Disp: , Rfl:   •  Cyanocobalamin (VITAMIN B 12 PO), Take 1 tablet by mouth Daily., Disp: , Rfl:   •  fludrocortisone 0.1 MG tablet, Take 1 tablet by mouth Daily., Disp: , Rfl:   •  levothyroxine (SYNTHROID, LEVOTHROID) 75 MCG tablet, Take 75 mcg by mouth Daily., Disp: , Rfl:   •  Magnesium 250 MG tablet, Take 1 tablet by mouth Daily., Disp: , Rfl:   •  midodrine (PROAMATINE) 5 MG tablet, Take 5 mg by mouth 3 (Three) Times a Day., Disp: , Rfl: 2  •  omeprazole (priLOSEC) 40 MG capsule, Take 40 mg by mouth Every Other Day., Disp: , Rfl:   •  simvastatin (ZOCOR) 40 MG tablet, Take 40 mg by mouth Daily., Disp: , Rfl:     Allergies   Allergen Reactions   • Latex Rash   • Tape Rash        ROS:  No fevers or chills.  No nausea or vomiting    PHYSICAL EXAM:    Vitals:    10/15/19 1322   Weight: 103 kg (227 lb)   Height: 182.9 cm (72\")       GAIT:     []  Normal  [x]  Antalgic    Assistive device: []  None  [x]  Walker     []  Crutches  []  Cane     []  Wheelchair  []  Stretcher    Patient is awake and alert, answers questions appropriately, and is in no apparent distress.    Hip flexion 110  Good distal pulses and sensation  Stable exam  nontender  Good muscle tone and strength.    Nm Pet Skull Base To Mid Thigh    Result Date: 9/30/2019  Narrative: " EXAM:  Nuclear Medicine Body PET/CT COMPARISON EXAMINATIONS:   CT scans of the chest abdomen and pelvis dated 6/19/2019. HISTORY: Neoplasm: chest, lung, staging , R91.1 Solitary pulmonary nodule: Right apical nodule. Dose:  14.78 mCi of F-18 FDG, I.V. Glucose:  85 mg / dl (capillary blood glucose measured at the time of injection) Contrast: None After allowing for an appropriate amount of time for uptake of the radiotracer, tomographic image acquisition was performed with PET and CT, from the base of the brain to the mid thighs. Anatomic imaging was performed during normal tidal respiration which potentially creates edge distortion due to motion artifact(s). The CT examination was performed without intravenous / oral contrast for the purpose of attenuation correction and is considered limited and 'non-diagnostic'  which precludes evaluation of solid organ parenchyma, characterization of solid masses, or depiction of subtle vascular pathology.  These non-diagnostic, non-contrast enhanced CT images obtained during tidal respiration, provide only a limited assessment of solid and hollow viscera, and do not replace diagnostic quality contrast enhanced CT scans. The PET and CT images were reconstructed in the axial, coronal, and sagittal planes and viewed independently as well as in a co-registered fashion. -------------- FINDINGS: HEAD and NECK: No suspicious hypermetabolic activity THORAX: Hypermetabolic activity is noted in the right upper lung associated with a nodule, SUVmax 4.8. This nodule measures 1.7 x 1.4 x 1.3 cm, previously 1.5 x 1.3 x 1.3 cm ABDOMEN/PELVIS: No suspicious hypermetabolic activity OSSEOUS / MISC: No suspicious hypermetabolic activity. There is a depression fracture of the L1 vertebral body not present on the prior study from June 19, 2019. ADDITIONAL FINDINGS: Multiple bilateral renal cysts. Multiple nonobstructing left renal calculi with the largest measuring 1.5 cm. Slightly enlarged  prostate. Fatty liver.     Impression: CONCLUSION: 1.  Enlarging hypermetabolic nodule in the right upper lung suspicious for neoplasm. 2.  There is a depression fracture of the L1 vertebral body not present on the prior study from June 19, 2019. 3.  Fatty liver. 4.  Multiple bilateral renal cysts. 5.  Multiple nonobstructing left renal calculi with the largest measuring 1.5 cm. Electronically signed by:  Jeremi Duarte MD  9/30/2019 9:33 AM CDT Workstation: [id number]    Xr Hip With Or Without Pelvis 2 - 3 View Left    Result Date: 10/15/2019  Narrative: Ordering Provider:  Brett Little MD Ordering Diagnosis/Indication:  Closed fracture of left hip with routine healing, subsequent encounter Procedure:  XR HIP W OR WO PELVIS 2-3 VIEW LEFT Exam Date:  10/15/19 COMPARISON:  Todays X-rays were compared to previous images dated September 3, 2019.     Impression:  AP the pelvis with AP and lateral of the left hip show acceptable position and alignment of an intertrochanteric hip fracture status post dynamic compression hip screw.  No sign of implant loosening or failure is noted.  Progressive healing is noted to the point of essentially complete bony consolidation.  No other acute findings are noted.  Brett Little MD 10/15/19           ASSESSMENT:  Diagnoses and all orders for this visit:    Closed fracture of left hip with routine healing, subsequent encounter        PLAN:    Continue ROM and strengthening as tolerated  No restrictions  Activity as tolerated  Continue HEP  Intermittent pain in multiple joints  Limiting activity      Return if symptoms worsen or fail to improve, for Recheck with repeat xrays.    Brett Little MD

## 2019-10-18 NOTE — ANESTHESIA PROCEDURE NOTES
Airway  Urgency: elective    Date/Time: 10/18/2019 12:01 PM  Airway not difficult    General Information and Staff    Patient location during procedure: OR    Indications and Patient Condition  Indications for airway management: airway protection    Preoxygenated: yes  Mask difficulty assessment: 1 - vent by mask    Final Airway Details  Final airway type: endotracheal airway      Successful airway: ETT  Cuffed: yes   Successful intubation technique: direct laryngoscopy  Facilitating devices/methods: intubating stylet  Endotracheal tube insertion site: oral  Blade: Navid  Blade size: 3  ETT size (mm): 8.0  Cormack-Lehane Classification: grade I - full view of glottis  Placement verified by: chest auscultation, bronchoscopy and capnometry   Inital cuff pressure (cm H2O): 8  Measured from: lips  ETT/EBT  to lips (cm): 24  Number of attempts at approach: 1  Assessment: lips, teeth, and gum same as pre-op and atraumatic intubation

## 2019-10-18 NOTE — ANESTHESIA POSTPROCEDURE EVALUATION
Patient: Kuldip Nevarez    Procedure Summary     Date:  10/18/19 Room / Location:  [facility] OR 09 / [facility] OR    Anesthesia Start:  1134 Anesthesia Stop:  1414    Procedure:  THORACOSCOPY VIDEO  ASSISTED , mini THORACOTOMY wedge resection nodule thoracic mediastinal lymphadenectomy bronchoscopy  Latex Allergy (Right Chest) Diagnosis:       Lung nodule      (Lung nodule [R91.1])    Surgeon:  Bruno Horton MD Provider:  Ruddy Jasmine MD    Anesthesia Type:  general ASA Status:  4          Anesthesia Type: general  Last vitals  BP   163/77 (10/18/19 0803)   Temp   97.4 °F (36.3 °C) (10/18/19 0803)   Pulse   66 (10/18/19 0803)   Resp   16 (10/18/19 0803)     SpO2   95 % (10/18/19 0803)     Post Anesthesia Care and Evaluation    Patient location during evaluation: PACU  Patient participation: complete - patient participated  Level of consciousness: awake  Pain score: 1  Pain management: adequate  Airway patency: patent  Anesthetic complications: No anesthetic complications    Cardiovascular status: acceptable  Respiratory status: acceptable  Hydration status: acceptable  Post Neuraxial Block status: Motor and sensory function returned to baseline  Comments: Pt unable to participate.  Intubated and sedated

## 2019-10-18 NOTE — PLAN OF CARE
Problem: Patient Care Overview  Goal: Plan of Care Review  Outcome: Ongoing (interventions implemented as appropriate)   10/18/19 0830   Coping/Psychosocial   Plan of Care Reviewed With patient;spouse   Plan of Care Review   Progress improving   OTHER   Outcome Summary a/ox4. describes pain as a intermittent dull ache, bp elevated, requiring nitro gtt to maintain SBP below 150, chest tube x 1 to suction. minimal output. Will continue to monitor.     Goal: Individualization and Mutuality  Outcome: Ongoing (interventions implemented as appropriate)    Goal: Discharge Needs Assessment  Outcome: Ongoing (interventions implemented as appropriate)    Goal: Interprofessional Rounds/Family Conf  Outcome: Ongoing (interventions implemented as appropriate)      Problem: Lung Surgery (via Thoracotomy) (Adult)  Goal: Signs and Symptoms of Listed Potential Problems Will be Absent, Minimized or Managed (Lung Surgery)  Outcome: Ongoing (interventions implemented as appropriate)    Goal: Anesthesia/Sedation Recovery  Outcome: Ongoing (interventions implemented as appropriate)      Problem: Fall Risk (Adult)  Goal: Identify Related Risk Factors and Signs and Symptoms  Outcome: Ongoing (interventions implemented as appropriate)    Goal: Absence of Fall  Outcome: Ongoing (interventions implemented as appropriate)      Problem: Skin Injury Risk (Adult)  Goal: Identify Related Risk Factors and Signs and Symptoms  Outcome: Ongoing (interventions implemented as appropriate)    Goal: Skin Health and Integrity  Outcome: Ongoing (interventions implemented as appropriate)

## 2019-10-18 NOTE — OP NOTE
OPERATIVE NOTE  Kuldip Nevarez  1945  10/18/2019    PREOP DIAGNOSES:  Lung nodule [R91.1]   Severe COPD    POSTOP DIAGNOSES:  Lung nodule [R91.1]    PROCEDURE:   RIGHT THORACOSCOPY VIDEO  ASSISTED ,   wedge resection nodule   thoracic mediastinal lymphadenectomy   bronchoscopy      SURGEON: JANNET Horton MD FACS [facility]    ASSISTANT: Raymundo Claros CFA    ANESTHESIA: General ET IVAN    ESTIMATED BLOOD LOSS: 30 ml     COMPLICATIONS: none    DESCRIPTION OF OPERATION: Patient taken to operating room, placed in supine position.  General anesthesia induced.  Radial arterial line placed by Anesthesia.  Prepped and draped in sterile fashion.  Fiberoptic bronchoscopy performed thru single lumen endotracheal tube, no endobronchial lesions, airways clear, minimal secretions.  Patient placed in LEFT lateral decubitus position with bean bag as axillary roll.  Prepped and draped in sterile fashion. 1cm port site placed 8th interspace anterior axillary line. Thoracoscope was introduced demonstrating normal-appearing lung, diaphragm, pericardium with mild adhesions.     RIGHT upper lobe posterior nodule identified, consistent with CT imaging.  Wedge resection performed with complete excision of nodule using Endo CATHERINE stapler with seamguard support.  Planned as definitive procedure due to poor pulmonary function.    Level 4 lymph nodes were identified, removed and sent separately.   No level 2, 7,8,9,10 lymph nodes were identified.   24Fr multihole chest tube placed posteriorly to apex.  Lung inflated to fill space, small air leak.    Incision closed layers of #2 Vicryl pericostal sutures around the 3rd rib thru the 4th rib. Serratus was reapproximated using 2-0 PDS suture, 2-0 PDS and subcutaneous tissue and 4-0 Monocryl skin with Dermabond tape. 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  Tolerated procedure well, transferred to PACU in stable  condition.            This document has been electronically signed by Bruno Horton MD on October 18, 2019 1:57 PM

## 2019-10-18 NOTE — ANESTHESIA PREPROCEDURE EVALUATION
Anesthesia Evaluation     no history of anesthetic complications:  NPO Solid Status: > 8 hours  NPO Liquid Status: > 2 hours           Airway   Mallampati: II  TM distance: >3 FB  Neck ROM: full  No difficulty expected  Dental    (+) edentulous    Pulmonary     breath sounds clear to auscultation  (+) a smoker Former, shortness of breath, sleep apnea, decreased breath sounds,   (-) pneumonia, COPD, asthma    ROS comment: UPPER RIGHT LUNG NODULE  Cardiovascular   Exercise tolerance: poor (<4 METS)    ECG reviewed  Rhythm: regular  Rate: normal    (+) pacemaker pacemaker, past MI  >12 months, CAD, cardiac stents more than 12 months ago dysrhythmias, CHF, PVD, hyperlipidemia,   (-) angina, murmur    ROS comment: Demand pacemaker, interpretation is based on intrinsic rhythm  Sinus rhythm  Inferior infarct , age undetermined  ST &  T wave abnormality, consider lateral ischemia  Abnormal ECG    EF 36-40%  · Left ventricular wall thickness is consistent with mild concentric hypertrophy.  · Left ventricular diastolic dysfunction (grade I) consistent with impaired relaxation.  · Mild mitral valve regurgitation is present  · Mild tricuspid valve regurgitation is present.  · The following left ventricular wall segments are hypokinetic: mid anterior, apical anterior, basal anterolateral, mid anterolateral, apical lateral, basal inferolateral, mid inferolateral, apical inferior, mid inferior, apical septal, basal inferoseptal, mid inferoseptal, apex hypokinetic, mid anteroseptal, basal anterior, basal inferior and basal inferoseptal.  · Right ventricular cavity is borderline dilated.    Neuro/Psych  (+) headaches,     (-) seizures, TIA, CVA, psychiatric history  GI/Hepatic/Renal/Endo    (+) obesity,  GERD well controlled,  hepatitis (1962) A, renal disease (hx of stones) stones and CRI, hypothyroidism,   (-) liver disease, diabetes    Musculoskeletal     (+) arthralgias,       ROS comment: gout  Abdominal   (+) obese,     Substance History - negative use     OB/GYN          Other   (+) arthritis   history of cancer (colon)                      Anesthesia Plan    ASA 4     general   (Arterial line, 2nd IV, possible post op vent and avoid Ketamine since nightmares for 3 days after hip surgery)  intravenous induction   Anesthetic plan, all risks, benefits, and alternatives have been provided, discussed and informed consent has been obtained with: patient, spouse/significant other and child.  Use of blood products discussed with patient  Consented to blood products.

## 2019-10-18 NOTE — ADDENDUM NOTE
Addendum  created 10/18/19 1500 by Brett Delgado CRNA    Intraprocedure LDAs edited, LDA properties accepted

## 2019-10-19 NOTE — PROGRESS NOTES
"  Cardiothoracic - Vascular Surgery Daily Note        LOS: 1 day   Patient Care Team:  Mike Draper MD as PCP - General    POD#1  RIGHT THORACOSCOPY VIDEO  ASSISTED ,   wedge resection nodule   thoracic mediastinal lymphadenectomy   bronchoscopy      Chief Complaint: Lung Nodule      Subjective     The following portions of the patient's history were reviewed and updated as appropriate: allergies, current medications, past family history, past medical history, past social history, past surgical history and problem list.     Subjective:  Symptoms:  Stable.  He reports chest pain (thoracic pain).    Diet:  Adequate intake.    Activity level: Impaired due to pain.    Pain:  He complains of pain that is moderate.  Pain is well controlled and requiring pain medication.          Objective     Vital Signs  Temp:  [96.9 °F (36.1 °C)-97.4 °F (36.3 °C)] 97.4 °F (36.3 °C)  Heart Rate:  [60-70] 60  Resp:  [15-18] 15  BP: (104-163)/(60-90) 129/68  Arterial Line BP: ()/(57-91) 158/82  Body mass index is 30.35 kg/m².    Intake/Output Summary (Last 24 hours) at 10/19/2019 0759  Last data filed at 10/18/2019 1759  Gross per 24 hour   Intake 1137 ml   Output 505 ml   Net 632 ml     No intake/output data recorded.    Wt Readings from Last 3 Encounters:   10/18/19 101 kg (223 lb 12.3 oz)   10/15/19 103 kg (227 lb)   10/04/19 103 kg (227 lb)      cc -10sx  No air leak      Physical Exam   Objective:  General Appearance:  Well-appearing.    Vital signs: (most recent): Blood pressure 129/68, pulse 60, temperature 97.4 °F (36.3 °C), temperature source Axillary, resp. rate 15, height 182.9 cm (72\"), weight 101 kg (223 lb 12.3 oz), SpO2 100 %.  Vital signs are normal.  No fever.    Output: Producing urine and no stool output.    HEENT: Normal HEENT exam.    Lungs:  Normal effort and normal respiratory rate.  Breath sounds clear to auscultation.  There are decreased breath sounds.  (CTx1 no air leak)  Heart: Normal rate.  " (PPM)  Abdomen: Abdomen is soft.  Bowel sounds are normal.     Extremities: Normal range of motion.    Neurological: Patient is alert and oriented to person, place and time.    Skin:  Warm and dry.  ((R) [employer]: Prineo intact)            Results Review:    Lab Results   Component Value Date    WBC 9.39 10/19/2019    HGB 11.0 (L) 10/19/2019    HCT 32.9 (L) 10/19/2019    MCV 95.4 10/19/2019     10/19/2019     Lab Results   Component Value Date    GLUCOSE 125 (H) 10/19/2019    BUN 22 10/19/2019    CREATININE 1.12 10/19/2019    EGFRIFNONA 64 10/19/2019    BCR 19.6 10/19/2019    K 4.7 10/19/2019    CO2 26.0 10/19/2019    CALCIUM 8.9 10/19/2019    ALBUMIN 3.5 09/11/2019    LABIL2 0.9 09/11/2019    AST 17 09/11/2019    ALT 14 09/11/2019       Calcium Calcium   Date/Time Value Ref Range Status   10/19/2019 0426 8.9 8.6 - 10.5 mg/dL Final      Magnesium No results found for: MG     Imaging Results (last 24 hours)     Procedure Component Value Units Date/Time    XR Chest 1 View [[id number]] Collected:  10/18/19 1433     Updated:  10/18/19 1452    Narrative:         [facility] [place]    Patient Name: ANA LINRY    ATTENDING: RAHAT ARAUZ     REFERRING: RAHAT ARAUZ    ORDERING: RAHAT ARAUZ    -----------------------    PROCEDURE: Chest, AP portable    DATE OF EXAM: 10/18/2019 at 1429 hours    HISTORY: Postop lung surgery    A single portable view of the chest was obtained. Study is  compared to 6/18/2019.    FINDINGS:    Postsurgical changes on the right with thoracotomy tube in place.  No pneumothorax is seen. Infiltrates in the left mid lung persist  with slight improvement from previous exam. Cardiac pacemaker is  seen in place on the left. Heart size is stable.      Impression:       1. Postsurgical changes with thoracotomy tube on the right. No  pneumothorax is seen.  2. Persistent infiltrate/airspace disease in left mid lung with  overall slight improvement from  6/18/2019.    Electronically signed by:  Sam Kaminski MD  10/18/2019 2:51 PM  CDT Workstation: [id number]                                acetaminophen 1,000 mg Oral Q6H   albuterol 2.5 mg Nebulization Q8H - RT   allopurinol 100 mg Oral Daily   aspirin 81 mg Oral Daily   atorvastatin 20 mg Oral Daily   fludrocortisone 100 mcg Oral Daily   ketorolac 15 mg Intravenous Once   Followed by      ketorolac 10 mg Oral Q8H   levothyroxine 75 mcg Oral Daily   magnesium oxide 400 mg Oral Daily   midodrine 5 mg Oral TID   pantoprazole 40 mg Oral QAM   sennosides-docusate sodium 2 tablet Oral Nightly       nitroglycerin 5-200 mcg/min Last Rate: 10 mcg/min (10/18/19 1900)   sodium chloride 100 mL/hr    sodium chloride 30 mL/hr Last Rate: 30 mL/hr (10/18/19 1611)             ASSESSMENT/PLAN       Lung nodule      Assessment & Plan    Stable Post Op RIGHT VATS: Progressing well    Lung Nodule: Positive Spindle Cell Carcinoma    Resp: O2 support. Incentive. Continue CT sx, waterseal AM    Pain: Tylenol. OxyIR prn    Rehab: OOB. Increase activity    Disp: Transfer 3W telemetry          This document has been electronically signed by NORMAN Gonzalez on October 19, 2019 7:59 AM

## 2019-10-19 NOTE — PLAN OF CARE
Problem: Patient Care Overview  Goal: Plan of Care Review  Outcome: Ongoing (interventions implemented as appropriate)   10/19/19 9235   Coping/Psychosocial   Plan of Care Reviewed With patient   Plan of Care Review   Progress improving   OTHER   Outcome Summary Patient improving sitting and talking with family       Problem: Lung Surgery (via Thoracotomy) (Adult)  Goal: Signs and Symptoms of Listed Potential Problems Will be Absent, Minimized or Managed (Lung Surgery)  Outcome: Ongoing (interventions implemented as appropriate)      Problem: Fall Risk (Adult)  Goal: Absence of Fall  Outcome: Ongoing (interventions implemented as appropriate)      Problem: Skin Injury Risk (Adult)  Goal: Skin Health and Integrity  Outcome: Ongoing (interventions implemented as appropriate)

## 2019-10-20 NOTE — PROGRESS NOTES
"  Cardiothoracic - Vascular Surgery Daily Note        LOS: 2 days   Patient Care Team:  Mike Draper MD as PCP - General    POD#2  RIGHT THORACOSCOPY VIDEO  ASSISTED ,   wedge resection nodule   thoracic mediastinal lymphadenectomy   bronchoscopy      Chief Complaint: Lung Nodule      Subjective     The following portions of the patient's history were reviewed and updated as appropriate: allergies, current medications, past family history, past medical history, past social history, past surgical history and problem list.     Subjective:  Symptoms:  Stable.  He reports chest pain (thoracic pain).    Diet:  Adequate intake.    Activity level: Impaired due to pain.    Pain:  He complains of pain that is mild.  Pain is well controlled and requiring pain medication.          Objective     Vital Signs  Temp:  [97 °F (36.1 °C)-98.4 °F (36.9 °C)] 98.4 °F (36.9 °C)  Heart Rate:  [60-75] 60  Resp:  [16-22] 20  BP: (110-146)/(61-79) 146/74  Body mass index is 29.32 kg/m².    Intake/Output Summary (Last 24 hours) at 10/20/2019 0830  Last data filed at 10/20/2019 0600  Gross per 24 hour   Intake 240 ml   Output 220 ml   Net 20 ml     No intake/output data recorded.    Wt Readings from Last 3 Encounters:   10/20/19 98.1 kg (216 lb 3.2 oz)   10/15/19 103 kg (227 lb)   10/04/19 103 kg (227 lb)      cc   waterseal  No air leak      Physical Exam   Objective:  General Appearance:  Well-appearing.    Vital signs: (most recent): Blood pressure 146/74, pulse 60, temperature 98.4 °F (36.9 °C), temperature source Temporal, resp. rate 20, height 182.9 cm (72\"), weight 98.1 kg (216 lb 3.2 oz), SpO2 93 %.  Vital signs are normal.  No fever.    Output: Producing urine and no stool output.    HEENT: Normal HEENT exam.    Lungs:  Normal effort and normal respiratory rate.  Breath sounds clear to auscultation.  There are decreased breath sounds.  (CTx1 no air leak)  Heart: Normal rate.  (PPM)  Abdomen: Abdomen is soft.  Bowel sounds " are normal.     Extremities: Normal range of motion.    Neurological: Patient is alert and oriented to person, place and time.    Skin:  Warm and dry.  ((R) [employer]: Prineo intact)            Results Review:    Lab Results   Component Value Date    WBC 9.39 10/19/2019    HGB 11.0 (L) 10/19/2019    HCT 32.9 (L) 10/19/2019    MCV 95.4 10/19/2019     10/19/2019     Lab Results   Component Value Date    GLUCOSE 125 (H) 10/19/2019    BUN 22 10/19/2019    CREATININE 1.12 10/19/2019    EGFRIFNONA 64 10/19/2019    BCR 19.6 10/19/2019    K 4.7 10/19/2019    CO2 26.0 10/19/2019    CALCIUM 8.9 10/19/2019    ALBUMIN 3.5 09/11/2019    LABIL2 0.9 09/11/2019    AST 17 09/11/2019    ALT 14 09/11/2019       Calcium Calcium   Date/Time Value Ref Range Status   10/19/2019 0426 8.9 8.6 - 10.5 mg/dL Final      Magnesium No results found for: MG     Imaging Results (last 24 hours)     Procedure Component Value Units Date/Time    XR Chest 1 View [[id number]] Collected:  10/20/19 0542     Updated:  10/20/19 0619    Narrative:       Exam: AP portable chest    INDICATION: CT waterseal    COMPARISON: 10/18/2019    FINDINGS: Portable chest. Right chest tube remains in place.  There is right apical pneumothorax which measures 1.3 cm from  apex to pleural surface. Heart is enlarged. Mild venous  congestion. There is atelectasis in the right perihilar region.  No change in the atelectasis and/or infiltrate in left lower  lung.      Impression:       Small right apical pneumothorax    Electronically signed by:  Chano Dimas MD  10/20/2019 6:18 AM  CDT Workstation: [id number]                                  acetaminophen 1,000 mg Oral Q6H   albuterol 2.5 mg Nebulization Q8H - RT   allopurinol 100 mg Oral Daily   aspirin 81 mg Oral Daily   atorvastatin 20 mg Oral Daily   fludrocortisone 100 mcg Oral Daily   ketorolac 15 mg Intravenous Once   Followed by      ketorolac 10 mg Oral Q8H   levothyroxine 75 mcg Oral Daily   magnesium oxide 400 mg  Oral Daily   midodrine 5 mg Oral TID   pantoprazole 40 mg Oral QAM   sennosides-docusate sodium 2 tablet Oral Nightly       sodium chloride 30 mL/hr Last Rate: 30 mL/hr (10/18/19 1611)             ASSESSMENT/PLAN       Lung nodule      Assessment & Plan    Stable Post Op RIGHT VATS: Progressing well    Lung Nodule: Positive Spindle Cell Carcinoma    Resp: O2 support. Incentive. Continue waterseal     Pain: Tylenol. OxyIR prn    Rehab: OOB. Increase activity    Disp: stable. Clamp in am, CXR for removal. DC planning          This document has been electronically signed by NORMAN Gonzalez on October 20, 2019 8:30 AM

## 2019-10-20 NOTE — PLAN OF CARE
Problem: Patient Care Overview  Goal: Plan of Care Review  Outcome: Ongoing (interventions implemented as appropriate)   10/20/19 9804   Coping/Psychosocial   Plan of Care Reviewed With patient   Plan of Care Review   Progress no change   OTHER   Outcome Summary patient denies pain.        Problem: Lung Surgery (via Thoracotomy) (Adult)  Goal: Signs and Symptoms of Listed Potential Problems Will be Absent, Minimized or Managed (Lung Surgery)  Outcome: Ongoing (interventions implemented as appropriate)      Problem: Fall Risk (Adult)  Goal: Absence of Fall  Outcome: Ongoing (interventions implemented as appropriate)      Problem: Skin Injury Risk (Adult)  Goal: Identify Related Risk Factors and Signs and Symptoms  Outcome: Ongoing (interventions implemented as appropriate)    Goal: Skin Health and Integrity  Outcome: Ongoing (interventions implemented as appropriate)

## 2019-10-21 NOTE — PROGRESS NOTES
"  Cardiothoracic - Vascular Surgery Daily Note        LOS: 3 days   Patient Care Team:  Mike Draper MD as PCP - General    POD#3  RIGHT THORACOSCOPY VIDEO  ASSISTED ,   wedge resection nodule   thoracic mediastinal lymphadenectomy   bronchoscopy      Chief Complaint: Lung Nodule      Subjective     The following portions of the patient's history were reviewed and updated as appropriate: allergies, current medications, past family history, past medical history, past social history, past surgical history and problem list.     Subjective:  Symptoms:  Stable.  He reports chest pain (thoracic pain).    Diet:  Adequate intake.    Activity level: Impaired due to pain.    Pain:  He complains of pain that is mild.  Pain is well controlled and requiring pain medication.          Objective     Vital Signs  Temp:  [97.5 °F (36.4 °C)-99.5 °F (37.5 °C)] 98.1 °F (36.7 °C)  Heart Rate:  [60-80] 80  Resp:  [18] 18  BP: (125-147)/(67-83) 143/76  Body mass index is 29.7 kg/m².    Intake/Output Summary (Last 24 hours) at 10/21/2019 1030  Last data filed at 10/21/2019 0549  Gross per 24 hour   Intake --   Output 250 ml   Net -250 ml     No intake/output data recorded.    Wt Readings from Last 3 Encounters:   10/21/19 99.3 kg (219 lb)   10/15/19 103 kg (227 lb)   10/04/19 103 kg (227 lb)      cc   clamped  No air leak      Physical Exam   Objective:  General Appearance:  Well-appearing.    Vital signs: (most recent): Blood pressure 143/76, pulse 80, temperature 98.1 °F (36.7 °C), temperature source Temporal, resp. rate 18, height 182.9 cm (72\"), weight 99.3 kg (219 lb), SpO2 96 %.  Vital signs are normal.  No fever.    Output: Producing urine and no stool output.    HEENT: Normal HEENT exam.    Lungs:  Normal effort and normal respiratory rate.  Breath sounds clear to auscultation.  There are decreased breath sounds.  (CTx1 DC)  Heart: Normal rate.  (PPM)  Abdomen: Abdomen is soft.  Bowel sounds are normal.     Extremities: " Normal range of motion.    Neurological: Patient is alert and oriented to person, place and time.    Skin:  Warm and dry.  ((R) [employer]: Prineo intact)            Results Review:    Lab Results   Component Value Date    WBC 9.39 10/19/2019    HGB 11.0 (L) 10/19/2019    HCT 32.9 (L) 10/19/2019    MCV 95.4 10/19/2019     10/19/2019     Lab Results   Component Value Date    GLUCOSE 125 (H) 10/19/2019    BUN 22 10/19/2019    CREATININE 1.12 10/19/2019    EGFRIFNONA 64 10/19/2019    BCR 19.6 10/19/2019    K 4.7 10/19/2019    CO2 26.0 10/19/2019    CALCIUM 8.9 10/19/2019    ALBUMIN 3.5 09/11/2019    LABIL2 0.9 09/11/2019    AST 17 09/11/2019    ALT 14 09/11/2019       Calcium Calcium   Date/Time Value Ref Range Status   10/19/2019 0426 8.9 8.6 - 10.5 mg/dL Final      Magnesium No results found for: MG     Imaging Results (last 24 hours)     Procedure Component Value Units Date/Time    XR Chest 2 View [[id number]] Collected:  10/21/19 0539     Updated:  10/21/19 0658    Narrative:       Exam: PA lateral chest    INDICATION: Chest tube clamped    COMPARISON: 10/20/2019    FINDINGS: PA lateral chest. Right chest tube is in place. There  is a very small right apical pneumothorax which has decreased in  size. Heart is enlarged. Pulmonary venous congestion is present.  No change atelectasis in the right perihilar region. There is  also mild atelectasis and/or infiltrate in the left left lower  lung.      Impression:       Interval decrease in the right apical pneumothorax  measuring less than 4 mm.    Electronically signed by:  Chano Dimas MD  10/21/2019 6:57 AM  CDT Workstation: [id number]                                  acetaminophen 1,000 mg Oral Q6H   allopurinol 100 mg Oral Daily   aspirin 81 mg Oral Daily   atorvastatin 20 mg Oral Daily   fludrocortisone 100 mcg Oral Daily   ketorolac 15 mg Intravenous Once   Followed by      ketorolac 10 mg Oral Q8H   levothyroxine 75 mcg Oral Daily   magnesium oxide 400 mg Oral  Daily   midodrine 5 mg Oral TID   pantoprazole 40 mg Oral QAM   sennosides-docusate sodium 2 tablet Oral Nightly       sodium chloride 30 mL/hr Last Rate: 30 mL/hr (10/18/19 1611)             ASSESSMENT/PLAN       Lung nodule      Assessment & Plan    Stable Post Op RIGHT VATS: Progressing well    Lung Nodule: Positive Spindle Cell Carcinoma. Await final path.    Resp: O2 support. Incentive. DC CT. Follow up CXR    Pain: Tylenol. OxyIR prn    Rehab: OOB. Increase activity    Disp: stable. Attempt to wean O2 for DC planning          This document has been electronically signed by NORMAN Gonzalez on October 21, 2019 10:30 AM

## 2019-10-21 NOTE — PROGRESS NOTES
Discharge Planning Assessment  [facility]     Patient Name: Kuldip Nevarez  MRN: [id number]  Today's Date: 10/21/2019    Admit Date: 10/18/2019    Discharge Needs Assessment     Row Name 10/21/19 1052       Living Environment    Lives With  spouse    Name(s) of Who Lives With Patient  My Nevarez (spouse)    Current Living Arrangements  home/apartment/condo Information on face sheet confirmed with patient.     Primary Care Provided by  self    Provides Primary Care For  no one    Family Caregiver if Needed  spouse;child(lynn), adult;other (see comments) Patient voiced that his son works 3rd shift but resides 5 miles away from he and his spouse. Patient voiced that his son and/or their neighbor assists as needed.     Family Caregiver Names  My Nevarez (spouse)    Quality of Family Relationships  involved;helpful    Able to Return to Prior Arrangements  yes       Resource/Environmental Concerns    Resource/Environmental Concerns  none    Transportation Concerns  car, none       Transition Planning    Patient/Family Anticipates Transition to  home with family    Patient/Family Anticipated Services at Transition  none    Transportation Anticipated  family or friend will provide;car, drives self Patient voiced that his son likes to be involved in his medical care and transports him to provider appointments.        Discharge Needs Assessment    Readmission Within the Last 30 Days  no previous admission in last 30 days    Concerns to be Addressed  denies needs/concerns at this time    Equipment Currently Used at Home  walker, rolling;bipap/cpap;cane, straight;walker, standard;wheelchair;shower chair;bath bench Patient uses [facility] for CPAP/supplies.    Anticipated Changes Related to Illness  none    Equipment Needed After Discharge  none    Offered/Gave Vendor List  other (see comments);no Patient voiced that he was active with [facility] prior and requests their service @ d/c for any d/c needs.     Current  Discharge Risk  chronically ill        Discharge Plan     Row Name 10/21/19 1033       Plan    Plan Comments  LACE complete. Patient stated that he was active with [facility] prior to admission. He reports that he had hip surgery in summer and had been receiving home health services for therapy. He reports that he had returned to baseline prior to recent hospital admission. He reports that he was independent with ADL's and had resumed driving.  He stated that his surgeon was no longer at [facility], Dr Hendrickson, and he had f/u with Dr Little. Per patient, Dr Little informed he that he does not need any additional ortho f/u.  Troy informed patient that if provider ordered home health services at d/c related to current diagnosis, CM would assist with notifying home health of new services.  Patient requests [facility] services pending need is identified.  CM will follow and assist with social service/dc planning needs. Continue with medical management.....America Noyola LSW    Row Name 10/21/19 1020       Plan    Plan Comments  Continue stay - POD#3 Right VATS with wedge resection - Patient with chest tube to water seal, repeat chest xray this am showing decrease in right apical pneumothorax.   Lawrence, RN        Destination      No service coordination in this encounter.      Durable Medical Equipment      No service coordination in this encounter.      Dialysis/Infusion      No service coordination in this encounter.      Home Medical Care      No service coordination in this encounter.      Therapy      No service coordination in this encounter.      Community Resources      No service coordination in this encounter.        Expected Discharge Date and Time     Expected Discharge Date Expected Discharge Time    Oct 23, 2019         Demographic Summary     Row Name 10/21/19 1040       General Information    Admission Type  inpatient    Arrived From  other (see comments) Scheduled procedure     Referral Source  high risk screening LACE score 14    Preferred Language  English     Used During This Interaction  no       Contact Information    Contact Information Obtained for          Functional Status     Row Name 10/21/19 1050       Functional Status    Usual Activity Tolerance  good    Current Activity Tolerance  good    Functional Status Comments  Patient reports independence with ADL's prior. He states since his hip surgery he is using a rolling walker with ambulation. Patient reports he performs his own bathing and dressing.        Functional Status, IADL    Medications  independent Pharmacy, [facility], and prescription coverage confirmed with patient.     Meal Preparation  assistive person    Housekeeping  assistive person    Laundry  assistive person    Shopping  assistive person    IADL Comments  Patient and his spouse share homemaking responsibilites. He voiced that their female neighbor and/or son also assists as needed.        Mental Status Summary    Recent Changes in Mental Status/Cognitive Functioning  no changes       Employment/    Employment Status  retired        Psychosocial    No documentation.       Abuse/Neglect    No documentation.       Legal    No documentation.       Substance Abuse    No documentation.       Patient Forms    No documentation.           NATHANIEL May

## 2019-10-21 NOTE — PLAN OF CARE
Problem: Patient Care Overview  Goal: Plan of Care Review  Outcome: Ongoing (interventions implemented as appropriate)   10/21/19 1410   Coping/Psychosocial   Plan of Care Reviewed With patient   Plan of Care Review   Progress improving   OTHER   Outcome Summary patient's chest tube has been pulled       Problem: Lung Surgery (via Thoracotomy) (Adult)  Goal: Signs and Symptoms of Listed Potential Problems Will be Absent, Minimized or Managed (Lung Surgery)  Outcome: Ongoing (interventions implemented as appropriate)      Problem: Skin Injury Risk (Adult)  Goal: Skin Health and Integrity  Outcome: Ongoing (interventions implemented as appropriate)

## 2019-10-21 NOTE — DISCHARGE SUMMARY
CVTS DISCHARGE SUMMARY  Date of Admission: 10/18/2019  7:34 AM  Date of Discharge:  10/21/2019    Admission Diagnosis:   Lung nodule [R91.1]    Discharge Diagnosis:   Post-Op Diagnosis Codes:     * Lung nodule [R91.1]    Frozen Pathology: spindle cell carcinoma    Consults:   Consults     No orders found from 9/19/2019 to 10/19/2019.          Procedures Performed  Procedure(s): RIGHT  THORACOSCOPY VIDEO  ASSISTED , mini THORACOTOMY wedge resection nodule thoracic mediastinal lymphadenectomy bronchoscopy  Latex Allergy       Discharge Medications     Discharge Medications      New Medications      Instructions Start Date   oxyCODONE-acetaminophen 5-325 MG per tablet  Commonly known as:  PERCOCET   1-2 tablets, Oral, Every 4 Hours PRN      sennosides-docusate sodium 8.6-50 MG tablet  Commonly known as:  SENOKOT-S   2 tablets, Oral, Nightly         Continue These Medications      Instructions Start Date   allopurinol 100 MG tablet  Commonly known as:  ZYLOPRIM   100 mg, Oral, Daily      aspirin 81 MG chewable tablet   81 mg, Oral, Daily      fludrocortisone 0.1 MG tablet   1 tablet, Oral, Daily      levothyroxine 75 MCG tablet  Commonly known as:  SYNTHROID, LEVOTHROID   75 mcg, Oral, Daily      Magnesium 250 MG tablet   1 tablet, Oral, Daily      midodrine 5 MG tablet  Commonly known as:  PROAMATINE   5 mg, Oral, 3 Times Daily      omeprazole 40 MG capsule  Commonly known as:  priLOSEC   40 mg, Oral, Every Other Day      simvastatin 40 MG tablet  Commonly known as:  ZOCOR   40 mg, Oral, Daily      VITAMIN B 12 PO   1 tablet, Oral, Daily           Medical Management:  Reviewed risks, benefits, and habit forming potential and weaning from narcotic medication. Patient understands and wishes to receive prescription.  Prescription written for PERCOCET #36  for post-surgical pain after Anuel placed on file.    Discharge Diet:   Diet Instructions     Diet: Cardiac      Discharge Diet:  Cardiac          Discharge  Disposition: Home in stable condition with follow up appointments with CVTS Nurse Practitioner 1 week, Dr. Horton/Carmela 2 weeks, Cardiology/PCP as scheduled.     History of Present Illness/Hospital Course:   74 y.o. male with HTN(stable, increased risk stroke, rupture), Obesity(uncontrolled, increased risk cardiovascular events), COPD(stable, increased risk pulmonary complications) and Chronic Kidney Disease(stable, increased risk renal failure) , lung nodule(new, suspicious malignancy).  former smoker.  Moderate shortness of breath x 1 year.  Hip fracture in summer 2019 complicated hypotension, hypoxia.  Lung nodule noted on imaging.  Prior colon cancer (resection, XRT, chemo)..  No TIA stroke amaurosis.  No MI claudication. No other associated signs, symptoms or modifying factors.     2019 CT Chest:  RIGHT upper lobe nodule 15mm.  2019 CT Chest:  RIGHT upper lobe nodule 20mm.  2019 PET CT:  RIGHT upper lobe nodule 20mm. (SUV 4.8)  2019 PFT:  FVC 3.1 (69%), FEV1 1.9 (57%), DLCO 38%     PCI x2, PPM  2019 Echocardiogram:  EF 35%, LA 44mm, LV 61mm, RVSP 45mmHg.  Mild MR TR.    2019 EC paced, QTc 471    Adequate preop studies were completed and the patient was scheduled electively. Operative day Kuldip Nevarez admitted through [facility], taken to operating suite and placed under general anesthesia.  Underwent said procedure without complications or difficulty. They were weaned easily from mechanical ventilation and extubated in the recovery room placed on oxygen per nasal cannula and further transferred to CCU for further care and monitoring. Kuldip Nevarez remained hemodynamically and neurovascularly stable immediately postop.  POD1 they were out of the bed to the chair tolerating breakfast pain controlled with Percocet and stable for transfer to 3W telemetry.  Kuldip Nevarez continued with PT/OT progressing well with aggressive pulmonary toilet and weaning oxygen.  Operative incisions  "clean, dry, intact. Neurovascular and respiratory status remained stable with CT discontinuation and satisfactory post removal CXR viewed on 10/21/19 demonstrating no pneumothorax.  Kuldip Nevarez is stable today for discharge home with follow up appointments.       Vital Signs  Temp:  [97.5 °F (36.4 °C)-99.5 °F (37.5 °C)] 98.2 °F (36.8 °C)  Heart Rate:  [62-80] 62  Resp:  [18] 18  BP: (131-147)/(69-83) 142/69      10/19/19  1120 10/20/19  0600 10/21/19  0549   Weight: 98.2 kg (216 lb 6.4 oz) 98.1 kg (216 lb 3.2 oz) 99.3 kg (219 lb)       Pertinent Test Results:  Lab Results   Component Value Date    WBC 9.39 10/19/2019    HGB 11.0 (L) 10/19/2019    HCT 32.9 (L) 10/19/2019    MCV 95.4 10/19/2019     10/19/2019     Lab Results   Component Value Date    GLUCOSE 125 (H) 10/19/2019    BUN 22 10/19/2019    CREATININE 1.12 10/19/2019    EGFRIFNONA 64 10/19/2019    BCR 19.6 10/19/2019    K 4.7 10/19/2019    CO2 26.0 10/19/2019    CALCIUM 8.9 10/19/2019    ALBUMIN 3.5 09/11/2019    LABIL2 0.9 09/11/2019    AST 17 09/11/2019    ALT 14 09/11/2019       Physical Exam:  Physical Exam   General Appearance:  Well-appearing.    Vital signs: (most recent): Blood pressure 143/76, pulse 80, temperature 98.1 °F (36.7 °C), temperature source Temporal, resp. rate 18, height 182.9 cm (72\"), weight 99.3 kg (219 lb), SpO2 96 %.  Vital signs are normal.  No fever.    Output: Producing urine and no stool output.    HEENT: Normal HEENT exam.    Lungs:  Normal effort and normal respiratory rate.  Breath sounds clear to auscultation.  There are decreased breath sounds.  (CTx1 DC)  Heart: Normal rate.  (PPM)  Abdomen: Abdomen is soft.  Bowel sounds are normal.     Extremities: Normal range of motion.    Neurological: Patient is alert and oriented to person, place and time.    Skin:  Warm and dry.  ((R[employer]: Prineo intact)        Additional Instructions for the Follow-ups that You Need to Schedule     Discharge Follow-up with " Specialty: Cardiothoracic Surgery; 1 Week   As directed      Specialty:  Cardiothoracic Surgery    Follow Up:  1 Week    Follow Up Details:  Starr AUSTIN 10/28/19 10:00               Discharge Instructions: Discharge instructions include no heavy lifting anything greater than 10lbs with the RIGHT arm for approximately 4 weeks.  No sex or driving for 3-6 weeks. Patient is to continue with Incentive Spirometry 4 times daily while awake at home as well as any prescribed respiratory treatments. Printed information given to the patient with advancement of activities weekly.  Risks and benefits of narcotic medications and weaning postoperatively have been discussed. Clean operative site with antibacterial soap/water, pat dry. Keep open to air unless draining, then may apply dry dressing.  No ointments or creams unless prescribed by provider. Signs and symptoms of infection including drainage from operative site, redness, swelling, with associated fever and/or chills notify Heart and Vascular center immediately for wound check.  Patient verbalizes understanding of discharge instructions, all questions are answered, follow up appointments have been made, they are discharged home in stable condition.           Follow-up Appointments  No future appointments.    Test Results Pending at Discharge   Order Current Status    Tissue Pathology Exam In process                 This document has been electronically signed by NORMAN Gonzalez on October 21, 2019 2:14 PM      Time: Discharge 60 min

## 2019-10-22 NOTE — OUTREACH NOTE
Prep Survey      Responses   Facility patient discharged from?  [facility]   Is patient eligible?  Yes   Discharge diagnosis  Lung nodule, s/p VATS mini thoracotomy wedge resection nodule   Does the patient have one of the following disease processes/diagnoses(primary or secondary)?  Cardiothoracic surgery   Does the patient have Home health ordered?  No   Is there a DME ordered?  No   Comments regarding appointments  See AVS   Prep survey completed?  Yes          Radha Anand RN

## 2019-10-24 NOTE — ADDENDUM NOTE
Addendum  created 10/24/19 1114 by Bruno Gutierrez MD    Child order released for a procedure order, Intraprocedure Blocks edited, LDA created via procedure documentation, Sign clinical note

## 2019-10-24 NOTE — ANESTHESIA PROCEDURE NOTES
Arterial Line      Patient reassessed immediately prior to procedure    Patient location during procedure: OR  Start time: 10/18/2019 12:34 PM  Stop Time:10/24/2019 12:41 PM       Line placed for hemodynamic monitoring.  Performed By   CRNA: Tyler Pizarro CRNA  Preanesthetic Checklist  Completed: patient identified and risks and benefits discussed  Arterial Line Prep   Sterile Tech: gloves  Prep: ChloraPrep  Patient monitoring: blood pressure monitoring, continuous pulse oximetry and EKG  Arterial Line Procedure   Laterality:left  Location:  radial artery  Catheter size: 20 G   Guidance: landmark technique and palpation technique  Successful placement: yes  Post Assessment   Dressing Type: occlusive dressing applied and secured with tape.   Complications no  Patient Tolerance: patient tolerated the procedure well with no apparent complications

## 2019-10-24 NOTE — OUTREACH NOTE
CT Surgery Week 1 Survey      Responses   Facility patient discharged from?  [facility]   Does the patient have one of the following disease processes/diagnoses(primary or secondary)?  Cardiothoracic surgery   Is there a successful TCM telephone encounter documented?  No   Week 1 attempt successful?  Yes   Call start time  1033   Call end time  1045   Discharge diagnosis  Lung nodule, s/p VATS mini thoracotomy wedge resection nodule   Meds reviewed with patient/caregiver?  Yes   Is the patient having any side effects they believe may be caused by any medication additions or changes?  No   Does the patient have all medications related to this admission filled (includes all antibiotics, pain medications, cardiac medications, etc.)  Yes   Is the patient taking all medications as directed (includes completed medication regime)?  Yes   Does the patient have a primary care provider?   Yes   Does the patient have an appointment scheduled with their C/T surgeon?  Yes   Has the patient kept scheduled appointments due by today?  Yes   Has home health visited the patient within 72 hours of discharge?  N/A   Psychosocial issues?  No   Did the patient receive a copy of their discharge instructions?  Yes   Nursing interventions  Reviewed instructions with patient   What is the patient's perception of their health status since discharge?  Improving   Nursing interventions  Nurse provided patient education   Is the patient/caregiver able to teach back normal signs of recovery?  Pain or discomfort at incisional site   Nursing interventions  Reassured on normal signs of recovery   Is the patient /caregiver able to teach back basic post-op care?  Take showers only when approved by MD-sponge bathe until then, No tub bath, swimming, or hot tub until instructed by MD, Lifting as instructed by MD in discharge instructions, Keep incision areas clean, dry and protected   Is the patient/caregiver able to teach back signs and symptoms of  incisional infection?  Increased redness, swelling or pain at the incisonal site, Increased drainage or bleeding, Pus or odor from incision   Is the patient/caregiver able to teach back steps to recovery at home?  Set small, achievable goals for return to baseline health, Rest and rebuild strength, gradually increase activity   Is the patient/caregiver able to teach back the hierarchy of who to call/visit for symptoms/problems? PCP, Specialist, Home health nurse, Urgent Care, ED, 911  Yes   Week 1 call completed?  Yes            Gurwinder Wheat RN

## 2019-10-28 PROBLEM — Z09 FOLLOW-UP SURGERY CARE: Status: ACTIVE | Noted: 2019-01-01

## 2019-10-28 NOTE — PROGRESS NOTES
CVTS Office Progress Note       Subjective   Patient ID: Kuldip Nevarez is a 74 y.o. male is here today for follow-up.    Chief Complaint:    Chief Complaint   Patient presents with   • Follow-up     VATS       The following portions of the patient's history were reviewed and updated as appropriate: allergies, current medications, past family history, past medical history, past social history, past surgical history and problem list.  Recent images independently reviewed.  Available laboratory values reviewed.    PCP:  Mike Draper MD  Cardiology:  Dr Grullon  Pulmonology:  Dr Lisa  Orthopedic Surgery:  Dr Little    74 y.o. male with HTN(stable, increased risk stroke, rupture), Obesity(uncontrolled, increased risk cardiovascular events), COPD(stable, increased risk pulmonary complications) and Chronic Kidney Disease(stable, increased risk renal failure) , lung nodule(new, suspicious malignancy).  former smoker.  Moderate shortness of breath x 1 year.  Hip fracture in summer 2019 complicated hypotension, hypoxia.  Lung nodule noted on imaging.  Prior colon cancer (resection, XRT, chemo)..  No TIA stroke amaurosis.  No MI claudication. No other associated signs, symptoms or modifying factors. Progressed well post op. Returns for follow up.    2019 CT Chest:  RIGHT upper lobe nodule 15mm.  2019 CT Chest:  RIGHT upper lobe nodule 20mm.  2019 PET CT:  RIGHT upper lobe nodule 20mm. 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  Mild MR TR.    2019 EC paced, QTc 471      Past Medical History:   Diagnosis Date   • Arthritis    • Cancer (CMS/HCC)     colon, skin   • Coronary artery disease    • Disease of thyroid gland    • GERD (gastroesophageal reflux disease)    • History of transfusion    • Hyperlipidemia    • MI  (myocardial infarction) (CMS/[facility])        • Pacemaker    • Sleep apnea      Past Surgical History:   Procedure Laterality Date   • APPENDECTOMY     • COLON SURGERY     • COLONOSCOPY N/A 2017    Procedure: COLONOSCOPY;  Surgeon: Barrie Jarrett DO;  Location: [facility] ENDOSCOPY;  Service:    • CORONARY ANGIOPLASTY WITH STENT PLACEMENT     • ENDOSCOPY     • FEMUR OPEN REDUCTION INTERNAL FIXATION Left 2019    Procedure: FEMUR OPEN REDUCTION INTERNAL FIXATION;  Surgeon: Edward Harley MD;  Location: [facility] OR;  Service: Orthopedics   • KIDNEY STONE SURGERY     • PACEMAKER IMPLANTATION     • SALIVARY GLAND EXCISION Left     Left neck due to skin cancer with resultant chronic dry mouth   • THORACOSCOPY Right 10/18/2019    Procedure: THORACOSCOPY VIDEO  ASSISTED , mini THORACOTOMY wedge resection nodule thoracic mediastinal lymphadenectomy bronchoscopy  Latex Allergy;  Surgeon: Bruno Horton MD;  Location: [facility] OR;  Service: Cardiothoracic     Family History   Problem Relation Age of Onset   • Coronary artery disease Mother    • Coronary artery disease Father      Social History     Tobacco Use   • Smoking status: Former Smoker     Years: 45.00     Last attempt to quit:      Years since quittin.8   • Smokeless tobacco: Never Used   Substance Use Topics   • Alcohol use: No   • Drug use: No       ALLERGIES:   Latex and Tape    MEDICATIONS:  Current outpatient and discharge medications have been reconciled for the patient.  Reviewed by: NORMAN Gonzalez      Current Outpatient Medications:   •  allopurinol (ZYLOPRIM) 100 MG tablet, Take 100 mg by mouth Daily., Disp: , Rfl:   •  aspirin 81 MG chewable tablet, Chew 81 mg Daily., Disp: , Rfl:   •  Cyanocobalamin (VITAMIN B 12 PO), Take 1 tablet by mouth Daily., Disp: , Rfl:   •  fludrocortisone 0.1 MG tablet, Take 1 tablet by mouth Daily., Disp: , Rfl:   •  levothyroxine (SYNTHROID, LEVOTHROID) 75 MCG tablet, Take 75 mcg by  mouth Daily., Disp: , Rfl:   •  Magnesium 250 MG tablet, Take 1 tablet by mouth Daily., Disp: , Rfl:   •  midodrine (PROAMATINE) 5 MG tablet, Take 5 mg by mouth 3 (Three) Times a Day., Disp: , Rfl: 2  •  omeprazole (priLOSEC) 40 MG capsule, Take 40 mg by mouth Every Other Day., Disp: , Rfl:   •  oxyCODONE-acetaminophen (PERCOCET) 5-325 MG per tablet, Take 1-2 tablets by mouth Every 4 (Four) Hours As Needed for Moderate Pain  (SURGICAL PAIN)., Disp: 36 tablet, Rfl: 0  •  sennosides-docusate sodium (SENOKOT-S) 8.6-50 MG tablet, Take 2 tablets by mouth Every Night., Disp: , Rfl:   •  simvastatin (ZOCOR) 40 MG tablet, Take 40 mg by mouth Daily., Disp: , Rfl:     Review of Systems   Constitution: Positive for malaise/fatigue. Negative for fever and weakness.   Cardiovascular: Negative for leg swelling.   Respiratory: Negative for shortness of breath.    Hematologic/Lymphatic: Does not bruise/bleed easily.   Skin: Positive for rash. Negative for poor wound healing.   Musculoskeletal: Positive for arthritis and back pain.   Gastrointestinal: Negative for change in bowel habit and constipation.   Genitourinary: Negative for dysuria.   Neurological: Positive for paresthesias (thoracotomy incision). Negative for light-headedness.   Psychiatric/Behavioral: Negative for altered mental status.   All other systems reviewed and are negative.       Objective   Temp:  [97.2 °F (36.2 °C)] 97.2 °F (36.2 °C)  Heart Rate:  [55] 55  Resp:  [16] 16  BP: (140)/(82) 140/82  Body mass index is 29.57 kg/m².  Physical Exam   Constitutional: He is oriented to person, place, and time. He appears well-nourished.   HENT:   Head: Atraumatic.   Eyes: EOM are normal.   Neck: Neck supple.   Cardiovascular: Normal heart sounds. Bradycardia present.   Pulses:       Dorsalis pedis pulses are 2+ on the right side, and 2+ on the left side.        Posterior tibial pulses are 2+ on the left side.   Pulmonary/Chest: Effort normal. He has decreased breath  sounds in the right upper field.   Abdominal: Soft. Bowel sounds are normal.   Musculoskeletal: Normal range of motion. He exhibits no edema.   Weak-WC   Neurological: He is alert and oriented to person, place, and time.   Skin: Skin is warm and dry. Capillary refill takes less than 2 seconds.   (R) [employer]: CDI, Well approximated. Prineo strip removed   Psychiatric: He has a normal mood and affect. Thought content normal.   Vitals reviewed.        Assessment & Plan     Independent Review of Radiographic Studies:  Detailed discussion regarding risks, benefits, and treatment plan. Images independently reviewed. Patient understands, agrees, and wishes to proceed with plan.     1. Follow-up surgery care  Stable Post Op RIGHT VATS  Awaiting final path from [facility]  Signs and symptoms of infection including drainage from operative site, redness, swelling, with associated fever and/or chills notify Heart and Vascular center immediately for wound check.  Clean operative site with antibacterial soap/water, pat dry. Keep open to air unless draining, then may apply dry dressing.  No ointments or creams unless prescribed by provider.   Return 4 weeks- Chest XR  - XR Chest 2 View; Future    2. Lung nodule  Spindle Cell CA.   Await Final Path.            This document has been electronically signed by NORMAN Gonzalez on October 28, 2019 11:40 AM

## 2019-10-28 NOTE — PATIENT INSTRUCTIONS
Stable Post Op RIGHT VATS  Awaiting final path from [facility]  Signs and symptoms of infection including drainage from operative site, redness, swelling, with associated fever and/or chills notify Heart and Vascular center immediately for wound check.  Clean operative site with antibacterial soap/water, pat dry. Keep open to air unless draining, then may apply dry dressing.  No ointments or creams unless prescribed by provider.   Return 4 weeks- Chest XR

## 2019-10-31 NOTE — OUTREACH NOTE
CT Surgery Week 2 Survey      Responses   Facility patient discharged from?  [place]   Does the patient have one of the following disease processes/diagnoses(primary or secondary)?  Cardiothoracic surgery   Week 2 attempt successful?  No   Unsuccessful attempts  Attempt 1          Kamila Blanco RN

## 2019-11-05 NOTE — OUTREACH NOTE
CT Surgery Week 2 Survey      Responses   Facility patient discharged from?  [facility]   Does the patient have one of the following disease processes/diagnoses(primary or secondary)?  Cardiothoracic surgery   Week 2 attempt successful?  No   Unsuccessful attempts  Attempt 2          Anthony Lopez RN

## 2019-11-07 NOTE — OUTREACH NOTE
CT Surgery Week 3 Survey      Responses   Facility patient discharged from?  [facility]   Does the patient have one of the following disease processes/diagnoses(primary or secondary)?  Cardiothoracic surgery   Week 3 attempt successful?  Yes   Call start time  1622   Call end time  1642   Discharge diagnosis  Lung nodule, s/p VATS mini thoracotomy wedge resection nodule   Is patient permission given to speak with other caregiver?  No   Meds reviewed with patient/caregiver?  Yes   Is the patient taking all medications as directed (includes completed medication regime)?  Yes   Has the patient kept scheduled appointments due by today?  Yes   What is the patient's perception of their health status since discharge?  Improving   Week 3 call completed?  Yes   Wrap up additional comments  Rates pain zero when still but when walk left knee hurts and left foot stays swollen with gout.  Having trouble with insomnia. Partly due to incision making him uncomfortable to lay on that side.           Alisson Roque, RN

## 2019-11-15 NOTE — OUTREACH NOTE
CT Surgery Week 4 Survey      Responses   Facility patient discharged from?  [place]   Does the patient have one of the following disease processes/diagnoses(primary or secondary)?  Cardiothoracic surgery   Week 4 attempt successful?  No          Agustina Carr RN  
No

## 2019-12-04 NOTE — PROGRESS NOTES
CVTS Office Progress Note       Subjective   Patient ID: Kuldip Nevarez is a 74 y.o. male is here today for follow-up.    Chief Complaint:    Chief Complaint   Patient presents with   • Follow-up     4 wk       The following portions of the patient's history were reviewed and updated as appropriate: allergies, current medications, past family history, past medical history, past social history, past surgical history and problem list.  Recent images independently reviewed.  Available laboratory values reviewed.    PCP:  Mike Draper MD  Cardiology:  Dr Grullon  Pulmonology:  Dr Lisa  Orthopedic Surgery:  Dr Little    74 y.o. male with HTN(stable, increased risk stroke, rupture), Obesity(uncontrolled, increased risk cardiovascular events), COPD(stable, increased risk pulmonary complications) and Chronic Kidney Disease(stable, increased risk renal failure) , lung nodule(new, suspicious malignancy).  former smoker.  Moderate shortness of breath x 1 year.  Hip fracture in summer 2019 complicated hypotension, hypoxia.  Lung nodule noted on imaging.  Prior colon cancer (resection, XRT, chemo)..  No TIA stroke amaurosis.  No MI claudication. No other associated signs, symptoms or modifying factors. Progressed well post op. Returns for follow up.    2019 CT Chest:  RIGHT upper lobe nodule 15mm.  2019 CT Chest:  RIGHT upper lobe nodule 20mm.  2019 PET CT:  RIGHT upper lobe nodule 20mm. (SUV 4.8)  2019 PFT:  FVC 3.1 (69%), FEV1 1.9 (57%), DLCO 38%  10/18/19: RIGHT VATS Upper lobe wedge resection: positive for spindle cell carcinoma  Final Pathology: Metastatic Malignant Melanoma    PCI x2, PPM  2019 Echocardiogram:  EF 35%, LA 44mm, LV 61mm, RVSP 45mmHg.  Mild MR TR.    2019 EC paced, QTc 471      Past Medical History:   Diagnosis Date   • Arthritis    • Cancer (CMS/HCC)     colon, skin   • Coronary artery disease    • Disease of thyroid gland    • GERD (gastroesophageal reflux disease)    •  History of transfusion    • Hyperlipidemia    • MI (myocardial infarction) (CMS/HCC)        • Pacemaker    • Sleep apnea      Past Surgical History:   Procedure Laterality Date   • APPENDECTOMY     • COLON SURGERY     • COLONOSCOPY N/A 2017    Procedure: COLONOSCOPY;  Surgeon: Barrie Jarrett DO;  Location: [facility] ENDOSCOPY;  Service:    • CORONARY ANGIOPLASTY WITH STENT PLACEMENT     • ENDOSCOPY     • FEMUR OPEN REDUCTION INTERNAL FIXATION Left 2019    Procedure: FEMUR OPEN REDUCTION INTERNAL FIXATION;  Surgeon: Edward Harley MD;  Location: [facility] OR;  Service: Orthopedics   • KIDNEY STONE SURGERY     • PACEMAKER IMPLANTATION     • SALIVARY GLAND EXCISION Left     Left neck due to skin cancer with resultant chronic dry mouth   • THORACOSCOPY Right 10/18/2019    Procedure: THORACOSCOPY VIDEO  ASSISTED , mini THORACOTOMY wedge resection nodule thoracic mediastinal lymphadenectomy bronchoscopy  Latex Allergy;  Surgeon: Bruno Horton MD;  Location: [facility] OR;  Service: Cardiothoracic     Family History   Problem Relation Age of Onset   • Coronary artery disease Mother    • Coronary artery disease Father      Social History     Tobacco Use   • Smoking status: Former Smoker     Years: 45.00     Last attempt to quit:      Years since quittin.9   • Smokeless tobacco: Never Used   Substance Use Topics   • Alcohol use: No   • Drug use: No       ALLERGIES:   Latex and Tape    MEDICATIONS:  Current outpatient and discharge medications have been reconciled for the patient.  Reviewed by: NORMAN Gonzalez      Current Outpatient Medications:   •  allopurinol (ZYLOPRIM) 100 MG tablet, Take 100 mg by mouth Daily., Disp: , Rfl:   •  aspirin 81 MG chewable tablet, Chew 81 mg Daily., Disp: , Rfl:   •  Cyanocobalamin (VITAMIN B 12 PO), Take 1 tablet by mouth Daily., Disp: , Rfl:   •  fludrocortisone 0.1 MG tablet, Take 1 tablet by mouth Daily., Disp: , Rfl:   •  levothyroxine  (SYNTHROID, LEVOTHROID) 75 MCG tablet, Take 75 mcg by mouth Daily., Disp: , Rfl:   •  Magnesium 250 MG tablet, Take 1 tablet by mouth Daily., Disp: , Rfl:   •  omeprazole (priLOSEC) 40 MG capsule, Take 40 mg by mouth Every Other Day., Disp: , Rfl:   •  oxyCODONE-acetaminophen (PERCOCET) 5-325 MG per tablet, Take 1-2 tablets by mouth Every 4 (Four) Hours As Needed for Moderate Pain  (SURGICAL PAIN)., Disp: 36 tablet, Rfl: 0  •  sennosides-docusate sodium (SENOKOT-S) 8.6-50 MG tablet, Take 2 tablets by mouth Every Night., Disp: , Rfl:   •  simvastatin (ZOCOR) 40 MG tablet, Take 40 mg by mouth Daily., Disp: , Rfl:   •  midodrine (PROAMATINE) 5 MG tablet, Take 5 mg by mouth 3 (Three) Times a Day., Disp: , Rfl: 2    Review of Systems   Constitution: Positive for malaise/fatigue. Negative for fever and weakness.   Cardiovascular: Negative for leg swelling.   Respiratory: Negative for shortness of breath.    Hematologic/Lymphatic: Does not bruise/bleed easily.   Skin: Positive for rash. Negative for poor wound healing.   Musculoskeletal: Positive for arthritis and back pain.   Gastrointestinal: Negative for change in bowel habit and constipation.   Genitourinary: Negative for dysuria.   Neurological: Positive for paresthesias (thoracotomy incision). Negative for light-headedness.   Psychiatric/Behavioral: Negative for altered mental status.   All other systems reviewed and are negative.       Objective       Vitals:    11/25/19 1308   BP: 140/80   Pulse: 57   SpO2: 98%      Body mass index is 29.57 kg/m².  Physical Exam   Constitutional: He is oriented to person, place, and time. He appears well-nourished.   HENT:   Head: Atraumatic.   Eyes: EOM are normal.   Neck: Neck supple.   Cardiovascular: Normal heart sounds. Bradycardia present.   Pulses:       Dorsalis pedis pulses are 2+ on the right side, and 2+ on the left side.        Posterior tibial pulses are 2+ on the left side.   Pulmonary/Chest: Effort normal. He has  decreased breath sounds in the right upper field.   Abdominal: Soft. Bowel sounds are normal.   Musculoskeletal: Normal range of motion. He exhibits no edema.   Weak-WC   Neurological: He is alert and oriented to person, place, and time.   Skin: Skin is warm and dry. Capillary refill takes less than 2 seconds.   Psychiatric: He has a normal mood and affect. Thought content normal.   Vitals reviewed.        Assessment & Plan     Independent Review of Radiographic Studies:  Detailed discussion regarding risks, benefits, and treatment plan. Images independently reviewed. Patient understands, agrees, and wishes to proceed with plan.     1. Follow-up surgery care  Stable Post Op RIGHT VATS  Follow up 4 weeks    2. Lung nodule  Lung Nodule  Final Pathology: Melanoma  Referral Oncology  - Ambulatory Referral to Hematology / Oncology            This document has been electronically signed by NORMAN Gonzalez on December 4, 2019 3:58 PM

## 2019-12-07 PROBLEM — I50.23 ACUTE ON CHRONIC SYSTOLIC CHF (CONGESTIVE HEART FAILURE) (HCC): Status: ACTIVE | Noted: 2019-01-01

## 2019-12-08 PROBLEM — I50.23 ACUTE ON CHRONIC SYSTOLIC HEART FAILURE (HCC): Status: ACTIVE | Noted: 2019-01-01

## 2019-12-08 NOTE — CONSULTS
Cardiology Consultation Note.        Patient Name: Kuldip Nevarez  Age/Sex: 74 y.o. male  : 1945  MRN: [id number]    Date of consultation: 2019  Consulting Physician: Cruzito Grullon MD  Primary care Physician: Mike Draper MD  Requesting Physician:   Jesus Ricardo MD     Reason for consultation: Shortness of breath    Subjective:       Chief Complaint: Shortness of breath      History of Present Illness:  Kuldip Nevarez is a 74 y.o. male     Body mass index is 27.07 kg/m². with a past medical history significant for atherosclerotic coronary artery disease status post aborted inferior wall myocardial infarction in  with PTCA and stenting of the mid right coronary artery with deployment of a 3 mm x 18 mm Vision stent on 2014 with repeat coronary angiogram done in  with PTCA and stenting of the distal rightcoronary artery with deployment of a 3 mm x 28 mm Cypher stent with 80%stenosis in the obtuse marginal branch upper division and 60% stenosis in the ramus intermedius branch and 40% stenosis in the 2nd diagonal branch treated medically, history of concentric left ventricular hypertrophy with diastolic dysfunction with an ejection fraction of 55% with a calcified aortic valve with aortic sclerosis, mild mitral, mild tricuspid, and mild aortic insufficiency, sick sinus syndrome status post [employer] dual-chamber permanent pacemaker implantation, arterial hypertension, hypertensive heart disease, hyperlipidemia, invasive adenocarcinoma of the sigmoid colon status post anterior resection of the upper rectum done by Dr. Correa, with subsequent chemotherapy administered by Dr. Chaudhari in [place], with multiple cutaneous carcinoma evaluated by Dr. Anderws, the dermatologist in [place], history of obstructive sleep apnea, noncompliant with CPAP machine, history of gouty arthritis, hyperlipidemia, mild obesity,  Patient was recently hospitalized and underwent bronchoscopy  and thoracoscopy with video-assisted VATS resection of the right lung nodule.  Patient biopsy report was suggestive of metastasis from the melanoma to the lung on the report from the [facility] of the specimen sent from the biopsy.    Patient was subsequently discharged home in stable condition.    Patient had presented to [facility] ED with symptoms of shortness of breath with chest x-ray suggestive of congestive heart failure and was subsequently transferred to [facility].  Patient on questioning complain of having some atypical symptoms of chest pain.  Patient on further questioning denies any nausea vomiting patient denies any paroxysmal nocturnal dyspnea orthopnea.  Patient had an elevated BNP level with a BUN of 17 and a creatinine of 1.4.  Patient's hemoglobin was 12.2 and hematocrit of 37.    Patient on further questioning does admit on eating excessive amount of salt.  Patient was on Midodrin which was stopped and patient is currently on Florinef.    Patient on further questioning denies any episodes of any bleeding diastasis.  Patient denies any symptoms of palpitation.  Patient denies any complete loss of consciousness.    Patient 10 point review of system except for stated in the history of present illness and negative.            Past Medical History:  1.   Shortness of breath.  2.    Sick sinus syndrome that is post [employer] dual-chamber pacemaker implantation December 2016.  3.   Atherosclerotic coronary artery disease.  4.   Aborted inferior wall myocardial infarction in 2004 with PTCA and  stenting of the right coronary artery with deployment of a 3 mm x 18 mm Vision stent.  5.   Repeat PTCA and stenting of the distal right coronary artery in 2005 with deployment of a 3 mm x 28 mm Cypher stent.  6.   60% stenosis in the 1st obtuse marginal branch and 50% stenosis in  the ramus intermedius branch and 40% stenosis in the diagonal branch treated medically.  7.   Luminal  irregularity in the left anterior descending artery.  8.   Concentric left ventricular hypertrophy with diastolic dysfunction.  9.   Left ventricular systolic dysfunction with an ejection fraction of 35% to 40 %.  10.  Arterial hypertension.  11.  Hypertensive heart disease.  12.  Hyperlipidemia.  13.  Gouty arthritis.  14.  Obstructive sleep apnea not compliant with the CPAP machine.  15.  Stage 2 adenocarcinoma of the sigmoid colon and the rectum status  post resection.  16.  Status post chemotherapy for 5 weeks administered at [place] by Dr. Chaudhari.  17.  Obesity.  18.  History of hepatitis A in 1961.  19.  Cutaneous carcinoma resection by Dr. Andrews in [place].  20.  Chronic kidney disease.  21.  Pulmonary nodule metastatic melanoma status post thoracoscopy and video-assisted bronchoscopy and resection done in October 2019.     PAST SURGICAL HISTORY:  1.   Colonoscopy.  2.   Cutaneous carcinoma resection.  3.   Low anterior resection of the sigmoid colon and upper rectum of the adenocarcinoma of the colon.  4.  Dual-chamber [employer] permanent pacemaker implantation in 2016  5.  Thoracoscopy video-assisted with wedge resection along with bronchoscopy done on 10/18/2019     SOCIAL HISTORY:  Denies any tobacco or alcohol intake.     FAMILY HISTORY:  Significant for atherosclerotic coronary artery disease.     CARDIAC RISK FACTORS:  1.   Male.  2.   Arterial hypertension.  3.   Hyperlipidemia.  4.   Family history for coronary artery disease.                    Allergies:  Allergies   Allergen Reactions   • Latex Rash   • Tape Rash       Medication:  Medications Prior to Admission   Medication Sig Dispense Refill Last Dose   • allopurinol (ZYLOPRIM) 100 MG tablet Take 100 mg by mouth Daily.   Taking   • aspirin 81 MG chewable tablet Chew 81 mg Daily.   Taking   • Cyanocobalamin (VITAMIN B 12 PO) Take 1 tablet by mouth Daily.   Taking   • fludrocortisone 0.1 MG tablet Take 1 tablet by mouth Daily.   Taking   •  levothyroxine (SYNTHROID, LEVOTHROID) 75 MCG tablet Take 75 mcg by mouth Daily.   Taking   • Magnesium 250 MG tablet Take 1 tablet by mouth Daily.   Taking   • omeprazole (priLOSEC) 40 MG capsule Take 40 mg by mouth Every Other Day.   Taking   • oxyCODONE-acetaminophen (PERCOCET) 5-325 MG per tablet Take 1-2 tablets by mouth Every 4 (Four) Hours As Needed for Moderate Pain  (SURGICAL PAIN). 36 tablet 0 Taking   • sennosides-docusate sodium (SENOKOT-S) 8.6-50 MG tablet Take 2 tablets by mouth Every Night.   Taking   • simvastatin (ZOCOR) 40 MG tablet Take 40 mg by mouth Daily.   Taking           Review of Systems:       Constitutional:  Denies recent weight loss, weight gain, fever or chills, no change in exercise tolerance.     HENT:  Denies any hearing loss, epistaxis, hoarseness, or difficulty speaking.     Eyes: Wears eyeglasses or contact lenses     Respiratory:  Denies dyspnea with exertion,no cough, wheezing, or hemoptysis.     Cardiovascular: Negative for palpitations, chest pain, orthopnea, PND, peripheral edema, syncope, or claudication.     Gastrointestinal:  Denies change in bowel habits, dyspepsia, ulcer disease, hematochezia, or melena.  No nausea, no vomiting, no hematemesis, no diarrhea or constipation.    Endocrine: Negative for cold intolerance, heat intolerance, polydipsia, polyphagia or polyuria. Denies any history of weight change or unintended weight loss.    Genitourinary: Negative for hematuria.  No frequent urination or nocturia.      Musculoskeletal: Denies any history of arthritic symptoms or back problems .  No joint pain, joint stiffness, joint swelling, muscle pain, muscle weakness or neck pain.    Skin:  Denies any change in hair or nails, rashes, or skin lesions.     Allergic/Immunologic: Negative.  Negative for environmental allergies, food allergies or immunocompromised state.     Neurological:  Denies any history of recurrent headaches, strokes, TIA, or seizure disorder.      Hematological: Denies excessive bleeding, easy bruising, fatigue, lymphadenopathy or petechiae or any bleeding disorders.     Psychiatric/Behavioral: Denies any history of depression, substance abuse, or change in cognitive function. Denies any psychomotor reaction or tangential thought.  No depression, homicidal ideations or suicidal ideations.          Objective:     Objective:  Temp:  [97 °F (36.1 °C)-97.8 °F (36.6 °C)] 97.8 °F (36.6 °C)  Heart Rate:  [60-75] 61  Resp:  [18] 18  BP: (119-165)/(68-86) 133/74      Body mass index is 27.07 kg/m².           Physical Exam:   General Appearance:    Alert, oriented, cooperative, in no acute distress.   Head:    Normocephalic, atraumatic, without obvious abnormality.   Eyes:           MARIELY.  Lids and lashes normal, conjunctivae and sclerae normal, no icterus, no pallor.   Ears:    Ears appear intact with no abnormalities noted.   Throat:   Mucous membranes pink and moist.   Neck:   Supple, trachea midline, no carotid bruit, no organomegaly or JVD.   Lungs:     Clear to auscultation and percussion, respirations regular, even and unlabored. No wheezes, rales or rhonchi.    Heart:    Regular rhythm and normal rate, normal S1 and S2, no            murmur, no gallop, no rub, no click.   Abdomen:     Soft, non-tender, non-distended, no guarding, no rebound tenderness, normal bowel sounds in all four quadrants, no masses, liver and spleen nonpalpable.   Genitalia:    Deferred.   Extremities:   Moves all extremities well, no edema, no cyanosis, no              redness, no clubbing.   Pulses:   Pulses palpable and equal bilaterally.   Skin:   Moist and warm. No bleeding, bruising or rash.   Neurologic/Psychiatric:   Alert and oriented to person, place, and time.  Motor, power and tone in upper and lower extremities are grossly intact. No focal neurological deficits. Normal cognitive function. No psychomotor reaction or tangential thought. No depression, homicidal ideations  and suicidal ideations.       Medication Review:   Current Facility-Administered Medications   Medication Dose Route Frequency Provider Last Rate Last Dose   • acetaminophen (TYLENOL) tablet 650 mg  650 mg Oral Q4H PRN German Castro, DO        Or   • acetaminophen (TYLENOL) suppository 650 mg  650 mg Rectal Q4H PRN German Castro I, DO       • allopurinol (ZYLOPRIM) tablet 100 mg  100 mg Oral Daily German Castro I, DO   100 mg at 12/08/19 0851   • aspirin chewable tablet 81 mg  81 mg Oral Daily German Castro I, DO   81 mg at 12/08/19 0851   • atorvastatin (LIPITOR) tablet 20 mg  20 mg Oral Daily German Castro I, DO   20 mg at 12/08/19 0851   • bisacodyl (DULCOLAX) EC tablet 5 mg  5 mg Oral Daily PRN German Castro, DO       • bisacodyl (DULCOLAX) suppository 10 mg  10 mg Rectal Daily PRN German Castro, DO       • fludrocortisone tablet 100 mcg  100 mcg Oral Daily German Castro I, DO   100 mcg at 12/08/19 0850   • furosemide (LASIX) injection 20 mg  20 mg Intravenous Q12H Jesus Ricardo MD   20 mg at 12/08/19 0856   • heparin (porcine) 5000 UNIT/ML injection 5,000 Units  5,000 Units Subcutaneous Q8H German Castro, DO   5,000 Units at 12/08/19 0602   • levothyroxine (SYNTHROID, LEVOTHROID) tablet 75 mcg  75 mcg Oral Q AM German Castro, DO   75 mcg at 12/08/19 0601   • Magnesium Oxide tablet 400 mg  400 mg Oral Daily German Castro I, DO   400 mg at 12/08/19 0851   • ondansetron (ZOFRAN) injection 4 mg  4 mg Intravenous Q4H PRN German Castro, DO       • oxyCODONE-acetaminophen (PERCOCET) 5-325 MG per tablet 1 tablet  1 tablet Oral Q4H PRN German Castro,        • pantoprazole (PROTONIX) EC tablet 40 mg  40 mg Oral Q AM German Castro DO   40 mg at 12/08/19 0601   • senna-docusate sodium (SENOKOT-S) 8.6-50 MG tablet 2 tablet  2 tablet Oral Nightly German Castro DO       • sodium  chloride 0.9 % flush 10 mL  10 mL Intravenous Q12H Hanane-Egziabher, German I, DO   10 mL at 12/08/19 0855   • sodium chloride 0.9 % flush 10 mL  10 mL Intravenous PRN Hanane-Egziabher, German I, DO           Lab Review:     Results from last 7 days   Lab Units 12/08/19  0521   SODIUM mmol/L 145   POTASSIUM mmol/L 3.5   CHLORIDE mmol/L 104   CO2 mmol/L 32.0*   BUN mg/dL 17   CREATININE mg/dL 1.40*   CALCIUM mg/dL 9.4   BILIRUBIN mg/dL 0.6   ALK PHOS U/L 206*   ALT (SGPT) U/L 13   AST (SGOT) U/L 22   GLUCOSE mg/dL 89             Results from last 7 days   Lab Units 12/08/19  0521   WBC 10*3/mm3 5.74   HEMOGLOBIN g/dL 12.2*   HEMATOCRIT % 37.4*   PLATELETS 10*3/mm3 218         Results from last 7 days   Lab Units 12/08/19  0521   MAGNESIUM mg/dL 1.9         Results from last 7 days   Lab Units 12/08/19  0521   TSH uIU/mL 2.950   FREE T4 ng/dL 1.01           EKG:   ECG/EMG Results (last 24 hours)     Procedure Component Value Units Date/Time    ECG 12 Lead [[id number]] Collected:  12/07/19 2345     Updated:  12/07/19 2346    Narrative:       Test Reason : chf  Blood Pressure : **/** mmHG  Vent. Rate : 067 BPM     Atrial Rate : 067 BPM     P-R Int : 000 ms          QRS Dur : 120 ms      QT Int : 472 ms       P-R-T Axes : 000 028 -70 degrees     QTc Int : 498 ms    Demand pacemaker, interpretation is based on intrinsic rhythm  Wide QRS rhythm with frequent premature ventricular complexes and fusion  complexes  Indeterminate axis  Inferior infarct , age undetermined  T wave abnormality, consider lateral ischemia  Abnormal ECG  When compared with ECG of 19-AUG-2019 14:36,  Wide QRS rhythm has replaced Sinus rhythm    Referred By:             Confirmed By:           ECHO:  Results for orders placed during the hospital encounter of 06/15/19   Adult Transthoracic Echo Complete W/ Cont if Necessary Per Protocol    Narrative · Left ventricular wall thickness is consistent with mild concentric   hypertrophy.  · Left ventricular  diastolic dysfunction (grade I) consistent with   impaired relaxation.  · Mild mitral valve regurgitation is present  · Mild tricuspid valve regurgitation is present.  · The following left ventricular wall segments are hypokinetic: mid   anterior, apical anterior, basal anterolateral, mid anterolateral, apical   lateral, basal inferolateral, mid inferolateral, apical inferior, mid   inferior, apical septal, basal inferoseptal, mid inferoseptal, apex   hypokinetic, mid anteroseptal, basal anterior, basal inferior and basal   inferoseptal.  · Right ventricular cavity is borderline dilated.           Imaging:  Imaging Results (Last 24 Hours)     Procedure Component Value Units Date/Time    XR Chest PA & Lateral [[id number]] Collected:  12/08/19 0006     Updated:  12/08/19 0030    Narrative:         Chest 2 view on  12/7/2019     CLINICAL INDICATION: CHF, cough    COMPARISON: 11/25/2019    FINDINGS: There is mild elevation of the right hemidiaphragm.  2-lead left subclavian pacemaker is noted in place. Mild  cardiomegaly is noted. There are stable increased reticular  interstitial changes that may be related to chronic interstitial  lung disease but cannot exclude component of edema or atypical  pneumonia. Vascular calcification is noted in the aorta. Hilar  and mediastinal contours are within normal limits. There is  evidence of calcified granulomatous disease in the chest.      Impression:       No significant change in the appearance of the chest.    Electronically signed by:  Ramiro Robles  12/8/2019 12:29 AM  CST Workstation: [id number]          I personally viewed and interpreted the patient's EKG/Telemetry data.    Assessment:   1.  Shortness of breath.  2.  Atherosclerotic coronary artery disease with previous PTCA and stenting.  3.  Left ventricular systolic dysfunction with an ejection fraction of 35 to 40%.  4.  Sick sinus syndrome status post pacemaker implantation.  5.  History of orthostatic  hypotension.  6.  Metastatic right lung nodule from melanoma status post thoracal score P and wedge resection of the nodule.          Plan:   1.  Shortness of breath.  Patient does have history of left ventricular systolic dysfunction with an ejection fraction of 35 to 40%.  Patient has history of documented atherosclerotic coronary artery disease.  Patient Florinef would be stopped.  Patient will be continued on the Lasix and will follow the blood pressure and fluid status.  Would repeat the renal function.  Patient has been counseled to decrease her salt intake.    2.  Atherosclerotic coronary artery disease.  Patient has history of  aborted inferior wall myocardial infarction in 2004 with PTCA and stenting of the mid right coronary artery with deployment of a 3 mm x 18 mm Vision stent on 07/05/2014 with repeat coronary angiogram done in 2005 with PTCA and stenting of the distal rightcoronary artery with deployment of a 3 mm x 28 mm Cypher stent with 80%stenosis in the obtuse marginal branch upper division and 60% stenosis in the ramus intermedius branch and 40% stenosis in the 2nd diagonal branch treated medically, patient at the present time does not have any symptoms suggestive of angina.  Patient at the present time would be treated medically.    3.  History of hypotension.  Patient was on Midodrin for low blood pressure.  If the patient continues to have low blood pressure patient will be started on Midodrin.  Patient Florinef would be stopped at the present time.    4.  Metastatic right lung nodule from the melanoma status post thoracoscopy video-assisted with minithoracotomy and a wedge resection of the nodule.   Patient has been followed by surgery on outpatient basis.    5.  Sick sinus syndrome status post pacemaker implantation.  Patient would undergo a complete pacemaker interrogation.      Thank you for the consultation.    Above plan of management were discussed with the patient.         Time: time  spent in face-to-face evaluation of greater than 55  minutes and interacting and formulating examining and discussing the plan with the patient with 50% of greater time spent in face-to-face interaction.    Cruzito Grullon MD  12/08/19  4:12 PM  Dictated utilizing Dragon dictation.

## 2019-12-08 NOTE — PLAN OF CARE
Problem: Patient Care Overview  Goal: Plan of Care Review  Outcome: Ongoing (interventions implemented as appropriate)  Flowsheets (Taken 12/8/2019 5950)  Progress: no change  Plan of Care Reviewed With: patient  Goal: Individualization and Mutuality  Outcome: Ongoing (interventions implemented as appropriate)  Goal: Discharge Needs Assessment  Outcome: Ongoing (interventions implemented as appropriate)  Goal: Interprofessional Rounds/Family Conf  Outcome: Ongoing (interventions implemented as appropriate)     Problem: Fall Risk (Adult)  Goal: Identify Related Risk Factors and Signs and Symptoms  Outcome: Ongoing (interventions implemented as appropriate)  Goal: Absence of Fall  Outcome: Ongoing (interventions implemented as appropriate)     Problem: Cardiac: Heart Failure (Adult)  Goal: Signs and Symptoms of Listed Potential Problems Will be Absent, Minimized or Managed (Cardiac: Heart Failure)  Outcome: Ongoing (interventions implemented as appropriate)

## 2019-12-08 NOTE — PROGRESS NOTES
[facility] Medicine Services  INPATIENT PROGRESS NOTE    Length of Stay: 1  Date of Admission: 12/7/2019  Primary Care Physician: Mike Draper MD    Subjective   Chief Complaint: SOA    HPI: Patient continues to be short of breath, especially with ambulation, but improved since admission.  He denies any chest pain, nausea, vomiting, palpitations.  He does have lower extremity swelling.  He denies any lightheadedness or dizziness with ambulation.  He reports a cough with occasional white frothy sputum.  He denies fevers or chills.    Review of Systems   Constitutional: Negative for chills and fever.   HENT: Negative for congestion and sore throat.    Respiratory: Positive for cough and shortness of breath.    Cardiovascular: Negative for chest pain.   Gastrointestinal: Negative for abdominal pain, nausea and vomiting.   Genitourinary: Negative for dysuria and urgency.   Musculoskeletal: Negative for arthralgias and myalgias.   Skin: Negative for rash.   Neurological: Negative for dizziness, light-headedness and headaches.        All pertinent negatives and positives are as above. All other systems have been reviewed and are negative unless otherwise stated.     Objective    Temp:  [97 °F (36.1 °C)-97.6 °F (36.4 °C)] 97.6 °F (36.4 °C)  Heart Rate:  [60-75] 75  Resp:  [18] 18  BP: (119-165)/(68-86) 165/86    Physical Exam   Constitutional: He is oriented to person, place, and time. He appears well-developed and well-nourished.   HENT:   Head: Normocephalic and atraumatic.   Mouth/Throat: Oropharynx is clear and moist.   Eyes: Pupils are equal, round, and reactive to light. EOM are normal.   Neck: Neck supple. No thyromegaly present.   Cardiovascular: Normal rate and regular rhythm.   No murmur heard.  Pulmonary/Chest: Effort normal. He has rales.   Abdominal: Soft. Bowel sounds are normal. There is no tenderness.   Musculoskeletal: He exhibits edema (BLE). He exhibits no  tenderness.   Lymphadenopathy:     He has no cervical adenopathy.   Neurological: He is alert and oriented to person, place, and time. No cranial nerve deficit.   Skin: Skin is warm and dry. No rash noted. No erythema.           Results Review:  I have reviewed the labs, radiology results, and diagnostic studies.    Laboratory Data:   Results from last 7 days   Lab Units 12/08/19  0521   SODIUM mmol/L 145   POTASSIUM mmol/L 3.5   CHLORIDE mmol/L 104   CO2 mmol/L 32.0*   BUN mg/dL 17   CREATININE mg/dL 1.40*   GLUCOSE mg/dL 89   CALCIUM mg/dL 9.4   BILIRUBIN mg/dL 0.6   ALK PHOS U/L 206*   ALT (SGPT) U/L 13   AST (SGOT) U/L 22   ANION GAP mmol/L 9.0     Estimated Creatinine Clearance: 57.5 mL/min (A) (by C-G formula based on SCr of 1.4 mg/dL (H)).  Results from last 7 days   Lab Units 12/08/19  0521   MAGNESIUM mg/dL 1.9   PHOSPHORUS mg/dL 3.1         Results from last 7 days   Lab Units 12/08/19  0521   WBC 10*3/mm3 5.74   HEMOGLOBIN g/dL 12.2*   HEMATOCRIT % 37.4*   PLATELETS 10*3/mm3 218           Culture Data:   No results found for: BLOODCX  No results found for: URINECX  No results found for: RESPCX  No results found for: WOUNDCX  No results found for: STOOLCX  No components found for: BODYFLD    Radiology Data:   Imaging Results (Last 24 Hours)     Procedure Component Value Units Date/Time    XR Chest PA & Lateral [[id number]] Collected:  12/08/19 0006     Updated:  12/08/19 0030    Narrative:         Chest 2 view on  12/7/2019     CLINICAL INDICATION: CHF, cough    COMPARISON: 11/25/2019    FINDINGS: There is mild elevation of the right hemidiaphragm.  2-lead left subclavian pacemaker is noted in place. Mild  cardiomegaly is noted. There are stable increased reticular  interstitial changes that may be related to chronic interstitial  lung disease but cannot exclude component of edema or atypical  pneumonia. Vascular calcification is noted in the aorta. Hilar  and mediastinal contours are within normal limits.  There is  evidence of calcified granulomatous disease in the chest.      Impression:       No significant change in the appearance of the chest.    Electronically signed by:  Ramiro Robles  12/8/2019 12:29 AM  CST Workstation: [id number]          I have reviewed the patient's current medications.     Assessment/Plan     Active Hospital Problems    Diagnosis   • **Acute on chronic systolic CHF (congestive heart failure) (CMS/HCC)   • Acute on chronic systolic heart failure (CMS/HCC)       Plan:     1.  Acute on chronic HFrEF (ER 36-40%) - cont low dose lasix for diuresis with caution given his h/o orthostatic HoTN.  Consult placed to Dr. Grullon.    2.  CKDIII - follow renal function with diuresis  3.  Orthostatic HoTN - currently on florinef but may be contributing to fluid retention  4.  HLD - home meds   5.  CAD s/p stent to RCA x 2 - no acute issues.  Home meds.               Discharge Planning: pending cards evaluation     Jesus Ricardo MD

## 2019-12-08 NOTE — H&P
[facility] Medicine Admission      Date of Admission: 12/7/2019      Primary Care Physician: Mike Draper MD      Chief Complaint: Shortness of breath    HPI:    The patient is a pleasant 74-year-old male has been having several days of shortness of air and ESPINAL.  He presented at [facility] on 12/7/2019 and underwent chest x-ray that showed pulmonary edema.    Laboratory work from [facility] ED:    1.  CBC: WBC 5.7, platelet count 213, hemoglobin 12.2  2.  BMP: Glucose 104, BUN 17, creatinine 1.4, chloride 106, bicarb 30 sodium 144 and potassium 3.8.  3.  LFTs: Albumin 3.1, ALT 13, AST 23, alk phos 13 and total bilirubin 0.7.  4. proBNP 17,900, troponin 0 0.021, d-dimer 2.41.  5.  ABG: pH 7.51, PCO2 34, PaO2 53 with 90% on RA    Concurrent Medical History:  has a past medical history of Arthritis, Cancer (CMS/[facility]), Coronary artery disease, Disease of thyroid gland, GERD (gastroesophageal reflux disease), History of transfusion, Hyperlipidemia, MI (myocardial infarction) (CMS/[facility]), Pacemaker, and Sleep apnea.    Past Surgical History:  has a past surgical history that includes Appendectomy; Kidney stone surgery; Colon surgery; Colonoscopy (N/A, 4/19/2017); Femur Open Reduction Internal Fixation (Left, 6/16/2019); Salivary Gland Excision (Left); Coronary angioplasty with stent; Pacemaker Implantation; Esophagogastroduodenoscopy; and Thoracoscopy (Right, 10/18/2019).    Family History: family history includes Coronary artery disease in his father and mother.     Social History:  reports that he quit smoking about 16 years ago. He quit after 45.00 years of use. He has never used smokeless tobacco. He reports that he does not drink alcohol or use drugs.    Allergies:   Allergies   Allergen Reactions   • Latex Rash   • Tape Rash       Medications:   Prior to Admission medications    Medication Sig Start Date End Date Taking? Authorizing Provider    allopurinol (ZYLOPRIM) 100 MG tablet Take 100 mg by mouth Daily.    Raleigh Lorenzana MD   aspirin 81 MG chewable tablet Chew 81 mg Daily.    Raleigh Lorenzana MD   Cyanocobalamin (VITAMIN B 12 PO) Take 1 tablet by mouth Daily.    Raleigh Lorenzana MD   fludrocortisone 0.1 MG tablet Take 1 tablet by mouth Daily. 9/18/19   Raleigh Lorenzana MD   levothyroxine (SYNTHROID, LEVOTHROID) 75 MCG tablet Take 75 mcg by mouth Daily.    Raleigh Lorenzana MD   Magnesium 250 MG tablet Take 1 tablet by mouth Daily.    Raleigh Lorenzana MD   omeprazole (priLOSEC) 40 MG capsule Take 40 mg by mouth Every Other Day. 2/14/17   Raleigh Lorenzana MD   oxyCODONE-acetaminophen (PERCOCET) 5-325 MG per tablet Take 1-2 tablets by mouth Every 4 (Four) Hours As Needed for Moderate Pain  (SURGICAL PAIN). 10/21/19   Starr Henry APRN   sennosides-docusate sodium (SENOKOT-S) 8.6-50 MG tablet Take 2 tablets by mouth Every Night. 10/21/19   Starr Henry APRN   simvastatin (ZOCOR) 40 MG tablet Take 40 mg by mouth Daily. 2/14/17   Raleigh Lorenzana MD       Review of Systems:  As per history of present illness and a complete 12 point review of systems is otherwise negative.     Physical Exam:   Temp:  [97.2 °F (36.2 °C)] 97.2 °F (36.2 °C)  Heart Rate:  [70] 70  Resp:  [18] 18  BP: (132)/(81) 132/81    Gen.: Appears as stated age.  No acute distress.  HEENT: EOMI.  PERRLA.  Sclera anicteric.  Moist mucous membranes.  Neck: Supple.  No JVD.  No thyromegaly.  Chest: Clear to auscultation bilaterally.  No wheeze, rhonchi or rales.  Heart: Regular rhythm and rate.  No murmurs, rubs or gallops.  Abdomen: Normoactive bowel sounds.  Nontender.  Nondistended.  No guarding. Neurological: Cranial nerves II through XII are grossly intact.  No cerebellar signs.   Extremities: Good range of motion throughout.  No cyanosis, clubbing or edema.  Skin: Warm.  No rashes.  No ulcers.  Psychiatry:  Cooperative.        Results Reviewed:  I have personally reviewed current lab, radiology, and data and agree with results.  Lab Results (last 24 hours)     ** No results found for the last 24 hours. **        Imaging Results (Last 24 Hours)     ** No results found for the last 24 hours. **            Assessment and Plan:    Principal Problem:    Acute on chronic systolic CHF (congestive heart failure) (CMS/[facility])    1. Acute on chronic systolic heart failure:  --last TTE 6/16/19--> EF 36-40% grade 1 diastolic dysfunction  --Continue diuresis    2.  Acute hypoxemic respiratory failure:  --ABG done and ED showed hypoxemia  --Ordered VQ scan because of elevated d-dimer  --Titrate pulse ox> 92%    3.  Orthostatic hypotension:  --Continue Florinef.  Failed midodrine.  --Patient concerned with exacerbation since he is being diuresed  --We will consider droxidopa if refractory  --Patient concerned about cost of medications    4.  CKD stage III:  --Nephrotoxin holds    5.  Hyperlipidemia:  --Statin    6. CAD s/p RCA stent x2  --Continue aspirin, not on BB because of orthostatic hypotension, Zocor  --Follows with Dr. Grullon    7.  NAFISA   --noncompliant with CPAP    8.  Bradycardia s/p pacemaker :  --Stable    9.  Gout:  --Continue allopurinol and colchicine    10.  GERD: Continue PPI    11. Hypothyroidism:  --Synthroid          German Castro DO  12/07/19  11:35 PM

## 2019-12-09 NOTE — PROGRESS NOTES
Cardiology Progress Note     LOS: 2 days   Patient Care Team:  Mike Draper MD as PCP - General    Subjective:    Chart reviewed. Patient seen and examined. Patient denies any chest pain, shortness of breath, or palpitation. Patient blood pressure has been on the low side but patient denies any symptoms of lightheaded dizziness.  Patient Florinef has been stopped.  Patient is tolerating the Lasix and has no shortness of breath.  Patient underwent pacemaker interrogation which revealed appropriate sensing and pacing function.  Patient's hemoglobin 11.2 with creatinine of 1.4.    Objective:  Temp:  [96.9 °F (36.1 °C)-97.8 °F (36.6 °C)] 97 °F (36.1 °C)  Heart Rate:  [60-76] 64  Resp:  [18-20] 18  BP: (108-165)/(62-86) 108/62    Intake/Output Summary (Last 24 hours) at 12/9/2019 1231  Last data filed at 12/9/2019 0449  Gross per 24 hour   Intake 200 ml   Output 1000 ml   Net -800 ml       Physical Exam:   General Appearance:    Alert, oriented, cooperative, in no acute distress.   Head:    Normocephalic, atraumatic, without obvious abnormality   Eyes:             MARIELY. Lids and lashes normal, conjunctivae and sclerae normal, no icterus, no pallor.   Ears:    Ears appear intact with no abnormalities noted.   Throat:   Mucous membranes pink and moist.   Neck:  Supple, trachea midline, no carotid bruit, no organomegaly or JVD.   Lungs:    Clear to auscultation and percussion.  Respirations regular, even and unlabored. No wheezes, rales, or rhonchi.    Heart:    Regular rhythm and normal rate, normal S1 and S2, no      murmur, no gallop, no rub, no click.   Abdomen:    Soft, non-tender, non-distended, no guarding, no rebound tenderness. Normal bowel sounds in all four quadrants, no masses, liver and spleen nonpalpable.    Genitalia:    Deferred.   Extremities:   Moves all extremities well, trace edema, no cyanosis, no       redness, no clubbing.   Pulses:   Pulses palpable and equal bilaterally.   Skin:   Moist and  warm. No bleeding, bruising or rash.   Neurologic/Psychiatric:   Alert and oriented to person, place, and time.  Motor, power and tone in upper and lower extremities are grossly intact.  No focal neurological deficits. Normal cognitive function. No psychomotor reaction or tangential thought. No depression, homicidal ideations and suicidal ideations.          Results Review:    Results from last 7 days   Lab Units 12/08/19  0521   SODIUM mmol/L 145   POTASSIUM mmol/L 3.5   CHLORIDE mmol/L 104   CO2 mmol/L 32.0*   BUN mg/dL 17   CREATININE mg/dL 1.40*   CALCIUM mg/dL 9.4   BILIRUBIN mg/dL 0.6   ALK PHOS U/L 206*   ALT (SGPT) U/L 13   AST (SGOT) U/L 22   GLUCOSE mg/dL 89             Results from last 7 days   Lab Units 12/08/19  0521   WBC 10*3/mm3 5.74   HEMOGLOBIN g/dL 12.2*   HEMATOCRIT % 37.4*   PLATELETS 10*3/mm3 218         Results from last 7 days   Lab Units 12/08/19  0521   MAGNESIUM mg/dL 1.9         Results from last 7 days   Lab Units 12/08/19  0521   TSH uIU/mL 2.950   FREE T4 ng/dL 1.01           ECHO:  Results for orders placed during the hospital encounter of 06/15/19   Adult Transthoracic Echo Complete W/ Cont if Necessary Per Protocol    Narrative · Left ventricular wall thickness is consistent with mild concentric   hypertrophy.  · Left ventricular diastolic dysfunction (grade I) consistent with   impaired relaxation.  · Mild mitral valve regurgitation is present  · Mild tricuspid valve regurgitation is present.  · The following left ventricular wall segments are hypokinetic: mid   anterior, apical anterior, basal anterolateral, mid anterolateral, apical   lateral, basal inferolateral, mid inferolateral, apical inferior, mid   inferior, apical septal, basal inferoseptal, mid inferoseptal, apex   hypokinetic, mid anteroseptal, basal anterior, basal inferior and basal   inferoseptal.  · Right ventricular cavity is borderline dilated.          ECG 12 Lead   Preliminary Result   Test Reason : chf   Blood  Pressure : **/** mmHG   Vent. Rate : 067 BPM     Atrial Rate : 067 BPM      P-R Int : 000 ms          QRS Dur : 120 ms       QT Int : 472 ms       P-R-T Axes : 000 028 -70 degrees      QTc Int : 498 ms      Demand pacemaker, interpretation is based on intrinsic rhythm   Wide QRS rhythm with frequent premature ventricular complexes and fusion   complexes   Indeterminate axis   Inferior infarct , age undetermined   T wave abnormality, consider lateral ischemia   Abnormal ECG   When compared with ECG of 19-AUG-2019 14:36,   Wide QRS rhythm has replaced Sinus rhythm      Referred By:             Confirmed By:            Medication Review:   Current Facility-Administered Medications   Medication Dose Route Frequency Provider Last Rate Last Dose   • acetaminophen (TYLENOL) tablet 650 mg  650 mg Oral Q4H PRN Hanane-Egziabher German I, DO        Or   • acetaminophen (TYLENOL) suppository 650 mg  650 mg Rectal Q4H PRN Hanane-Egziabher, German I, DO       • allopurinol (ZYLOPRIM) tablet 100 mg  100 mg Oral Daily Hanane-Egziabher, German I, DO   100 mg at 12/09/19 0813   • aspirin chewable tablet 81 mg  81 mg Oral Daily Hanane-Egziabher, German I, DO   81 mg at 12/09/19 0813   • atorvastatin (LIPITOR) tablet 20 mg  20 mg Oral Daily Hanane-Egziabher, German I, DO   20 mg at 12/09/19 0813   • bisacodyl (DULCOLAX) EC tablet 5 mg  5 mg Oral Daily PRN Hanane-Egziabher, German I, DO       • bisacodyl (DULCOLAX) suppository 10 mg  10 mg Rectal Daily PRN Hanane-Marlenaab, German I, DO       • furosemide (LASIX) injection 20 mg  20 mg Intravenous BID Jesus Ricardo MD       • heparin (porcine) 5000 UNIT/ML injection 5,000 Units  5,000 Units Subcutaneous Q8H Hanane-Marlenaab, German I, DO   5,000 Units at 12/09/19 0640   • levothyroxine (SYNTHROID, LEVOTHROID) tablet 75 mcg  75 mcg Oral Q AM Hanane-Marlenaabher German I, DO   75 mcg at 12/09/19 0640   • Magnesium Oxide tablet 400 mg  400 mg Oral Daily German Castro DO   400 mg at 12/09/19 0812   •  ondansetron (ZOFRAN) injection 4 mg  4 mg Intravenous Q4H PRN Hanane-Egziabher, German I, DO       • oxyCODONE-acetaminophen (PERCOCET) 5-325 MG per tablet 1 tablet  1 tablet Oral Q4H PRN Hanane-Egziabher, German I, DO       • pantoprazole (PROTONIX) EC tablet 40 mg  40 mg Oral Q AM Hanane-Egziabher, German I, DO   40 mg at 12/09/19 0640   • senna-docusate sodium (SENOKOT-S) 8.6-50 MG tablet 2 tablet  2 tablet Oral Nightly Hanane-Egziabher, German I, DO       • sodium chloride 0.9 % flush 10 mL  10 mL Intravenous Q12H Hanane-Egziabher, German I, DO   10 mL at 12/09/19 0813   • sodium chloride 0.9 % flush 10 mL  10 mL Intravenous PRN Hanane-Egziabher, German I, DO           Assessment and Plan:      Acute on chronic systolic CHF (congestive heart failure) (CMS/HCC)    Acute on chronic systolic heart failure (CMS/HCC)  1.  Shortness of breath.  Patient underwent a VQ scan which was low probability.  Patient had mild fluid overload and was on Florinef which has been stopped.  Patient will be continued on the low-dose of Lasix 20 mg once a day on discharge.  Patient does have left medical systolic dysfunction with ejection fraction of  40%.  Patient has low blood pressure and is not able to tolerate any afterload reduction medication.     2.  Atherosclerotic coronary artery disease.  Patient is status post aborted inferior wall myocardial infarction in 2004 with PTCA and stenting with repeat coronary intervention done in 2005 with deployment of a 3 mm x 28 mm Cypher stent.  Patient had not complained of having any symptoms of substernal chest pain suggestive of angina.  Next    3.  Sick sinus syndrome with symptoms of presyncope with subsequent [employer] dual-chamber pacemaker plantation in December 2016.  Patient pacemaker interrogation had reveal appropriate sensing and pacing function with adequate battery reserve.    4.  Hypotension.  Patient blood pressure is 103/70.  Patient had not complained of having any symptoms of dizziness.   Patient has been recommended to use support stockings.  If the patient continues to have low blood pressure patient would be started on Midodrin 5 mg 3 times a day.    5.  Metastatic melanoma to the lung with previous history of adenocarcinoma of the sigmoid colon status post resection status post chemotherapy.  Patient has a follow-up appointment with the oncologist department.  Next    6.  Anemia.  Patient globin is 12.2.  Patient has not had any hemoptysis hematuria bright red blood per rectum.    7.  Hyperlipidemia.  Patient is not on low-fat low-cholesterol diet and to continue with the present dose of Lipitor.      Patient stable from a cardiac stand point to be discharged.    Patient will follow-up in the office in 12 weeks.        Cruzito Grullon MD  12/09/19  12:31 PM      Time: Time spent on face-to-face interaction 20 minutes    Dictated utilizing Dragon dictation.

## 2019-12-09 NOTE — DISCHARGE SUMMARY
"    [facility] Medicine Services  DISCHARGE SUMMARY       Date of Admission: 12/7/2019  Date of Discharge:  12/9/2019  Primary Care Physician: Mike Draper MD    Presenting Problem/History of Present Illness:  Acute on chronic systolic heart failure (CMS/HCC) [I50.23]       Final Discharge Diagnoses:  Active Hospital Problems    Diagnosis   • **Acute on chronic systolic CHF (congestive heart failure) (CMS/HCC)   • Acute on chronic systolic heart failure (CMS/HCC)   A/CHFrEF (EF 36-40%)  CKD3  Orthostatic HoTN  HLD  CAD    Consults:   Consults     Date and Time Order Name Status Description    12/8/2019 1528 Inpatient Cardiology Consult Completed       Dr. Grullon    Procedures Performed:                 Pertinent Test Results:   Lab Results (most recent)     Procedure Component Value Units Date/Time    Procalcitonin [[id number]]  (Abnormal) Collected:  12/08/19 0521    Specimen:  Blood Updated:  12/08/19 1207     Procalcitonin 0.09 ng/mL     Narrative:       As a Marker for Sepsis (Non-Neonates):   1. <0.5 ng/mL represents a low risk of severe sepsis and/or septic shock.  1. >2 ng/mL represents a high risk of severe sepsis and/or septic shock.    As a Marker for Lower Respiratory Tract Infections that require antibiotic therapy:  PCT on Admission     Antibiotic Therapy             6-12 Hrs later  > 0.5                Strongly Recommended            >0.25 - <0.5         Recommended  0.1 - 0.25           Discouraged                   Remeasure/reassess PCT  <0.1                 Strongly Discouraged          Remeasure/reassess PCT      As 28 day mortality risk marker: \"Change in Procalcitonin Result\" (> 80 % or <=80 %) if Day 0 (or Day 1) and Day 4 values are available. Refer to http://www.[employer]-pct-calculator.com/   Change in PCT <=80 %   A decrease of PCT levels below or equal to 80 % defines a positive change in PCT test result representing a higher risk for 28-day " all-cause mortality of patients diagnosed with severe sepsis or septic shock.  Change in PCT > 80 %   A decrease of PCT levels of more than 80 % defines a negative change in PCT result representing a lower risk for 28-day all-cause mortality of patients diagnosed with severe sepsis or septic shock.                  BNP [[id number]]  (Abnormal) Collected:  12/08/19 0521    Specimen:  Blood Updated:  12/08/19 0601     proBNP 19,832.0 pg/mL     Narrative:       Among patients with dyspnea, NT-proBNP is highly sensitive for the detection of acute congestive heart failure. In addition NT-proBNP of <300 pg/ml effectively rules out acute congestive heart failure with 99% negative predictive value.      TSH [[id number]]  (Normal) Collected:  12/08/19 0521    Specimen:  Blood Updated:  12/08/19 0601     TSH 2.950 uIU/mL     T4, Free [[id number]]  (Normal) Collected:  12/08/19 0521    Specimen:  Blood Updated:  12/08/19 0601     Free T4 1.01 ng/dL     Comprehensive Metabolic Panel [[id number]]  (Abnormal) Collected:  12/08/19 0521    Specimen:  Blood Updated:  12/08/19 0554     Glucose 89 mg/dL      BUN 17 mg/dL      Creatinine 1.40 mg/dL      Sodium 145 mmol/L      Potassium 3.5 mmol/L      Chloride 104 mmol/L      CO2 32.0 mmol/L      Calcium 9.4 mg/dL      Total Protein 6.5 g/dL      Albumin 3.10 g/dL      ALT (SGPT) 13 U/L      AST (SGOT) 22 U/L      Alkaline Phosphatase 206 U/L      Total Bilirubin 0.6 mg/dL      eGFR Non African Amer 49 mL/min/1.73      Globulin 3.4 gm/dL      A/G Ratio 0.9 g/dL      BUN/Creatinine Ratio 12.1     Anion Gap 9.0 mmol/L     Narrative:       GFR Normal >60  Chronic Kidney Disease <60  Kidney Failure <15      Phosphorus [[id number]]  (Normal) Collected:  12/08/19 0521    Specimen:  Blood Updated:  12/08/19 0554     Phosphorus 3.1 mg/dL     Magnesium [[id number]]  (Normal) Collected:  12/08/19 0521    Specimen:  Blood Updated:  12/08/19 0554     Magnesium 1.9 mg/dL     Lactic Acid, Plasma  [[id number]]  (Normal) Collected:  12/08/19 0521    Specimen:  Blood Updated:  12/08/19 0552     Lactate 1.5 mmol/L     CBC Auto Differential [[id number]]  (Abnormal) Collected:  12/08/19 0521    Specimen:  Blood Updated:  12/08/19 0536     WBC 5.74 10*3/mm3      RBC 3.82 10*6/mm3      Hemoglobin 12.2 g/dL      Hematocrit 37.4 %      MCV 97.9 fL      MCH 31.9 pg      MCHC 32.6 g/dL      RDW 14.3 %      RDW-SD 51.7 fl      MPV 10.0 fL      Platelets 218 10*3/mm3      Neutrophil % 59.2 %      Lymphocyte % 20.2 %      Monocyte % 9.2 %      Eosinophil % 10.6 %      Basophil % 0.5 %      Immature Grans % 0.3 %      Neutrophils, Absolute 3.39 10*3/mm3      Lymphocytes, Absolute 1.16 10*3/mm3      Monocytes, Absolute 0.53 10*3/mm3      Eosinophils, Absolute 0.61 10*3/mm3      Basophils, Absolute 0.03 10*3/mm3      Immature Grans, Absolute 0.02 10*3/mm3      nRBC 0.0 /100 WBC         Imaging Results (Most Recent)     Procedure Component Value Units Date/Time    NM Lung Ventilation Perfusion [[id number]] Collected:  12/09/19 0909     Updated:  12/09/19 1036    Narrative:       EXAMINATION:  NUCLEAR MEDICINE PULMONARY VENTILATION / PERFUSION  SCAN (V/Q SCAN)    CLINICAL HISTORY:   Dyspnea  COMPARISON EXAMINATIONS:  CXR:  12/8/19  TECHNIQUE:    Perfusion:    - 6.5 mCi of technetium labeled MAA    Ventilation:    - 33 mCi of  technetium labeled DTPA aerosol    FINDINGS:    Perfusion:    - no evidence of pleural based wedge-shaped perfusion  defect(s), corresponding to segmental / lobar anatomy, to suggest  the presence of  acute pulmonary embolism. Nonsegmental decreased  perfusion noted in the left lower lung posteriorly.    Ventilation:    - Focally decreased ventilation in the left lower lung  posteriorly, otherwise physiologic distribution.      Impression:       CONCLUSION:  1.  No evidence to suggest the presence of acute pulmonary  embolism.  2. Matched defect in the left lower lung posteriorly.          Electronically  "signed by:  WERNER Fontaine MD  12/9/2019 10:35  AM CST Workstation: [id number]    XR Chest PA & Lateral [[id number]] Collected:  12/08/19 0006     Updated:  12/08/19 0030    Narrative:         Chest 2 view on  12/7/2019     CLINICAL INDICATION: CHF, cough    COMPARISON: 11/25/2019    FINDINGS: There is mild elevation of the right hemidiaphragm.  2-lead left subclavian pacemaker is noted in place. Mild  cardiomegaly is noted. There are stable increased reticular  interstitial changes that may be related to chronic interstitial  lung disease but cannot exclude component of edema or atypical  pneumonia. Vascular calcification is noted in the aorta. Hilar  and mediastinal contours are within normal limits. There is  evidence of calcified granulomatous disease in the chest.      Impression:       No significant change in the appearance of the chest.    Electronically signed by:  Ramiro Robles  12/8/2019 12:29 AM  CST Workstation: [id number]          Chief Complaint on Day of Discharge: No complaints     Hospital Course:  The patient is a 74 y.o. male who presented to [facility] with SOA.  He was found to be in volume overload thought to be secondary to the Florinef that had recently been started for his orthostatic hypotnesion.  He was very gently diuresed and the florinef was discontinued.  He had no issues with orthostatic HoTN but if issues return Midodrine is suggested as the pharmalogic treatment.  Dr Grullon saw the patient in consult.  PM was interrogated and no issues were identified.   nursing, PT and OT were ordered for discharge.          Condition on Discharge:  Stable   Physical Exam on Discharge:  /67 (BP Location: Left arm, Patient Position: Sitting)   Pulse 63   Temp 98.8 °F (37.1 °C) (Temporal)   Resp 18   Ht 182.9 cm (72\")   Wt 83.3 kg (183 lb 9.6 oz)   SpO2 93%   BMI 24.90 kg/m²   Physical Exam   Constitutional: He is oriented to person, place, and time. He appears " well-developed and well-nourished.   HENT:   Head: Normocephalic and atraumatic.   Mouth/Throat: Oropharynx is clear and moist.   Eyes: Pupils are equal, round, and reactive to light. EOM are normal.   Neck: Normal range of motion. Neck supple. No thyromegaly present.   Cardiovascular: Normal rate and regular rhythm.   No murmur heard.  Pulmonary/Chest: Effort normal and breath sounds normal. He has no wheezes. He has no rales.   Abdominal: Soft. There is no tenderness.   Musculoskeletal: He exhibits no edema or tenderness.   Lymphadenopathy:     He has no cervical adenopathy.   Neurological: He is alert and oriented to person, place, and time. No cranial nerve deficit.   Skin: Skin is warm and dry. No rash noted.         Discharge Disposition:  Home or Self Care    Discharge Medications:     Discharge Medications      New Medications      Instructions Start Date   furosemide 20 MG tablet  Commonly known as:  LASIX   20 mg, Oral, Daily         Continue These Medications      Instructions Start Date   allopurinol 100 MG tablet  Commonly known as:  ZYLOPRIM   100 mg, Oral, Daily      aspirin 81 MG chewable tablet   81 mg, Oral, Daily      levothyroxine 75 MCG tablet  Commonly known as:  SYNTHROID, LEVOTHROID   75 mcg, Oral, Daily      Magnesium 250 MG tablet   1 tablet, Oral, Daily      omeprazole 40 MG capsule  Commonly known as:  priLOSEC   40 mg, Oral, Every Other Day      oxyCODONE-acetaminophen 5-325 MG per tablet  Commonly known as:  PERCOCET   1-2 tablets, Oral, Every 4 Hours PRN      senna-docusate sodium 8.6-50 MG tablet  Commonly known as:  SENOKOT-S   2 tablets, Oral, Nightly      simvastatin 40 MG tablet  Commonly known as:  ZOCOR   40 mg, Oral, Daily      VITAMIN B 12 PO   1 tablet, Oral, Daily         Stop These Medications    fludrocortisone 0.1 MG tablet            Discharge Diet:   Diet Instructions     Diet: Cardiac      Discharge Diet:  Cardiac          Activity at Discharge:   Activity Instructions      Activity as Tolerated            Discharge Care Plan/Instructions: none     Follow-up Appointments:   Future Appointments   Date Time Provider Department Center   12/10/2019  2:00 PM Perfecto Samuel MD [facility] ONC [facility]   12/10/2019  3:00 PM NURSE [facility] OPI [facility]   1/6/2020 11:00 AM Bruno Horton MD [facility] CTV [facility] None       Test Results Pending at Discharge: none     Jesus Ricardo MD    Time:40 min

## 2019-12-09 NOTE — DISCHARGE PLACEMENT REQUEST
"Kuldip Lin (74 y.o. Male)     Date of Birth Social Security Number Address Home Phone MRN    1945  [address] [facility] [address] [phone] [id number]    [employer] Marital Status          Denominational        Admission Date Admission Type Admitting Provider Attending Provider Department, Room/Bed    12/7/19 Urgent German Castro, German Nieves DO [address], 304/1    Discharge Date Discharge Disposition Discharge Destination         Home or Self Care              Attending Provider:  German Castro DO    Allergies:  Latex, Tape    Isolation:  None   Infection:  None   Code Status:  CPR    Ht:  182.9 cm (72\")   Wt:  83.3 kg (183 lb 9.6 oz)    Admission Cmt:  None   Principal Problem:  Acute on chronic systolic CHF (congestive heart failure) (CMS/[facility]) [I50.23]                 Active Insurance as of 12/7/2019     Primary Coverage     Payor Plan Insurance Group Employer/Plan Group    MEDICARE MEDICARE A & B      Payor Plan Address Payor Plan Phone Number Payor Plan Fax Number Effective Dates    [address] [phone]  9/1/2007 - None Entered    [place] [address]       Subscriber Name Subscriber Birth Date Member ID       KULDIP LIN 1945 [id number]           Secondary Coverage     Payor Plan Insurance Group Employer/Plan Group    [employer] [place] [employer] [place] [id number]     Payor Plan Address Payor Plan Phone Number Payor Plan Fax Number Effective Dates    [address] [place] [address] [phone]  1/1/2015 - None Entered    [place] [address]       Subscriber Name Subscriber Birth Date Member ID       KULDIP LIN 1945 [id number]                 Emergency Contacts      (Rel.) Home Phone Work Phone Mobile Phone    My Lin (Spouse) [phone] -- [phone]    Gurwinder Lin (Son) [phone] -- [phone]    Lynn Lin (Relative) [phone] -- [phone]            Emergency " Contact Information     Name Relation Home Work Mobile    My Nevarez Spouse [phone]    Gurwinder Nevarez Son [phone]    Lynn Nevarez Relative [phone]          Insurance Information                MEDICARE/MEDICARE A & B Phone: [phone]    Subscriber: Kuldip Nevarez Subscriber#: [id number]    Group#:  Precert#:         [employer] [place] [place] Phone: [phone]    Subscriber: Kuldip Nevarez Subscriber#: [id number]    Group#: [id number] Precert#:              History & Physical      German Castro DO at 12/07/19 [facility]                [facility] Medicine Admission      Date of Admission: 12/7/2019      Primary Care Physician: Mike Draper MD      Chief Complaint: Shortness of breath    HPI:    The patient is a pleasant 74-year-old male has been having several days of shortness of air and ESPINAL.  He presented at [facility] on 12/7/2019 and underwent chest x-ray that showed pulmonary edema.    Laboratory work from [facility] ED:    1.  CBC: WBC 5.7, platelet count 213, hemoglobin 12.2  2.  BMP: Glucose 104, BUN 17, creatinine 1.4, chloride 106, bicarb 30 sodium 144 and potassium 3.8.  3.  LFTs: Albumin 3.1, ALT 13, AST 23, alk phos 13 and total bilirubin 0.7.  4. proBNP 17,900, troponin 0 0.021, d-dimer 2.41.  5.  ABG: pH 7.51, PCO2 34, PaO2 53 with 90% on RA    Concurrent Medical History:  has a past medical history of Arthritis, Cancer (CMS/HCC), Coronary artery disease, Disease of thyroid gland, GERD (gastroesophageal reflux disease), History of transfusion, Hyperlipidemia, MI (myocardial infarction) (CMS/HCC), Pacemaker, and Sleep apnea.    Past Surgical History:  has a past surgical history that includes Appendectomy; Kidney stone surgery; Colon surgery; Colonoscopy (N/A, 4/19/2017); Femur Open Reduction Internal Fixation (Left, 6/16/2019); Salivary Gland Excision  (Left); Coronary angioplasty with stent; Pacemaker Implantation; Esophagogastroduodenoscopy; and Thoracoscopy (Right, 10/18/2019).    Family History: family history includes Coronary artery disease in his father and mother.     Social History:  reports that he quit smoking about 16 years ago. He quit after 45.00 years of use. He has never used smokeless tobacco. He reports that he does not drink alcohol or use drugs.    Allergies:   Allergies   Allergen Reactions   • Latex Rash   • Tape Rash       Medications:   Prior to Admission medications    Medication Sig Start Date End Date Taking? Authorizing Provider   allopurinol (ZYLOPRIM) 100 MG tablet Take 100 mg by mouth Daily.    Raleigh Lorenzana MD   aspirin 81 MG chewable tablet Chew 81 mg Daily.    Raleigh Lorenzana MD   Cyanocobalamin (VITAMIN B 12 PO) Take 1 tablet by mouth Daily.    Raleigh Lorenzana MD   fludrocortisone 0.1 MG tablet Take 1 tablet by mouth Daily. 9/18/19   Raleigh Lorenzana MD   levothyroxine (SYNTHROID, LEVOTHROID) 75 MCG tablet Take 75 mcg by mouth Daily.    Raleigh Lorenzana MD   Magnesium 250 MG tablet Take 1 tablet by mouth Daily.    Raleigh Lorenzana MD   omeprazole (priLOSEC) 40 MG capsule Take 40 mg by mouth Every Other Day. 2/14/17   Raleigh Lorenzana MD   oxyCODONE-acetaminophen (PERCOCET) 5-325 MG per tablet Take 1-2 tablets by mouth Every 4 (Four) Hours As Needed for Moderate Pain  (SURGICAL PAIN). 10/21/19   Starr Henry APRN   sennosides-docusate sodium (SENOKOT-S) 8.6-50 MG tablet Take 2 tablets by mouth Every Night. 10/21/19   Starr Henry APRN   simvastatin (ZOCOR) 40 MG tablet Take 40 mg by mouth Daily. 2/14/17   Raleigh Lorenzana MD       Review of Systems:  As per history of present illness and a complete 12 point review of systems is otherwise negative.     Physical Exam:   Temp:  [97.2 °F (36.2 °C)] 97.2 °F (36.2 °C)  Heart Rate:  [70] 70  Resp:  [18] 18  BP: (132)/(81)  132/81    Gen.: Appears as stated age.  No acute distress.  HEENT: EOMI.  PERRLA.  Sclera anicteric.  Moist mucous membranes.  Neck: Supple.  No JVD.  No thyromegaly.  Chest: Clear to auscultation bilaterally.  No wheeze, rhonchi or rales.  Heart: Regular rhythm and rate.  No murmurs, rubs or gallops.  Abdomen: Normoactive bowel sounds.  Nontender.  Nondistended.  No guarding. Neurological: Cranial nerves II through XII are grossly intact.  No cerebellar signs.   Extremities: Good range of motion throughout.  No cyanosis, clubbing or edema.  Skin: Warm.  No rashes.  No ulcers.  Psychiatry: Cooperative.        Results Reviewed:  I have personally reviewed current lab, radiology, and data and agree with results.  Lab Results (last 24 hours)     ** No results found for the last 24 hours. **        Imaging Results (Last 24 Hours)     ** No results found for the last 24 hours. **            Assessment and Plan:    Principal Problem:    Acute on chronic systolic CHF (congestive heart failure) (CMS/[facility])    1. Acute on chronic systolic heart failure:  --last TTE 6/16/19--> EF 36-40% grade 1 diastolic dysfunction  --Continue diuresis    2.  Acute hypoxemic respiratory failure:  --ABG done and ED showed hypoxemia  --Ordered VQ scan because of elevated d-dimer  --Titrate pulse ox> 92%    3.  Orthostatic hypotension:  --Continue Florinef.  Failed midodrine.  --Patient concerned with exacerbation since he is being diuresed  --We will consider droxidopa if refractory  --Patient concerned about cost of medications    4.  CKD stage III:  --Nephrotoxin holds    5.  Hyperlipidemia:  --Statin    6. CAD s/p RCA stent x2  --Continue aspirin, not on BB because of orthostatic hypotension, Zocor  --Follows with Dr. Grullon    7.  NAFISA   --noncompliant with CPAP    8.  Bradycardia s/p pacemaker :  --Stable    9.  Gout:  --Continue allopurinol and colchicine    10.  GERD: Continue PPI    11. Hypothyroidism:  --Synthroid          German i  DO Matthew  12/07/19  11:35 PM                  Electronically signed by German Castro DO at 12/08/19 0319          Physician Progress Notes (last 24 hours) (Notes from 12/08/19 1631 through 12/09/19 1631)      Cruzito Grullon MD at 12/09/19 1231          Cardiology Progress Note     LOS: 2 days   Patient Care Team:  Mike Draper MD as PCP - General    Subjective:    Chart reviewed. Patient seen and examined. Patient denies any chest pain, shortness of breath, or palpitation. Patient blood pressure has been on the low side but patient denies any symptoms of lightheaded dizziness.  Patient Florinef has been stopped.  Patient is tolerating the Lasix and has no shortness of breath.  Patient underwent pacemaker interrogation which revealed appropriate sensing and pacing function.  Patient's hemoglobin 11.2 with creatinine of 1.4.    Objective:  Temp:  [96.9 °F (36.1 °C)-97.8 °F (36.6 °C)] 97 °F (36.1 °C)  Heart Rate:  [60-76] 64  Resp:  [18-20] 18  BP: (108-165)/(62-86) 108/62    Intake/Output Summary (Last 24 hours) at 12/9/2019 1231  Last data filed at 12/9/2019 0449  Gross per 24 hour   Intake 200 ml   Output 1000 ml   Net -800 ml       Physical Exam:   General Appearance:    Alert, oriented, cooperative, in no acute distress.   Head:    Normocephalic, atraumatic, without obvious abnormality   Eyes:             MARIELY. Lids and lashes normal, conjunctivae and sclerae normal, no icterus, no pallor.   Ears:    Ears appear intact with no abnormalities noted.   Throat:   Mucous membranes pink and moist.   Neck:  Supple, trachea midline, no carotid bruit, no organomegaly or JVD.   Lungs:    Clear to auscultation and percussion.  Respirations regular, even and unlabored. No wheezes, rales, or rhonchi.    Heart:    Regular rhythm and normal rate, normal S1 and S2, no      murmur, no gallop, no rub, no click.   Abdomen:    Soft, non-tender, non-distended, no guarding, no rebound tenderness. Normal  bowel sounds in all four quadrants, no masses, liver and spleen nonpalpable.    Genitalia:    Deferred.   Extremities:   Moves all extremities well, trace edema, no cyanosis, no       redness, no clubbing.   Pulses:   Pulses palpable and equal bilaterally.   Skin:   Moist and warm. No bleeding, bruising or rash.   Neurologic/Psychiatric:   Alert and oriented to person, place, and time.  Motor, power and tone in upper and lower extremities are grossly intact.  No focal neurological deficits. Normal cognitive function. No psychomotor reaction or tangential thought. No depression, homicidal ideations and suicidal ideations.          Results Review:    Results from last 7 days   Lab Units 12/08/19  0521   SODIUM mmol/L 145   POTASSIUM mmol/L 3.5   CHLORIDE mmol/L 104   CO2 mmol/L 32.0*   BUN mg/dL 17   CREATININE mg/dL 1.40*   CALCIUM mg/dL 9.4   BILIRUBIN mg/dL 0.6   ALK PHOS U/L 206*   ALT (SGPT) U/L 13   AST (SGOT) U/L 22   GLUCOSE mg/dL 89             Results from last 7 days   Lab Units 12/08/19  0521   WBC 10*3/mm3 5.74   HEMOGLOBIN g/dL 12.2*   HEMATOCRIT % 37.4*   PLATELETS 10*3/mm3 218         Results from last 7 days   Lab Units 12/08/19  0521   MAGNESIUM mg/dL 1.9         Results from last 7 days   Lab Units 12/08/19  0521   TSH uIU/mL 2.950   FREE T4 ng/dL 1.01           ECHO:  Results for orders placed during the hospital encounter of 06/15/19   Adult Transthoracic Echo Complete W/ Cont if Necessary Per Protocol    Narrative · Left ventricular wall thickness is consistent with mild concentric   hypertrophy.  · Left ventricular diastolic dysfunction (grade I) consistent with   impaired relaxation.  · Mild mitral valve regurgitation is present  · Mild tricuspid valve regurgitation is present.  · The following left ventricular wall segments are hypokinetic: mid   anterior, apical anterior, basal anterolateral, mid anterolateral, apical   lateral, basal inferolateral, mid inferolateral, apical inferior, mid    inferior, apical septal, basal inferoseptal, mid inferoseptal, apex   hypokinetic, mid anteroseptal, basal anterior, basal inferior and basal   inferoseptal.  · Right ventricular cavity is borderline dilated.          ECG 12 Lead   Preliminary Result   Test Reason : chf   Blood Pressure : **/** mmHG   Vent. Rate : 067 BPM     Atrial Rate : 067 BPM      P-R Int : 000 ms          QRS Dur : 120 ms       QT Int : 472 ms       P-R-T Axes : 000 028 -70 degrees      QTc Int : 498 ms      Demand pacemaker, interpretation is based on intrinsic rhythm   Wide QRS rhythm with frequent premature ventricular complexes and fusion   complexes   Indeterminate axis   Inferior infarct , age undetermined   T wave abnormality, consider lateral ischemia   Abnormal ECG   When compared with ECG of 19-AUG-2019 14:36,   Wide QRS rhythm has replaced Sinus rhythm      Referred By:             Confirmed By:            Medication Review:   Current Facility-Administered Medications   Medication Dose Route Frequency Provider Last Rate Last Dose   • acetaminophen (TYLENOL) tablet 650 mg  650 mg Oral Q4H PRN Hanane-Egziabher, German I, DO        Or   • acetaminophen (TYLENOL) suppository 650 mg  650 mg Rectal Q4H PRN Hanane-Egziabher, German I, DO       • allopurinol (ZYLOPRIM) tablet 100 mg  100 mg Oral Daily Hanane-Egziabher, German I, DO   100 mg at 12/09/19 0813   • aspirin chewable tablet 81 mg  81 mg Oral Daily Hanane-Egziabher, German I, DO   81 mg at 12/09/19 0813   • atorvastatin (LIPITOR) tablet 20 mg  20 mg Oral Daily Hanane-Egziabher, German I, DO   20 mg at 12/09/19 0813   • bisacodyl (DULCOLAX) EC tablet 5 mg  5 mg Oral Daily PRN Hanane-Egziabher, German I, DO       • bisacodyl (DULCOLAX) suppository 10 mg  10 mg Rectal Daily PRN Hanane-Egziabher, German I, DO       • furosemide (LASIX) injection 20 mg  20 mg Intravenous BID Jesus Ricardo MD       • heparin (porcine) 5000 UNIT/ML injection 5,000 Units  5,000 Units Subcutaneous Q8H Hanane-Egjdabher,  German I, DO   5,000 Units at 12/09/19 0640   • levothyroxine (SYNTHROID, LEVOTHROID) tablet 75 mcg  75 mcg Oral Q AM Hanane-Marlenaabher German I, DO   75 mcg at 12/09/19 0640   • Magnesium Oxide tablet 400 mg  400 mg Oral Daily Hanane-Egziab, German I, DO   400 mg at 12/09/19 0812   • ondansetron (ZOFRAN) injection 4 mg  4 mg Intravenous Q4H PRN Hanane-Egjdabher, German I, DO       • oxyCODONE-acetaminophen (PERCOCET) 5-325 MG per tablet 1 tablet  1 tablet Oral Q4H PRN Hanane-Egziabher, German I, DO       • pantoprazole (PROTONIX) EC tablet 40 mg  40 mg Oral Q AM Hanane-Egziabher, German I, DO   40 mg at 12/09/19 0640   • senna-docusate sodium (SENOKOT-S) 8.6-50 MG tablet 2 tablet  2 tablet Oral Nightly Hanane-Marlenaab, German I, DO       • sodium chloride 0.9 % flush 10 mL  10 mL Intravenous Q12H Hanane-Tomziabher, German I, DO   10 mL at 12/09/19 0813   • sodium chloride 0.9 % flush 10 mL  10 mL Intravenous PRN Hanane-Marlenaab, German I, DO           Assessment and Plan:      Acute on chronic systolic CHF (congestive heart failure) (CMS/HCC)    Acute on chronic systolic heart failure (CMS/HCC)  1.  Shortness of breath.  Patient underwent a VQ scan which was low probability.  Patient had mild fluid overload and was on Florinef which has been stopped.  Patient will be continued on the low-dose of Lasix 20 mg once a day on discharge.  Patient does have left medical systolic dysfunction with ejection fraction of  40%.  Patient has low blood pressure and is not able to tolerate any afterload reduction medication.     2.  Atherosclerotic coronary artery disease.  Patient is status post aborted inferior wall myocardial infarction in 2004 with PTCA and stenting with repeat coronary intervention done in 2005 with deployment of a 3 mm x 28 mm Cypher stent.  Patient had not complained of having any symptoms of substernal chest pain suggestive of angina.  Next    3.  Sick sinus syndrome with symptoms of presyncope with subsequent [place]  dual-chamber pacemaker plantation in December 2016.  Patient pacemaker interrogation had reveal appropriate sensing and pacing function with adequate battery reserve.    4.  Hypotension.  Patient blood pressure is 103/70.  Patient had not complained of having any symptoms of dizziness.  Patient has been recommended to use support stockings.  If the patient continues to have low blood pressure patient would be started on Midodrin 5 mg 3 times a day.    5.  Metastatic melanoma to the lung with previous history of adenocarcinoma of the sigmoid colon status post resection status post chemotherapy.  Patient has a follow-up appointment with the oncologist department.  Next    6.  Anemia.  Patient globin is 12.2.  Patient has not had any hemoptysis hematuria bright red blood per rectum.    7.  Hyperlipidemia.  Patient is not on low-fat low-cholesterol diet and to continue with the present dose of Lipitor.      Patient stable from a cardiac stand point to be discharged.    Patient will follow-up in the office in 12 weeks.        Cruzito Grullon MD  12/09/19  12:31 PM      Time: Time spent on face-to-face interaction 20 minutes    Dictated utilizing Dragon dictation.         Electronically signed by Cruzito Grullon MD at 12/09/19 1320     Jesus Ricardo MD at 12/09/19 1201              [facility]  INPATIENT PROGRESS NOTE    Length of Stay: 2  Date of Admission: 12/7/2019  Primary Care Physician: Mike Draper MD    Subjective   Chief Complaint: SOA    HPI: Patient reports improvement in his shortness of breath.  He denies lightheadedness, dizziness, near syncope.      Review of Systems   Constitutional: Negative for chills and fever.   HENT: Negative for congestion and sore throat.    Respiratory: Positive for cough and shortness of breath.    Cardiovascular: Negative for chest pain.   Gastrointestinal: Negative for abdominal pain, nausea and vomiting.    Genitourinary: Negative for dysuria and urgency.   Musculoskeletal: Negative for arthralgias and myalgias.   Skin: Negative for rash.   Neurological: Negative for dizziness, light-headedness and headaches.        All pertinent negatives and positives are as above. All other systems have been reviewed and are negative unless otherwise stated.     Objective    Temp:  [96.9 °F (36.1 °C)-97.8 °F (36.6 °C)] 97 °F (36.1 °C)  Heart Rate:  [60-76] 64  Resp:  [18-20] 18  BP: (108-165)/(62-86) 108/62    Physical Exam   Constitutional: He is oriented to person, place, and time. He appears well-developed and well-nourished.   HENT:   Head: Normocephalic and atraumatic.   Mouth/Throat: Oropharynx is clear and moist.   Eyes: Pupils are equal, round, and reactive to light. EOM are normal.   Neck: Neck supple. No thyromegaly present.   Cardiovascular: Normal rate and regular rhythm.   No murmur heard.  Pulmonary/Chest: Effort normal. He has rales.   Abdominal: Soft. Bowel sounds are normal. There is no tenderness.   Musculoskeletal: He exhibits edema (BLE). He exhibits no tenderness.   Lymphadenopathy:     He has no cervical adenopathy.   Neurological: He is alert and oriented to person, place, and time. No cranial nerve deficit.   Skin: Skin is warm and dry. No rash noted. No erythema.           Results Review:  I have reviewed the labs, radiology results, and diagnostic studies.    Laboratory Data:   Results from last 7 days   Lab Units 12/08/19  0521   SODIUM mmol/L 145   POTASSIUM mmol/L 3.5   CHLORIDE mmol/L 104   CO2 mmol/L 32.0*   BUN mg/dL 17   CREATININE mg/dL 1.40*   GLUCOSE mg/dL 89   CALCIUM mg/dL 9.4   BILIRUBIN mg/dL 0.6   ALK PHOS U/L 206*   ALT (SGPT) U/L 13   AST (SGOT) U/L 22   ANION GAP mmol/L 9.0     Estimated Creatinine Clearance: 52.9 mL/min (A) (by C-G formula based on SCr of 1.4 mg/dL (H)).  Results from last 7 days   Lab Units 12/08/19  0521   MAGNESIUM mg/dL 1.9   PHOSPHORUS mg/dL 3.1         Results  from last 7 days   Lab Units 12/08/19  0521   WBC 10*3/mm3 5.74   HEMOGLOBIN g/dL 12.2*   HEMATOCRIT % 37.4*   PLATELETS 10*3/mm3 218           Culture Data:   No results found for: BLOODCX  No results found for: URINECX  No results found for: RESPCX  No results found for: WOUNDCX  No results found for: STOOLCX  No components found for: BODYFLD    Radiology Data:   Imaging Results (Last 24 Hours)     Procedure Component Value Units Date/Time    NM Lung Ventilation Perfusion [[id number]] Collected:  12/09/19 0909     Updated:  12/09/19 1036    Narrative:       EXAMINATION:  NUCLEAR MEDICINE PULMONARY VENTILATION / PERFUSION  SCAN (V/Q SCAN)    CLINICAL HISTORY:   Dyspnea  COMPARISON EXAMINATIONS:  CXR:  12/8/19  TECHNIQUE:    Perfusion:    - 6.5 mCi of technetium labeled MAA    Ventilation:    - 33 mCi of  technetium labeled DTPA aerosol    FINDINGS:    Perfusion:    - no evidence of pleural based wedge-shaped perfusion  defect(s), corresponding to segmental / lobar anatomy, to suggest  the presence of  acute pulmonary embolism. Nonsegmental decreased  perfusion noted in the left lower lung posteriorly.    Ventilation:    - Focally decreased ventilation in the left lower lung  posteriorly, otherwise physiologic distribution.      Impression:       CONCLUSION:  1.  No evidence to suggest the presence of acute pulmonary  embolism.  2. Matched defect in the left lower lung posteriorly.          Electronically signed by:  WERNER Fontaine MD  12/9/2019 10:35  AM CST Workstation: [id number]          I have reviewed the patient's current medications.     Assessment/Plan     Active Hospital Problems    Diagnosis   • **Acute on chronic systolic CHF (congestive heart failure) (CMS/HCC)   • Acute on chronic systolic heart failure (CMS/HCC)       Plan:     1.  Acute on chronic HFrEF (ER 36-40%) - cont low dose lasix for diuresis with caution given his h/o orthostatic HoTN.  Consult placed to Dr. Grullon.    2.  CKDIII - follow  renal function with diuresis  3.  Orthostatic HoTN - currently on florinef but may be contributing to fluid retention.  Consider midodrine if BP support needed.    4.  HLD - home meds   5.  CAD s/p stent to RCA x 2 - no acute issues.  Home meds.     Pt has a appointment with Oncology this afternoon regarding lung mass (s/p resection).                    Discharge Planning: PM to be evaluated by cards today and if okay, can be dc'd home.      Mika Ricardo MD    Electronically signed by Mika Ricardo MD at 19 1209       [address] [address]  Phone:  [phone]  Fax:   Date: Dec 9, 2019      Ambulatory Referral to Home Health     Patient:  Kuldip Nevarez MRN:  [id number]   [address] [facility] [address] :  1945  SSN:    Phone: [phone] Sex:  M      INSURANCE PAYOR PLAN GROUP # SUBSCRIBER ID   Primary:  Secondary:    MEDICARE [employer] [place] [id number]  [id number]    [id number]  [id number]      Referring Provider Information:  MIKA RICARDO Phone: [phone] Fax:       Referral Information:   # Visits:  1 Referral Type: Home Health [42]   Urgency:  Routine Referral Reason: Specialty Services Required   Start Date: Dec 9, 2019 End Date:  To be determined by Insurer   Diagnosis: Acute on chronic systolic CHF (congestive heart failure) (CMS/[facility]) (I50.23 [ICD-10-CM] 428.23,428.0 [ICD-9-CM])      Refer to Dept:   Refer to Provider:   Refer to Facility:       Face to Face Visit Date: 2019  Follow-up provider for Plan of Care? I treated the patient in an acute care facility and will not continue treatment after discharge.  Follow-up provider: NO KNOWN PROVIDER [[id number]]  Reason/Clinical Findings: CHF, ortostatic HoTN  Describe mobility limitations that make leaving home difficult: CHF, orthostatic HoTN  Nursing/Therapeutic Services Requested: Skilled Nursing  Nursing/Therapeutic Services  Requested: Physical Therapy  Nursing/Therapeutic Services Requested: Occupational Therapy  Skilled nursing orders: CHF management  Frequency: 1 Week 1     This document serves as a request of services and does not constitute Insurance authorization or approval of services.  To determine eligibility, please contact the members Insurance carrier to verify and review coverage.     If you have medical questions regarding this request for services. Please contact [address] at [phone] during normal business hours.       Authorizing Provider:Jesus Ricardo MD  Authorizing Provider's NPI: [id number]  Order Entered By: Jesus Ricardo MD 12/9/2019  4:22 PM     Electronically signed by: Jesus Ricardo MD 12/9/2019  4:22 PM

## 2019-12-09 NOTE — NURSING NOTE
Called to make one week follow up appointment with Dr. Grullon for patient. Dr. Grullon answered phone and stated that he does not to see him in one week. He stated to keep the appointment that he already has. His appointment is Feb 19.

## 2019-12-09 NOTE — PROGRESS NOTES
[facility] Medicine Services  INPATIENT PROGRESS NOTE    Length of Stay: 2  Date of Admission: 12/7/2019  Primary Care Physician: Mike Draper MD    Subjective   Chief Complaint: SOA    HPI: Patient reports improvement in his shortness of breath.  He denies lightheadedness, dizziness, near syncope.      Review of Systems   Constitutional: Negative for chills and fever.   HENT: Negative for congestion and sore throat.    Respiratory: Positive for cough and shortness of breath.    Cardiovascular: Negative for chest pain.   Gastrointestinal: Negative for abdominal pain, nausea and vomiting.   Genitourinary: Negative for dysuria and urgency.   Musculoskeletal: Negative for arthralgias and myalgias.   Skin: Negative for rash.   Neurological: Negative for dizziness, light-headedness and headaches.        All pertinent negatives and positives are as above. All other systems have been reviewed and are negative unless otherwise stated.     Objective    Temp:  [96.9 °F (36.1 °C)-97.8 °F (36.6 °C)] 97 °F (36.1 °C)  Heart Rate:  [60-76] 64  Resp:  [18-20] 18  BP: (108-165)/(62-86) 108/62    Physical Exam   Constitutional: He is oriented to person, place, and time. He appears well-developed and well-nourished.   HENT:   Head: Normocephalic and atraumatic.   Mouth/Throat: Oropharynx is clear and moist.   Eyes: Pupils are equal, round, and reactive to light. EOM are normal.   Neck: Neck supple. No thyromegaly present.   Cardiovascular: Normal rate and regular rhythm.   No murmur heard.  Pulmonary/Chest: Effort normal. He has rales.   Abdominal: Soft. Bowel sounds are normal. There is no tenderness.   Musculoskeletal: He exhibits edema (BLE). He exhibits no tenderness.   Lymphadenopathy:     He has no cervical adenopathy.   Neurological: He is alert and oriented to person, place, and time. No cranial nerve deficit.   Skin: Skin is warm and dry. No rash noted. No erythema.            Results Review:  I have reviewed the labs, radiology results, and diagnostic studies.    Laboratory Data:   Results from last 7 days   Lab Units 12/08/19  0521   SODIUM mmol/L 145   POTASSIUM mmol/L 3.5   CHLORIDE mmol/L 104   CO2 mmol/L 32.0*   BUN mg/dL 17   CREATININE mg/dL 1.40*   GLUCOSE mg/dL 89   CALCIUM mg/dL 9.4   BILIRUBIN mg/dL 0.6   ALK PHOS U/L 206*   ALT (SGPT) U/L 13   AST (SGOT) U/L 22   ANION GAP mmol/L 9.0     Estimated Creatinine Clearance: 52.9 mL/min (A) (by C-G formula based on SCr of 1.4 mg/dL (H)).  Results from last 7 days   Lab Units 12/08/19  0521   MAGNESIUM mg/dL 1.9   PHOSPHORUS mg/dL 3.1         Results from last 7 days   Lab Units 12/08/19  0521   WBC 10*3/mm3 5.74   HEMOGLOBIN g/dL 12.2*   HEMATOCRIT % 37.4*   PLATELETS 10*3/mm3 218           Culture Data:   No results found for: BLOODCX  No results found for: URINECX  No results found for: RESPCX  No results found for: WOUNDCX  No results found for: STOOLCX  No components found for: BODYFLD    Radiology Data:   Imaging Results (Last 24 Hours)     Procedure Component Value Units Date/Time    NM Lung Ventilation Perfusion [[id number]] Collected:  12/09/19 0909     Updated:  12/09/19 1036    Narrative:       EXAMINATION:  NUCLEAR MEDICINE PULMONARY VENTILATION / PERFUSION  SCAN (V/Q SCAN)    CLINICAL HISTORY:   Dyspnea  COMPARISON EXAMINATIONS:  CXR:  12/8/19  TECHNIQUE:    Perfusion:    - 6.5 mCi of technetium labeled MAA    Ventilation:    - 33 mCi of  technetium labeled DTPA aerosol    FINDINGS:    Perfusion:    - no evidence of pleural based wedge-shaped perfusion  defect(s), corresponding to segmental / lobar anatomy, to suggest  the presence of  acute pulmonary embolism. Nonsegmental decreased  perfusion noted in the left lower lung posteriorly.    Ventilation:    - Focally decreased ventilation in the left lower lung  posteriorly, otherwise physiologic distribution.      Impression:       CONCLUSION:  1.  No evidence to  suggest the presence of acute pulmonary  embolism.  2. Matched defect in the left lower lung posteriorly.          Electronically signed by:  WERNER Fontaine MD  12/9/2019 10:35  AM CST Workstation: [id number]          I have reviewed the patient's current medications.     Assessment/Plan     Active Hospital Problems    Diagnosis   • **Acute on chronic systolic CHF (congestive heart failure) (CMS/HCC)   • Acute on chronic systolic heart failure (CMS/HCC)       Plan:     1.  Acute on chronic HFrEF (ER 36-40%) - cont low dose lasix for diuresis with caution given his h/o orthostatic HoTN.  Consult placed to Dr. Grullon.    2.  CKDIII - follow renal function with diuresis  3.  Orthostatic HoTN - currently on florinef but may be contributing to fluid retention.  Consider midodrine if BP support needed.    4.  HLD - home meds   5.  CAD s/p stent to RCA x 2 - no acute issues.  Home meds.     Pt has a appointment with Oncology this afternoon regarding lung mass (s/p resection).                    Discharge Planning: PM to be evaluated by cards today and if okay, can be dc'd home.      Jesus Ricardo MD

## 2019-12-09 NOTE — PLAN OF CARE
Will continue to monitor  Problem: Patient Care Overview  Goal: Plan of Care Review  Outcome: Ongoing (interventions implemented as appropriate)  Goal: Individualization and Mutuality  Outcome: Ongoing (interventions implemented as appropriate)  Goal: Discharge Needs Assessment  Outcome: Ongoing (interventions implemented as appropriate)  Goal: Interprofessional Rounds/Family Conf  Outcome: Ongoing (interventions implemented as appropriate)     Problem: Fall Risk (Adult)  Goal: Identify Related Risk Factors and Signs and Symptoms  Outcome: Ongoing (interventions implemented as appropriate)  Goal: Absence of Fall  Outcome: Ongoing (interventions implemented as appropriate)     Problem: Cardiac: Heart Failure (Adult)  Goal: Signs and Symptoms of Listed Potential Problems Will be Absent, Minimized or Managed (Cardiac: Heart Failure)  Outcome: Ongoing (interventions implemented as appropriate)

## 2019-12-09 NOTE — CONSULTS
Adult Nutrition  Assessment    Patient Name:  Kuldip Nevarez  YOB: 1945  MRN: [id number]  Admit Date:  12/7/2019    Assessment Date:  12/9/2019    Comments:  Pt with dx Heart Failure.  Pt reports 15-16 lb fluid wt loss since admit.  Lasix prescribed.  Mild Sodium Restricted Diet info used to provide Low Sodium Diet ed and diet copy given.  Pt has discharge orders.    Reason for Assessment     Row Name 12/09/19 1639          Reason for Assessment    Reason For Assessment  per organizational policy Low Sodium Diet ed     Diagnosis  cardiac disease dx Heart Failure     Identified At Risk by Screening Criteria  need for education                             Electronically signed by:  Libia Glynn RD  12/09/19 4:39 PM

## 2019-12-09 NOTE — PLAN OF CARE
Problem: Patient Care Overview  Goal: Plan of Care Review  Outcome: Ongoing (interventions implemented as appropriate)  Flowsheets (Taken 12/9/2019 6779)  Progress: no change  Plan of Care Reviewed With: patient  Outcome Summary: initial visit  Note:   Mild Sodium Restricted Diet info used to provide Low Sodium Diet ed and diet copy given.

## 2019-12-10 PROBLEM — C78.01: Status: ACTIVE | Noted: 2019-01-01

## 2019-12-10 PROBLEM — Z85.038 HISTORY OF COLON CANCER: Status: ACTIVE | Noted: 2019-01-01

## 2019-12-10 NOTE — PROGRESS NOTES
DATE OF CONSULT: 12/10/2019      REQUESTING SOURCE:  Starr Henry APRN      REASON FOR CONSULTATION: Metastatic melanoma involving the right lung status post wedge excision, history of colon cancer      HISTORY OF PRESENT ILLNESS:   74-year-old male with medical problem consisting of hypothyroidism, dyslipidemia, coronary artery disease status post stent, status post pacemaker, history of skin cancer including melanoma more than 20 years ago, history of rectal cancer diagnosed in 2005 for which patient was treated by Dr. Chaudhari in [place] with chemotherapy and radiation was admitted to [facility] in June 2019 with fracture of left hip.  During hospital course, patient had a CT scan of chest done that showed 1.24 cm lesion in right upper lung.  Patient was followed up as outpatient by Dr. Lisa and had a PET/CT done on September 27, 2019 that showed uptake in right upper lobe lung nodule consistent with malignancy.  Due to solitary lung nodule, patient was referred to Dr. Horton and underwent wedge resection on October 18, 2019 which showed metastatic melanoma.  Patient has been referred to [facility] for further evaluation and recommendation regarding metastatic melanoma of lung.  Patient denies any new skin lesion.  Complains of shortness of breath which is improved with recent diuresis.  Denies any bleeding.  Denies any new lymph node enlargement.  Complains of 40 pound of weight loss in last 1 year.  States his appetite is good.  Denies any tingling or numbness affecting upper or lower extremity.  Complains of chronic skin changes affecting bilateral lower extremity and upper extremity.    PAST MEDICAL HISTORY:    Past Medical History:   Diagnosis Date   • Arthritis    • Cancer (CMS/HCC)     colon, skin   • Colon cancer (CMS/HCC)    • Coronary artery disease    • Disease of thyroid gland    • GERD (gastroesophageal reflux disease)    • History of transfusion    •  Hyperlipidemia    • MI (myocardial infarction) (CMS/HCC)        • Pacemaker    • Skin cancer    • Sleep apnea        PAST SURGICAL HISTORY:  Past Surgical History:   Procedure Laterality Date   • APPENDECTOMY     • COLON SURGERY     • COLONOSCOPY N/A 2017    Procedure: COLONOSCOPY;  Surgeon: Barrie Jarrett DO;  Location: [facility] ENDOSCOPY;  Service:    • CORONARY ANGIOPLASTY WITH STENT PLACEMENT     • ENDOSCOPY     • FEMUR OPEN REDUCTION INTERNAL FIXATION Left 2019    Procedure: FEMUR OPEN REDUCTION INTERNAL FIXATION;  Surgeon: Edward Harley MD;  Location: [facility] OR;  Service: Orthopedics   • KIDNEY STONE SURGERY     • PACEMAKER IMPLANTATION     • SALIVARY GLAND EXCISION Left     Left neck due to skin cancer with resultant chronic dry mouth   • THORACOSCOPY Right 10/18/2019    Procedure: THORACOSCOPY VIDEO  ASSISTED , mini THORACOTOMY wedge resection nodule thoracic mediastinal lymphadenectomy bronchoscopy  Latex Allergy;  Surgeon: Bruno Horton MD;  Location: [facility] OR;  Service: Cardiothoracic       ALLERGIES:    Allergies   Allergen Reactions   • Latex Rash   • Tape Rash       SOCIAL HISTORY:   Social History     Tobacco Use   • Smoking status: Former Smoker     Years: 45.00     Last attempt to quit:      Years since quittin.9   • Smokeless tobacco: Never Used   Substance Use Topics   • Alcohol use: No   • Drug use: No       CURRENT MEDICATIONS:    Current Outpatient Medications   Medication Sig Dispense Refill   • allopurinol (ZYLOPRIM) 100 MG tablet Take 100 mg by mouth Daily.     • aspirin 81 MG chewable tablet Chew 81 mg Daily.     • Cyanocobalamin (VITAMIN B 12 PO) Take 1 tablet by mouth Daily.     • furosemide (LASIX) 20 MG tablet Take 1 tablet by mouth Daily. 30 tablet 1   • levothyroxine (SYNTHROID, LEVOTHROID) 75 MCG tablet Take 75 mcg by mouth Daily.     • Magnesium 250 MG tablet Take 1 tablet by mouth Daily.     • omeprazole (priLOSEC) 40 MG capsule Take  40 mg by mouth Every Other Day.     • oxyCODONE-acetaminophen (PERCOCET) 5-325 MG per tablet Take 1-2 tablets by mouth Every 4 (Four) Hours As Needed for Moderate Pain  (SURGICAL PAIN). 36 tablet 0   • sennosides-docusate sodium (SENOKOT-S) 8.6-50 MG tablet Take 2 tablets by mouth Every Night.     • simvastatin (ZOCOR) 40 MG tablet Take 40 mg by mouth Daily.       No current facility-administered medications for this visit.         HOME MEDICATIONS:   Current Outpatient Medications on File Prior to Visit   Medication Sig Dispense Refill   • allopurinol (ZYLOPRIM) 100 MG tablet Take 100 mg by mouth Daily.     • aspirin 81 MG chewable tablet Chew 81 mg Daily.     • Cyanocobalamin (VITAMIN B 12 PO) Take 1 tablet by mouth Daily.     • furosemide (LASIX) 20 MG tablet Take 1 tablet by mouth Daily. 30 tablet 1   • levothyroxine (SYNTHROID, LEVOTHROID) 75 MCG tablet Take 75 mcg by mouth Daily.     • Magnesium 250 MG tablet Take 1 tablet by mouth Daily.     • omeprazole (priLOSEC) 40 MG capsule Take 40 mg by mouth Every Other Day.     • oxyCODONE-acetaminophen (PERCOCET) 5-325 MG per tablet Take 1-2 tablets by mouth Every 4 (Four) Hours As Needed for Moderate Pain  (SURGICAL PAIN). 36 tablet 0   • sennosides-docusate sodium (SENOKOT-S) 8.6-50 MG tablet Take 2 tablets by mouth Every Night.     • simvastatin (ZOCOR) 40 MG tablet Take 40 mg by mouth Daily.       Current Facility-Administered Medications on File Prior to Visit   Medication Dose Route Frequency Provider Last Rate Last Dose   • [DISCONTINUED] acetaminophen (TYLENOL) suppository 650 mg  650 mg Rectal Q4H PRN Hanane-MarlenaabElian schwarzan I, DO       • [DISCONTINUED] acetaminophen (TYLENOL) tablet 650 mg  650 mg Oral Q4H PRN Hanane-Tomziabher German I, DO       • [DISCONTINUED] allopurinol (ZYLOPRIM) tablet 100 mg  100 mg Oral Daily QuinabGerman schwarz I, DO   100 mg at 12/09/19 0813   • [DISCONTINUED] aspirin chewable tablet 81 mg  81 mg Oral Daily German Castro,  DO   81 mg at 12/09/19 0813   • [DISCONTINUED] atorvastatin (LIPITOR) tablet 20 mg  20 mg Oral Daily Hanane-EgjdabGerman schwarz I, DO   20 mg at 12/09/19 0813   • [DISCONTINUED] bisacodyl (DULCOLAX) EC tablet 5 mg  5 mg Oral Daily PRN Hanane-Egziab, German I, DO       • [DISCONTINUED] bisacodyl (DULCOLAX) suppository 10 mg  10 mg Rectal Daily PRN Hanane-MarlenaabGerman schwarz I, DO       • [DISCONTINUED] furosemide (LASIX) injection 20 mg  20 mg Intravenous BID Jesus Ricardo MD       • [DISCONTINUED] heparin (porcine) 5000 UNIT/ML injection 5,000 Units  5,000 Units Subcutaneous Q8H Hanane-Marlenaab, German I, DO   5,000 Units at 12/09/19 0640   • [DISCONTINUED] levothyroxine (SYNTHROID, LEVOTHROID) tablet 75 mcg  75 mcg Oral Q AM Hanane-Marlenaab, German I, DO   75 mcg at 12/09/19 0640   • [DISCONTINUED] Magnesium Oxide tablet 400 mg  400 mg Oral Daily Hanane-Marlenaab, German I, DO   400 mg at 12/09/19 0812   • [DISCONTINUED] ondansetron (ZOFRAN) injection 4 mg  4 mg Intravenous Q4H PRN Hanane-Marlenaab, German I, DO       • [DISCONTINUED] oxyCODONE-acetaminophen (PERCOCET) 5-325 MG per tablet 1 tablet  1 tablet Oral Q4H PRN Hanane-German Pratt I, DO       • [DISCONTINUED] pantoprazole (PROTONIX) EC tablet 40 mg  40 mg Oral Q AM Hanane-Marlenaab, German I, DO   40 mg at 12/09/19 0640   • [DISCONTINUED] senna-docusate sodium (SENOKOT-S) 8.6-50 MG tablet 2 tablet  2 tablet Oral Nightly Hanane-Marlenaab, German I, DO       • [DISCONTINUED] sodium chloride 0.9 % flush 10 mL  10 mL Intravenous Q12H Hanane-Marlenaabher German I, DO   10 mL at 12/09/19 0813   • [DISCONTINUED] sodium chloride 0.9 % flush 10 mL  10 mL Intravenous PRN German Castro I, DO           FAMILY HISTORY:    Family History   Problem Relation Age of Onset   • Coronary artery disease Mother    • Coronary artery disease Father    • Lung cancer Brother        REVIEW OF SYSTEMS:      CONSTITUTIONAL:  Complains of fatigue.  Admits to 40 pound of weight loss in last 1 year.   "Denies any fever, chills .     EYES: No visual disturbances. No discharge. No new lesions    ENMT:  No epistaxis, mouth sores or difficulty swallowing.    RESPIRATORY: Positive for shortness of breath. No new cough or hemoptysis.    CARDIOVASCULAR:  No chest pain or palpitations.    GASTROINTESTINAL:  No abdominal pain nausea, vomiting or blood in the stool.    GENITOURINARY: No Dysuria or Hematuria.    MUSCULOSKELETAL: Positive for arthritic pain.    LYMPHATICS:  Denies any abnormal swollen glands anywhere in the body.    NEUROLOGICAL : No tingling or numbness. No headache or dizziness. No seizures or balance problems.    SKIN: Positive for history of skin cancer affecting bilateral upper extremity and face.  Complains of chronic skin changes affecting bilateral upper extremity.    ENDOCRINE : No new heat or cold intolerance. No new polyuria . No polydipsia.        PHYSICAL EXAMINATION:      VITAL SIGNS:  /66   Pulse 60   Temp 97.9 °F (36.6 °C)   Ht 177.8 cm (70\")   Wt 90.2 kg (198 lb 12.8 oz)   BMI 28.52 kg/m²       12/10/19  1420   Weight: 90.2 kg (198 lb 12.8 oz)       ECOG performance status: 2    CONSTITUTIONAL:  Not in any distress.    EYES: Mild conjunctival Pallor. No Icterus. No Pterygium. Extraocular Movements intact.No ptosis.    ENMT:  Normocephalic, Atraumatic.No Facial Asymmetry noted.    NECK:  No adenopathy.Trachea midline. NO JVD.    RESPIRATORY:  Fair air entry bilateral. No rhonchi or wheezing.Fair respiratory effort.    CARDIOVASCULAR:  S1, S2. Regular rate and rhythm. No murmur or gallop appreciated.  Pacemaker present.    ABDOMEN:  Soft,  nontender. Bowel sounds present in all four quadrants.  No Hepatosplenomegaly appreciated.    MUSCULOSKELETAL:  Trace edema.No Calf Tenderness.Decreased range of motion.    NEUROLOGIC:    No  Motor  deficit appreciated. Cranial Nerves 2-12 grossly intact.    SKIN : Evidence of treatment for previous skin cancer on bilateral upper extremity and " right side of face.  Evidence of hyperpigmented skin lesion on left side of face. Skin is warm and dry to touch.    LYMPHATICS: No new enlarged lymph nodes in neck or supraclavicular area.    PSYCHIATRY: Alert, awake and oriented ×3.          DIAGNOSTIC DATA:    WBC   Date Value Ref Range Status   12/08/2019 5.74 3.40 - 10.80 10*3/mm3 Final   08/21/2019 8.3 4.0 - 11.0 10*3/uL Final     RBC   Date Value Ref Range Status   12/08/2019 3.82 (L) 4.14 - 5.80 10*6/mm3 Final   08/21/2019 4.13 (L) 4.73 - 5.49 10*6/uL Final     Hemoglobin   Date Value Ref Range Status   12/08/2019 12.2 (L) 13.0 - 17.7 g/dL Final   08/21/2019 13.5 (L) 14.4 - 16.6 g/dL Final     Hematocrit   Date Value Ref Range Status   12/08/2019 37.4 (L) 37.5 - 51.0 % Final   08/21/2019 42.1 (L) 42.9 - 49.1 % Final     MCV   Date Value Ref Range Status   12/08/2019 97.9 (H) 79.0 - 97.0 fL Final   08/21/2019 102 (H) 85 - 95 fl Final     MCH   Date Value Ref Range Status   12/08/2019 31.9 26.6 - 33.0 pg Final   08/21/2019 32.7 (H) 28.0 - 32.0 pg Final     MCHC   Date Value Ref Range Status   12/08/2019 32.6 31.5 - 35.7 g/dL Final   08/21/2019 32.1 32.0 - 34.0 g/dL Final     RDW   Date Value Ref Range Status   12/08/2019 14.3 12.3 - 15.4 % Final   08/21/2019 51.6 37 - 54 fl Final     RDW-SD   Date Value Ref Range Status   12/08/2019 51.7 37.0 - 54.0 fl Final     MPV   Date Value Ref Range Status   12/08/2019 10.0 6.0 - 12.0 fL Final   08/21/2019 10 8.0 - 12.0 fl Final     Platelets   Date Value Ref Range Status   12/08/2019 218 140 - 450 10*3/mm3 Final   08/21/2019 232 150 - 450 10*3/uL Final     Neutrophil %   Date Value Ref Range Status   12/08/2019 59.2 42.7 - 76.0 % Final     Lymphocyte Rel %   Date Value Ref Range Status   08/21/2019 15.3 5 - 55 % Final     Lymphocyte %   Date Value Ref Range Status   12/08/2019 20.2 19.6 - 45.3 % Final     Monocyte Rel %   Date Value Ref Range Status   08/21/2019 11.8 (H) 3 - 8 % Final     Monocyte %   Date Value Ref  Range Status   12/08/2019 9.2 5.0 - 12.0 % Final     Eosinophil Rel %   Date Value Ref Range Status   08/21/2019 6.6 (H) 0 - 5 % Final     Eosinophil %   Date Value Ref Range Status   12/08/2019 10.6 (H) 0.3 - 6.2 % Final     Basophil Rel %   Date Value Ref Range Status   08/21/2019 0.5 0 - 2 % Final     Basophil %   Date Value Ref Range Status   12/08/2019 0.5 0.0 - 1.5 % Final     Immature Grans %   Date Value Ref Range Status   12/08/2019 0.3 0.0 - 0.5 % Final   08/21/2019 65.2 45 - 80 % Final     Neutrophils, Absolute   Date Value Ref Range Status   12/08/2019 3.39 1.70 - 7.00 10*3/mm3 Final     Lymphocytes, Absolute   Date Value Ref Range Status   12/08/2019 1.16 0.70 - 3.10 10*3/mm3 Final     Monocytes, Absolute   Date Value Ref Range Status   12/08/2019 0.53 0.10 - 0.90 10*3/mm3 Final     Eosinophils, Absolute   Date Value Ref Range Status   12/08/2019 0.61 (H) 0.00 - 0.40 10*3/mm3 Final     Basophils, Absolute   Date Value Ref Range Status   12/08/2019 0.03 0.00 - 0.20 10*3/mm3 Final     Immature Grans, Absolute   Date Value Ref Range Status   12/08/2019 0.02 0.00 - 0.05 10*3/mm3 Final     nRBC   Date Value Ref Range Status   12/08/2019 0.0 0.0 - 0.2 /100 WBC Final     Glucose   Date Value Ref Range Status   12/08/2019 89 65 - 99 mg/dL Final     Glucose, Arterial   Date Value Ref Range Status   06/16/2019 131 (H) 65 - 95 mmol/L Final     Sodium   Date Value Ref Range Status   12/08/2019 145 136 - 145 mmol/L Final   09/11/2019 139 136 - 145 MMOL/L Final     Potassium   Date Value Ref Range Status   12/08/2019 3.5 3.5 - 5.2 mmol/L Final   09/11/2019 4.8 3.5 - 5.4 MMOL/L Final     CO2   Date Value Ref Range Status   12/08/2019 32.0 (H) 22.0 - 29.0 mmol/L Final   09/11/2019 26 20 - 33 MMOL/L Final     Chloride   Date Value Ref Range Status   12/08/2019 104 98 - 107 mmol/L Final   09/11/2019 102 98 - 107 MMOL/L Final     Anion Gap   Date Value Ref Range Status   12/08/2019 9.0 5.0 - 15.0 mmol/L Final   09/11/2019  11 7 - 14 MMOL/L Final     Creatinine   Date Value Ref Range Status   12/08/2019 1.40 (H) 0.76 - 1.27 mg/dL Final   09/11/2019 1.7 (H) 0.6 - 1.2 MG/DL Final     BUN   Date Value Ref Range Status   12/08/2019 17 8 - 23 mg/dL Final   09/11/2019 29 (H) 8 - 23 MG/DL Final     BUN/Creatinine Ratio   Date Value Ref Range Status   12/08/2019 12.1 7.0 - 25.0 Final     Calcium   Date Value Ref Range Status   12/08/2019 9.4 8.6 - 10.5 mg/dL Final   09/11/2019 9.9 8.7 - 10.4 MG/DL Final     eGFR Non  Amer   Date Value Ref Range Status   12/08/2019 49 (L) >60 mL/min/1.73 Final     Alkaline Phosphatase   Date Value Ref Range Status   12/08/2019 206 (H) 39 - 117 U/L Final   09/11/2019 253 (H) 25 - 100 U/L Final   08/21/2019 229 (H) 46 - 116 U/L Final     Total Protein   Date Value Ref Range Status   12/08/2019 6.5 6.0 - 8.5 g/dL Final   09/11/2019 7.3 6.0 - 8.2 G/DL Final   08/21/2019 6.8 6.4 - 8.2 g/dL Final     ALT (SGPT)   Date Value Ref Range Status   12/08/2019 13 1 - 41 U/L Final   09/11/2019 14 10 - 40 U/L Final   08/21/2019 18 16 - 63 U/L Final     AST (SGOT)   Date Value Ref Range Status   12/08/2019 22 1 - 40 U/L Final   09/11/2019 17 0 - 39 U/L Final     Total Bilirubin   Date Value Ref Range Status   12/08/2019 0.6 0.2 - 1.2 mg/dL Final   09/11/2019 0.8 0.3 - 1.2 MG/DL Final     Albumin   Date Value Ref Range Status   12/08/2019 3.10 (L) 3.50 - 5.20 g/dL Final   09/11/2019 3.5 3.4 - 4.8 G/DL Final     Globulin   Date Value Ref Range Status   12/08/2019 3.4 gm/dL Final     A/G Ratio   Date Value Ref Range Status   09/11/2019 0.9  Final     Lab Results   Component Value Date    IRON 64 06/27/2019    TIBC 250 (L) 06/27/2019    LABIRON 26 06/27/2019    [id number] 978 08/21/2019     No results found for: , LABCA2, AFPTM, HCGQUANT, , CHROMGRNA, [id number], CEA, REFLABREPO]    Radiology Data :  PET/CT done on September 27, 2019 showed:  FINDINGS:     HEAD and NECK:  No suspicious hypermetabolic  "activity     THORAX:  Hypermetabolic activity is noted in the right upper lung  associated with a nodule, SUVmax 4.8. This nodule measures 1.7 x  1.4 x 1.3 cm, previously 1.5 x 1.3 x 1.3 cm     ABDOMEN/PELVIS:  No suspicious hypermetabolic activity     OSSEOUS / MISC:  No suspicious hypermetabolic activity.  There is a depression fracture of the L1 vertebral body not  present on the prior study from June 19, 2019.     ADDITIONAL FINDINGS:   Multiple bilateral renal cysts.   Multiple nonobstructing left renal calculi with the largest  measuring 1.5 cm.  Slightly enlarged prostate.  Fatty liver.     IMPRESSION:  CONCLUSION:  1.  Enlarging hypermetabolic nodule in the right upper lung  suspicious for neoplasm.  2.  There is a depression fracture of the L1 vertebral body not  present on the prior study from June 19, 2019.  3.  Fatty liver.  4.  Multiple bilateral renal cysts.  5.  Multiple nonobstructing left renal calculi with the largest  measuring 1.5 cm.        Pathology :  Pathology report from October 18, 2019 showed:  Final Diagnosis   1.  WEDGE, UPPER LOBE OF RIGHT LUNG:   METASTATIC MALIGNANT MELANOMA.   LINES OF EXCISION NEGATIVE FOR MELANOMA.     2.  LEVEL 4 LYMPH NODE, RIGHT:   ONE (1) LYMPH NODE NEGATIVE FOR MELANOMA.    Electronically signed by Juanjo Andrade MD on 10/28/2019 at 1556   Comment    The case is submitted to the [facility] for evaluation and their report is attached.  The findings are telephoned to Dr. Horton at 15:40 on 10/28/2019.   Intraoperative Consultation    FROZEN SECTION DIAGNOSIS:   1.  Spindle cell tumor, diagnosis deferred.   Gross Description    1.  The first container is labeled \"right upper lobe for frozen section, right lung\" and has a wedge of peripheral lung measuring 5.0 x 2.0 x 1.7 cm.  The smooth pleura has an underlying ovoid white tumor measuring 2.5 x 1.7 x 1.5 cm.  Part of the tumor is utilized for frozen section examination as 1A.  The wedge staple line is now " "removed and the exposed tissue is now marked with black ink.  Additional representative sections are embedded.  1B through 1E additional sections of tumor.       2.  The second container is labeled \"level 4 lymph node, right\" and has an ovoid pink tissue measuring 1.5 x 0.7 x 0.5 cm.  All transverse sections are entirely embedded as 2A.   Special Stains    Immunostains for cytokeratin (AE1/AE3), MARLEE, S100, HMB45, Melan A, CD34, calretinin, CK5/6, MOC-31 and thyroid transcription factor-1 (TTF-1) are performed at [facility] to separate carcinoma from mesothelioma, melanoma and sarcoma.  Immunostains (block 1C) have a malignant tumor negative for AE1/AE3, equivocal for MARLEE, strongly positive for S100 protein, negative for HMB45, negative for melan A, negative for CD34, negative for calretinin, negative for CK5/6, negative for MOC-31 and negative for TTF-1.           ASSESSMENT AND PLAN:      1.  Malignant melanoma involving right upper lobe, stage IV status post with resection on October 18, 2019:  - Result of PET/CT, pathology were discussed with patient and his family.  - It was also discussed with the patient and his family that melanoma typically does not happen in the lung it must have started somewhere else and metastasized to lung.  Making it stage IV.  - Patient had a wedge resection done on October 18, 2019 by Dr. Wright with negative margin.  - NCCN guidelines were reviewed.  -As per NCCN guidelines, patient was offered adjuvant Opdivo for 1 year in total.  -Side effect of immunotherapy including but not limited to immune mediated pneumonitis, hepatitis, colitis, thyroiditis, encephalitis, nephritis, pan hypopituitarism, dermatitis and skin rash were discussed with the patient.  We will provide them information about immunotherapy consisting of Opdivo today.  It was also discussed with them rarely immune mediated side effect can be severe and life-threatening.  -Patient wants to think " about whether he wants to do adjuvant immunotherapy or do close observation.  He will call us tomorrow with his final decision.      2.  History of rectal cancer:  -Patient was diagnosed with colon cancer in 2004.  -Patient received treatment in [place] with Dr. Chaudhari.  -We do not have any records available for review.    3.  Chronic kidney disease stage III    4 systolic congestive heart failure: Being followed by Dr. Grullon    5 hypothyroidism    6.  Health maintenance: Patient does not smoke.    7.Advance Care Planning: For now patient remains full code and is able to make his decisions.  Patient has health care surrogate mentioned on chart.    8.  Pain assessment:  -Patient denies any pain today          Thank you for this consultation.                  Perfecto Samuel MD  12/10/2019  5:03 PM          EMR Dragon/Transcription disclaimer:   Much of this encounter note is an electronic transcription/translation of spoken language to printed text. The electronic translation of spoken language may permit erroneous, or at times, nonsensical words or phrases to be inadvertently transcribed; Although I have reviewed the note for such errors, some may still exist.

## 2019-12-10 NOTE — OUTREACH NOTE
Prep Survey      Responses   Facility patient discharged from?  [facility]   Is patient eligible?  Yes   Discharge diagnosis  CHF   Does the patient have one of the following disease processes/diagnoses(primary or secondary)?  CHF   Does the patient have Home health ordered?  Yes   What is the Home health agency?   [facility]   Is there a DME ordered?  No   Comments regarding appointments  see AVS   Medication alerts for this patient  lasix   Prep survey completed?  Yes          Mayela Corado RN

## 2019-12-11 NOTE — OUTREACH NOTE
"CHF Week 1 Survey      Responses   Facility patient discharged from?  [facility]   Does the patient have one of the following disease processes/diagnoses(primary or secondary)?  CHF   Is there a successful TCM telephone encounter documented?  No   CHF Week 1 attempt successful?  Yes   Call start time  0902   Call end time  0912   Discharge diagnosis  CHF   Meds reviewed with patient/caregiver?  Yes   Is the patient having any side effects they believe may be caused by any medication additions or changes?  No   Does the patient have all medications ordered at discharge?  Yes   Is the patient taking all medications as directed (includes completed medication regime)?  Yes   Does the patient have a primary care provider?   Yes   Does the patient have an appointment with their PCP within 7 days of discharge?  Yes   Comments regarding PCP  Appointment with PCP 12/12/19   Has the patient kept scheduled appointments due by today?  Yes   Comments  Appointment with oncology 12/10/19,  Appointment with Dr. Grullon is in Feb 2020.   What is the Home health agency?   [facility]   Has home health visited the patient within 72 hours of discharge?  Yes   Psychosocial issues?  No   Did the patient receive a copy of their discharge instructions?  Yes   Nursing interventions  Reviewed instructions with patient   What is the patient's perception of their health status since discharge?  Improving [Pt reports, \"I've been sleeping better.\"]   Nursing interventions  Nurse provided patient education   Is the patient weighing daily?  Yes   Does the patient have scales?  Yes   Daily weight interventions  Education provided on importance of daily weight   Is the patient able to teach back Heart Failure diet management?  Yes   Is the patient able to teach back Heart Failure Zones?  Yes   Is the patient able to teach back signs and symptoms of worsening condition? (i.e. weight gain, shortness of air, etc.)  Yes   If the patient is a current " smoker, are they able to teach back resources for cessation?  -- [Pt is a nonsmoker]   Is the patient/caregiver able to teach back the hierarchy of who to call/visit for symptoms/problems? PCP, Specialist, Home health nurse, Urgent Care, ED, 911  Yes          Estefania Valentine RN

## 2019-12-16 NOTE — TELEPHONE ENCOUNTER
I am not sure why patient is having diarrhea.  We have not even started his treatment yet.  If he is taking laxative he needs to hold off.  Recommend following up with primary medical provider for further management of diarrhea.Thanks

## 2019-12-18 NOTE — OUTREACH NOTE
CHF Week 2 Survey      Responses   Facility patient discharged from?  [place]   Does the patient have one of the following disease processes/diagnoses(primary or secondary)?  CHF   Week 2 attempt successful?  No   Unsuccessful attempts  Attempt 1          Darcie Hetah RN

## 2019-12-19 NOTE — OUTREACH NOTE
CHF Week 2 Survey      Responses   Facility patient discharged from?  [place]   Does the patient have one of the following disease processes/diagnoses(primary or secondary)?  CHF   Week 2 attempt successful?  Yes   Call start time  1127   Call end time  1133   Meds reviewed with patient/caregiver?  Yes   Is the patient taking all medications as directed (includes completed medication regime)?  Yes   Medication comments  Medication for diarrhea   Has the patient kept scheduled appointments due by today?  Yes   Comments  Has had diarrhea for a week or more, has been seen by Dr.    What is the patient's perception of their health status since discharge?  Improving   Is the patient weighing daily?  Yes   Is the patient able to teach back Heart Failure Zones?  Yes [green zone]   CHF Week 2 call completed?  Yes   Wrap up additional comments  Having lots of diarrhea, sent stool specimens, breathingis much better.           Sandy Fang RN

## 2019-12-20 NOTE — TELEPHONE ENCOUNTER
Called patient and informed. He states he is tired of going to different doctors. He says he got a call from urgent care and they will have a different doctor in tomorrow that he is going to see. He states he will call us back on Monday to let us know if he decides to go see GI or not. Referral has not been placed yet.

## 2019-12-20 NOTE — PROGRESS NOTES
"Adult Outpatient Nutrition  Assessment    Patient Name:  Kuldip Nevarez  YOB: 1945  MRN: [id number]    Assessment Date:  12/20/2019    Comments:  Phone contact to address continued weight loss--lost 17% body weight/year. Metastatic melanoma (right lung). Alb 3.1 Pt stated biggest concern at present is unresolved diarrhea > 5 days. Has seen PCP and MD at Urgent Care. Finishes lomotil TID tomorrow--not effective. States up with diarrhea x4 last night between 1-5 am. States stool sample \"negative\".   Advised pt re: appropriate food/beverage/supplement choices. Concerned about hydration, but do not want to over hydrate due to hx of CHF. Recommended daily weight with report to MD if significant loss or gain noted. Left detailed note for Dr Samuel's RN re: above concerns.                        Electronically signed by:  Patricia Cerda RD  12/20/19 1:29 PM   "

## 2019-12-20 NOTE — TELEPHONE ENCOUNTER
"----- Message from Perfecto Samuel MD sent at 12/20/2019 11:33 AM CST -----  Regarding: RE: unresolved diarrhea/hx chf  Recommend GI referral if diarrhea is not resolved. his stool is negative for any kind of infection.  Okay to place verbal referral if required.  Thank you  ----- Message -----  From: Emilia Malone RN  Sent: 12/20/2019  11:26 AM CST  To: Perfecto Samuel MD  Subject: FW: unresolved diarrhea/hx chf                   See note from Patricia.    ----- Message -----  From: Patricia Cerda RD  Sent: 12/20/2019  11:05 AM CST  To: Emilia Malone RN  Subject: unresolved diarrhea/hx chf                       Phone contact for nutrition assessment to address significant weight loss over past year--lost 17% body weight. States has been to urgent care and PCP due to uncontrolled diarrhea>5 days. Output watery with small amounts of stool and significant amounts of \"mucus\". Last night x4 episodes between 1-5 am. States stool was tested and came back \"negative\". Will finish diphenoxylate TID tomorrow--has not been effective.  History of CHF complicates challenge to keep hydrated. I advised re: appropriate food choices and recommended daily weights (to monitor for dehydration and chf). States oxygen and blood pressure have been good when at doctor.      "

## 2019-12-26 NOTE — TELEPHONE ENCOUNTER
Called pt. He states that he has not had a bowel movement/diarrhea today. Pt wants to wait on the GI referral for now. Pt has a follow up appt with Dr Samuel tomorrow 12/27.

## 2019-12-27 PROBLEM — Z85.048 HISTORY OF RECTAL CANCER: Status: ACTIVE | Noted: 2019-01-01

## 2019-12-27 PROBLEM — R19.7 DIARRHEA: Status: ACTIVE | Noted: 2019-01-01

## 2019-12-27 NOTE — OUTREACH NOTE
CHF Week 3 Survey      Responses   Facility patient discharged from?  [place]   Does the patient have one of the following disease processes/diagnoses(primary or secondary)?  CHF   Week 3 attempt successful?  No   Unsuccessful attempts  Attempt 1          Shara Munguia RN

## 2019-12-27 NOTE — PROGRESS NOTES
DATE OF VISIT: 2019      REASON FOR VISIT: Metastatic melanoma with lung involvement, history of rectal cancer, diarrhea      HISTORY OF PRESENT ILLNESS:    74-year-old male with medical problem consisting of hypothyroidism, dyslipidemia, coronary artery disease status post stent, status post pacemaker, history of skin cancer including melanoma more than 20 years ago, history of rectal cancer diagnosed in  for which patient was treated by Dr. Chaudhari in [place] with chemotherapy was initially seen in consultation on December 10, 2019 for evaluation of metastatic anal melanoma with lung involvement status post resection.  Due to stage IV disease, Opdivo was recommended to patient.  Patient is here to get cycle 1 of Opdivo today on 2019.  Still complains of ongoing diarrhea.  States he is having multiple bowel movement and symptoms he cannot even control his bowel movement.  Denies any bleeding.  Denies any new shortness of breath.      PAST MEDICAL HISTORY:    Past Medical History:   Diagnosis Date   • Arthritis    • Cancer (CMS/HCC)     colon, skin   • Colon cancer (CMS/HCC)    • Coronary artery disease    • Disease of thyroid gland    • GERD (gastroesophageal reflux disease)    • History of transfusion    • Hyperlipidemia    • MI (myocardial infarction) (CMS/HCC)        • Pacemaker    • Skin cancer    • Sleep apnea        SOCIAL HISTORY:    Social History     Tobacco Use   • Smoking status: Former Smoker     Years: 45.00     Last attempt to quit: 2003     Years since quittin.9   • Smokeless tobacco: Never Used   Substance Use Topics   • Alcohol use: No   • Drug use: No       Surgical History :  Past Surgical History:   Procedure Laterality Date   • APPENDECTOMY     • COLON SURGERY     • COLONOSCOPY N/A 2017    Procedure: COLONOSCOPY;  Surgeon: Barrie Jarrett DO;  Location[facility] ENDOSCOPY;  Service:    • CORONARY ANGIOPLASTY WITH STENT PLACEMENT     • ENDOSCOPY     • FEMUR  OPEN REDUCTION INTERNAL FIXATION Left 6/16/2019    Procedure: FEMUR OPEN REDUCTION INTERNAL FIXATION;  Surgeon: Edward Harley MD;  Location: [facility];  Service: Orthopedics   • KIDNEY STONE SURGERY     • PACEMAKER IMPLANTATION     • SALIVARY GLAND EXCISION Left     Left neck due to skin cancer with resultant chronic dry mouth   • THORACOSCOPY Right 10/18/2019    Procedure: THORACOSCOPY VIDEO  ASSISTED , mini THORACOTOMY wedge resection nodule thoracic mediastinal lymphadenectomy bronchoscopy  Latex Allergy;  Surgeon: Bruno Horton MD;  Location: [facility];  Service: Cardiothoracic       ALLERGIES:    Allergies   Allergen Reactions   • Latex Rash   • Tape Rash         FAMILY HISTORY:  Family History   Problem Relation Age of Onset   • Coronary artery disease Mother    • Coronary artery disease Father    • Lung cancer Brother            REVIEW OF SYSTEMS:      CONSTITUTIONAL:  Complains of fatigue.  Admits to 45 pound of weight loss in last 1 year.  Denies any fever, chills .      EYES: No visual disturbances. No discharge. No new lesions     ENMT:  No epistaxis, mouth sores or difficulty swallowing.     RESPIRATORY: Positive for shortness of breath. No new cough or hemoptysis.     CARDIOVASCULAR:  No chest pain or palpitations.     GASTROINTESTINAL: Complains of ongoing diarrhea for last 3 weeks. No abdominal pain nausea, vomiting or blood in the stool.     GENITOURINARY: No Dysuria or Hematuria.     MUSCULOSKELETAL: Positive for arthritic pain.     LYMPHATICS:  Denies any abnormal swollen glands anywhere in the body.     NEUROLOGICAL : No tingling or numbness. No headache or dizziness. No seizures or balance problems.     SKIN: Positive for history of skin cancer affecting bilateral upper extremity and face.  Complains of chronic skin changes affecting bilateral upper extremity.     ENDOCRINE : No new hot or cold intolerance. No new polyuria . No polydipsia.          PHYSICAL EXAMINATION:   "    VITAL SIGNS:  /77   Pulse 60   Temp 98.4 °F (36.9 °C)   Resp 18   Ht 177.8 cm (70\")   Wt 87.8 kg (193 lb 9.6 oz)   SpO2 98%   BMI 27.78 kg/m²       12/27/19  1153   Weight: 87.8 kg (193 lb 9.6 oz)         ECOG performance status: 2     CONSTITUTIONAL:  Not in any distress.     EYES: Mild conjunctival Pallor. No Icterus. No Pterygium. Extraocular Movements intact.No ptosis.     ENMT:  Normocephalic, Atraumatic.No Facial Asymmetry noted.     NECK:  No adenopathy.Trachea midline. NO JVD.     RESPIRATORY:  Fair air entry bilateral. No rhonchi or wheezing.Fair respiratory effort.     CARDIOVASCULAR:  S1, S2. Regular rate and rhythm. No murmur or gallop appreciated.  Pacemaker present.     ABDOMEN:  Soft,  nontender. Bowel sounds present in all four quadrants.  No Hepatosplenomegaly appreciated.     MUSCULOSKELETAL:  Trace edema.No Calf Tenderness.Decreased range of motion.     NEUROLOGIC:    No  Motor  deficit appreciated. Cranial Nerves 2-12 grossly intact.     SKIN : Evidence of treatment for previous skin cancer on bilateral upper extremity and right side of face.  Evidence of hyperpigmented skin lesion on left side of face. Skin is warm and dry to touch.     LYMPHATICS: No new enlarged lymph nodes in neck or supraclavicular area.     PSYCHIATRY: Alert, awake and oriented ×3.        DIAGNOSTIC DATA:    Glucose   Date Value Ref Range Status   12/27/2019 89 65 - 99 mg/dL Final     Glucose, Arterial   Date Value Ref Range Status   06/16/2019 131 (H) 65 - 95 mmol/L Final     Sodium   Date Value Ref Range Status   12/27/2019 143 136 - 145 mmol/L Final   09/11/2019 139 136 - 145 MMOL/L Final     Potassium   Date Value Ref Range Status   12/27/2019 3.5 3.5 - 5.2 mmol/L Final   09/11/2019 4.8 3.5 - 5.4 MMOL/L Final     CO2   Date Value Ref Range Status   12/27/2019 27.0 22.0 - 29.0 mmol/L Final   09/11/2019 26 20 - 33 MMOL/L Final     Chloride   Date Value Ref Range Status   12/27/2019 105 98 - 107 mmol/L " Final   09/11/2019 102 98 - 107 MMOL/L Final     Anion Gap   Date Value Ref Range Status   12/27/2019 11.0 5.0 - 15.0 mmol/L Final   09/11/2019 11 7 - 14 MMOL/L Final     Creatinine   Date Value Ref Range Status   12/27/2019 1.35 (H) 0.76 - 1.27 mg/dL Final   09/11/2019 1.7 (H) 0.6 - 1.2 MG/DL Final     BUN   Date Value Ref Range Status   12/27/2019 14 8 - 23 mg/dL Final   09/11/2019 29 (H) 8 - 23 MG/DL Final     BUN/Creatinine Ratio   Date Value Ref Range Status   12/27/2019 10.4 7.0 - 25.0 Final     Calcium   Date Value Ref Range Status   12/27/2019 9.0 8.6 - 10.5 mg/dL Final   09/11/2019 9.9 8.7 - 10.4 MG/DL Final     eGFR Non  Amer   Date Value Ref Range Status   12/27/2019 51 (L) >60 mL/min/1.73 Final     Alkaline Phosphatase   Date Value Ref Range Status   12/27/2019 189 (H) 39 - 117 U/L Final   09/11/2019 253 (H) 25 - 100 U/L Final   08/21/2019 229 (H) 46 - 116 U/L Final     Total Protein   Date Value Ref Range Status   12/27/2019 6.8 6.0 - 8.5 g/dL Final   09/11/2019 7.3 6.0 - 8.2 G/DL Final   08/21/2019 6.8 6.4 - 8.2 g/dL Final     ALT (SGPT)   Date Value Ref Range Status   12/27/2019 11 1 - 41 U/L Final   09/11/2019 14 10 - 40 U/L Final   08/21/2019 18 16 - 63 U/L Final     AST (SGOT)   Date Value Ref Range Status   12/27/2019 21 1 - 40 U/L Final   09/11/2019 17 0 - 39 U/L Final     Total Bilirubin   Date Value Ref Range Status   12/27/2019 0.7 0.2 - 1.2 mg/dL Final   09/11/2019 0.8 0.3 - 1.2 MG/DL Final     Albumin   Date Value Ref Range Status   12/27/2019 3.10 (L) 3.50 - 5.20 g/dL Final   09/11/2019 3.5 3.4 - 4.8 G/DL Final     Globulin   Date Value Ref Range Status   12/27/2019 3.7 gm/dL Final     A/G Ratio   Date Value Ref Range Status   09/11/2019 0.9  Final     Lab Results   Component Value Date    WBC 5.77 12/27/2019    HGB 13.2 12/27/2019    HCT 40.3 12/27/2019    MCV 96.9 12/27/2019     12/27/2019     Lab Results   Component Value Date    NEUTROABS 3.53 12/27/2019    IRON 64  06/27/2019    TIBC 250 (L) 06/27/2019    LABIRON 26 06/27/2019    TJXUOKAM89 978 08/21/2019     No results found for: , LABCA2, AFPTM, HCGQUANT, , CHROMGRNA, [id number], CEA, REFLABREPO      Radiology Data :  PET/CT done on September 27, 2019 showed:  FINDINGS:     HEAD and NECK:  No suspicious hypermetabolic activity     THORAX:  Hypermetabolic activity is noted in the right upper lung  associated with a nodule, SUVmax 4.8. This nodule measures 1.7 x  1.4 x 1.3 cm, previously 1.5 x 1.3 x 1.3 cm     ABDOMEN/PELVIS:  No suspicious hypermetabolic activity     OSSEOUS / MISC:  No suspicious hypermetabolic activity.  There is a depression fracture of the L1 vertebral body not  present on the prior study from June 19, 2019.     ADDITIONAL FINDINGS:   Multiple bilateral renal cysts.   Multiple nonobstructing left renal calculi with the largest  measuring 1.5 cm.  Slightly enlarged prostate.  Fatty liver.     IMPRESSION:  CONCLUSION:  1.  Enlarging hypermetabolic nodule in the right upper lung  suspicious for neoplasm.  2.  There is a depression fracture of the L1 vertebral body not  present on the prior study from June 19, 2019.  3.  Fatty liver.  4.  Multiple bilateral renal cysts.  5.  Multiple nonobstructing left renal calculi with the largest  measuring 1.5 cm.           Pathology :  Pathology report from October 18, 2019 showed:  Final Diagnosis   1.  WEDGE, UPPER LOBE OF RIGHT LUNG:   METASTATIC MALIGNANT MELANOMA.   LINES OF EXCISION NEGATIVE FOR MELANOMA.     2.  LEVEL 4 LYMPH NODE, RIGHT:   ONE (1) LYMPH NODE NEGATIVE FOR MELANOMA.    Electronically signed by Juanjo Andrade MD on 10/28/2019 at 1556   Comment     The case is submitted to the [facility] for evaluation and their report is attached.  The findings are telephoned to Dr. Horton at 15:40 on 10/28/2019.   Intraoperative Consultation     FROZEN SECTION DIAGNOSIS:   1.  Spindle cell tumor, diagnosis deferred.   Gross Description     1.  The  "first container is labeled \"right upper lobe for frozen section, right lung\" and has a wedge of peripheral lung measuring 5.0 x 2.0 x 1.7 cm.  The smooth pleura has an underlying ovoid white tumor measuring 2.5 x 1.7 x 1.5 cm.  Part of the tumor is utilized for frozen section examination as 1A.  The wedge staple line is now removed and the exposed tissue is now marked with black ink.  Additional representative sections are embedded.  1B through 1E additional sections of tumor.       2.  The second container is labeled \"level 4 lymph node, right\" and has an ovoid pink tissue measuring 1.5 x 0.7 x 0.5 cm.  All transverse sections are entirely embedded as 2A.   Special Stains     Immunostains for cytokeratin (AE1/AE3), MARLEE, S100, HMB45, Melan A, CD34, calretinin, CK5/6, MOC-31 and thyroid transcription factor-1 (TTF-1) are performed at [facility] to separate carcinoma from mesothelioma, melanoma and sarcoma.  Immunostains (block 1C) have a malignant tumor negative for AE1/AE3, equivocal for MARLEE, strongly positive for S100 protein, negative for HMB45, negative for melan A, negative for CD34, negative for calretinin, negative for CK5/6, negative for MOC-31 and negative for TTF-1.              ASSESSMENT AND PLAN:       1.  Malignant melanoma involving right upper lobe, stage IV status post with resection on October 18, 2019:  - Result of PET/CT, pathology were discussed with patient and his family.  - It was also discussed with the patient and his family that melanoma typically does not happen in the lung it must have started somewhere else and metastasized to lung.  Making it stage IV.  - Patient had a wedge resection done on October 18, 2019 by Dr. Horton with negative margin.  - NCCN guidelines were reviewed.  -As per NCCN guidelines, patient was offered adjuvant Opdivo for 1 year in total.  -Side effect of immunotherapy including but not limited to immune mediated pneumonitis, hepatitis, " colitis, thyroiditis, encephalitis, nephritis, pan hypopituitarism, dermatitis and skin rash were discussed with the patient.  We will provide them information about immunotherapy consisting of Opdivo today.  It was also discussed with them rarely immune mediated side effect can be severe and life-threatening.  -After thinking about treatment, patient decided to do Opdivo.  -Patient's treatment has been delayed due to ongoing diarrhea for last 3 weeks.  At the time of examination today, patient did defecate on himself in the exam room.  Due to ongoing diarrhea, recommend holding off on starting on Opdivo at present.  1 of the side effect of Opdivo is diarrhea.  Would not recommend starting Opdivo unless diarrhea gets controlled.  -Since patient has a pulmonary metastasis, recommend ophthalmology evaluation to rule out ocular melanoma.  -Patient will also need testing for BRAF mutation at some point.  -We will continue holding Opdivo, will ask patient to return to clinic in 1 month with a repeat CT of chest abdomen and pelvis without contrast to be done prior to that.  -We will start Opdivo in 4 weeks if his diarrhea is controlled by that point.        2.  History of rectal cancer:  -Patient was diagnosed with colon cancer in 2004.  -Patient received treatment in [place] with Dr. Chaudhari.  -We do not have any records available for review.    3.  Diarrhea:  - Patient is complaining of ongoing diarrhea for last 3 weeks.  He was checked for infectious etiology at [facility] on December 19, 2019 which was negative.  -Since patient has a history of rectal cancer status post chemoradiation, will refer him back to Dr. Jarrett for further evaluation of diarrhea.     4.  Chronic kidney disease stage III     5 systolic congestive heart failure: Being followed by Dr. Grullon     6 hypothyroidism     7.  Health maintenance: Patient does not smoke.     8.Advance Care Planning: For now patient remains full code and is  able to make his decisions.  Patient has health care surrogate mentioned on chart.     9.  Pain assessment:  -Patient denies any pain today         Perfecto Samuel MD  12/27/2019  5:00 PM        EMR Dragon/Transcription disclaimer:   Much of this encounter note is an electronic transcription/translation of spoken language to printed text. The electronic translation of spoken language may permit erroneous, or at times, nonsensical words or phrases to be inadvertently transcribed; Although I have reviewed the note for such errors, some may still exist.   oriented to person, place, time and situation

## 2019-12-30 NOTE — OUTREACH NOTE
CHF Week 3 Survey      Responses   Facility patient discharged from?  [place]   Does the patient have one of the following disease processes/diagnoses(primary or secondary)?  CHF   Week 3 attempt successful?  Yes   Call start time  1352   Call end time  1401   Discharge diagnosis  CHF   Meds reviewed with patient/caregiver?  Yes   Is the patient having any side effects they believe may be caused by any medication additions or changes?  No   Does the patient have all medications ordered at discharge?  Yes   Is the patient taking all medications as directed (includes completed medication regime)?  Yes   Medication comments  Pt continues to have diarrhea.  He reports he is scheduling an appointment with gastrointerologist.   Does the patient have a primary care provider?   Yes   Comments regarding PCP  Pt will see oncologist in one month.     Has the patient kept scheduled appointments due by today?  Yes   Psychosocial issues?  No   Comments  Pt continues to have diarrhea and is having some frustrations regarding getting to see gastrointerologist.   Did the patient receive a copy of their discharge instructions?  Yes   What is the patient's perception of their health status since discharge?  New symptoms unrelated to diagnosis [diarrhea]   Is the patient weighing daily?  Yes   Does the patient have scales?  Yes   Is the patient/caregiver able to teach back the hierarchy of who to call/visit for symptoms/problems? PCP, Specialist, Home health nurse, Urgent Care, ED, 911  Yes   CHF Week 3 call completed?  Yes   Wrap up additional comments  Pt is havng frustrations with chronich diarrhea and unable to get appts set up.  He was encouraged to call gastarointerologist today as he has been waiting on a call bacl for an appointment.           Kelly Sandoval RN

## 2020-01-01 ENCOUNTER — LAB (OUTPATIENT)
Dept: ONCOLOGY | Facility: HOSPITAL | Age: 75
End: 2020-01-01

## 2020-01-01 ENCOUNTER — ANESTHESIA (OUTPATIENT)
Dept: PERIOP | Facility: HOSPITAL | Age: 75
End: 2020-01-01

## 2020-01-01 ENCOUNTER — READMISSION MANAGEMENT (OUTPATIENT)
Dept: CALL CENTER | Facility: HOSPITAL | Age: 75
End: 2020-01-01

## 2020-01-01 ENCOUNTER — APPOINTMENT (OUTPATIENT)
Dept: ONCOLOGY | Facility: HOSPITAL | Age: 75
End: 2020-01-01

## 2020-01-01 ENCOUNTER — APPOINTMENT (OUTPATIENT)
Dept: GENERAL RADIOLOGY | Facility: HOSPITAL | Age: 75
End: 2020-01-01

## 2020-01-01 ENCOUNTER — APPOINTMENT (OUTPATIENT)
Dept: ULTRASOUND IMAGING | Facility: HOSPITAL | Age: 75
End: 2020-01-01

## 2020-01-01 ENCOUNTER — APPOINTMENT (OUTPATIENT)
Dept: CT IMAGING | Facility: HOSPITAL | Age: 75
End: 2020-01-01

## 2020-01-01 ENCOUNTER — HOSPITAL ENCOUNTER (OUTPATIENT)
Facility: HOSPITAL | Age: 75
Setting detail: HOSPITAL OUTPATIENT SURGERY
Discharge: HOME | End: 2020-01-22
Attending: INTERNAL MEDICINE | Admitting: INTERNAL MEDICINE

## 2020-01-01 ENCOUNTER — TELEPHONE (OUTPATIENT)
Dept: NUTRITION | Facility: HOSPITAL | Age: 75
End: 2020-01-01

## 2020-01-01 ENCOUNTER — ANESTHESIA EVENT (OUTPATIENT)
Dept: PERIOP | Facility: HOSPITAL | Age: 75
End: 2020-01-01

## 2020-01-01 ENCOUNTER — ANESTHESIA EVENT (OUTPATIENT)
Dept: ICU | Facility: HOSPITAL | Age: 75
End: 2020-01-01

## 2020-01-01 ENCOUNTER — HOSPITAL ENCOUNTER (INPATIENT)
Facility: HOSPITAL | Age: 75
LOS: 6 days | End: 2020-03-26
Attending: EMERGENCY MEDICINE | Admitting: INTERNAL MEDICINE

## 2020-01-01 ENCOUNTER — OFFICE VISIT (OUTPATIENT)
Dept: ONCOLOGY | Facility: CLINIC | Age: 75
End: 2020-01-01

## 2020-01-01 ENCOUNTER — TELEPHONE (OUTPATIENT)
Dept: ONCOLOGY | Facility: HOSPITAL | Age: 75
End: 2020-01-01

## 2020-01-01 ENCOUNTER — INFUSION (OUTPATIENT)
Dept: PEDIATRICS | Facility: HOSPITAL | Age: 75
End: 2020-01-01

## 2020-01-01 ENCOUNTER — TELEPHONE (OUTPATIENT)
Dept: ONCOLOGY | Facility: CLINIC | Age: 75
End: 2020-01-01

## 2020-01-01 ENCOUNTER — ANESTHESIA (OUTPATIENT)
Dept: ICU | Facility: HOSPITAL | Age: 75
End: 2020-01-01

## 2020-01-01 ENCOUNTER — HOSPITAL ENCOUNTER (OUTPATIENT)
Dept: CT IMAGING | Facility: HOSPITAL | Age: 75
Discharge: HOME | End: 2020-01-14
Admitting: INTERNAL MEDICINE

## 2020-01-01 ENCOUNTER — OFFICE VISIT (OUTPATIENT)
Dept: CARDIAC SURGERY | Facility: CLINIC | Age: 75
End: 2020-01-01

## 2020-01-01 ENCOUNTER — TELEPHONE (OUTPATIENT)
Dept: GENERAL RADIOLOGY | Facility: HOSPITAL | Age: 75
End: 2020-01-01

## 2020-01-01 ENCOUNTER — CLINICAL SUPPORT (OUTPATIENT)
Dept: ONCOLOGY | Facility: HOSPITAL | Age: 75
End: 2020-01-01

## 2020-01-01 ENCOUNTER — INFUSION (OUTPATIENT)
Dept: ONCOLOGY | Facility: HOSPITAL | Age: 75
End: 2020-01-01

## 2020-01-01 ENCOUNTER — APPOINTMENT (OUTPATIENT)
Dept: INTERVENTIONAL RADIOLOGY/VASCULAR | Facility: HOSPITAL | Age: 75
End: 2020-01-01

## 2020-01-01 ENCOUNTER — APPOINTMENT (OUTPATIENT)
Dept: ONCOLOGY | Facility: CLINIC | Age: 75
End: 2020-01-01

## 2020-01-01 VITALS
OXYGEN SATURATION: 82 % | BODY MASS INDEX: 24.25 KG/M2 | SYSTOLIC BLOOD PRESSURE: 77 MMHG | HEIGHT: 72 IN | TEMPERATURE: 100.1 F | WEIGHT: 179.01 LBS | DIASTOLIC BLOOD PRESSURE: 47 MMHG | RESPIRATION RATE: 30 BRPM

## 2020-01-01 VITALS
TEMPERATURE: 99 F | DIASTOLIC BLOOD PRESSURE: 59 MMHG | SYSTOLIC BLOOD PRESSURE: 100 MMHG | HEIGHT: 72 IN | WEIGHT: 182.9 LBS | HEART RATE: 64 BPM | BODY MASS INDEX: 24.77 KG/M2 | RESPIRATION RATE: 16 BRPM

## 2020-01-01 VITALS
WEIGHT: 181.8 LBS | HEART RATE: 64 BPM | DIASTOLIC BLOOD PRESSURE: 56 MMHG | TEMPERATURE: 98.8 F | SYSTOLIC BLOOD PRESSURE: 102 MMHG | HEIGHT: 72 IN | BODY MASS INDEX: 24.62 KG/M2 | RESPIRATION RATE: 16 BRPM

## 2020-01-01 VITALS
RESPIRATION RATE: 18 BRPM | TEMPERATURE: 97.8 F | WEIGHT: 180.12 LBS | OXYGEN SATURATION: 98 % | HEIGHT: 72 IN | SYSTOLIC BLOOD PRESSURE: 112 MMHG | BODY MASS INDEX: 24.4 KG/M2 | DIASTOLIC BLOOD PRESSURE: 57 MMHG | HEART RATE: 68 BPM

## 2020-01-01 VITALS
HEART RATE: 59 BPM | BODY MASS INDEX: 24.42 KG/M2 | HEIGHT: 70 IN | SYSTOLIC BLOOD PRESSURE: 111 MMHG | RESPIRATION RATE: 16 BRPM | OXYGEN SATURATION: 98 % | DIASTOLIC BLOOD PRESSURE: 80 MMHG | DIASTOLIC BLOOD PRESSURE: 61 MMHG | WEIGHT: 193 LBS | HEART RATE: 74 BPM | BODY MASS INDEX: 27.63 KG/M2 | TEMPERATURE: 98 F | SYSTOLIC BLOOD PRESSURE: 128 MMHG | HEIGHT: 72 IN | TEMPERATURE: 99.4 F

## 2020-01-01 VITALS
TEMPERATURE: 98.1 F | DIASTOLIC BLOOD PRESSURE: 56 MMHG | SYSTOLIC BLOOD PRESSURE: 105 MMHG | WEIGHT: 186.1 LBS | OXYGEN SATURATION: 97 % | BODY MASS INDEX: 25.23 KG/M2 | HEART RATE: 59 BPM

## 2020-01-01 DIAGNOSIS — D64.9 ANEMIA, UNSPECIFIED TYPE: ICD-10-CM

## 2020-01-01 DIAGNOSIS — C78.01 SECONDARY MALIGNANT MELANOMA OF RIGHT LUNG (HCC): ICD-10-CM

## 2020-01-01 DIAGNOSIS — Z79.899 OTHER LONG TERM (CURRENT) DRUG THERAPY: ICD-10-CM

## 2020-01-01 DIAGNOSIS — I95.1 ORTHOSTATIC HYPOTENSION: ICD-10-CM

## 2020-01-01 DIAGNOSIS — C78.01 SECONDARY MALIGNANT MELANOMA OF RIGHT LUNG (HCC): Primary | ICD-10-CM

## 2020-01-01 DIAGNOSIS — R09.02 HYPOXIA: Primary | ICD-10-CM

## 2020-01-01 DIAGNOSIS — R94.6 ABNORMAL THYROID FUNCTION TEST: ICD-10-CM

## 2020-01-01 DIAGNOSIS — E07.9 DISEASE OF THYROID GLAND: ICD-10-CM

## 2020-01-01 DIAGNOSIS — I50.23 ACUTE ON CHRONIC SYSTOLIC CHF (CONGESTIVE HEART FAILURE) (HCC): ICD-10-CM

## 2020-01-01 DIAGNOSIS — Z79.899 OTHER LONG TERM (CURRENT) DRUG THERAPY: Primary | ICD-10-CM

## 2020-01-01 DIAGNOSIS — C18.9 MALIGNANT NEOPLASM OF COLON, UNSPECIFIED PART OF COLON (HCC): ICD-10-CM

## 2020-01-01 DIAGNOSIS — R19.7 DIARRHEA: ICD-10-CM

## 2020-01-01 DIAGNOSIS — E66.3 OVERWEIGHT (BMI 25.0-29.9): ICD-10-CM

## 2020-01-01 DIAGNOSIS — R79.89 ELEVATED SERUM CREATININE: Primary | ICD-10-CM

## 2020-01-01 DIAGNOSIS — Z85.048 HISTORY OF RECTAL CANCER: ICD-10-CM

## 2020-01-01 DIAGNOSIS — R55 SYNCOPE, UNSPECIFIED SYNCOPE TYPE: ICD-10-CM

## 2020-01-01 DIAGNOSIS — J44.9 COPD MIXED TYPE (HCC): ICD-10-CM

## 2020-01-01 DIAGNOSIS — N18.30 STAGE 3 CHRONIC KIDNEY DISEASE (HCC): ICD-10-CM

## 2020-01-01 DIAGNOSIS — I25.10 CORONARY ARTERY DISEASE INVOLVING NATIVE CORONARY ARTERY OF NATIVE HEART WITHOUT ANGINA PECTORIS: ICD-10-CM

## 2020-01-01 DIAGNOSIS — R19.7 DIARRHEA, UNSPECIFIED TYPE: ICD-10-CM

## 2020-01-01 DIAGNOSIS — R91.1 NODULE OF LOWER LOBE OF LEFT LUNG: ICD-10-CM

## 2020-01-01 LAB
A-A DO2: 59.3 MMHG
ALBUMIN SERPL-MCNC: 2.6 G/DL (ref 3.5–5.2)
ALBUMIN SERPL-MCNC: 2.7 G/DL (ref 3.5–5.2)
ALBUMIN SERPL-MCNC: 3.2 G/DL (ref 3.5–5.2)
ALBUMIN SERPL-MCNC: 3.3 G/DL (ref 3.5–5.2)
ALBUMIN SERPL-MCNC: 3.4 G/DL (ref 3.5–5.2)
ALBUMIN/GLOB SERPL: 0.8 G/DL
ALBUMIN/GLOB SERPL: 0.9 G/DL
ALBUMIN/GLOB SERPL: 1 G/DL
ALP SERPL-CCNC: 103 U/L (ref 39–117)
ALP SERPL-CCNC: 111 U/L (ref 39–117)
ALP SERPL-CCNC: 143 U/L (ref 39–117)
ALP SERPL-CCNC: 163 U/L (ref 39–117)
ALP SERPL-CCNC: 171 U/L (ref 39–117)
ALP SERPL-CCNC: 177 U/L (ref 39–117)
ALP SERPL-CCNC: 196 U/L (ref 39–117)
ALT SERPL W P-5'-P-CCNC: 10 U/L (ref 1–41)
ALT SERPL W P-5'-P-CCNC: 14 U/L (ref 1–41)
ALT SERPL W P-5'-P-CCNC: 15 U/L (ref 1–41)
ALT SERPL W P-5'-P-CCNC: 17 U/L (ref 1–41)
ALT SERPL W P-5'-P-CCNC: 21 U/L (ref 1–41)
ALT SERPL W P-5'-P-CCNC: 8 U/L (ref 1–41)
ALT SERPL W P-5'-P-CCNC: 9 U/L (ref 1–41)
ANION GAP SERPL CALCULATED.3IONS-SCNC: 10 MMOL/L (ref 5–15)
ANION GAP SERPL CALCULATED.3IONS-SCNC: 11 MMOL/L (ref 5–15)
ANION GAP SERPL CALCULATED.3IONS-SCNC: 12 MMOL/L (ref 5–15)
ANION GAP SERPL CALCULATED.3IONS-SCNC: 13 MMOL/L (ref 5–15)
ANION GAP SERPL CALCULATED.3IONS-SCNC: 9 MMOL/L (ref 5–15)
ANION GAP SERPL CALCULATED.3IONS-SCNC: 9 MMOL/L (ref 5–15)
APTT PPP: 37.8 SECONDS (ref 20–40.3)
ARTERIAL PATENCY WRIST A: ABNORMAL
ARTERIAL PATENCY WRIST A: POSITIVE
AST SERPL-CCNC: 13 U/L (ref 1–40)
AST SERPL-CCNC: 16 U/L (ref 1–40)
AST SERPL-CCNC: 17 U/L (ref 1–40)
AST SERPL-CCNC: 17 U/L (ref 1–40)
AST SERPL-CCNC: 18 U/L (ref 1–40)
AST SERPL-CCNC: 44 U/L (ref 1–40)
AST SERPL-CCNC: 51 U/L (ref 1–40)
ATMOSPHERIC PRESS: 747 MMHG
ATMOSPHERIC PRESS: 747 MMHG
ATMOSPHERIC PRESS: 748 MMHG
ATMOSPHERIC PRESS: 749 MMHG
ATMOSPHERIC PRESS: 750 MMHG
ATMOSPHERIC PRESS: 752 MMHG
ATMOSPHERIC PRESS: 754 MMHG
ATMOSPHERIC PRESS: 756 MMHG
ATMOSPHERIC PRESS: 757 MMHG
ATMOSPHERIC PRESS: 757 MMHG
B PERT DNA SPEC QL NAA+PROBE: NOT DETECTED
BACTERIA SPEC AEROBE CULT: NO GROWTH
BACTERIA SPEC AEROBE CULT: NORMAL
BACTERIA SPEC AEROBE CULT: NORMAL
BACTERIA SPEC RESP CULT: NORMAL
BACTERIA UR QL AUTO: ABNORMAL /HPF
BASE EXCESS BLDA CALC-SCNC: -3.1 MMOL/L (ref 0–2)
BASE EXCESS BLDA CALC-SCNC: -4 MMOL/L (ref 0–2)
BASE EXCESS BLDA CALC-SCNC: -4 MMOL/L (ref 0–2)
BASE EXCESS BLDA CALC-SCNC: -4.1 MMOL/L (ref 0–2)
BASE EXCESS BLDA CALC-SCNC: -4.7 MMOL/L (ref 0–2)
BASE EXCESS BLDA CALC-SCNC: -6.1 MMOL/L (ref 0–2)
BASE EXCESS BLDA CALC-SCNC: -6.2 MMOL/L (ref 0–2)
BASE EXCESS BLDA CALC-SCNC: -6.8 MMOL/L (ref 0–2)
BASE EXCESS BLDA CALC-SCNC: -9.4 MMOL/L (ref 0–2)
BASE EXCESS BLDA CALC-SCNC: 0.6 MMOL/L (ref 0–2)
BASOPHILS # BLD AUTO: 0.01 10*3/MM3 (ref 0–0.2)
BASOPHILS # BLD AUTO: 0.02 10*3/MM3 (ref 0–0.2)
BASOPHILS # BLD AUTO: 0.03 10*3/MM3 (ref 0–0.2)
BASOPHILS # BLD AUTO: 0.04 10*3/MM3 (ref 0–0.2)
BASOPHILS NFR BLD AUTO: 0.2 % (ref 0–1.5)
BASOPHILS NFR BLD AUTO: 0.2 % (ref 0–1.5)
BASOPHILS NFR BLD AUTO: 0.3 % (ref 0–1.5)
BASOPHILS NFR BLD AUTO: 0.3 % (ref 0–1.5)
BASOPHILS NFR BLD AUTO: 0.4 % (ref 0–1.5)
BASOPHILS NFR BLD AUTO: 0.5 % (ref 0–1.5)
BASOPHILS NFR BLD AUTO: 0.5 % (ref 0–1.5)
BASOPHILS NFR BLD AUTO: 0.6 % (ref 0–1.5)
BDY SITE: ABNORMAL
BILIRUB SERPL-MCNC: 0.4 MG/DL (ref 0.2–1.2)
BILIRUB SERPL-MCNC: 0.5 MG/DL (ref 0.2–1.2)
BILIRUB SERPL-MCNC: 0.6 MG/DL (ref 0.2–1.2)
BILIRUB SERPL-MCNC: 0.6 MG/DL (ref 0.2–1.2)
BILIRUB SERPL-MCNC: 0.7 MG/DL (ref 0.2–1.2)
BILIRUB SERPL-MCNC: 0.8 MG/DL (ref 0.2–1.2)
BILIRUB SERPL-MCNC: 1.4 MG/DL (ref 0.2–1.2)
BILIRUB UR QL STRIP: ABNORMAL
BUN BLD-MCNC: 23 MG/DL (ref 8–23)
BUN BLD-MCNC: 26 MG/DL (ref 8–23)
BUN BLD-MCNC: 27 MG/DL (ref 8–23)
BUN BLD-MCNC: 28 MG/DL (ref 8–23)
BUN BLD-MCNC: 31 MG/DL (ref 8–23)
BUN BLD-MCNC: 34 MG/DL (ref 8–23)
BUN BLD-MCNC: 36 MG/DL (ref 8–23)
BUN BLD-MCNC: 39 MG/DL (ref 8–23)
BUN BLD-MCNC: 40 MG/DL (ref 8–23)
BUN BLD-MCNC: 46 MG/DL (ref 8–23)
BUN/CREAT SERPL: 16.5 (ref 7–25)
BUN/CREAT SERPL: 17.3 (ref 7–25)
BUN/CREAT SERPL: 18.9 (ref 7–25)
BUN/CREAT SERPL: 19.4 (ref 7–25)
BUN/CREAT SERPL: 20.2 (ref 7–25)
BUN/CREAT SERPL: 21.1 (ref 7–25)
BUN/CREAT SERPL: 21.1 (ref 7–25)
BUN/CREAT SERPL: 24.4 (ref 7–25)
BUN/CREAT SERPL: 25 (ref 7–25)
BUN/CREAT SERPL: 25.5 (ref 7–25)
BUN/CREAT SERPL: 27.4 (ref 7–25)
BUN/CREAT SERPL: 28.3 (ref 7–25)
C PNEUM DNA NPH QL NAA+NON-PROBE: NOT DETECTED
CA-I BLD-MCNC: 5.16 MG/DL (ref 4.6–5.6)
CALCIUM SPEC-SCNC: 8.7 MG/DL (ref 8.6–10.5)
CALCIUM SPEC-SCNC: 8.8 MG/DL (ref 8.6–10.5)
CALCIUM SPEC-SCNC: 9 MG/DL (ref 8.6–10.5)
CALCIUM SPEC-SCNC: 9.1 MG/DL (ref 8.6–10.5)
CALCIUM SPEC-SCNC: 9.1 MG/DL (ref 8.6–10.5)
CALCIUM SPEC-SCNC: 9.2 MG/DL (ref 8.6–10.5)
CALCIUM SPEC-SCNC: 9.3 MG/DL (ref 8.6–10.5)
CALCIUM SPEC-SCNC: 9.4 MG/DL (ref 8.6–10.5)
CALCIUM SPEC-SCNC: 9.6 MG/DL (ref 8.6–10.5)
CALCIUM SPEC-SCNC: 9.8 MG/DL (ref 8.6–10.5)
CALCIUM SPEC-SCNC: 9.8 MG/DL (ref 8.6–10.5)
CALCIUM SPEC-SCNC: 9.9 MG/DL (ref 8.6–10.5)
CHLORIDE SERPL-SCNC: 103 MMOL/L (ref 98–107)
CHLORIDE SERPL-SCNC: 104 MMOL/L (ref 98–107)
CHLORIDE SERPL-SCNC: 106 MMOL/L (ref 98–107)
CHLORIDE SERPL-SCNC: 106 MMOL/L (ref 98–107)
CHLORIDE SERPL-SCNC: 107 MMOL/L (ref 98–107)
CHLORIDE SERPL-SCNC: 108 MMOL/L (ref 98–107)
CHLORIDE SERPL-SCNC: 110 MMOL/L (ref 98–107)
CHLORIDE SERPL-SCNC: 111 MMOL/L (ref 98–107)
CHLORIDE SERPL-SCNC: 112 MMOL/L (ref 98–107)
CHLORIDE SERPL-SCNC: 113 MMOL/L (ref 98–107)
CHOLEST SERPL-MCNC: 114 MG/DL (ref 0–200)
CK MB SERPL-CCNC: 1.03 NG/ML
CK SERPL-CCNC: 137 U/L (ref 20–200)
CLARITY UR: CLEAR
CO2 SERPL-SCNC: 21 MMOL/L (ref 22–29)
CO2 SERPL-SCNC: 21 MMOL/L (ref 22–29)
CO2 SERPL-SCNC: 22 MMOL/L (ref 22–29)
CO2 SERPL-SCNC: 23 MMOL/L (ref 22–29)
CO2 SERPL-SCNC: 23 MMOL/L (ref 22–29)
CO2 SERPL-SCNC: 25 MMOL/L (ref 22–29)
CO2 SERPL-SCNC: 25 MMOL/L (ref 22–29)
CO2 SERPL-SCNC: 26 MMOL/L (ref 22–29)
CO2 SERPL-SCNC: 26 MMOL/L (ref 22–29)
COHGB MFR BLD: 1.8 % (ref 0–5)
COLOR UR: ABNORMAL
CREAT BLD-MCNC: 1.27 MG/DL (ref 0.76–1.27)
CREAT BLD-MCNC: 1.28 MG/DL (ref 0.76–1.27)
CREAT BLD-MCNC: 1.29 MG/DL (ref 0.76–1.27)
CREAT BLD-MCNC: 1.33 MG/DL (ref 0.76–1.27)
CREAT BLD-MCNC: 1.44 MG/DL (ref 0.76–1.27)
CREAT BLD-MCNC: 1.46 MG/DL (ref 0.76–1.27)
CREAT BLD-MCNC: 1.53 MG/DL (ref 0.76–1.27)
CREAT BLD-MCNC: 1.64 MG/DL (ref 0.76–1.27)
CREAT BLD-MCNC: 1.8 MG/DL (ref 0.76–1.27)
CREAT BLD-MCNC: 1.84 MG/DL (ref 0.76–1.27)
CREAT BLD-MCNC: 1.88 MG/DL (ref 0.76–1.27)
CREAT BLD-MCNC: 1.9 MG/DL (ref 0.76–1.27)
D-DIMER, QUANTITATIVE (MAD,POW, STR): 2680 NG/ML (FEU) (ref 0–470)
D-LACTATE SERPL-SCNC: 1.4 MMOL/L (ref 0.5–2)
D-LACTATE SERPL-SCNC: 1.5 MMOL/L (ref 0.5–2)
DEPRECATED RDW RBC AUTO: 44.5 FL (ref 37–54)
DEPRECATED RDW RBC AUTO: 44.8 FL (ref 37–54)
DEPRECATED RDW RBC AUTO: 47.1 FL (ref 37–54)
DEPRECATED RDW RBC AUTO: 48.8 FL (ref 37–54)
DEPRECATED RDW RBC AUTO: 49 FL (ref 37–54)
DEPRECATED RDW RBC AUTO: 50.3 FL (ref 37–54)
DEPRECATED RDW RBC AUTO: 51.8 FL (ref 37–54)
DEPRECATED RDW RBC AUTO: 52.6 FL (ref 37–54)
DEPRECATED RDW RBC AUTO: 53.7 FL (ref 37–54)
DEPRECATED RDW RBC AUTO: 54.9 FL (ref 37–54)
DEPRECATED RDW RBC AUTO: 55.3 FL (ref 37–54)
EOSINOPHIL # BLD AUTO: 0 10*3/MM3 (ref 0–0.4)
EOSINOPHIL # BLD AUTO: 0.02 10*3/MM3 (ref 0–0.4)
EOSINOPHIL # BLD AUTO: 0.27 10*3/MM3 (ref 0–0.4)
EOSINOPHIL # BLD AUTO: 0.33 10*3/MM3 (ref 0–0.4)
EOSINOPHIL # BLD AUTO: 0.39 10*3/MM3 (ref 0–0.4)
EOSINOPHIL # BLD AUTO: 0.57 10*3/MM3 (ref 0–0.4)
EOSINOPHIL # BLD AUTO: 0.65 10*3/MM3 (ref 0–0.4)
EOSINOPHIL # BLD AUTO: 0.66 10*3/MM3 (ref 0–0.4)
EOSINOPHIL # BLD AUTO: 0.91 10*3/MM3 (ref 0–0.4)
EOSINOPHIL # BLD AUTO: 1.01 10*3/MM3 (ref 0–0.4)
EOSINOPHIL # BLD AUTO: 1.14 10*3/MM3 (ref 0–0.4)
EOSINOPHIL NFR BLD AUTO: 0 % (ref 0.3–6.2)
EOSINOPHIL NFR BLD AUTO: 0.3 % (ref 0.3–6.2)
EOSINOPHIL NFR BLD AUTO: 13.6 % (ref 0.3–6.2)
EOSINOPHIL NFR BLD AUTO: 16.8 % (ref 0.3–6.2)
EOSINOPHIL NFR BLD AUTO: 18 % (ref 0.3–6.2)
EOSINOPHIL NFR BLD AUTO: 3.2 % (ref 0.3–6.2)
EOSINOPHIL NFR BLD AUTO: 4.3 % (ref 0.3–6.2)
EOSINOPHIL NFR BLD AUTO: 4.4 % (ref 0.3–6.2)
EOSINOPHIL NFR BLD AUTO: 7.4 % (ref 0.3–6.2)
EOSINOPHIL NFR BLD AUTO: 7.5 % (ref 0.3–6.2)
EOSINOPHIL NFR BLD AUTO: 8.9 % (ref 0.3–6.2)
ERYTHROCYTE [DISTWIDTH] IN BLOOD BY AUTOMATED COUNT: 12.9 % (ref 12.3–15.4)
ERYTHROCYTE [DISTWIDTH] IN BLOOD BY AUTOMATED COUNT: 13.1 % (ref 12.3–15.4)
ERYTHROCYTE [DISTWIDTH] IN BLOOD BY AUTOMATED COUNT: 13.4 % (ref 12.3–15.4)
ERYTHROCYTE [DISTWIDTH] IN BLOOD BY AUTOMATED COUNT: 13.6 % (ref 12.3–15.4)
ERYTHROCYTE [DISTWIDTH] IN BLOOD BY AUTOMATED COUNT: 13.8 % (ref 12.3–15.4)
ERYTHROCYTE [DISTWIDTH] IN BLOOD BY AUTOMATED COUNT: 13.8 % (ref 12.3–15.4)
ERYTHROCYTE [DISTWIDTH] IN BLOOD BY AUTOMATED COUNT: 14.5 % (ref 12.3–15.4)
ERYTHROCYTE [DISTWIDTH] IN BLOOD BY AUTOMATED COUNT: 14.5 % (ref 12.3–15.4)
ERYTHROCYTE [DISTWIDTH] IN BLOOD BY AUTOMATED COUNT: 14.6 % (ref 12.3–15.4)
ERYTHROCYTE [DISTWIDTH] IN BLOOD BY AUTOMATED COUNT: 14.6 % (ref 12.3–15.4)
ERYTHROCYTE [DISTWIDTH] IN BLOOD BY AUTOMATED COUNT: 14.7 % (ref 12.3–15.4)
FIBRINOGEN PPP-MCNC: 315 MG/DL (ref 228–514)
FLUAV H1 2009 PAND RNA NPH QL NAA+PROBE: NOT DETECTED
FLUAV H1 HA GENE NPH QL NAA+PROBE: NOT DETECTED
FLUAV H3 RNA NPH QL NAA+PROBE: NOT DETECTED
FLUAV SUBTYP SPEC NAA+PROBE: NOT DETECTED
FLUBV RNA ISLT QL NAA+PROBE: NOT DETECTED
GAS FLOW AIRWAY: 15 LPM
GAS FLOW AIRWAY: 2.5 LPM
GFR SERPL CREATININE-BSD FRML MDRD: 35 ML/MIN/1.73
GFR SERPL CREATININE-BSD FRML MDRD: 35 ML/MIN/1.73
GFR SERPL CREATININE-BSD FRML MDRD: 36 ML/MIN/1.73
GFR SERPL CREATININE-BSD FRML MDRD: 37 ML/MIN/1.73
GFR SERPL CREATININE-BSD FRML MDRD: 41 ML/MIN/1.73
GFR SERPL CREATININE-BSD FRML MDRD: 44 ML/MIN/1.73
GFR SERPL CREATININE-BSD FRML MDRD: 47 ML/MIN/1.73
GFR SERPL CREATININE-BSD FRML MDRD: 48 ML/MIN/1.73
GFR SERPL CREATININE-BSD FRML MDRD: 52 ML/MIN/1.73
GFR SERPL CREATININE-BSD FRML MDRD: 54 ML/MIN/1.73
GFR SERPL CREATININE-BSD FRML MDRD: 55 ML/MIN/1.73
GFR SERPL CREATININE-BSD FRML MDRD: 55 ML/MIN/1.73
GLOBULIN UR ELPH-MCNC: 3.1 GM/DL
GLOBULIN UR ELPH-MCNC: 3.2 GM/DL
GLOBULIN UR ELPH-MCNC: 3.3 GM/DL
GLOBULIN UR ELPH-MCNC: 3.4 GM/DL
GLOBULIN UR ELPH-MCNC: 3.4 GM/DL
GLOBULIN UR ELPH-MCNC: 3.5 GM/DL
GLOBULIN UR ELPH-MCNC: 3.5 GM/DL
GLUCOSE BLD-MCNC: 100 MG/DL (ref 65–99)
GLUCOSE BLD-MCNC: 104 MG/DL (ref 65–99)
GLUCOSE BLD-MCNC: 117 MG/DL (ref 65–99)
GLUCOSE BLD-MCNC: 118 MG/DL (ref 65–99)
GLUCOSE BLD-MCNC: 130 MG/DL (ref 65–99)
GLUCOSE BLD-MCNC: 75 MG/DL (ref 65–99)
GLUCOSE BLD-MCNC: 78 MG/DL (ref 65–99)
GLUCOSE BLD-MCNC: 80 MG/DL (ref 65–99)
GLUCOSE BLD-MCNC: 96 MG/DL (ref 65–99)
GLUCOSE BLD-MCNC: 99 MG/DL (ref 65–99)
GLUCOSE BLDA-MCNC: 117 MMOL/L (ref 65–95)
GLUCOSE UR STRIP-MCNC: NEGATIVE MG/DL
GRAM STN SPEC: NORMAL
HADV DNA SPEC NAA+PROBE: NOT DETECTED
HCO3 BLDA-SCNC: 16.6 MMOL/L (ref 20–26)
HCO3 BLDA-SCNC: 22.2 MMOL/L (ref 20–26)
HCO3 BLDA-SCNC: 22.3 MMOL/L (ref 20–26)
HCO3 BLDA-SCNC: 22.5 MMOL/L (ref 20–26)
HCO3 BLDA-SCNC: 23.1 MMOL/L (ref 20–26)
HCO3 BLDA-SCNC: 23.9 MMOL/L (ref 20–26)
HCO3 BLDA-SCNC: 24.4 MMOL/L (ref 20–26)
HCO3 BLDA-SCNC: 25.1 MMOL/L (ref 20–26)
HCO3 BLDA-SCNC: 25.3 MMOL/L (ref 20–26)
HCO3 BLDA-SCNC: 25.5 MMOL/L (ref 20–26)
HCOV 229E RNA SPEC QL NAA+PROBE: NOT DETECTED
HCOV HKU1 RNA SPEC QL NAA+PROBE: NOT DETECTED
HCOV NL63 RNA SPEC QL NAA+PROBE: NOT DETECTED
HCOV OC43 RNA SPEC QL NAA+PROBE: NOT DETECTED
HCT VFR BLD AUTO: 36.1 % (ref 37.5–51)
HCT VFR BLD AUTO: 38.1 % (ref 37.5–51)
HCT VFR BLD AUTO: 38.7 % (ref 37.5–51)
HCT VFR BLD AUTO: 39.3 % (ref 37.5–51)
HCT VFR BLD AUTO: 39.9 % (ref 37.5–51)
HCT VFR BLD AUTO: 40.6 % (ref 37.5–51)
HCT VFR BLD AUTO: 40.7 % (ref 37.5–51)
HCT VFR BLD AUTO: 41.3 % (ref 37.5–51)
HCT VFR BLD AUTO: 42.2 % (ref 37.5–51)
HCT VFR BLD AUTO: 43.8 % (ref 37.5–51)
HCT VFR BLD AUTO: 45.4 % (ref 37.5–51)
HCT VFR BLD CALC: 42.6 % (ref 38–51)
HDLC SERPL-MCNC: 43 MG/DL (ref 40–60)
HGB BLD-MCNC: 11.8 G/DL (ref 13–17.7)
HGB BLD-MCNC: 12.6 G/DL (ref 13–17.7)
HGB BLD-MCNC: 12.7 G/DL (ref 13–17.7)
HGB BLD-MCNC: 12.9 G/DL (ref 13–17.7)
HGB BLD-MCNC: 13.2 G/DL (ref 13–17.7)
HGB BLD-MCNC: 13.4 G/DL (ref 13–17.7)
HGB BLD-MCNC: 13.4 G/DL (ref 13–17.7)
HGB BLD-MCNC: 13.7 G/DL (ref 13–17.7)
HGB BLD-MCNC: 13.7 G/DL (ref 13–17.7)
HGB BLD-MCNC: 14.1 G/DL (ref 13–17.7)
HGB BLD-MCNC: 14.6 G/DL (ref 13–17.7)
HGB BLDA-MCNC: 13.9 G/DL (ref 14–18)
HGB UR QL STRIP.AUTO: ABNORMAL
HMPV RNA NPH QL NAA+NON-PROBE: NOT DETECTED
HOLD SPECIMEN: NORMAL
HOROWITZ INDEX BLD+IHG-RTO: 100 %
HOROWITZ INDEX BLD+IHG-RTO: 40 %
HOROWITZ INDEX BLD+IHG-RTO: 40 %
HOROWITZ INDEX BLD+IHG-RTO: 60 %
HPIV1 RNA SPEC QL NAA+PROBE: NOT DETECTED
HPIV2 RNA SPEC QL NAA+PROBE: NOT DETECTED
HPIV3 RNA NPH QL NAA+PROBE: NOT DETECTED
HPIV4 P GENE NPH QL NAA+PROBE: NOT DETECTED
HYALINE CASTS UR QL AUTO: ABNORMAL /LPF
IMM GRANULOCYTES # BLD AUTO: 0.01 10*3/MM3 (ref 0–0.05)
IMM GRANULOCYTES # BLD AUTO: 0.02 10*3/MM3 (ref 0–0.05)
IMM GRANULOCYTES # BLD AUTO: 0.03 10*3/MM3 (ref 0–0.05)
IMM GRANULOCYTES # BLD AUTO: 0.06 10*3/MM3 (ref 0–0.05)
IMM GRANULOCYTES # BLD AUTO: 0.08 10*3/MM3 (ref 0–0.05)
IMM GRANULOCYTES # BLD AUTO: 0.09 10*3/MM3 (ref 0–0.05)
IMM GRANULOCYTES # BLD AUTO: 0.09 10*3/MM3 (ref 0–0.05)
IMM GRANULOCYTES NFR BLD AUTO: 0.2 % (ref 0–0.5)
IMM GRANULOCYTES NFR BLD AUTO: 0.3 % (ref 0–0.5)
IMM GRANULOCYTES NFR BLD AUTO: 0.4 % (ref 0–0.5)
IMM GRANULOCYTES NFR BLD AUTO: 0.8 % (ref 0–0.5)
IMM GRANULOCYTES NFR BLD AUTO: 0.9 % (ref 0–0.5)
IMM GRANULOCYTES NFR BLD AUTO: 1 % (ref 0–0.5)
IMM GRANULOCYTES NFR BLD AUTO: 1.1 % (ref 0–0.5)
INR PPP: 1.11 (ref 0.8–1.2)
KETONES UR QL STRIP: NEGATIVE
LAB AP CASE REPORT: NORMAL
LDH SERPL-CCNC: 384 U/L (ref 135–225)
LDLC SERPL CALC-MCNC: 57 MG/DL (ref 0–100)
LDLC/HDLC SERPL: 1.32 {RATIO}
LEUKOCYTE ESTERASE UR QL STRIP.AUTO: ABNORMAL
LYMPHOCYTES # BLD AUTO: 0.28 10*3/MM3 (ref 0.7–3.1)
LYMPHOCYTES # BLD AUTO: 0.6 10*3/MM3 (ref 0.7–3.1)
LYMPHOCYTES # BLD AUTO: 0.62 10*3/MM3 (ref 0.7–3.1)
LYMPHOCYTES # BLD AUTO: 0.77 10*3/MM3 (ref 0.7–3.1)
LYMPHOCYTES # BLD AUTO: 0.84 10*3/MM3 (ref 0.7–3.1)
LYMPHOCYTES # BLD AUTO: 0.95 10*3/MM3 (ref 0.7–3.1)
LYMPHOCYTES # BLD AUTO: 1.32 10*3/MM3 (ref 0.7–3.1)
LYMPHOCYTES # BLD AUTO: 1.34 10*3/MM3 (ref 0.7–3.1)
LYMPHOCYTES # BLD AUTO: 1.34 10*3/MM3 (ref 0.7–3.1)
LYMPHOCYTES # BLD AUTO: 1.43 10*3/MM3 (ref 0.7–3.1)
LYMPHOCYTES # BLD AUTO: 1.45 10*3/MM3 (ref 0.7–3.1)
LYMPHOCYTES NFR BLD AUTO: 11.3 % (ref 19.6–45.3)
LYMPHOCYTES NFR BLD AUTO: 14.6 % (ref 19.6–45.3)
LYMPHOCYTES NFR BLD AUTO: 14.8 % (ref 19.6–45.3)
LYMPHOCYTES NFR BLD AUTO: 19.2 % (ref 19.6–45.3)
LYMPHOCYTES NFR BLD AUTO: 19.8 % (ref 19.6–45.3)
LYMPHOCYTES NFR BLD AUTO: 21.2 % (ref 19.6–45.3)
LYMPHOCYTES NFR BLD AUTO: 23.8 % (ref 19.6–45.3)
LYMPHOCYTES NFR BLD AUTO: 3.5 % (ref 19.6–45.3)
LYMPHOCYTES NFR BLD AUTO: 7.2 % (ref 19.6–45.3)
LYMPHOCYTES NFR BLD AUTO: 8 % (ref 19.6–45.3)
LYMPHOCYTES NFR BLD AUTO: 8.8 % (ref 19.6–45.3)
Lab: ABNORMAL
M PNEUMO IGG SER IA-ACNC: NOT DETECTED
MAGNESIUM SERPL-MCNC: 1.4 MG/DL (ref 1.6–2.4)
MAGNESIUM SERPL-MCNC: 1.4 MG/DL (ref 1.6–2.4)
MAGNESIUM SERPL-MCNC: 2.4 MG/DL (ref 1.6–2.4)
MCH RBC QN AUTO: 31.4 PG (ref 26.6–33)
MCH RBC QN AUTO: 31.6 PG (ref 26.6–33)
MCH RBC QN AUTO: 31.7 PG (ref 26.6–33)
MCH RBC QN AUTO: 31.9 PG (ref 26.6–33)
MCH RBC QN AUTO: 31.9 PG (ref 26.6–33)
MCH RBC QN AUTO: 32.1 PG (ref 26.6–33)
MCH RBC QN AUTO: 32.2 PG (ref 26.6–33)
MCH RBC QN AUTO: 32.3 PG (ref 26.6–33)
MCH RBC QN AUTO: 32.6 PG (ref 26.6–33)
MCHC RBC AUTO-ENTMCNC: 31.6 G/DL (ref 31.5–35.7)
MCHC RBC AUTO-ENTMCNC: 32.2 G/DL (ref 31.5–35.7)
MCHC RBC AUTO-ENTMCNC: 32.2 G/DL (ref 31.5–35.7)
MCHC RBC AUTO-ENTMCNC: 32.4 G/DL (ref 31.5–35.7)
MCHC RBC AUTO-ENTMCNC: 32.5 G/DL (ref 31.5–35.7)
MCHC RBC AUTO-ENTMCNC: 32.7 G/DL (ref 31.5–35.7)
MCHC RBC AUTO-ENTMCNC: 32.9 G/DL (ref 31.5–35.7)
MCHC RBC AUTO-ENTMCNC: 33.3 G/DL (ref 31.5–35.7)
MCHC RBC AUTO-ENTMCNC: 33.3 G/DL (ref 31.5–35.7)
MCHC RBC AUTO-ENTMCNC: 33.6 G/DL (ref 31.5–35.7)
MCHC RBC AUTO-ENTMCNC: 33.7 G/DL (ref 31.5–35.7)
MCV RBC AUTO: 100.5 FL (ref 79–97)
MCV RBC AUTO: 101 FL (ref 79–97)
MCV RBC AUTO: 94.2 FL (ref 79–97)
MCV RBC AUTO: 94.4 FL (ref 79–97)
MCV RBC AUTO: 96 FL (ref 79–97)
MCV RBC AUTO: 96.7 FL (ref 79–97)
MCV RBC AUTO: 97 FL (ref 79–97)
MCV RBC AUTO: 97.6 FL (ref 79–97)
MCV RBC AUTO: 98.6 FL (ref 79–97)
MCV RBC AUTO: 99.1 FL (ref 79–97)
MCV RBC AUTO: 99.8 FL (ref 79–97)
METHGB BLD QL: 0.7 % (ref 0–3)
MODALITY: ABNORMAL
MONOCYTES # BLD AUTO: 0.28 10*3/MM3 (ref 0.1–0.9)
MONOCYTES # BLD AUTO: 0.37 10*3/MM3 (ref 0.1–0.9)
MONOCYTES # BLD AUTO: 0.5 10*3/MM3 (ref 0.1–0.9)
MONOCYTES # BLD AUTO: 0.57 10*3/MM3 (ref 0.1–0.9)
MONOCYTES # BLD AUTO: 0.65 10*3/MM3 (ref 0.1–0.9)
MONOCYTES # BLD AUTO: 0.73 10*3/MM3 (ref 0.1–0.9)
MONOCYTES # BLD AUTO: 0.75 10*3/MM3 (ref 0.1–0.9)
MONOCYTES # BLD AUTO: 0.76 10*3/MM3 (ref 0.1–0.9)
MONOCYTES # BLD AUTO: 0.77 10*3/MM3 (ref 0.1–0.9)
MONOCYTES # BLD AUTO: 0.9 10*3/MM3 (ref 0.1–0.9)
MONOCYTES # BLD AUTO: 0.92 10*3/MM3 (ref 0.1–0.9)
MONOCYTES NFR BLD AUTO: 10.2 % (ref 5–12)
MONOCYTES NFR BLD AUTO: 10.3 % (ref 5–12)
MONOCYTES NFR BLD AUTO: 11.9 % (ref 5–12)
MONOCYTES NFR BLD AUTO: 12.2 % (ref 5–12)
MONOCYTES NFR BLD AUTO: 3.5 % (ref 5–12)
MONOCYTES NFR BLD AUTO: 4.9 % (ref 5–12)
MONOCYTES NFR BLD AUTO: 7.9 % (ref 5–12)
MONOCYTES NFR BLD AUTO: 8.6 % (ref 5–12)
MONOCYTES NFR BLD AUTO: 8.8 % (ref 5–12)
MONOCYTES NFR BLD AUTO: 8.9 % (ref 5–12)
MONOCYTES NFR BLD AUTO: 9.7 % (ref 5–12)
MRSA DNA SPEC QL NAA+PROBE: NEGATIVE
NEUTROPHILS # BLD AUTO: 2.78 10*3/MM3 (ref 1.7–7)
NEUTROPHILS # BLD AUTO: 3.31 10*3/MM3 (ref 1.7–7)
NEUTROPHILS # BLD AUTO: 3.73 10*3/MM3 (ref 1.7–7)
NEUTROPHILS # BLD AUTO: 4.59 10*3/MM3 (ref 1.7–7)
NEUTROPHILS # BLD AUTO: 4.92 10*3/MM3 (ref 1.7–7)
NEUTROPHILS # BLD AUTO: 5.07 10*3/MM3 (ref 1.7–7)
NEUTROPHILS # BLD AUTO: 6.13 10*3/MM3 (ref 1.7–7)
NEUTROPHILS # BLD AUTO: 6.29 10*3/MM3 (ref 1.7–7)
NEUTROPHILS # BLD AUTO: 6.49 10*3/MM3 (ref 1.7–7)
NEUTROPHILS # BLD AUTO: 6.79 10*3/MM3 (ref 1.7–7)
NEUTROPHILS # BLD AUTO: 7.37 10*3/MM3 (ref 1.7–7)
NEUTROPHILS NFR BLD AUTO: 46.4 % (ref 42.7–76)
NEUTROPHILS NFR BLD AUTO: 52.4 % (ref 42.7–76)
NEUTROPHILS NFR BLD AUTO: 56 % (ref 42.7–76)
NEUTROPHILS NFR BLD AUTO: 60.6 % (ref 42.7–76)
NEUTROPHILS NFR BLD AUTO: 68.2 % (ref 42.7–76)
NEUTROPHILS NFR BLD AUTO: 69.4 % (ref 42.7–76)
NEUTROPHILS NFR BLD AUTO: 74 % (ref 42.7–76)
NEUTROPHILS NFR BLD AUTO: 75.8 % (ref 42.7–76)
NEUTROPHILS NFR BLD AUTO: 79.2 % (ref 42.7–76)
NEUTROPHILS NFR BLD AUTO: 81.9 % (ref 42.7–76)
NEUTROPHILS NFR BLD AUTO: 92.3 % (ref 42.7–76)
NITRITE UR QL STRIP: NEGATIVE
NOTE: ABNORMAL
NRBC BLD AUTO-RTO: 0 /100 WBC (ref 0–0.2)
NT-PROBNP SERPL-MCNC: 5533 PG/ML (ref 5–1800)
OXYHGB MFR BLDV: 76.6 % (ref 94–99)
PATH REPORT.FINAL DX SPEC: NORMAL
PATH REPORT.GROSS SPEC: NORMAL
PAW @ PEAK INSP FLOW SETTING VENT: 25 CMH2O
PAW @ PEAK INSP FLOW SETTING VENT: 26 CMH2O
PAW @ PEAK INSP FLOW SETTING VENT: 26 CMH2O
PCO2 BLDA: 35.7 MM HG (ref 35–45)
PCO2 BLDA: 39.1 MM HG (ref 35–45)
PCO2 BLDA: 53.1 MM HG (ref 35–45)
PCO2 BLDA: 56 MM HG (ref 35–45)
PCO2 BLDA: 56.2 MM HG (ref 35–45)
PCO2 BLDA: 57.7 MM HG (ref 35–45)
PCO2 BLDA: 57.8 MM HG (ref 35–45)
PCO2 BLDA: 58.4 MM HG (ref 35–45)
PCO2 BLDA: 59 MM HG (ref 35–45)
PCO2 BLDA: 66.8 MM HG (ref 35–45)
PCO2 TEMP ADJ BLD: ABNORMAL MM[HG]
PEEP RESPIRATORY: 10 CM[H2O]
PEEP RESPIRATORY: 10 CM[H2O]
PEEP RESPIRATORY: 5 CM[H2O]
PH BLDA: 7.19 PH UNITS (ref 7.35–7.45)
PH BLDA: 7.19 PH UNITS (ref 7.35–7.45)
PH BLDA: 7.2 PH UNITS (ref 7.35–7.45)
PH BLDA: 7.21 PH UNITS (ref 7.35–7.45)
PH BLDA: 7.23 PH UNITS (ref 7.35–7.45)
PH BLDA: 7.24 PH UNITS (ref 7.35–7.45)
PH BLDA: 7.24 PH UNITS (ref 7.35–7.45)
PH BLDA: 7.25 PH UNITS (ref 7.35–7.45)
PH BLDA: 7.28 PH UNITS (ref 7.35–7.45)
PH BLDA: 7.42 PH UNITS (ref 7.35–7.45)
PH UR STRIP.AUTO: 6 [PH] (ref 5–9)
PH, TEMP CORRECTED: ABNORMAL
PLATELET # BLD AUTO: 118 10*3/MM3 (ref 140–450)
PLATELET # BLD AUTO: 141 10*3/MM3 (ref 140–450)
PLATELET # BLD AUTO: 144 10*3/MM3 (ref 140–450)
PLATELET # BLD AUTO: 161 10*3/MM3 (ref 140–450)
PLATELET # BLD AUTO: 162 10*3/MM3 (ref 140–450)
PLATELET # BLD AUTO: 71 10*3/MM3 (ref 140–450)
PLATELET # BLD AUTO: 84 10*3/MM3 (ref 140–450)
PLATELET # BLD AUTO: 85 10*3/MM3 (ref 140–450)
PLATELET # BLD AUTO: 92 10*3/MM3 (ref 140–450)
PLATELET # BLD AUTO: 94 10*3/MM3 (ref 140–450)
PLATELET # BLD AUTO: 95 10*3/MM3 (ref 140–450)
PMV BLD AUTO: 10 FL (ref 6–12)
PMV BLD AUTO: 10.1 FL (ref 6–12)
PMV BLD AUTO: 10.1 FL (ref 6–12)
PMV BLD AUTO: 10.4 FL (ref 6–12)
PMV BLD AUTO: 10.8 FL (ref 6–12)
PMV BLD AUTO: 10.9 FL (ref 6–12)
PMV BLD AUTO: 10.9 FL (ref 6–12)
PMV BLD AUTO: 11.2 FL (ref 6–12)
PMV BLD AUTO: 11.4 FL (ref 6–12)
PO2 BLDA: 101 MM HG (ref 83–108)
PO2 BLDA: 153 MM HG (ref 83–108)
PO2 BLDA: 47.4 MM HG (ref 83–108)
PO2 BLDA: 74.8 MM HG (ref 83–108)
PO2 BLDA: 80.9 MM HG (ref 83–108)
PO2 BLDA: 82.3 MM HG (ref 83–108)
PO2 BLDA: 82.9 MM HG (ref 83–108)
PO2 BLDA: 87.1 MM HG (ref 83–108)
PO2 BLDA: 87.3 MM HG (ref 83–108)
PO2 BLDA: 99 MM HG (ref 83–108)
PO2 TEMP ADJ BLD: ABNORMAL MM[HG]
POTASSIUM BLD-SCNC: 4 MMOL/L (ref 3.5–5.2)
POTASSIUM BLD-SCNC: 4 MMOL/L (ref 3.5–5.2)
POTASSIUM BLD-SCNC: 4.1 MMOL/L (ref 3.5–5.2)
POTASSIUM BLD-SCNC: 4.5 MMOL/L (ref 3.5–5.2)
POTASSIUM BLD-SCNC: 4.5 MMOL/L (ref 3.5–5.2)
POTASSIUM BLD-SCNC: 4.6 MMOL/L (ref 3.5–5.2)
POTASSIUM BLD-SCNC: 4.6 MMOL/L (ref 3.5–5.2)
POTASSIUM BLD-SCNC: 4.7 MMOL/L (ref 3.5–5.2)
POTASSIUM BLD-SCNC: 4.7 MMOL/L (ref 3.5–5.2)
POTASSIUM BLD-SCNC: 4.9 MMOL/L (ref 3.5–5.2)
POTASSIUM BLD-SCNC: 5 MMOL/L (ref 3.5–5.2)
POTASSIUM BLD-SCNC: 5.2 MMOL/L (ref 3.5–5.2)
POTASSIUM BLDA-SCNC: 4.2 MMOL/L (ref 3.4–4.5)
PROCALCITONIN SERPL-MCNC: 7.45 NG/ML (ref 0.1–0.25)
PROT SERPL-MCNC: 5.8 G/DL (ref 6–8.5)
PROT SERPL-MCNC: 6 G/DL (ref 6–8.5)
PROT SERPL-MCNC: 6.5 G/DL (ref 6–8.5)
PROT SERPL-MCNC: 6.7 G/DL (ref 6–8.5)
PROT SERPL-MCNC: 6.8 G/DL (ref 6–8.5)
PROT UR QL STRIP: ABNORMAL
PROTHROMBIN TIME: 14.1 SECONDS (ref 11.1–15.3)
RBC # BLD AUTO: 3.66 10*6/MM3 (ref 4.14–5.8)
RBC # BLD AUTO: 3.94 10*6/MM3 (ref 4.14–5.8)
RBC # BLD AUTO: 3.95 10*6/MM3 (ref 4.14–5.8)
RBC # BLD AUTO: 4.05 10*6/MM3 (ref 4.14–5.8)
RBC # BLD AUTO: 4.11 10*6/MM3 (ref 4.14–5.8)
RBC # BLD AUTO: 4.23 10*6/MM3 (ref 4.14–5.8)
RBC # BLD AUTO: 4.24 10*6/MM3 (ref 4.14–5.8)
RBC # BLD AUTO: 4.26 10*6/MM3 (ref 4.14–5.8)
RBC # BLD AUTO: 4.3 10*6/MM3 (ref 4.14–5.8)
RBC # BLD AUTO: 4.36 10*6/MM3 (ref 4.14–5.8)
RBC # BLD AUTO: 4.55 10*6/MM3 (ref 4.14–5.8)
RBC # UR: ABNORMAL /HPF
RBC MORPH BLD: NORMAL
REF LAB TEST METHOD: ABNORMAL
REF LAB TEST RESULTS: NORMAL
RHINOVIRUS RNA SPEC NAA+PROBE: NOT DETECTED
RSV RNA NPH QL NAA+NON-PROBE: NOT DETECTED
SAO2 % BLDCOA: 78.5 % (ref 94–99)
SAO2 % BLDCOA: 94.9 % (ref 94–99)
SAO2 % BLDCOA: 95.3 % (ref 94–99)
SAO2 % BLDCOA: 95.3 % (ref 94–99)
SAO2 % BLDCOA: 95.4 % (ref 94–99)
SAO2 % BLDCOA: 95.8 % (ref 94–99)
SAO2 % BLDCOA: 96.8 % (ref 94–99)
SAO2 % BLDCOA: 97.2 % (ref 94–99)
SAO2 % BLDCOA: 97.6 % (ref 94–99)
SAO2 % BLDCOA: 99.1 % (ref 94–99)
SARS-COV-2 RNA RESP QL NAA+PROBE: DETECTED
SET MECH RESP RATE: 14
SET MECH RESP RATE: 24
SET MECH RESP RATE: 24
SET MECH RESP RATE: 28
SMALL PLATELETS BLD QL SMEAR: NORMAL
SODIUM BLD-SCNC: 138 MMOL/L (ref 136–145)
SODIUM BLD-SCNC: 139 MMOL/L (ref 136–145)
SODIUM BLD-SCNC: 141 MMOL/L (ref 136–145)
SODIUM BLD-SCNC: 144 MMOL/L (ref 136–145)
SODIUM BLD-SCNC: 145 MMOL/L (ref 136–145)
SODIUM BLD-SCNC: 146 MMOL/L (ref 136–145)
SODIUM BLD-SCNC: 146 MMOL/L (ref 136–145)
SODIUM BLDA-SCNC: 139 MMOL/L (ref 136–146)
SP GR UR STRIP: 1.02 (ref 1–1.03)
SQUAMOUS #/AREA URNS HPF: ABNORMAL /HPF
T4 FREE SERPL-MCNC: 0.63 NG/DL (ref 0.93–1.7)
T4 FREE SERPL-MCNC: 0.86 NG/DL (ref 0.93–1.7)
T4 FREE SERPL-MCNC: 2.15 NG/DL (ref 0.93–1.7)
TRIGL SERPL-MCNC: 72 MG/DL (ref 0–150)
TROPONIN T SERPL-MCNC: 0.01 NG/ML (ref 0–0.03)
TROPONIN T SERPL-MCNC: 0.02 NG/ML (ref 0–0.03)
TSH SERPL DL<=0.05 MIU/L-ACNC: 0.03 UIU/ML (ref 0.27–4.2)
TSH SERPL DL<=0.05 MIU/L-ACNC: 1.95 UIU/ML (ref 0.27–4.2)
TSH SERPL DL<=0.05 MIU/L-ACNC: 3.39 UIU/ML (ref 0.27–4.2)
UROBILINOGEN UR QL STRIP: ABNORMAL
VENTILATOR MODE: ABNORMAL
VENTILATOR MODE: AC
VLDLC SERPL-MCNC: 14.4 MG/DL
VT ON VENT VENT: 310 ML
VT ON VENT VENT: 450 ML
VT ON VENT VENT: 480 ML
VT ON VENT VENT: 520 ML
WBC MORPH BLD: NORMAL
WBC NRBC COR # BLD: 6 10*3/MM3 (ref 3.4–10.8)
WBC NRBC COR # BLD: 6.32 10*3/MM3 (ref 3.4–10.8)
WBC NRBC COR # BLD: 6.49 10*3/MM3 (ref 3.4–10.8)
WBC NRBC COR # BLD: 6.67 10*3/MM3 (ref 3.4–10.8)
WBC NRBC COR # BLD: 7.44 10*3/MM3 (ref 3.4–10.8)
WBC NRBC COR # BLD: 7.49 10*3/MM3 (ref 3.4–10.8)
WBC NRBC COR # BLD: 7.57 10*3/MM3 (ref 3.4–10.8)
WBC NRBC COR # BLD: 7.98 10*3/MM3 (ref 3.4–10.8)
WBC NRBC COR # BLD: 8.56 10*3/MM3 (ref 3.4–10.8)
WBC NRBC COR # BLD: 8.77 10*3/MM3 (ref 3.4–10.8)
WBC NRBC COR # BLD: 9.06 10*3/MM3 (ref 3.4–10.8)
WBC UR QL AUTO: ABNORMAL /HPF
WHOLE BLOOD HOLD SPECIMEN: NORMAL

## 2020-01-01 PROCEDURE — 85025 COMPLETE CBC W/AUTO DIFF WBC: CPT | Performed by: PHYSICIAN ASSISTANT

## 2020-01-01 PROCEDURE — 96413 CHEMO IV INFUSION 1 HR: CPT | Performed by: INTERNAL MEDICINE

## 2020-01-01 PROCEDURE — 99291 CRITICAL CARE FIRST HOUR: CPT | Performed by: INTERNAL MEDICINE

## 2020-01-01 PROCEDURE — 25010000002 MAGNESIUM SULFATE 2 GM/50ML SOLUTION: Performed by: INTERNAL MEDICINE

## 2020-01-01 PROCEDURE — 84443 ASSAY THYROID STIM HORMONE: CPT

## 2020-01-01 PROCEDURE — 36600 WITHDRAWAL OF ARTERIAL BLOOD: CPT

## 2020-01-01 PROCEDURE — 1123F ACP DISCUSS/DSCN MKR DOCD: CPT | Performed by: INTERNAL MEDICINE

## 2020-01-01 PROCEDURE — 84439 ASSAY OF FREE THYROXINE: CPT

## 2020-01-01 PROCEDURE — 80061 LIPID PANEL: CPT | Performed by: INTERNAL MEDICINE

## 2020-01-01 PROCEDURE — 85025 COMPLETE CBC W/AUTO DIFF WBC: CPT | Performed by: HOSPITALIST

## 2020-01-01 PROCEDURE — 83880 ASSAY OF NATRIURETIC PEPTIDE: CPT | Performed by: EMERGENCY MEDICINE

## 2020-01-01 PROCEDURE — 94799 UNLISTED PULMONARY SVC/PX: CPT

## 2020-01-01 PROCEDURE — 81001 URINALYSIS AUTO W/SCOPE: CPT | Performed by: EMERGENCY MEDICINE

## 2020-01-01 PROCEDURE — 25010000002 NIVOLUMAB 240 MG/24ML SOLUTION 24 ML VIAL: Performed by: INTERNAL MEDICINE

## 2020-01-01 PROCEDURE — 25010000002 PROPOFOL 10 MG/ML EMULSION: Performed by: HOSPITALIST

## 2020-01-01 PROCEDURE — G9903 PT SCRN TBCO ID AS NON USER: HCPCS | Performed by: INTERNAL MEDICINE

## 2020-01-01 PROCEDURE — G8731 PAIN NEG NO PLAN: HCPCS | Performed by: INTERNAL MEDICINE

## 2020-01-01 PROCEDURE — 80053 COMPREHEN METABOLIC PANEL: CPT

## 2020-01-01 PROCEDURE — 84484 ASSAY OF TROPONIN QUANT: CPT | Performed by: EMERGENCY MEDICINE

## 2020-01-01 PROCEDURE — 99232 SBSQ HOSP IP/OBS MODERATE 35: CPT | Performed by: INTERNAL MEDICINE

## 2020-01-01 PROCEDURE — 80048 BASIC METABOLIC PNL TOTAL CA: CPT | Performed by: HOSPITALIST

## 2020-01-01 PROCEDURE — 25010000002 CEFEPIME 2 G/NS 100 ML SOLUTION: Performed by: PHYSICIAN ASSISTANT

## 2020-01-01 PROCEDURE — 99214 OFFICE O/P EST MOD 30 MIN: CPT | Performed by: INTERNAL MEDICINE

## 2020-01-01 PROCEDURE — 71045 X-RAY EXAM CHEST 1 VIEW: CPT

## 2020-01-01 PROCEDURE — 25010000002 LORAZEPAM PER 2 MG

## 2020-01-01 PROCEDURE — 83615 LACTATE (LD) (LDH) ENZYME: CPT | Performed by: HOSPITALIST

## 2020-01-01 PROCEDURE — 99285 EMERGENCY DEPT VISIT HI MDM: CPT

## 2020-01-01 PROCEDURE — 85384 FIBRINOGEN ACTIVITY: CPT | Performed by: HOSPITALIST

## 2020-01-01 PROCEDURE — 25010000002 CEFEPIME PER 500 MG: Performed by: PHYSICIAN ASSISTANT

## 2020-01-01 PROCEDURE — 71250 CT THORAX DX C-: CPT

## 2020-01-01 PROCEDURE — 85379 FIBRIN DEGRADATION QUANT: CPT | Performed by: HOSPITALIST

## 2020-01-01 PROCEDURE — 82803 BLOOD GASES ANY COMBINATION: CPT

## 2020-01-01 PROCEDURE — 70450 CT HEAD/BRAIN W/O DYE: CPT

## 2020-01-01 PROCEDURE — C1751 CATH, INF, PER/CENT/MIDLINE: HCPCS

## 2020-01-01 PROCEDURE — 87641 MR-STAPH DNA AMP PROBE: CPT | Performed by: PHYSICIAN ASSISTANT

## 2020-01-01 PROCEDURE — 85007 BL SMEAR W/DIFF WBC COUNT: CPT | Performed by: EMERGENCY MEDICINE

## 2020-01-01 PROCEDURE — 80053 COMPREHEN METABOLIC PANEL: CPT | Performed by: EMERGENCY MEDICINE

## 2020-01-01 PROCEDURE — 36415 COLL VENOUS BLD VENIPUNCTURE: CPT | Performed by: INTERNAL MEDICINE

## 2020-01-01 PROCEDURE — 83050 HGB METHEMOGLOBIN QUAN: CPT

## 2020-01-01 PROCEDURE — 94002 VENT MGMT INPAT INIT DAY: CPT

## 2020-01-01 PROCEDURE — G0378 HOSPITAL OBSERVATION PER HR: HCPCS

## 2020-01-01 PROCEDURE — 94760 N-INVAS EAR/PLS OXIMETRY 1: CPT

## 2020-01-01 PROCEDURE — 87086 URINE CULTURE/COLONY COUNT: CPT | Performed by: PHYSICIAN ASSISTANT

## 2020-01-01 PROCEDURE — 93005 ELECTROCARDIOGRAM TRACING: CPT | Performed by: EMERGENCY MEDICINE

## 2020-01-01 PROCEDURE — 96361 HYDRATE IV INFUSION ADD-ON: CPT | Performed by: INTERNAL MEDICINE

## 2020-01-01 PROCEDURE — 25010000002 MORPHINE PER 10 MG: Performed by: INTERNAL MEDICINE

## 2020-01-01 PROCEDURE — 3E0G76Z INTRODUCTION OF NUTRITIONAL SUBSTANCE INTO UPPER GI, VIA NATURAL OR ARTIFICIAL OPENING: ICD-10-PCS | Performed by: INTERNAL MEDICINE

## 2020-01-01 PROCEDURE — 82375 ASSAY CARBOXYHB QUANT: CPT

## 2020-01-01 PROCEDURE — 82805 BLOOD GASES W/O2 SATURATION: CPT

## 2020-01-01 PROCEDURE — 94003 VENT MGMT INPAT SUBQ DAY: CPT

## 2020-01-01 PROCEDURE — 85007 BL SMEAR W/DIFF WBC COUNT: CPT | Performed by: HOSPITALIST

## 2020-01-01 PROCEDURE — 85025 COMPLETE CBC W/AUTO DIFF WBC: CPT

## 2020-01-01 PROCEDURE — 82550 ASSAY OF CK (CPK): CPT | Performed by: INTERNAL MEDICINE

## 2020-01-01 PROCEDURE — 83605 ASSAY OF LACTIC ACID: CPT | Performed by: HOSPITALIST

## 2020-01-01 PROCEDURE — U0003 INFECTIOUS AGENT DETECTION BY NUCLEIC ACID (DNA OR RNA); SEVERE ACUTE RESPIRATORY SYNDROME CORONAVIRUS 2 (SARS-COV-2) (CORONAVIRUS DISEASE [COVID-19]), AMPLIFIED PROBE TECHNIQUE, MAKING USE OF HIGH THROUGHPUT TECHNOLOGIES AS DESCRIBED BY CMS-2020-01-R: HCPCS | Performed by: PHYSICIAN ASSISTANT

## 2020-01-01 PROCEDURE — 84145 PROCALCITONIN (PCT): CPT | Performed by: HOSPITALIST

## 2020-01-01 PROCEDURE — 99214 OFFICE O/P EST MOD 30 MIN: CPT | Performed by: THORACIC SURGERY (CARDIOTHORACIC VASCULAR SURGERY)

## 2020-01-01 PROCEDURE — 93010 ELECTROCARDIOGRAM REPORT: CPT | Performed by: INTERNAL MEDICINE

## 2020-01-01 PROCEDURE — 80048 BASIC METABOLIC PNL TOTAL CA: CPT | Performed by: PHYSICIAN ASSISTANT

## 2020-01-01 PROCEDURE — 85007 BL SMEAR W/DIFF WBC COUNT: CPT | Performed by: PHYSICIAN ASSISTANT

## 2020-01-01 PROCEDURE — 83735 ASSAY OF MAGNESIUM: CPT | Performed by: EMERGENCY MEDICINE

## 2020-01-01 PROCEDURE — 25010000002 PROPOFOL 10 MG/ML EMULSION: Performed by: ANESTHESIOLOGY

## 2020-01-01 PROCEDURE — 74176 CT ABD & PELVIS W/O CONTRAST: CPT

## 2020-01-01 PROCEDURE — 05HY33Z INSERTION OF INFUSION DEVICE INTO UPPER VEIN, PERCUTANEOUS APPROACH: ICD-10-PCS | Performed by: INTERNAL MEDICINE

## 2020-01-01 PROCEDURE — 83735 ASSAY OF MAGNESIUM: CPT | Performed by: HOSPITALIST

## 2020-01-01 PROCEDURE — 0099U HC BIOFIRE FILMARRAY RESP PANEL 1: CPT | Performed by: INTERNAL MEDICINE

## 2020-01-01 PROCEDURE — 36415 COLL VENOUS BLD VENIPUNCTURE: CPT

## 2020-01-01 PROCEDURE — 87040 BLOOD CULTURE FOR BACTERIA: CPT | Performed by: EMERGENCY MEDICINE

## 2020-01-01 PROCEDURE — 87070 CULTURE OTHR SPECIMN AEROBIC: CPT | Performed by: HOSPITALIST

## 2020-01-01 PROCEDURE — 87635 SARS-COV-2 COVID-19 AMP PRB: CPT | Performed by: PHYSICIAN ASSISTANT

## 2020-01-01 PROCEDURE — 25010000002 PROPOFOL 10 MG/ML EMULSION: Performed by: NURSE ANESTHETIST, CERTIFIED REGISTERED

## 2020-01-01 PROCEDURE — 85730 THROMBOPLASTIN TIME PARTIAL: CPT | Performed by: HOSPITALIST

## 2020-01-01 PROCEDURE — 36410 VNPNXR 3YR/> PHY/QHP DX/THER: CPT

## 2020-01-01 PROCEDURE — 83735 ASSAY OF MAGNESIUM: CPT | Performed by: INTERNAL MEDICINE

## 2020-01-01 PROCEDURE — 80053 COMPREHEN METABOLIC PANEL: CPT | Performed by: PHYSICIAN ASSISTANT

## 2020-01-01 PROCEDURE — 85610 PROTHROMBIN TIME: CPT | Performed by: HOSPITALIST

## 2020-01-01 PROCEDURE — 5A1955Z RESPIRATORY VENTILATION, GREATER THAN 96 CONSECUTIVE HOURS: ICD-10-PCS | Performed by: INTERNAL MEDICINE

## 2020-01-01 PROCEDURE — 0BH17EZ INSERTION OF ENDOTRACHEAL AIRWAY INTO TRACHEA, VIA NATURAL OR ARTIFICIAL OPENING: ICD-10-PCS | Performed by: ANESTHESIOLOGY

## 2020-01-01 PROCEDURE — 25010000002 SUCCINYLCHOLINE PER 20 MG: Performed by: ANESTHESIOLOGY

## 2020-01-01 PROCEDURE — 82553 CREATINE MB FRACTION: CPT | Performed by: INTERNAL MEDICINE

## 2020-01-01 PROCEDURE — 84484 ASSAY OF TROPONIN QUANT: CPT | Performed by: INTERNAL MEDICINE

## 2020-01-01 PROCEDURE — 88305 TISSUE EXAM BY PATHOLOGIST: CPT | Performed by: PATHOLOGY

## 2020-01-01 PROCEDURE — 25010000002 LORAZEPAM PER 2 MG: Performed by: INTERNAL MEDICINE

## 2020-01-01 PROCEDURE — 87205 SMEAR GRAM STAIN: CPT | Performed by: HOSPITALIST

## 2020-01-01 PROCEDURE — 85025 COMPLETE CBC W/AUTO DIFF WBC: CPT | Performed by: EMERGENCY MEDICINE

## 2020-01-01 PROCEDURE — 88305 TISSUE EXAM BY PATHOLOGIST: CPT | Performed by: INTERNAL MEDICINE

## 2020-01-01 PROCEDURE — 80053 COMPREHEN METABOLIC PANEL: CPT | Performed by: HOSPITALIST

## 2020-01-01 PROCEDURE — 83605 ASSAY OF LACTIC ACID: CPT | Performed by: EMERGENCY MEDICINE

## 2020-01-01 PROCEDURE — 31500 INSERT EMERGENCY AIRWAY: CPT | Performed by: ANESTHESIOLOGY

## 2020-01-01 PROCEDURE — 76937 US GUIDE VASCULAR ACCESS: CPT

## 2020-01-01 RX ORDER — MIDAZOLAM HYDROCHLORIDE 1 MG/ML
1 INJECTION INTRAMUSCULAR; INTRAVENOUS
Status: DISCONTINUED | OUTPATIENT
Start: 2020-01-01 | End: 2020-01-01

## 2020-01-01 RX ORDER — MORPHINE SULFATE 20 MG/ML
5 SOLUTION ORAL EVERY 12 HOURS PRN
Status: DISCONTINUED | OUTPATIENT
Start: 2020-01-01 | End: 2020-01-01 | Stop reason: HOSPADM

## 2020-01-01 RX ORDER — ACETAMINOPHEN 650 MG/1
650 SUPPOSITORY RECTAL EVERY 6 HOURS PRN
Status: DISCONTINUED | OUTPATIENT
Start: 2020-01-01 | End: 2020-01-01 | Stop reason: HOSPADM

## 2020-01-01 RX ORDER — PANTOPRAZOLE SODIUM 40 MG/1
40 TABLET, DELAYED RELEASE ORAL EVERY MORNING
Status: DISCONTINUED | OUTPATIENT
Start: 2020-01-01 | End: 2020-01-01

## 2020-01-01 RX ORDER — SODIUM CHLORIDE 9 MG/ML
250 INJECTION, SOLUTION INTRAVENOUS ONCE
Status: COMPLETED | OUTPATIENT
Start: 2020-01-01 | End: 2020-01-01

## 2020-01-01 RX ORDER — FENTANYL CITRATE 50 UG/ML
25 INJECTION, SOLUTION INTRAMUSCULAR; INTRAVENOUS
Status: DISCONTINUED | OUTPATIENT
Start: 2020-01-01 | End: 2020-01-01

## 2020-01-01 RX ORDER — SCOLOPAMINE TRANSDERMAL SYSTEM 1 MG/1
1 PATCH, EXTENDED RELEASE TRANSDERMAL
Status: DISCONTINUED | OUTPATIENT
Start: 2020-01-01 | End: 2020-01-01 | Stop reason: HOSPADM

## 2020-01-01 RX ORDER — ACETAMINOPHEN 325 MG/1
325 TABLET ORAL ONCE
Status: COMPLETED | OUTPATIENT
Start: 2020-01-01 | End: 2020-01-01

## 2020-01-01 RX ORDER — SODIUM CHLORIDE 0.9 % (FLUSH) 0.9 %
10 SYRINGE (ML) INJECTION AS NEEDED
Status: DISCONTINUED | OUTPATIENT
Start: 2020-01-01 | End: 2020-01-01 | Stop reason: HOSPADM

## 2020-01-01 RX ORDER — SODIUM CHLORIDE 9 MG/ML
250 INJECTION, SOLUTION INTRAVENOUS ONCE
Status: DISCONTINUED | OUTPATIENT
Start: 2020-01-01 | End: 2020-01-01 | Stop reason: HOSPADM

## 2020-01-01 RX ORDER — MAGNESIUM SULFATE HEPTAHYDRATE 40 MG/ML
4 INJECTION, SOLUTION INTRAVENOUS AS NEEDED
Status: DISCONTINUED | OUTPATIENT
Start: 2020-01-01 | End: 2020-01-01

## 2020-01-01 RX ORDER — CHLORHEXIDINE GLUCONATE 0.12 MG/ML
15 RINSE ORAL EVERY 12 HOURS SCHEDULED
Status: DISCONTINUED | OUTPATIENT
Start: 2020-01-01 | End: 2020-01-01 | Stop reason: HOSPADM

## 2020-01-01 RX ORDER — PROPOFOL 10 MG/ML
50 VIAL (ML) INTRAVENOUS ONCE
Status: COMPLETED | OUTPATIENT
Start: 2020-01-01 | End: 2020-01-01

## 2020-01-01 RX ORDER — ONDANSETRON 4 MG/1
TABLET, FILM COATED ORAL
Qty: 40 TABLET | Refills: 3 | Status: SHIPPED | OUTPATIENT
Start: 2020-01-01

## 2020-01-01 RX ORDER — PROPOFOL 10 MG/ML
VIAL (ML) INTRAVENOUS AS NEEDED
Status: DISCONTINUED | OUTPATIENT
Start: 2020-01-01 | End: 2020-01-01 | Stop reason: SURG

## 2020-01-01 RX ORDER — LORAZEPAM 2 MG/ML
2 INJECTION INTRAMUSCULAR ONCE
Status: COMPLETED | OUTPATIENT
Start: 2020-01-01 | End: 2020-01-01

## 2020-01-01 RX ORDER — SODIUM CHLORIDE 9 MG/ML
250 INJECTION, SOLUTION INTRAVENOUS ONCE
Status: CANCELLED | OUTPATIENT
Start: 2020-01-01

## 2020-01-01 RX ORDER — METOCLOPRAMIDE HYDROCHLORIDE 5 MG/ML
10 INJECTION INTRAMUSCULAR; INTRAVENOUS EVERY 6 HOURS PRN
Status: DISCONTINUED | OUTPATIENT
Start: 2020-01-01 | End: 2020-01-01 | Stop reason: HOSPADM

## 2020-01-01 RX ORDER — ONDANSETRON 4 MG/1
4 TABLET, FILM COATED ORAL EVERY 6 HOURS PRN
Status: DISCONTINUED | OUTPATIENT
Start: 2020-01-01 | End: 2020-01-01 | Stop reason: HOSPADM

## 2020-01-01 RX ORDER — SODIUM CHLORIDE 0.9 % (FLUSH) 0.9 %
10 SYRINGE (ML) INJECTION EVERY 12 HOURS SCHEDULED
Status: DISCONTINUED | OUTPATIENT
Start: 2020-01-01 | End: 2020-01-01 | Stop reason: HOSPADM

## 2020-01-01 RX ORDER — ACETAMINOPHEN 500 MG
1000 TABLET ORAL ONCE
Status: COMPLETED | OUTPATIENT
Start: 2020-01-01 | End: 2020-01-01

## 2020-01-01 RX ORDER — LEVOTHYROXINE SODIUM 0.05 MG/1
50 TABLET ORAL DAILY
Qty: 30 TABLET | Refills: 1 | Status: SHIPPED | OUTPATIENT
Start: 2020-01-01

## 2020-01-01 RX ORDER — NOREPINEPHRINE BIT/0.9 % NACL 8 MG/250ML
.02-.3 INFUSION BOTTLE (ML) INTRAVENOUS
Status: DISCONTINUED | OUTPATIENT
Start: 2020-01-01 | End: 2020-01-01

## 2020-01-01 RX ORDER — ASPIRIN 81 MG/1
81 TABLET, CHEWABLE ORAL DAILY
Status: DISCONTINUED | OUTPATIENT
Start: 2020-01-01 | End: 2020-01-01

## 2020-01-01 RX ORDER — ALLOPURINOL 100 MG/1
100 TABLET ORAL DAILY
Status: DISCONTINUED | OUTPATIENT
Start: 2020-01-01 | End: 2020-01-01

## 2020-01-01 RX ORDER — MIDODRINE HYDROCHLORIDE 5 MG/1
10 TABLET ORAL
Status: DISCONTINUED | OUTPATIENT
Start: 2020-01-01 | End: 2020-01-01

## 2020-01-01 RX ORDER — LIDOCAINE HYDROCHLORIDE 20 MG/ML
INJECTION, SOLUTION EPIDURAL; INFILTRATION; INTRACAUDAL; PERINEURAL AS NEEDED
Status: DISCONTINUED | OUTPATIENT
Start: 2020-01-01 | End: 2020-01-01 | Stop reason: SURG

## 2020-01-01 RX ORDER — DEXTROSE AND SODIUM CHLORIDE 5; .45 G/100ML; G/100ML
50 INJECTION, SOLUTION INTRAVENOUS CONTINUOUS
Status: DISCONTINUED | OUTPATIENT
Start: 2020-01-01 | End: 2020-01-01 | Stop reason: HOSPADM

## 2020-01-01 RX ORDER — COLCHICINE 0.6 MG/1
0.6 TABLET ORAL DAILY
Status: DISCONTINUED | OUTPATIENT
Start: 2020-01-01 | End: 2020-01-01

## 2020-01-01 RX ORDER — LEVOTHYROXINE SODIUM 0.05 MG/1
50 TABLET ORAL
Status: DISCONTINUED | OUTPATIENT
Start: 2020-01-01 | End: 2020-01-01

## 2020-01-01 RX ORDER — SODIUM CHLORIDE 9 MG/ML
500 INJECTION, SOLUTION INTRAVENOUS ONCE
Status: CANCELLED | OUTPATIENT
Start: 2020-01-01

## 2020-01-01 RX ORDER — SODIUM CHLORIDE 9 MG/ML
100 INJECTION, SOLUTION INTRAVENOUS CONTINUOUS
Status: DISCONTINUED | OUTPATIENT
Start: 2020-01-01 | End: 2020-01-01

## 2020-01-01 RX ORDER — ATORVASTATIN CALCIUM 10 MG/1
10 TABLET, FILM COATED ORAL DAILY
Status: DISCONTINUED | OUTPATIENT
Start: 2020-01-01 | End: 2020-01-01

## 2020-01-01 RX ORDER — MORPHINE SULFATE 2 MG/ML
6 INJECTION, SOLUTION INTRAMUSCULAR; INTRAVENOUS
Status: DISCONTINUED | OUTPATIENT
Start: 2020-01-01 | End: 2020-01-01 | Stop reason: HOSPADM

## 2020-01-01 RX ORDER — SUCCINYLCHOLINE CHLORIDE 20 MG/ML
100 INJECTION INTRAMUSCULAR; INTRAVENOUS ONCE
Status: COMPLETED | OUTPATIENT
Start: 2020-01-01 | End: 2020-01-01

## 2020-01-01 RX ORDER — MAGNESIUM SULFATE HEPTAHYDRATE 40 MG/ML
2 INJECTION, SOLUTION INTRAVENOUS AS NEEDED
Status: DISCONTINUED | OUTPATIENT
Start: 2020-01-01 | End: 2020-01-01

## 2020-01-01 RX ORDER — SODIUM CHLORIDE 9 MG/ML
75 INJECTION, SOLUTION INTRAVENOUS CONTINUOUS
Status: DISCONTINUED | OUTPATIENT
Start: 2020-01-01 | End: 2020-01-01

## 2020-01-01 RX ORDER — IBUPROFEN 400 MG/1
400 TABLET ORAL ONCE
Status: DISCONTINUED | OUTPATIENT
Start: 2020-01-01 | End: 2020-01-01

## 2020-01-01 RX ORDER — LORAZEPAM 2 MG/ML
INJECTION INTRAMUSCULAR
Status: COMPLETED
Start: 2020-01-01 | End: 2020-01-01

## 2020-01-01 RX ORDER — COLCHICINE 0.6 MG/1
0.6 TABLET ORAL DAILY
COMMUNITY

## 2020-01-01 RX ORDER — PANTOPRAZOLE SODIUM 40 MG/10ML
40 INJECTION, POWDER, LYOPHILIZED, FOR SOLUTION INTRAVENOUS
Status: DISCONTINUED | OUTPATIENT
Start: 2020-01-01 | End: 2020-01-01

## 2020-01-01 RX ORDER — SODIUM CHLORIDE 9 MG/ML
500 INJECTION, SOLUTION INTRAVENOUS ONCE
Status: COMPLETED | OUTPATIENT
Start: 2020-01-01 | End: 2020-01-01

## 2020-01-01 RX ORDER — ACETAMINOPHEN 325 MG/1
650 TABLET ORAL EVERY 6 HOURS PRN
Status: DISCONTINUED | OUTPATIENT
Start: 2020-01-01 | End: 2020-01-01 | Stop reason: HOSPADM

## 2020-01-01 RX ORDER — LORAZEPAM 2 MG/ML
1 INJECTION INTRAMUSCULAR EVERY 12 HOURS PRN
Status: DISCONTINUED | OUTPATIENT
Start: 2020-01-01 | End: 2020-01-01 | Stop reason: HOSPADM

## 2020-01-01 RX ADMIN — SODIUM CHLORIDE 75 ML/HR: 9 INJECTION, SOLUTION INTRAVENOUS at 10:37

## 2020-01-01 RX ADMIN — SODIUM CHLORIDE 100 ML/HR: 9 INJECTION, SOLUTION INTRAVENOUS at 04:46

## 2020-01-01 RX ADMIN — PROPOFOL 50 MCG/KG/MIN: 10 INJECTION, EMULSION INTRAVENOUS at 10:04

## 2020-01-01 RX ADMIN — PROPOFOL 40 MCG/KG/MIN: 10 INJECTION, EMULSION INTRAVENOUS at 06:35

## 2020-01-01 RX ADMIN — ATORVASTATIN CALCIUM 10 MG: 10 TABLET, FILM COATED ORAL at 08:57

## 2020-01-01 RX ADMIN — SODIUM CHLORIDE, PRESERVATIVE FREE 10 ML: 5 INJECTION INTRAVENOUS at 09:42

## 2020-01-01 RX ADMIN — ATORVASTATIN CALCIUM 10 MG: 10 TABLET, FILM COATED ORAL at 09:42

## 2020-01-01 RX ADMIN — SODIUM CHLORIDE, PRESERVATIVE FREE 10 ML: 5 INJECTION INTRAVENOUS at 08:58

## 2020-01-01 RX ADMIN — SODIUM CHLORIDE, PRESERVATIVE FREE 10 ML: 5 INJECTION INTRAVENOUS at 09:44

## 2020-01-01 RX ADMIN — SODIUM CHLORIDE, PRESERVATIVE FREE 10 ML: 5 INJECTION INTRAVENOUS at 20:35

## 2020-01-01 RX ADMIN — Medication 0.02 MCG/KG/MIN: at 12:15

## 2020-01-01 RX ADMIN — SODIUM CHLORIDE 1000 ML: 900 INJECTION, SOLUTION INTRAVENOUS at 21:15

## 2020-01-01 RX ADMIN — PROPOFOL 50 MCG/KG/MIN: 10 INJECTION, EMULSION INTRAVENOUS at 14:00

## 2020-01-01 RX ADMIN — PROPOFOL 40 MCG/KG/MIN: 10 INJECTION, EMULSION INTRAVENOUS at 01:32

## 2020-01-01 RX ADMIN — SODIUM CHLORIDE, PRESERVATIVE FREE 10 ML: 5 INJECTION INTRAVENOUS at 21:31

## 2020-01-01 RX ADMIN — LIDOCAINE HYDROCHLORIDE 50 MG: 20 INJECTION, SOLUTION EPIDURAL; INFILTRATION; INTRACAUDAL; PERINEURAL at 16:26

## 2020-01-01 RX ADMIN — PROPOFOL 50 MCG/KG/MIN: 10 INJECTION, EMULSION INTRAVENOUS at 16:31

## 2020-01-01 RX ADMIN — SODIUM CHLORIDE, PRESERVATIVE FREE 10 ML: 5 INJECTION INTRAVENOUS at 21:32

## 2020-01-01 RX ADMIN — PROPOFOL 50 MCG/KG/MIN: 10 INJECTION, EMULSION INTRAVENOUS at 03:32

## 2020-01-01 RX ADMIN — DOXYCYCLINE 100 MG: 100 INJECTION, POWDER, LYOPHILIZED, FOR SOLUTION INTRAVENOUS at 12:08

## 2020-01-01 RX ADMIN — CEFEPIME HYDROCHLORIDE 2 G: 2 INJECTION, POWDER, FOR SOLUTION INTRAVENOUS at 13:03

## 2020-01-01 RX ADMIN — SODIUM CHLORIDE 240 MG: 9 INJECTION, SOLUTION INTRAVENOUS at 10:03

## 2020-01-01 RX ADMIN — PROPOFOL 30 MG: 10 INJECTION, EMULSION INTRAVENOUS at 16:32

## 2020-01-01 RX ADMIN — CEFEPIME HYDROCHLORIDE 2 G: 2 INJECTION, POWDER, FOR SOLUTION INTRAVENOUS at 23:14

## 2020-01-01 RX ADMIN — SODIUM CHLORIDE, PRESERVATIVE FREE 10 ML: 5 INJECTION INTRAVENOUS at 21:24

## 2020-01-01 RX ADMIN — PROPOFOL 40 MCG/KG/MIN: 10 INJECTION, EMULSION INTRAVENOUS at 14:57

## 2020-01-01 RX ADMIN — SODIUM CHLORIDE 500 ML: 9 INJECTION, SOLUTION INTRAVENOUS at 09:39

## 2020-01-01 RX ADMIN — CHLORHEXIDINE GLUCONATE 0.12% ORAL RINSE 15 ML: 1.2 LIQUID ORAL at 09:14

## 2020-01-01 RX ADMIN — LORAZEPAM 2 MG: 2 INJECTION INTRAMUSCULAR; INTRAVENOUS at 10:08

## 2020-01-01 RX ADMIN — SODIUM CHLORIDE 100 ML/HR: 9 INJECTION, SOLUTION INTRAVENOUS at 13:17

## 2020-01-01 RX ADMIN — ATORVASTATIN CALCIUM 10 MG: 10 TABLET, FILM COATED ORAL at 09:00

## 2020-01-01 RX ADMIN — ACETAMINOPHEN 325 MG: 325 TABLET, FILM COATED ORAL at 20:35

## 2020-01-01 RX ADMIN — LEVOTHYROXINE SODIUM 50 MCG: 50 TABLET ORAL at 05:21

## 2020-01-01 RX ADMIN — CHLORHEXIDINE GLUCONATE 0.12% ORAL RINSE 15 ML: 1.2 LIQUID ORAL at 10:45

## 2020-01-01 RX ADMIN — SODIUM CHLORIDE 100 ML/HR: 9 INJECTION, SOLUTION INTRAVENOUS at 02:04

## 2020-01-01 RX ADMIN — CHLORHEXIDINE GLUCONATE 0.12% ORAL RINSE 15 ML: 1.2 LIQUID ORAL at 22:10

## 2020-01-01 RX ADMIN — CEFEPIME HYDROCHLORIDE 2 G: 2 INJECTION, POWDER, FOR SOLUTION INTRAVENOUS at 11:15

## 2020-01-01 RX ADMIN — ASPIRIN 81 MG 81 MG: 81 TABLET ORAL at 09:41

## 2020-01-01 RX ADMIN — PROPOFOL 50 MCG/KG/MIN: 10 INJECTION, EMULSION INTRAVENOUS at 18:23

## 2020-01-01 RX ADMIN — CEFEPIME HYDROCHLORIDE 2 G: 2 INJECTION, POWDER, FOR SOLUTION INTRAVENOUS at 00:07

## 2020-01-01 RX ADMIN — SODIUM CHLORIDE 500 ML: 9 INJECTION, SOLUTION INTRAVENOUS at 13:17

## 2020-01-01 RX ADMIN — DOXYCYCLINE 100 MG: 100 INJECTION, POWDER, LYOPHILIZED, FOR SOLUTION INTRAVENOUS at 00:29

## 2020-01-01 RX ADMIN — PROPOFOL 50 MCG/KG/MIN: 10 INJECTION, EMULSION INTRAVENOUS at 23:24

## 2020-01-01 RX ADMIN — CEFEPIME HYDROCHLORIDE 2 G: 2 INJECTION, POWDER, FOR SOLUTION INTRAVENOUS at 13:59

## 2020-01-01 RX ADMIN — SODIUM CHLORIDE, PRESERVATIVE FREE 10 ML: 5 INJECTION INTRAVENOUS at 02:47

## 2020-01-01 RX ADMIN — CHLORHEXIDINE GLUCONATE 0.12% ORAL RINSE 15 ML: 1.2 LIQUID ORAL at 10:03

## 2020-01-01 RX ADMIN — ASPIRIN 81 MG 81 MG: 81 TABLET ORAL at 10:04

## 2020-01-01 RX ADMIN — PROPOFOL 50 MCG/KG/MIN: 10 INJECTION, EMULSION INTRAVENOUS at 21:30

## 2020-01-01 RX ADMIN — CHLORHEXIDINE GLUCONATE 0.12% ORAL RINSE 15 ML: 1.2 LIQUID ORAL at 21:31

## 2020-01-01 RX ADMIN — PROPOFOL 50 MCG/KG/MIN: 10 INJECTION, EMULSION INTRAVENOUS at 16:29

## 2020-01-01 RX ADMIN — PROPOFOL 50 MCG/KG/MIN: 10 INJECTION, EMULSION INTRAVENOUS at 13:08

## 2020-01-01 RX ADMIN — MIDODRINE HYDROCHLORIDE 10 MG: 5 TABLET ORAL at 17:00

## 2020-01-01 RX ADMIN — DEXTROSE AND SODIUM CHLORIDE: 5; 450 INJECTION, SOLUTION INTRAVENOUS at 16:19

## 2020-01-01 RX ADMIN — PROPOFOL 45 MCG/KG/MIN: 10 INJECTION, EMULSION INTRAVENOUS at 19:30

## 2020-01-01 RX ADMIN — ASPIRIN 81 MG 81 MG: 81 TABLET ORAL at 10:45

## 2020-01-01 RX ADMIN — PROPOFOL 50 MCG/KG/MIN: 10 INJECTION, EMULSION INTRAVENOUS at 03:12

## 2020-01-01 RX ADMIN — MIDODRINE HYDROCHLORIDE 10 MG: 5 TABLET ORAL at 19:34

## 2020-01-01 RX ADMIN — PROPOFOL 40 MCG/KG/MIN: 10 INJECTION, EMULSION INTRAVENOUS at 15:07

## 2020-01-01 RX ADMIN — PROPOFOL 40 MCG/KG/MIN: 10 INJECTION, EMULSION INTRAVENOUS at 23:26

## 2020-01-01 RX ADMIN — SODIUM CHLORIDE 240 MG: 9 INJECTION, SOLUTION INTRAVENOUS at 11:17

## 2020-01-01 RX ADMIN — PROPOFOL 25 MCG/KG/MIN: 10 INJECTION, EMULSION INTRAVENOUS at 15:13

## 2020-01-01 RX ADMIN — SODIUM BICARBONATE: 84 INJECTION, SOLUTION INTRAVENOUS at 22:08

## 2020-01-01 RX ADMIN — DOXYCYCLINE 100 MG: 100 INJECTION, POWDER, LYOPHILIZED, FOR SOLUTION INTRAVENOUS at 12:44

## 2020-01-01 RX ADMIN — PROPOFOL 50 MCG/KG/MIN: 10 INJECTION, EMULSION INTRAVENOUS at 09:43

## 2020-01-01 RX ADMIN — ACETAMINOPHEN 1000 MG: 500 TABLET ORAL at 21:15

## 2020-01-01 RX ADMIN — PROPOFOL 50 MG: 10 INJECTION, EMULSION INTRAVENOUS at 16:26

## 2020-01-01 RX ADMIN — Medication 0.03 MCG/KG/MIN: at 11:47

## 2020-01-01 RX ADMIN — CEFEPIME HYDROCHLORIDE 2 G: 2 INJECTION, POWDER, FOR SOLUTION INTRAVENOUS at 12:40

## 2020-01-01 RX ADMIN — SODIUM BICARBONATE: 84 INJECTION, SOLUTION INTRAVENOUS at 22:11

## 2020-01-01 RX ADMIN — DOXYCYCLINE 100 MG: 100 INJECTION, POWDER, LYOPHILIZED, FOR SOLUTION INTRAVENOUS at 12:17

## 2020-01-01 RX ADMIN — DOXYCYCLINE 100 MG: 100 INJECTION, POWDER, LYOPHILIZED, FOR SOLUTION INTRAVENOUS at 00:19

## 2020-01-01 RX ADMIN — LORAZEPAM 1 MG: 2 INJECTION INTRAMUSCULAR; INTRAVENOUS at 13:51

## 2020-01-01 RX ADMIN — PROPOFOL 50 MCG/KG/MIN: 10 INJECTION, EMULSION INTRAVENOUS at 06:00

## 2020-01-01 RX ADMIN — SODIUM CHLORIDE, PRESERVATIVE FREE 10 ML: 5 INJECTION INTRAVENOUS at 09:49

## 2020-01-01 RX ADMIN — CHLORHEXIDINE GLUCONATE 0.12% ORAL RINSE 15 ML: 1.2 LIQUID ORAL at 21:29

## 2020-01-01 RX ADMIN — DOXYCYCLINE 100 MG: 100 INJECTION, POWDER, LYOPHILIZED, FOR SOLUTION INTRAVENOUS at 00:22

## 2020-01-01 RX ADMIN — PANTOPRAZOLE SODIUM 40 MG: 40 INJECTION, POWDER, FOR SOLUTION INTRAVENOUS at 05:06

## 2020-01-01 RX ADMIN — SODIUM CHLORIDE, PRESERVATIVE FREE 10 ML: 5 INJECTION INTRAVENOUS at 20:30

## 2020-01-01 RX ADMIN — Medication 0.06 MCG/KG/MIN: at 16:29

## 2020-01-01 RX ADMIN — PROPOFOL 40 MCG/KG/MIN: 10 INJECTION, EMULSION INTRAVENOUS at 03:58

## 2020-01-01 RX ADMIN — SODIUM CHLORIDE 240 MG: 9 INJECTION, SOLUTION INTRAVENOUS at 11:28

## 2020-01-01 RX ADMIN — DOXYCYCLINE 100 MG: 100 INJECTION, POWDER, LYOPHILIZED, FOR SOLUTION INTRAVENOUS at 02:10

## 2020-01-01 RX ADMIN — Medication 0.08 MCG/KG/MIN: at 17:27

## 2020-01-01 RX ADMIN — SODIUM CHLORIDE, PRESERVATIVE FREE 10 ML: 5 INJECTION INTRAVENOUS at 21:46

## 2020-01-01 RX ADMIN — ASPIRIN 81 MG 81 MG: 81 TABLET ORAL at 10:33

## 2020-01-01 RX ADMIN — ATORVASTATIN CALCIUM 10 MG: 10 TABLET, FILM COATED ORAL at 09:49

## 2020-01-01 RX ADMIN — PANTOPRAZOLE SODIUM 40 MG: 40 TABLET, DELAYED RELEASE ORAL at 07:02

## 2020-01-01 RX ADMIN — ACETAMINOPHEN 650 MG: 325 TABLET, FILM COATED ORAL at 19:34

## 2020-01-01 RX ADMIN — CEFEPIME HYDROCHLORIDE 2 G: 2 INJECTION, POWDER, FOR SOLUTION INTRAVENOUS at 23:26

## 2020-01-01 RX ADMIN — PROPOFOL 60 MCG/KG/MIN: 10 INJECTION, EMULSION INTRAVENOUS at 23:55

## 2020-01-01 RX ADMIN — PANTOPRAZOLE SODIUM 40 MG: 40 INJECTION, POWDER, FOR SOLUTION INTRAVENOUS at 05:20

## 2020-01-01 RX ADMIN — PROPOFOL 40 MCG/KG/MIN: 10 INJECTION, EMULSION INTRAVENOUS at 21:31

## 2020-01-01 RX ADMIN — PROPOFOL 30 MG: 10 INJECTION, EMULSION INTRAVENOUS at 16:29

## 2020-01-01 RX ADMIN — MAGNESIUM SULFATE HEPTAHYDRATE 2 G: 40 INJECTION, SOLUTION INTRAVENOUS at 09:48

## 2020-01-01 RX ADMIN — SODIUM CHLORIDE, PRESERVATIVE FREE 10 ML: 5 INJECTION INTRAVENOUS at 22:11

## 2020-01-01 RX ADMIN — SODIUM BICARBONATE: 84 INJECTION, SOLUTION INTRAVENOUS at 17:27

## 2020-01-01 RX ADMIN — SUCCINYLCHOLINE CHLORIDE 100 MG: 20 INJECTION, SOLUTION INTRAMUSCULAR; INTRAVENOUS at 08:41

## 2020-01-01 RX ADMIN — SODIUM CHLORIDE 250 ML: 9 INJECTION, SOLUTION INTRAVENOUS at 11:17

## 2020-01-01 RX ADMIN — PROPOFOL 50 MCG/KG/MIN: 10 INJECTION, EMULSION INTRAVENOUS at 08:00

## 2020-01-01 RX ADMIN — PANTOPRAZOLE SODIUM 40 MG: 40 INJECTION, POWDER, FOR SOLUTION INTRAVENOUS at 05:23

## 2020-01-01 RX ADMIN — MORPHINE SULFATE 6 MG: 2 INJECTION, SOLUTION INTRAMUSCULAR; INTRAVENOUS at 13:51

## 2020-01-01 RX ADMIN — CEFEPIME HYDROCHLORIDE 2 G: 2 INJECTION, POWDER, FOR SOLUTION INTRAVENOUS at 00:28

## 2020-01-01 RX ADMIN — CHLORHEXIDINE GLUCONATE 0.12% ORAL RINSE 15 ML: 1.2 LIQUID ORAL at 08:58

## 2020-01-01 RX ADMIN — SODIUM CHLORIDE 75 ML/HR: 9 INJECTION, SOLUTION INTRAVENOUS at 17:01

## 2020-01-01 RX ADMIN — PROPOFOL 35 MCG/KG/MIN: 10 INJECTION, EMULSION INTRAVENOUS at 11:48

## 2020-01-01 RX ADMIN — ATORVASTATIN CALCIUM 10 MG: 10 TABLET, FILM COATED ORAL at 10:04

## 2020-01-01 RX ADMIN — MIDODRINE HYDROCHLORIDE 10 MG: 5 TABLET ORAL at 06:32

## 2020-01-01 RX ADMIN — CHLORHEXIDINE GLUCONATE 0.12% ORAL RINSE 15 ML: 1.2 LIQUID ORAL at 17:01

## 2020-01-01 RX ADMIN — SODIUM CHLORIDE, PRESERVATIVE FREE 10 ML: 5 INJECTION INTRAVENOUS at 22:13

## 2020-01-01 RX ADMIN — CHLORHEXIDINE GLUCONATE 0.12% ORAL RINSE 15 ML: 1.2 LIQUID ORAL at 21:46

## 2020-01-01 RX ADMIN — LEVOTHYROXINE SODIUM 50 MCG: 50 TABLET ORAL at 06:16

## 2020-01-01 RX ADMIN — PANTOPRAZOLE SODIUM 40 MG: 40 TABLET, DELAYED RELEASE ORAL at 10:32

## 2020-01-01 RX ADMIN — ACETAMINOPHEN 650 MG: 325 TABLET, FILM COATED ORAL at 11:44

## 2020-01-01 RX ADMIN — ASPIRIN 81 MG 81 MG: 81 TABLET ORAL at 09:49

## 2020-01-01 RX ADMIN — DOXYCYCLINE 100 MG: 100 INJECTION, POWDER, LYOPHILIZED, FOR SOLUTION INTRAVENOUS at 17:56

## 2020-01-01 RX ADMIN — DOXYCYCLINE 100 MG: 100 INJECTION, POWDER, LYOPHILIZED, FOR SOLUTION INTRAVENOUS at 13:00

## 2020-01-01 RX ADMIN — CHLORHEXIDINE GLUCONATE 0.12% ORAL RINSE 15 ML: 1.2 LIQUID ORAL at 09:42

## 2020-01-01 RX ADMIN — PROPOFOL 45 MCG/KG/MIN: 10 INJECTION, EMULSION INTRAVENOUS at 12:17

## 2020-01-01 RX ADMIN — SODIUM CHLORIDE, PRESERVATIVE FREE 10 ML: 5 INJECTION INTRAVENOUS at 09:25

## 2020-01-01 RX ADMIN — CHLORHEXIDINE GLUCONATE 0.12% ORAL RINSE 15 ML: 1.2 LIQUID ORAL at 20:30

## 2020-01-01 RX ADMIN — CEFEPIME HYDROCHLORIDE 2 G: 2 INJECTION, POWDER, FOR SOLUTION INTRAVENOUS at 23:25

## 2020-01-01 RX ADMIN — CEFEPIME HYDROCHLORIDE 2 G: 2 INJECTION, POWDER, FOR SOLUTION INTRAVENOUS at 00:19

## 2020-01-01 RX ADMIN — MIDODRINE HYDROCHLORIDE 10 MG: 5 TABLET ORAL at 09:44

## 2020-01-01 RX ADMIN — PROPOFOL 50 MCG/KG/MIN: 10 INJECTION, EMULSION INTRAVENOUS at 00:19

## 2020-01-01 RX ADMIN — SODIUM CHLORIDE, PRESERVATIVE FREE 10 ML: 5 INJECTION INTRAVENOUS at 20:31

## 2020-01-01 RX ADMIN — DOXYCYCLINE 100 MG: 100 INJECTION, POWDER, LYOPHILIZED, FOR SOLUTION INTRAVENOUS at 02:07

## 2020-01-01 RX ADMIN — PROPOFOL 50 MCG/KG/MIN: 10 INJECTION, EMULSION INTRAVENOUS at 21:47

## 2020-01-01 RX ADMIN — LEVOTHYROXINE SODIUM 50 MCG: 50 TABLET ORAL at 05:06

## 2020-01-01 RX ADMIN — PANTOPRAZOLE SODIUM 40 MG: 40 INJECTION, POWDER, FOR SOLUTION INTRAVENOUS at 06:15

## 2020-01-01 RX ADMIN — CEFEPIME HYDROCHLORIDE 2 G: 2 INJECTION, POWDER, FOR SOLUTION INTRAVENOUS at 16:13

## 2020-01-01 RX ADMIN — SODIUM CHLORIDE 75 ML/HR: 9 INJECTION, SOLUTION INTRAVENOUS at 00:54

## 2020-01-01 RX ADMIN — LEVOTHYROXINE SODIUM 50 MCG: 50 TABLET ORAL at 10:31

## 2020-01-01 RX ADMIN — DOXYCYCLINE 100 MG: 100 INJECTION, POWDER, LYOPHILIZED, FOR SOLUTION INTRAVENOUS at 13:08

## 2020-01-01 RX ADMIN — LEVOTHYROXINE SODIUM 50 MCG: 50 TABLET ORAL at 06:55

## 2020-01-01 RX ADMIN — PROPOFOL 60 MCG/KG/MIN: 10 INJECTION, EMULSION INTRAVENOUS at 19:24

## 2020-01-01 RX ADMIN — PROPOFOL 40 MCG/KG/MIN: 10 INJECTION, EMULSION INTRAVENOUS at 10:47

## 2020-01-01 RX ADMIN — DOXYCYCLINE 100 MG: 100 INJECTION, POWDER, LYOPHILIZED, FOR SOLUTION INTRAVENOUS at 00:05

## 2020-01-01 RX ADMIN — CHLORHEXIDINE GLUCONATE 0.12% ORAL RINSE 15 ML: 1.2 LIQUID ORAL at 21:24

## 2020-01-01 RX ADMIN — SODIUM CHLORIDE 240 MG: 9 INJECTION, SOLUTION INTRAVENOUS at 09:31

## 2020-01-01 RX ADMIN — ATORVASTATIN CALCIUM 10 MG: 10 TABLET, FILM COATED ORAL at 10:44

## 2020-01-01 RX ADMIN — CEFEPIME HYDROCHLORIDE 2 G: 2 INJECTION, POWDER, FOR SOLUTION INTRAVENOUS at 12:00

## 2020-01-01 RX ADMIN — PROPOFOL 50 MCG/KG/MIN: 10 INJECTION, EMULSION INTRAVENOUS at 04:44

## 2020-01-01 RX ADMIN — MAGNESIUM SULFATE HEPTAHYDRATE 2 G: 40 INJECTION, SOLUTION INTRAVENOUS at 22:50

## 2020-01-01 RX ADMIN — SODIUM CHLORIDE 250 ML: 9 INJECTION, SOLUTION INTRAVENOUS at 09:31

## 2020-01-01 RX ADMIN — MIDODRINE HYDROCHLORIDE 10 MG: 5 TABLET ORAL at 16:57

## 2020-01-01 RX ADMIN — PROPOFOL 50 MG: 10 INJECTION, EMULSION INTRAVENOUS at 08:40

## 2020-01-01 RX ADMIN — COLCHICINE 0.6 MG: 0.6 TABLET, FILM COATED ORAL at 10:31

## 2020-01-01 RX ADMIN — LORAZEPAM 2 MG: 2 INJECTION INTRAMUSCULAR at 10:08

## 2020-01-01 RX ADMIN — SODIUM CHLORIDE, PRESERVATIVE FREE 10 ML: 5 INJECTION INTRAVENOUS at 10:06

## 2020-01-01 RX ADMIN — SODIUM CHLORIDE, PRESERVATIVE FREE 10 ML: 5 INJECTION INTRAVENOUS at 22:12

## 2020-01-01 RX ADMIN — PROPOFOL 50 MCG/KG/MIN: 10 INJECTION, EMULSION INTRAVENOUS at 22:38

## 2020-01-01 RX ADMIN — MIDODRINE HYDROCHLORIDE 10 MG: 5 TABLET ORAL at 06:31

## 2020-01-01 RX ADMIN — ALLOPURINOL 100 MG: 100 TABLET ORAL at 10:32

## 2020-01-01 RX ADMIN — SODIUM CHLORIDE 100 ML/HR: 9 INJECTION, SOLUTION INTRAVENOUS at 11:47

## 2020-01-01 RX ADMIN — MIDODRINE HYDROCHLORIDE 10 MG: 5 TABLET ORAL at 21:30

## 2020-01-01 RX ADMIN — PROPOFOL 50 MCG/KG/MIN: 10 INJECTION, EMULSION INTRAVENOUS at 12:06

## 2020-01-01 RX ADMIN — DOXYCYCLINE 100 MG: 100 INJECTION, POWDER, LYOPHILIZED, FOR SOLUTION INTRAVENOUS at 15:12

## 2020-01-01 RX ADMIN — SODIUM CHLORIDE 250 ML: 9 INJECTION, SOLUTION INTRAVENOUS at 11:28

## 2020-01-01 RX ADMIN — MAGNESIUM SULFATE HEPTAHYDRATE 2 G: 40 INJECTION, SOLUTION INTRAVENOUS at 02:57

## 2020-01-01 RX ADMIN — CEFEPIME HYDROCHLORIDE 2 G: 2 INJECTION, POWDER, FOR SOLUTION INTRAVENOUS at 15:11

## 2020-01-01 RX ADMIN — PROPOFOL 60 MCG/KG/MIN: 10 INJECTION, EMULSION INTRAVENOUS at 21:48

## 2020-01-01 RX ADMIN — SODIUM CHLORIDE 100 ML/HR: 9 INJECTION, SOLUTION INTRAVENOUS at 16:05

## 2020-01-01 RX ADMIN — MAGNESIUM SULFATE HEPTAHYDRATE 2 G: 40 INJECTION, SOLUTION INTRAVENOUS at 04:59

## 2020-01-01 RX ADMIN — ASPIRIN 81 MG 81 MG: 81 TABLET ORAL at 08:58

## 2020-01-01 RX ADMIN — LEVOTHYROXINE SODIUM 50 MCG: 50 TABLET ORAL at 05:23

## 2020-01-01 RX ADMIN — PROPOFOL 50 MCG/KG/MIN: 10 INJECTION, EMULSION INTRAVENOUS at 06:33

## 2020-01-01 RX ADMIN — MIDODRINE HYDROCHLORIDE 10 MG: 5 TABLET ORAL at 05:21

## 2020-01-07 NOTE — OUTREACH NOTE
CHF Week 4 Survey      Responses   Facility patient discharged from?  [place]   Does the patient have one of the following disease processes/diagnoses(primary or secondary)?  CHF   Week 4 attempt successful?  No          Shara Munguia RN

## 2020-01-12 PROBLEM — E66.3 OVERWEIGHT (BMI 25.0-29.9): Status: ACTIVE | Noted: 2019-01-01

## 2020-01-12 NOTE — PROGRESS NOTES
2020    Kuldip Nevarez  1945    Chief Complaint:  Melanoma     HPI:      PCP:  Mike Draper MD  Cardiology:  Dr Grullon  Pulmonology:  Dr Lisa  GI:  Dr Jarrett  Ophthalmology:  Dr Valencia  Orthopedic Surgery:  Dr Little     74 y.o. male with HTN(stable, increased risk stroke, rupture), Obesity(uncontrolled, increased risk cardiovascular events), COPD(stable, increased risk pulmonary complications) and Chronic Kidney Disease(stable, increased risk renal failure) , lung nodule(new, suspicious malignancy).  former smoker.  moderate diarrhea.  Recovering well from VAT. Moderate shortness of breath x 1 year.  Hip fracture in summer 2019 complicated hypotension, hypoxia.  Lung nodule noted on imaging.  Prior colon cancer 2004(resection, XRT, chemo)..  No TIA stroke amaurosis.  No MI claudication. No other associated signs, symptoms or modifying factors.     2019 CT Chest:  RIGHT upper lobe nodule 15mm.  2019 CT Chest:  RIGHT upper lobe nodule 20mm.  2019 PET CT:  RIGHT upper lobe nodule 20mm. (SUV 4.8)  2019 PFT:  FVC 3.1 (69%), FEV1 1.9 (57%), DLCO 38%  10/2019 RIGHT VAT upper lobe wedge resection.  PATH:  Metastatic melanoma.  2019 CXR:  Satisfactory post op     PCI x2, PPM  2019 Echocardiogram:  EF 35%, LA 44mm, LV 61mm, RVSP 45mmHg.  Mild MR TR.    2019 EC paced, QTc 471    The following portions of the patient's history were reviewed and updated as appropriate: allergies, current medications, past family history, past medical history, past social history, past surgical history and problem list.  Recent images independently reviewed.  Available laboratory values reviewed.    PMH:  Past Medical History:   Diagnosis Date   • Arthritis    • Cancer (CMS/HCC)     colon, skin   • Colon cancer (CMS/HCC)    • Coronary artery disease    • Disease of thyroid gland    • GERD (gastroesophageal reflux disease)    • History of transfusion    • Hyperlipidemia    • MI (myocardial infarction)  (CMS/[facility])        • Pacemaker    • Skin cancer    • Sleep apnea      Past Surgical History:   Procedure Laterality Date   • APPENDECTOMY     • COLON SURGERY     • COLONOSCOPY N/A 2017    Procedure: COLONOSCOPY;  Surgeon: Barrie Jarrett DO;  Location: [facility] ENDOSCOPY;  Service:    • CORONARY ANGIOPLASTY WITH STENT PLACEMENT     • ENDOSCOPY     • FEMUR OPEN REDUCTION INTERNAL FIXATION Left 2019    Procedure: FEMUR OPEN REDUCTION INTERNAL FIXATION;  Surgeon: Edward Harley MD;  Location: [facility] OR;  Service: Orthopedics   • KIDNEY STONE SURGERY     • PACEMAKER IMPLANTATION     • SALIVARY GLAND EXCISION Left     Left neck due to skin cancer with resultant chronic dry mouth   • THORACOSCOPY Right 10/18/2019    Procedure: THORACOSCOPY VIDEO  ASSISTED , mini THORACOTOMY wedge resection nodule thoracic mediastinal lymphadenectomy bronchoscopy  Latex Allergy;  Surgeon: Bruon Horton MD;  Location: [facility] OR;  Service: Cardiothoracic     Family History   Problem Relation Age of Onset   • Coronary artery disease Mother    • Coronary artery disease Father    • Lung cancer Brother      Social History     Tobacco Use   • Smoking status: Former Smoker     Years: 45.00     Last attempt to quit:      Years since quittin.0   • Smokeless tobacco: Never Used   Substance Use Topics   • Alcohol use: No   • Drug use: No       ALLERGIES:  Allergies   Allergen Reactions   • Latex Rash   • Tape Rash         MEDICATIONS:    Current Outpatient Medications:   •  allopurinol (ZYLOPRIM) 100 MG tablet, Take 100 mg by mouth Daily., Disp: , Rfl:   •  aspirin 81 MG chewable tablet, Chew 81 mg Daily., Disp: , Rfl:   •  Cyanocobalamin (VITAMIN B 12 PO), Take 1 tablet by mouth Daily., Disp: , Rfl:   •  diphenoxylate-atropine (LOMOTIL) 2.5-0.025 MG per tablet, TK 1 T PO  TID PRF DIARRHEA, Disp: , Rfl:   •  furosemide (LASIX) 20 MG tablet, Take 1 tablet by mouth Daily., Disp: 30 tablet, Rfl: 1  •   "levothyroxine (SYNTHROID, LEVOTHROID) 75 MCG tablet, Take 75 mcg by mouth Daily., Disp: , Rfl:   •  Magnesium 250 MG tablet, Take 1 tablet by mouth Daily., Disp: , Rfl:   •  omeprazole (priLOSEC) 40 MG capsule, Take 40 mg by mouth Every Other Day., Disp: , Rfl:   •  ondansetron (ZOFRAN) 4 MG tablet, Take 1 tablet by mouth 4 (Four) Times a Day As Needed for Nausea or Vomiting., Disp: 40 tablet, Rfl: 3  •  oxyCODONE-acetaminophen (PERCOCET) 5-325 MG per tablet, Take 1-2 tablets by mouth Every 4 (Four) Hours As Needed for Moderate Pain  (SURGICAL PAIN)., Disp: 36 tablet, Rfl: 0  •  sennosides-docusate sodium (SENOKOT-S) 8.6-50 MG tablet, Take 2 tablets by mouth Every Night., Disp: , Rfl:   •  simvastatin (ZOCOR) 40 MG tablet, Take 40 mg by mouth Daily., Disp: , Rfl:     Review of Systems   Review of Systems   Constitution: Positive for malaise/fatigue. Negative for weight loss.   Cardiovascular: Positive for dyspnea on exertion. Negative for chest pain and claudication.   Respiratory: Negative for cough and shortness of breath.    Skin: Negative for color change and poor wound healing.   Musculoskeletal: Positive for arthritis and back pain.   Neurological: Negative for dizziness, numbness and weakness.       Physical Exam   Vitals:    01/06/20 1004   BP: 128/80   BP Location: Left arm   Pulse: 74   Temp: 98 °F (36.7 °C)   TempSrc: Temporal   SpO2: 98%   Weight: 87.5 kg (193 lb)   Height: 177.8 cm (70\")     Physical Exam   Constitutional: He is oriented to person, place, and time. He is active and cooperative. He does not appear ill. No distress.   HENT:   Right Ear: Hearing normal.   Left Ear: Hearing normal.   Nose: No nasal deformity. No epistaxis.   Mouth/Throat: He does not have dentures. Normal dentition.   Cardiovascular: Normal rate and regular rhythm.   No murmur heard.  Pulses:       Carotid pulses are 2+ on the right side, and 2+ on the left side.       Radial pulses are 2+ on the right side, and 2+ on the " left side.        Dorsalis pedis pulses are 2+ on the right side, and 2+ on the left side.   Incisions well healed.   Pulmonary/Chest: Effort normal and breath sounds normal.   Abdominal: Soft. He exhibits no distension and no mass. There is no tenderness.   Musculoskeletal: He exhibits no deformity.   Gait normal.    Neurological: He is alert and oriented to person, place, and time. He has normal strength.   Skin: Skin is warm and dry. No cyanosis or erythema. No pallor.   No venous staining   Psychiatric: He has a normal mood and affect. His speech is normal. Judgment and thought content normal.     Results for KULDIP LIN (MRN [id number]) as of 1/12/2020 16:56  GFR 51 Ref. Range 12/27/2019 11:53   Creatinine Latest Ref Range: 0.76 - 1.27 mg/dL 1.35 (H)   BUN Latest Ref Range: 8 - 23 mg/dL 14     ASSESSMENT:  Kuldip was seen today for lung nodule.    Diagnoses and all orders for this visit:    Secondary malignant melanoma of right lung (CMS/HCC)    Acute on chronic systolic CHF (congestive heart failure) (CMS/HCC)    Coronary artery disease involving native coronary artery of native heart without angina pectoris    COPD mixed type (CMS/HCC)    Overweight (BMI 25.0-29.9)    Malignant neoplasm of colon, unspecified part of colon (CMS/HCC)    Stage 3 chronic kidney disease (CMS/HCC)    PLAN:  Detailed discussion with Kuldip Lin regarding situation and options.  Stable metastatic melanoma.  Recovered well from VAT..Oncology following for treatment options.  Multiple risk factors with severe comorbidities.  No intervention indicated at this time.    Risks, benefits discussed.  Understands and wishes to proceed with plan.    Return as needed.  Obesity Class  0. Increased risk cardiovascular events, sleep and breathing disorders, joint issues, type 2 diabetes mellitus. Options for weight management, heart healthy diet, exercise programs, and associated health risks of obesity discussed.  Recommended  regular physical activity, progressive walking program.  Continue current medications as directed.    Thank you for the opportunity to participate in this patient's care.    Copy to primary care provider.    EMR Dragon/Transcription disclaimer:   Much of this encounter note is an electronic transcription/translation of spoken language to printed text. The electronic translation of spoken language may permit erroneous, or at times, nonsensical words or phrases to be inadvertently transcribed; Although I have reviewed the note for such errors, some may still exist.

## 2020-01-22 NOTE — NURSING NOTE
Called and spoke to Dr. Jarrett to see if pt will need to urinate before going home. Dr. Jarrett informed me that pt does not have to urinate before going home. Pt informed and pt informed that if he is unable to go and has any trouble to go to ER. Pt verablized understanding

## 2020-01-22 NOTE — H&P
Felicity Ayala DO,[facility]  Gastroenterology  Hepatology  Endoscopy  Board Certified in Internal Medicine and gastroenterology  [address], [address]  [place]. [address]  - [phone]   F - [phone]     GASTROENTEROLOGY HISTORY AND PHYSICAL  NOTE   FELICITY AYALA DO.         SUBJECTIVE:   1/22/2020    Name: Kuldip Nevarez  DOD: 1945        Chief Complaint:       Subjective : Diarrhea with a history of colon cancer    Patient is 74 y.o. male presents with desire for elective colonoscopy with biopsies.  Patient had cardiac arrest in July 2019.      ROS/HISTORY/ CURRENT MEDICATIONS/OBJECTIVE/VS/PE:   Review of Systems:  All systems unremarkable unless specified below.  Constitutional   HENT  Eyes   Respiratory    Cardiovascular  Gastrointestinal   Endocrine  Genitourinary    Musculoskeletal   Skin  Allergic/Immunologic    Neurological    Hematological  Psychiatric/Behavioral    History:     Past Medical History:   Diagnosis Date   • Arthritis    • Cancer (CMS/HCC)     colon, skin   • Colon cancer (CMS/HCC)    • Coronary artery disease    • Disease of thyroid gland    • GERD (gastroesophageal reflux disease)    • History of transfusion    • Hyperlipidemia    • MI (myocardial infarction) (CMS/HCC)     2003   • Pacemaker    • Skin cancer    • Sleep apnea      Past Surgical History:   Procedure Laterality Date   • APPENDECTOMY     • COLON SURGERY     • COLONOSCOPY N/A 4/19/2017    Procedure: COLONOSCOPY;  Surgeon: Felicity Ayala DO;  Location: [facility] ENDOSCOPY;  Service:    • CORONARY ANGIOPLASTY WITH STENT PLACEMENT     • ENDOSCOPY     • FEMUR OPEN REDUCTION INTERNAL FIXATION Left 6/16/2019    Procedure: FEMUR OPEN REDUCTION INTERNAL FIXATION;  Surgeon: Edward Harley MD;  Location: [facility] OR;  Service: Orthopedics   • KIDNEY STONE SURGERY     • PACEMAKER IMPLANTATION     • SALIVARY GLAND EXCISION Left     Left neck due to skin cancer with resultant chronic dry mouth   •  THORACOSCOPY Right 10/18/2019    Procedure: THORACOSCOPY VIDEO  ASSISTED , mini THORACOTOMY wedge resection nodule thoracic mediastinal lymphadenectomy bronchoscopy  Latex Allergy;  Surgeon: Bruno Horton MD;  Location: [facility];  Service: Cardiothoracic     Family History   Problem Relation Age of Onset   • Coronary artery disease Mother    • Coronary artery disease Father    • Lung cancer Brother      Social History     Tobacco Use   • Smoking status: Former Smoker     Years: 45.00     Last attempt to quit:      Years since quittin.0   • Smokeless tobacco: Never Used   Substance Use Topics   • Alcohol use: No   • Drug use: No     Prior to Admission medications    Medication Sig Start Date End Date Taking? Authorizing Provider   allopurinol (ZYLOPRIM) 100 MG tablet Take 100 mg by mouth Daily.   Yes Raleigh Lorenzana MD   Cyanocobalamin (VITAMIN B 12 PO) Take 1 tablet by mouth Daily.   Yes Raleigh Lorenzana MD   diphenoxylate-atropine (LOMOTIL) 2.5-0.025 MG per tablet TK 1 T PO  TID PRF DIARRHEA 19  Yes Raleigh Lorenzana MD   furosemide (LASIX) 20 MG tablet Take 1 tablet by mouth Daily. 19  Yes Jesus Ricardo MD   levothyroxine (SYNTHROID, LEVOTHROID) 75 MCG tablet Take 75 mcg by mouth Daily.   Yes Raleigh Lorenzana MD   simvastatin (ZOCOR) 40 MG tablet Take 40 mg by mouth Daily. 17  Yes Raleigh Lorenzana MD   aspirin 81 MG chewable tablet Chew 81 mg Daily.    Raleigh Lorenzana MD   omeprazole (priLOSEC) 40 MG capsule Take 40 mg by mouth Every Other Day. 17   Raleigh Lorenzana MD   ondansetron (ZOFRAN) 4 MG tablet TAKE 1 TABLET FOUR TIMES DAILY AS NEEDED FOR NAUSEA OR VOMITING 20   Perfecto Samuel MD   oxyCODONE-acetaminophen (PERCOCET) 5-325 MG per tablet Take 1-2 tablets by mouth Every 4 (Four) Hours As Needed for Moderate Pain  (SURGICAL PAIN). 10/21/19   Starr Henry APRN   sennosides-docusate sodium (SENOKOT-S) 8.6-50 MG  tablet Take 2 tablets by mouth Every Night. 10/21/19   Starr Henry APRN     Allergies:  Latex and Tape    I have reviewed the patients medical history, surgical history and family history in the available medical record system.     Current Medications:     Current Facility-Administered Medications   Medication Dose Route Frequency Provider Last Rate Last Dose   • dextrose 5 % and sodium chloride 0.45 % infusion  50 mL/hr Intravenous Continuous Barrie Jarrett DO       • sodium chloride 0.9 % flush 10 mL  10 mL Intravenous Q12H Barrie Jarrett DO       • sodium chloride 0.9 % flush 10 mL  10 mL Intravenous PRN Barrie Jarrett DO           Objective     Physical Exam:   Temp:  [97.2 °F (36.2 °C)] 97.2 °F (36.2 °C)  Heart Rate:  [62] 62  Resp:  [17] 17  BP: (132)/(71) 132/71    Physical Exam:  General Appearance:    Alert, cooperative, in no acute distress   Head:    Normocephalic, without obvious abnormality, atraumatic   Eyes:            Lids and lashes normal, conjunctivae and sclerae normal, no   icterus, no pallor, corneas clear, PERRLA   Ears:    Ears appear intact with no abnormalities noted   Throat:   No oral lesions, no thrush, oral mucosa moist   Neck:   No adenopathy, supple, trachea midline, no thyromegaly, no     carotid bruit, no JVD   Back:     No kyphosis present, no scoliosis present, no skin lesions,       erythema or scars, no tenderness to percussion or                   palpation,   range of motion normal   Lungs:     Clear to auscultation,respirations regular, even and                   unlabored    Heart:    Regular rhythm and normal rate, normal S1 and S2, no            murmur, no gallop, no rub, no click   Breast Exam:    Deferred   Abdomen:     Normal bowel sounds, no masses, no organomegaly, soft        non-tender, non-distended, no guarding, no rebound                 tenderness   Genitalia:    Deferred   Extremities:   Moves all extremities well, no edema, no cyanosis, no               redness   Pulses:   Pulses palpable and equal bilaterally   Skin:   No bleeding, bruising or rash   Lymph nodes:   No palpable adenopathy   Neurologic:   Cranial nerves 2 - 12 grossly intact, sensation intact, DTR        present and equal bilaterally      Results Review:     Lab Results   Component Value Date    WBC 5.77 12/27/2019    WBC 5.74 12/08/2019    WBC 9.39 10/19/2019    HGB 13.2 12/27/2019    HGB 12.2 (L) 12/08/2019    HGB 11.0 (L) 10/19/2019    HCT 40.3 12/27/2019    HCT 37.4 (L) 12/08/2019    HCT 32.9 (L) 10/19/2019     12/27/2019     12/08/2019     10/19/2019             No results found for: LIPASE  Lab Results   Component Value Date    INR 1.37 (H) 06/16/2019    INR 1.2 09/06/2016     No results found for: CULTURE    Radiology Review:  Imaging Results (Last 72 Hours)     ** No results found for the last 72 hours. **           I reviewed the patient's new clinical results.  I reviewed the patient's new imaging results and agree with the interpretation.     ASSESSMENT/PLAN:   ASSESSMENT:  1.  Diarrhea  2.  Personal history of colon cancer    PLAN:  1.  Colonoscopy with biopsy    Risk and benefits associated with the procedure are reviewed with the patient.  The patient wished to proceed     Barrie Jarrett DO  01/22/20  4:03 PM

## 2020-01-22 NOTE — ANESTHESIA POSTPROCEDURE EVALUATION
Patient: Kuldip Nevarez    Procedure Summary     Date:  01/22/20 Room / Location:  [facility] OR [address] [place] [address] OR    Anesthesia Start:  1619 Anesthesia Stop:  1657    Procedure:  COLONOSCOPY          (DONE IN OR WITH ENDO)              LATEX ALLERGY (N/A ) Diagnosis:       Diarrhea      History of colon cancer      (Diarrhea [R19.7])    Surgeon:  Barrie Jarrett DO Provider:  Jordi Rivera MD    Anesthesia Type:  MAC ASA Status:  4          Anesthesia Type: MAC    Vitals  No vitals data found for the desired time range.          Post Anesthesia Care and Evaluation    Patient location during evaluation: bedside  Patient participation: complete - patient participated  Level of consciousness: awake  Pain score: 0  Pain management: adequate  Airway patency: patent  Anesthetic complications: No anesthetic complications  PONV Status: none  Cardiovascular status: acceptable  Respiratory status: acceptable  Hydration status: acceptable

## 2020-01-22 NOTE — ANESTHESIA PREPROCEDURE EVALUATION
Anesthesia Evaluation     Patient summary reviewed   no history of anesthetic complications:  NPO Solid Status: > 8 hours  NPO Liquid Status: > 6 hours           Airway   Mallampati: II  TM distance: >3 FB  Neck ROM: full  No difficulty expected  Dental    (+) edentulous    Pulmonary     breath sounds clear to auscultation  (+) a smoker Former, shortness of breath, sleep apnea, decreased breath sounds,   (-) pneumonia, COPD, asthma    ROS comment: UPPER RIGHT LUNG NODULE  Cardiovascular   Exercise tolerance: poor (<4 METS)    ECG reviewed  PT is on anticoagulation therapy  Rhythm: regular  Rate: normal    (+) pacemaker pacemaker, past MI  >12 months, CAD, cardiac stents more than 12 months ago dysrhythmias, CHF , PVD, hyperlipidemia,   (-) angina, murmur    ROS comment: Demand pacemaker, interpretation is based on intrinsic rhythm  Sinus rhythm  Inferior infarct , age undetermined  ST &  T wave abnormality, consider lateral ischemia  Abnormal ECG    EF 36-40%  · Left ventricular wall thickness is consistent with mild concentric hypertrophy.  · Left ventricular diastolic dysfunction (grade I) consistent with impaired relaxation.  · Mild mitral valve regurgitation is present  · Mild tricuspid valve regurgitation is present.  · The following left ventricular wall segments are hypokinetic: mid anterior, apical anterior, basal anterolateral, mid anterolateral, apical lateral, basal inferolateral, mid inferolateral, apical inferior, mid inferior, apical septal, basal inferoseptal, mid inferoseptal, apex hypokinetic, mid anteroseptal, basal anterior, basal inferior and basal inferoseptal.  · Right ventricular cavity is borderline dilated.    Neuro/Psych  (+) headaches,     (-) seizures, TIA, CVA, psychiatric history  GI/Hepatic/Renal/Endo    (+) obesity,  GERD well controlled,  hepatitis (1962) A, renal disease (hx of stones) stones and CRI,   (-) liver disease, diabetes    Musculoskeletal     (+) arthralgias,       ROS  comment: gout  Abdominal   (+) obese,    Substance History - negative use     OB/GYN          Other   arthritis,    history of cancer (colon)                      Anesthesia Plan    ASA 4     MAC   ( avoid Ketamine since nightmares for 3 days after hip surgery)  intravenous induction     Anesthetic plan, all risks, benefits, and alternatives have been provided, discussed and informed consent has been obtained with: patient, spouse/significant other and child.  Use of blood products discussed with patient  Consented to blood products.

## 2020-01-24 NOTE — PATIENT INSTRUCTIONS
Fall Prevention in the Home, Adult  Falls can cause injuries. They can happen to people of all ages. There are many things you can do to make your home safe and to help prevent falls. Ask for help when making these changes, if needed.  What actions can I take to prevent falls?  General Instructions  · Use good lighting in all rooms. Replace any light bulbs that burn out.  · Turn on the lights when you go into a dark area. Use night-lights.  · Keep items that you use often in easy-to-reach places. Lower the shelves around your home if necessary.  · Set up your furniture so you have a clear path. Avoid moving your furniture around.  · Do not have throw rugs and other things on the floor that can make you trip.  · Avoid walking on wet floors.  · If any of your floors are uneven, fix them.  · Add color or contrast paint or tape to clearly siobhan and help you see:  ? Any grab bars or handrails.  ? First and last steps of stairways.  ? Where the edge of each step is.  · If you use a stepladder:  ? Make sure that it is fully opened. Do not climb a closed stepladder.  ? Make sure that both sides of the stepladder are locked into place.  ? Ask someone to hold the stepladder for you while you use it.  · If there are any pets around you, be aware of where they are.  What can I do in the bathroom?         · Keep the floor dry. Clean up any water that spills onto the floor as soon as it happens.  · Remove soap buildup in the tub or shower regularly.  · Use non-skid mats or decals on the floor of the tub or shower.  · Attach bath mats securely with double-sided, non-slip rug tape.  · If you need to sit down in the shower, use a plastic, non-slip stool.  · Install grab bars by the toilet and in the tub and shower. Do not use towel bars as grab bars.  What can I do in the bedroom?  · Make sure that you have a light by your bed that is easy to reach.  · Do not use any sheets or blankets that are too big for your bed. They should not  hang down onto the floor.  · Have a firm chair that has side arms. You can use this for support while you get dressed.  What can I do in the kitchen?  · Clean up any spills right away.  · If you need to reach something above you, use a strong step stool that has a grab bar.  · Keep electrical cords out of the way.  · Do not use floor polish or wax that makes floors slippery. If you must use wax, use non-skid floor wax.  What can I do with my stairs?  · Do not leave any items on the stairs.  · Make sure that you have a light switch at the top of the stairs and the bottom of the stairs. If you do not have them, ask someone to add them for you.  · Make sure that there are handrails on both sides of the stairs, and use them. Fix handrails that are broken or loose. Make sure that handrails are as long as the stairways.  · Install non-slip stair treads on all stairs in your home.  · Avoid having throw rugs at the top or bottom of the stairs. If you do have throw rugs, attach them to the floor with carpet tape.  · Choose a carpet that does not hide the edge of the steps on the stairway.  · Check any carpeting to make sure that it is firmly attached to the stairs. Fix any carpet that is loose or worn.  What can I do on the outside of my home?  · Use bright outdoor lighting.  · Regularly fix the edges of walkways and driveways and fix any cracks.  · Remove anything that might make you trip as you walk through a door, such as a raised step or threshold.  · Trim any bushes or trees on the path to your home.  · Regularly check to see if handrails are loose or broken. Make sure that both sides of any steps have handrails.  · Install guardrails along the edges of any raised decks and porches.  · Clear walking paths of anything that might make someone trip, such as tools or rocks.  · Have any leaves, snow, or ice cleared regularly.  · Use sand or salt on walking paths during winter.  · Clean up any spills in your garage right  away. This includes grease or oil spills.  What other actions can I take?  · Wear shoes that:  ? Have a low heel. Do not wear high heels.  ? Have rubber bottoms.  ? Are comfortable and fit you well.  ? Are closed at the toe. Do not wear open-toe sandals.  · Use tools that help you move around (mobility aids) if they are needed. These include:  ? Canes.  ? Walkers.  ? Scooters.  ? Crutches.  · Review your medicines with your doctor. Some medicines can make you feel dizzy. This can increase your chance of falling.  Ask your doctor what other things you can do to help prevent falls.  Where to find more information  · Centers for Disease Control and Prevention, [place]: https://cdc.gov  · National [place] on Aging: https://qq5urkl.kamini.nih.gov  Contact a doctor if:  · You are afraid of falling at home.  · You feel weak, drowsy, or dizzy at home.  · You fall at home.  Summary  · There are many simple things that you can do to make your home safe and to help prevent falls.  · Ways to make your home safe include removing tripping hazards and installing grab bars in the bathroom.  · Ask for help when making these changes in your home.  This information is not intended to replace advice given to you by your health care provider. Make sure you discuss any questions you have with your health care provider.  Document Released: 10/14/2010 Document Revised: 08/02/2018 Document Reviewed: 08/02/2018  [employer] Interactive Patient Education © 2019 [employer]

## 2020-01-25 PROBLEM — R94.6 ABNORMAL THYROID FUNCTION TEST: Status: ACTIVE | Noted: 2020-01-01

## 2020-01-25 NOTE — PROGRESS NOTES
DATE OF VISIT: 2020      REASON FOR VISIT: Metastatic melanoma with lung involvement, history of rectal cancer, diarrhea      HISTORY OF PRESENT ILLNESS:    74-year-old male with medical problem consisting of hypothyroidism, dyslipidemia, coronary artery disease status post stent, status post pacemaker, history of skin cancer including melanoma more than 20 years ago, history of rectal cancer diagnosed in  for which patient was treated by Dr. Chaudhari in [place] with chemotherapy was initially seen in consultation on December 10, 2019 for evaluation of metastatic anal melanoma with lung involvement status post resection.  Due to stage IV disease, Opdivo was recommended to patient.  Patient was last seen here at clinic on 2019 to start Opdivo.  However due to persistent diarrhea.  Opdivo was was not started and patient was referred to Dr. Jarrett.  Patient had a colonoscopy done by Dr. Jarrett on 2020 which does not show any evidence of malignancy.  Patient states her diarrhea resolved after stopping magnesium supplement.  Had a restaging CT of chest abdomen and pelvis without contrast done on 2020, patient here to discuss the results and further recommendation.  Denies any bleeding.  Denies any new shortness of breath.      PAST MEDICAL HISTORY:    Past Medical History:   Diagnosis Date   • Arthritis    • Cancer (CMS/HCC)     colon, skin   • Colon cancer (CMS/HCC)    • Coronary artery disease    • Disease of thyroid gland    • GERD (gastroesophageal reflux disease)    • History of transfusion    • Hyperlipidemia    • MI (myocardial infarction) (CMS/HCC)        • Pacemaker    • Skin cancer    • Sleep apnea        SOCIAL HISTORY:    Social History     Tobacco Use   • Smoking status: Former Smoker     Years: 45.00     Last attempt to quit:      Years since quittin.0   • Smokeless tobacco: Never Used   Substance Use Topics   • Alcohol use: No   • Drug use: No        Surgical History :  Past Surgical History:   Procedure Laterality Date   • APPENDECTOMY     • COLON SURGERY     • COLONOSCOPY N/A 4/19/2017    Procedure: COLONOSCOPY;  Surgeon: Barrie Jarrett DO;  Location: [facility] ENDOSCOPY;  Service:    • CORONARY ANGIOPLASTY WITH STENT PLACEMENT     • ENDOSCOPY     • FEMUR OPEN REDUCTION INTERNAL FIXATION Left 6/16/2019    Procedure: FEMUR OPEN REDUCTION INTERNAL FIXATION;  Surgeon: Edward Harley MD;  Location: [facility] OR;  Service: Orthopedics   • KIDNEY STONE SURGERY     • PACEMAKER IMPLANTATION     • SALIVARY GLAND EXCISION Left     Left neck due to skin cancer with resultant chronic dry mouth   • THORACOSCOPY Right 10/18/2019    Procedure: THORACOSCOPY VIDEO  ASSISTED , mini THORACOTOMY wedge resection nodule thoracic mediastinal lymphadenectomy bronchoscopy  Latex Allergy;  Surgeon: Bruno Horton MD;  Location: [facility] OR;  Service: Cardiothoracic       ALLERGIES:    Allergies   Allergen Reactions   • Latex Rash   • Tape Rash         FAMILY HISTORY:  Family History   Problem Relation Age of Onset   • Coronary artery disease Mother    • Coronary artery disease Father    • Lung cancer Brother            REVIEW OF SYSTEMS:      CONSTITUTIONAL:  Complains of fatigue.  Admits to 45 pound of weight loss in last 1 year.  Denies any fever, chills .      EYES: No visual disturbances. No discharge. No new lesions     ENMT:  No epistaxis, mouth sores or difficulty swallowing.     RESPIRATORY: Positive for shortness of breath. No new cough or hemoptysis.     CARDIOVASCULAR:  No chest pain or palpitations.     GASTROINTESTINAL: States his diarrhea is resolved after stopping magnesium supplement. No abdominal pain nausea, vomiting or blood in the stool.     GENITOURINARY: No Dysuria or Hematuria.     MUSCULOSKELETAL: Positive for arthritic pain.     LYMPHATICS:  Denies any abnormal swollen glands anywhere in the body.     NEUROLOGICAL : No tingling or  "numbness. No headache or dizziness. No seizures or balance problems.     SKIN: Positive for history of skin cancer affecting bilateral upper extremity and face.  Complains of chronic skin changes affecting bilateral upper extremity.     ENDOCRINE : No new hot or cold intolerance. No new polyuria . No polydipsia.          PHYSICAL EXAMINATION:      VITAL SIGNS:  /61   Pulse 59   Temp 99.4 °F (37.4 °C) (Temporal)   Resp 16   Ht 182.9 cm (72.01\")   BMI 24.42 kg/m²       01/24/20  1000   Weight: (wheel chair)         ECOG performance status: 2     CONSTITUTIONAL:  Not in any distress.     EYES: Mild conjunctival Pallor. No Icterus. No Pterygium. Extraocular Movements intact.No ptosis.     ENMT:  Normocephalic, Atraumatic.No Facial Asymmetry noted.     NECK:  No adenopathy.Trachea midline. NO JVD.     RESPIRATORY:  Fair air entry bilateral. No rhonchi or wheezing.Fair respiratory effort.     CARDIOVASCULAR:  S1, S2. Regular rate and rhythm. No murmur or gallop appreciated.  Pacemaker present.     ABDOMEN:  Soft,  nontender. Bowel sounds present in all four quadrants.  No Hepatosplenomegaly appreciated.     MUSCULOSKELETAL:  Trace edema.No Calf Tenderness.Decreased range of motion.     NEUROLOGIC:    No  Motor  deficit appreciated. Cranial Nerves 2-12 grossly intact.     SKIN : Evidence of treatment for previous skin cancer on bilateral upper extremity and right side of face.  Evidence of hyperpigmented skin lesion on left side of face. Skin is warm and dry to touch.     LYMPHATICS: No new enlarged lymph nodes in neck or supraclavicular area.     PSYCHIATRY: Alert, awake and oriented ×3.        DIAGNOSTIC DATA:    Glucose   Date Value Ref Range Status   01/24/2020 100 (H) 65 - 99 mg/dL Final     Glucose, Arterial   Date Value Ref Range Status   06/16/2019 131 (H) 65 - 95 mmol/L Final     Sodium   Date Value Ref Range Status   01/24/2020 141 136 - 145 mmol/L Final   09/11/2019 139 136 - 145 MMOL/L Final "     Potassium   Date Value Ref Range Status   01/24/2020 4.6 3.5 - 5.2 mmol/L Final   09/11/2019 4.8 3.5 - 5.4 MMOL/L Final     CO2   Date Value Ref Range Status   01/24/2020 26.0 22.0 - 29.0 mmol/L Final   09/11/2019 26 20 - 33 MMOL/L Final     Chloride   Date Value Ref Range Status   01/24/2020 106 98 - 107 mmol/L Final   09/11/2019 102 98 - 107 MMOL/L Final     Anion Gap   Date Value Ref Range Status   01/24/2020 9.0 5.0 - 15.0 mmol/L Final   09/11/2019 11 7 - 14 MMOL/L Final     Creatinine   Date Value Ref Range Status   01/24/2020 1.44 (H) 0.76 - 1.27 mg/dL Final   09/11/2019 1.7 (H) 0.6 - 1.2 MG/DL Final     BUN   Date Value Ref Range Status   01/24/2020 28 (H) 8 - 23 mg/dL Final   09/11/2019 29 (H) 8 - 23 MG/DL Final     BUN/Creatinine Ratio   Date Value Ref Range Status   01/24/2020 19.4 7.0 - 25.0 Final     Calcium   Date Value Ref Range Status   01/24/2020 9.4 8.6 - 10.5 mg/dL Final   09/11/2019 9.9 8.7 - 10.4 MG/DL Final     eGFR Non  Amer   Date Value Ref Range Status   01/24/2020 48 (L) >60 mL/min/1.73 Final     Alkaline Phosphatase   Date Value Ref Range Status   01/24/2020 171 (H) 39 - 117 U/L Final   09/11/2019 253 (H) 25 - 100 U/L Final   08/21/2019 229 (H) 46 - 116 U/L Final     Total Protein   Date Value Ref Range Status   01/24/2020 6.7 6.0 - 8.5 g/dL Final   09/11/2019 7.3 6.0 - 8.2 G/DL Final   08/21/2019 6.8 6.4 - 8.2 g/dL Final     ALT (SGPT)   Date Value Ref Range Status   01/24/2020 15 1 - 41 U/L Final   09/11/2019 14 10 - 40 U/L Final   08/21/2019 18 16 - 63 U/L Final     AST (SGOT)   Date Value Ref Range Status   01/24/2020 18 1 - 40 U/L Final   09/11/2019 17 0 - 39 U/L Final     Total Bilirubin   Date Value Ref Range Status   01/24/2020 0.6 0.2 - 1.2 mg/dL Final   09/11/2019 0.8 0.3 - 1.2 MG/DL Final     Albumin   Date Value Ref Range Status   01/24/2020 3.20 (L) 3.50 - 5.20 g/dL Final   09/11/2019 3.5 3.4 - 4.8 G/DL Final     Globulin   Date Value Ref Range Status   01/24/2020  3.5 gm/dL Final     A/G Ratio   Date Value Ref Range Status   09/11/2019 0.9  Final     Lab Results   Component Value Date    WBC 7.57 01/24/2020    HGB 13.4 01/24/2020    HCT 41.3 01/24/2020    MCV 97.6 (H) 01/24/2020     01/24/2020     Lab Results   Component Value Date    NEUTROABS 4.59 01/24/2020    IRON 64 06/27/2019    TIBC 250 (L) 06/27/2019    LABIRON 26 06/27/2019    [id number] 978 08/21/2019     No results found for: , LABCA2, AFPTM, HCGQUANT, , CHROMGRNA, [id number], CEA, REFLABREPO      Radiology Data :  PET/CT done on September 27, 2019 showed:  FINDINGS:     HEAD and NECK:  No suspicious hypermetabolic activity     THORAX:  Hypermetabolic activity is noted in the right upper lung  associated with a nodule, SUVmax 4.8. This nodule measures 1.7 x  1.4 x 1.3 cm, previously 1.5 x 1.3 x 1.3 cm     ABDOMEN/PELVIS:  No suspicious hypermetabolic activity     OSSEOUS / MISC:  No suspicious hypermetabolic activity.  There is a depression fracture of the L1 vertebral body not  present on the prior study from June 19, 2019.     ADDITIONAL FINDINGS:   Multiple bilateral renal cysts.   Multiple nonobstructing left renal calculi with the largest  measuring 1.5 cm.  Slightly enlarged prostate.  Fatty liver.     IMPRESSION:  CONCLUSION:  1.  Enlarging hypermetabolic nodule in the right upper lung  suspicious for neoplasm.  2.  There is a depression fracture of the L1 vertebral body not  present on the prior study from June 19, 2019.  3.  Fatty liver.  4.  Multiple bilateral renal cysts.  5.  Multiple nonobstructing left renal calculi with the largest  measuring 1.5 cm.           Pathology :  Pathology report from October 18, 2019 showed:      Final Diagnosis   1.  WEDGE, UPPER LOBE OF RIGHT LUNG:   METASTATIC MALIGNANT MELANOMA.   LINES OF EXCISION NEGATIVE FOR MELANOMA.     2.  LEVEL 4 LYMPH NODE, RIGHT:   ONE (1) LYMPH NODE NEGATIVE FOR MELANOMA.    Electronically signed by Juanjo Andrade MD on  "10/28/2019 at 1556   Comment     The case is submitted to the [facility] for evaluation and their report is attached.  The findings are telephoned to Dr. Horton at 15:40 on 10/28/2019.   Intraoperative Consultation     FROZEN SECTION DIAGNOSIS:   1.  Spindle cell tumor, diagnosis deferred.   Gross Description     1.  The first container is labeled \"right upper lobe for frozen section, right lung\" and has a wedge of peripheral lung measuring 5.0 x 2.0 x 1.7 cm.  The smooth pleura has an underlying ovoid white tumor measuring 2.5 x 1.7 x 1.5 cm.  Part of the tumor is utilized for frozen section examination as 1A.  The wedge staple line is now removed and the exposed tissue is now marked with black ink.  Additional representative sections are embedded.  1B through 1E additional sections of tumor.       2.  The second container is labeled \"level 4 lymph node, right\" and has an ovoid pink tissue measuring 1.5 x 0.7 x 0.5 cm.  All transverse sections are entirely embedded as 2A.   Special Stains     Immunostains for cytokeratin (AE1/AE3), MARLEE, S100, HMB45, Melan A, CD34, calretinin, CK5/6, MOC-31 and thyroid transcription factor-1 (TTF-1) are performed at [facility] to separate carcinoma from mesothelioma, melanoma and sarcoma.  Immunostains (block 1C) have a malignant tumor negative for AE1/AE3, equivocal for MARLEE, strongly positive for S100 protein, negative for HMB45, negative for melan A, negative for CD34, negative for calretinin, negative for CK5/6, negative for MOC-31 and negative for TTF-1.              ASSESSMENT AND PLAN:       1.  Malignant melanoma involving right upper lobe, stage IV status post with resection on October 18, 2019:  - Result of PET/CT, pathology were discussed with patient and his family.  - It was also discussed with the patient and his family that melanoma typically does not happen in the lung it must have started somewhere else and metastasized to lung.  Making it " stage IV.  - Patient had a wedge resection done on October 18, 2019 by Dr. Horton with negative margin.  - NCCN guidelines were reviewed.  -As per NCCN guidelines, patient was offered adjuvant Opdivo for 1 year in total.  -Side effect of immunotherapy including but not limited to immune mediated pneumonitis, hepatitis, colitis, thyroiditis, encephalitis, nephritis, pan hypopituitarism, dermatitis and skin rash were discussed with the patient.  We will provide them information about immunotherapy consisting of Opdivo today.  It was also discussed with them rarely immune mediated side effect can be severe and life-threatening.  -After thinking about treatment, patient decided to do Opdivo.  Patient was scheduled to start Opdivo on December 27, 2019.  However due to persistent diarrhea Opdivo was held and patient was referred to Dr. Jarrett.  - CT of chest, abdomen and pelvis without contrast done on January 14, 2020 shows new left lower lobe lung nodule which is pleural-based.  Result of CT scan showing new lung nodule was discussed with patient and his son.  -Since diarrhea has stopped, recommend starting Opdivo.  We will go ahead with cycle 1 of Opdivo today.  -Plan is to repeat restaging CT of chest, abdomen and pelvis without contrast after cycle 5 of Opdivo.  -We will send tumor for BRAF testing.    2.  History of rectal cancer:  -Patient was diagnosed with colon cancer in 2004.  -Patient received treatment in [place] with Dr. Chaudhari.  -We do not have any records available for review.    3.  Left lower lobe lung nodule:  - CT scan of chest done on January 14, 2020 shows left lower lobe lung nodule which is pleural-based.  This is new finding as compared to PET/CT.  Lung nodule could be either metastatic focus from melanoma versus new primary lung malignancy.  Due to location of lung nodule, he will need open biopsy which was discussed with patient.  Patient wants to hold off on doing biopsy at present.  -  Recommend doing 5 treatment with Opdivo, if restaging CT scan after cycle 5 of Opdivo does not show any response to left lung nodule, will refer him back to Dr. Wright which was discussed with patient today.    4.  Diarrhea:  - His diarrhea is stopped after stopping magnesium supplement.  -Recent colonoscopy on January 22, 2020 does not show any evidence of malignancy or recurrence of colon cancer.    5.  Chronic kidney disease stage III     6 systolic congestive heart failure: Being followed by Dr. Grullon     7hypothyroidism  -Free T4 is minimally low today on January 25, 2020.  We will continue with periodic check of thyroid profile while on Opdivo.     8.  Health maintenance: Patient does not smoke.     9.Advance Care Planning: For now patient remains full code and is able to make his decisions.  Patient has health care surrogate mentioned on chart.     10 Kuldip Nevarez reports a pain score of 0 .  Given his pain assessment as noted, treatment options were discussed and the following options were decided upon as a follow-up plan to address the patient's pain: No intervention required.     11. PHQ-9 Total Score:        Perfecto Samuel MD  1/25/2020  12:57 PM        EMR Dragon/Transcription disclaimer:   Much of this encounter note is an electronic transcription/translation of spoken language to printed text. The electronic translation of spoken language may permit erroneous, or at times, nonsensical words or phrases to be inadvertently transcribed; Although I have reviewed the note for such errors, some may still exist.

## 2020-02-03 NOTE — TELEPHONE ENCOUNTER
Pt received a notification form his insurance co that they will no longer cover ondansetron (ZOFRAN) 4 MG tablet  Pt says he has 5 unopened bottles that he has never opened he wants to make sure someone cancels this medication so he doesn't have to pay for it     Pt contact # [phone]

## 2020-02-03 NOTE — TELEPHONE ENCOUNTER
Pt said that he received a letter at the first of the year that zofran is not covered. Called [facility] [facility] they said that it was covered with a small copay, they shipped med out on 1/27/20. Pt states that he will bring letter in for financial assistance to review.

## 2020-02-07 NOTE — PROGRESS NOTES
DATE OF VISIT: 2020      REASON FOR VISIT: Metastatic melanoma with lung involvement, history of rectal cancer, abnormal thyroid function test      HISTORY OF PRESENT ILLNESS:    74-year-old male with medical problem consisting of hypothyroidism, dyslipidemia, coronary artery disease status post stent, status post pacemaker, history of skin cancer including melanoma more than 20 years ago, history of rectal cancer diagnosed in  for which patient was treated by Dr. Chaudhari in [place] with chemotherapy was initially seen in consultation on December 10, 2019 for evaluation of metastatic anal melanoma with lung involvement status post resection.  Due to stage IV disease, Opdivo was recommended to patient.  Patient was last seen here at clinic on 2019 to start Opdivo.  However due to persistent diarrhea.  Patient received cycle 1 of Opdivo on 2020.  Patient is here to get cycle 2 of Opdivo.  Denies any nausea or vomiting or diarrhea or fever with first cycle of Opdivo.  Denies any bleeding.  Denies any new shortness of breath.      PAST MEDICAL HISTORY:    Past Medical History:   Diagnosis Date   • Arthritis    • Cancer (CMS/HCC)     colon, skin   • Colon cancer (CMS/HCC)    • Coronary artery disease    • Disease of thyroid gland    • GERD (gastroesophageal reflux disease)    • History of transfusion    • Hyperlipidemia    • MI (myocardial infarction) (CMS/HCC)        • Pacemaker    • Skin cancer    • Sleep apnea        SOCIAL HISTORY:    Social History     Tobacco Use   • Smoking status: Former Smoker     Years: 45.00     Last attempt to quit: 2003     Years since quittin.1   • Smokeless tobacco: Never Used   Substance Use Topics   • Alcohol use: No   • Drug use: No       Surgical History :  Past Surgical History:   Procedure Laterality Date   • APPENDECTOMY     • COLON SURGERY     • COLONOSCOPY N/A 2017    Procedure: COLONOSCOPY;  Surgeon: Barrie Jarrett DO;   Location: [facility] ENDOSCOPY;  Service:    • CORONARY ANGIOPLASTY WITH STENT PLACEMENT     • ENDOSCOPY     • FEMUR OPEN REDUCTION INTERNAL FIXATION Left 6/16/2019    Procedure: FEMUR OPEN REDUCTION INTERNAL FIXATION;  Surgeon: Edward Harley MD;  Location: [facility] OR;  Service: Orthopedics   • KIDNEY STONE SURGERY     • PACEMAKER IMPLANTATION     • SALIVARY GLAND EXCISION Left     Left neck due to skin cancer with resultant chronic dry mouth   • THORACOSCOPY Right 10/18/2019    Procedure: THORACOSCOPY VIDEO  ASSISTED , mini THORACOTOMY wedge resection nodule thoracic mediastinal lymphadenectomy bronchoscopy  Latex Allergy;  Surgeon: Bruno Horton MD;  Location: [facility] OR;  Service: Cardiothoracic       ALLERGIES:    Allergies   Allergen Reactions   • Latex Rash   • Tape Rash         FAMILY HISTORY:  Family History   Problem Relation Age of Onset   • Coronary artery disease Mother    • Coronary artery disease Father    • Lung cancer Brother            REVIEW OF SYSTEMS:      CONSTITUTIONAL:  Complains of fatigue.    Has gained 2 pounds since last clinic visit.  Denies any fever, chills .      EYES: No visual disturbances. No discharge. No new lesions     ENMT:  No epistaxis, mouth sores or difficulty swallowing.     RESPIRATORY: Positive for shortness of breath. No new cough or hemoptysis.     CARDIOVASCULAR:  No chest pain or palpitations.     GASTROINTESTINAL: States his diarrhea is resolved after stopping magnesium supplement. No abdominal pain nausea, vomiting or blood in the stool.     GENITOURINARY: No Dysuria or Hematuria.     MUSCULOSKELETAL: Positive for arthritic pain.     LYMPHATICS:  Denies any abnormal swollen glands anywhere in the body.     NEUROLOGICAL : No tingling or numbness. No headache or dizziness. No seizures or balance problems.     SKIN: Positive for history of skin cancer affecting bilateral upper extremity and face.  Complains of chronic skin changes affecting bilateral  "upper extremity.     ENDOCRINE : No new hot or cold intolerance. No new polyuria . No polydipsia.          PHYSICAL EXAMINATION:      VITAL SIGNS:  /59   Pulse 64   Temp 99 °F (37.2 °C) (Temporal)   Resp 16   Ht 182.9 cm (72.01\")   Wt 83 kg (182 lb 14.4 oz)   BMI 24.80 kg/m²       02/07/20  1015   Weight: 83 kg (182 lb 14.4 oz)         ECOG performance status: 2     CONSTITUTIONAL:  Not in any distress.     EYES: Mild conjunctival Pallor. No Icterus. No Pterygium. Extraocular Movements intact.No ptosis.     ENMT:  Normocephalic, Atraumatic.No Facial Asymmetry noted.     NECK:  No adenopathy.Trachea midline. NO JVD.     RESPIRATORY:  Fair air entry bilateral. No rhonchi or wheezing.Fair respiratory effort.     CARDIOVASCULAR:  S1, S2. Regular rate and rhythm. No murmur or gallop appreciated.  Pacemaker present.     ABDOMEN:  Soft,  nontender. Bowel sounds present in all four quadrants.  No Hepatosplenomegaly appreciated.     MUSCULOSKELETAL:  Trace edema.No Calf Tenderness.Decreased range of motion.     NEUROLOGIC:    No  Motor  deficit appreciated. Cranial Nerves 2-12 grossly intact.     SKIN : Evidence of treatment for previous skin cancer on bilateral upper extremity and right side of face.  Evidence of hyperpigmented skin lesion on left side of face. Skin is warm and dry to touch.     LYMPHATICS: No new enlarged lymph nodes in neck or supraclavicular area.     PSYCHIATRY: Alert, awake and oriented ×3.        DIAGNOSTIC DATA:    Glucose   Date Value Ref Range Status   02/07/2020 117 (H) 65 - 99 mg/dL Final     Glucose, Arterial   Date Value Ref Range Status   06/16/2019 131 (H) 65 - 95 mmol/L Final     Sodium   Date Value Ref Range Status   02/07/2020 145 136 - 145 mmol/L Final   09/11/2019 139 136 - 145 MMOL/L Final     Potassium   Date Value Ref Range Status   02/07/2020 4.0 3.5 - 5.2 mmol/L Final   09/11/2019 4.8 3.5 - 5.4 MMOL/L Final     CO2   Date Value Ref Range Status   02/07/2020 23.0 22.0 - " 29.0 mmol/L Final   09/11/2019 26 20 - 33 MMOL/L Final     Chloride   Date Value Ref Range Status   02/07/2020 110 (H) 98 - 107 mmol/L Final   09/11/2019 102 98 - 107 MMOL/L Final     Anion Gap   Date Value Ref Range Status   02/07/2020 12.0 5.0 - 15.0 mmol/L Final   09/11/2019 11 7 - 14 MMOL/L Final     Creatinine   Date Value Ref Range Status   02/07/2020 1.28 (H) 0.76 - 1.27 mg/dL Final   09/11/2019 1.7 (H) 0.6 - 1.2 MG/DL Final     BUN   Date Value Ref Range Status   02/07/2020 27 (H) 8 - 23 mg/dL Final   09/11/2019 29 (H) 8 - 23 MG/DL Final     BUN/Creatinine Ratio   Date Value Ref Range Status   02/07/2020 21.1 7.0 - 25.0 Final     Calcium   Date Value Ref Range Status   02/07/2020 9.9 8.6 - 10.5 mg/dL Final   09/11/2019 9.9 8.7 - 10.4 MG/DL Final     eGFR Non  Amer   Date Value Ref Range Status   02/07/2020 55 (L) >60 mL/min/1.73 Final     Alkaline Phosphatase   Date Value Ref Range Status   02/07/2020 177 (H) 39 - 117 U/L Final   09/11/2019 253 (H) 25 - 100 U/L Final   08/21/2019 229 (H) 46 - 116 U/L Final     Total Protein   Date Value Ref Range Status   02/07/2020 6.7 6.0 - 8.5 g/dL Final   09/11/2019 7.3 6.0 - 8.2 G/DL Final   08/21/2019 6.8 6.4 - 8.2 g/dL Final     ALT (SGPT)   Date Value Ref Range Status   02/07/2020 14 1 - 41 U/L Final   09/11/2019 14 10 - 40 U/L Final   08/21/2019 18 16 - 63 U/L Final     AST (SGOT)   Date Value Ref Range Status   02/07/2020 17 1 - 40 U/L Final   09/11/2019 17 0 - 39 U/L Final     Total Bilirubin   Date Value Ref Range Status   02/07/2020 0.6 0.2 - 1.2 mg/dL Final   09/11/2019 0.8 0.3 - 1.2 MG/DL Final     Albumin   Date Value Ref Range Status   02/07/2020 3.40 (L) 3.50 - 5.20 g/dL Final   09/11/2019 3.5 3.4 - 4.8 G/DL Final     Globulin   Date Value Ref Range Status   02/07/2020 3.3 gm/dL Final     A/G Ratio   Date Value Ref Range Status   09/11/2019 0.9  Final     Lab Results   Component Value Date    WBC 6.67 02/07/2020    HGB 13.4 02/07/2020    HCT 40.7  02/07/2020    MCV 96.0 02/07/2020     02/07/2020     Lab Results   Component Value Date    NEUTROABS 3.73 02/07/2020    IRON 64 06/27/2019    TIBC 250 (L) 06/27/2019    LABIRON 26 06/27/2019    MGPYHZEZ00 978 08/21/2019     No results found for: , LABCA2, AFPTM, HCGQUANT, , CHROMGRNA, [id number], CEA, REFLABREPO    Component Free T4   Latest Ref Rng & Units 0.93 - 1.70 ng/dL   9/2/2016 0.80   12/8/2019 1.01   12/27/2019 0.96   1/24/2020 0.86 (L)   2/7/2020 2.15 (H)       Component       TSH Baseline   Latest Ref Rng & Units       0.270 - 4.200 uIU/mL   9/1/2016       7.32 (H)   9/2/2016       1:50 AM 13.60 (H)   9/2/2016       6:30 AM 11.30 (H)   2/15/2017       3.130   11/1/2018       2.59   6/27/2019       2.090   12/8/2019       2.950   12/27/2019       2.760   1/24/2020       1.950   2/7/2020       0.033 (L)           Radiology Data :  PET/CT done on September 27, 2019 showed:  FINDINGS:     HEAD and NECK:  No suspicious hypermetabolic activity     THORAX:  Hypermetabolic activity is noted in the right upper lung  associated with a nodule, SUVmax 4.8. This nodule measures 1.7 x  1.4 x 1.3 cm, previously 1.5 x 1.3 x 1.3 cm     ABDOMEN/PELVIS:  No suspicious hypermetabolic activity     OSSEOUS / MISC:  No suspicious hypermetabolic activity.  There is a depression fracture of the L1 vertebral body not  present on the prior study from June 19, 2019.     ADDITIONAL FINDINGS:   Multiple bilateral renal cysts.   Multiple nonobstructing left renal calculi with the largest  measuring 1.5 cm.  Slightly enlarged prostate.  Fatty liver.     IMPRESSION:  CONCLUSION:  1.  Enlarging hypermetabolic nodule in the right upper lung  suspicious for neoplasm.  2.  There is a depression fracture of the L1 vertebral body not  present on the prior study from June 19, 2019.  3.  Fatty liver.  4.  Multiple bilateral renal cysts.  5.  Multiple nonobstructing left renal calculi with the largest  measuring 1.5  "cm.           Pathology :  Pathology report from October 18, 2019 showed:      Final Diagnosis   1.  WEDGE, UPPER LOBE OF RIGHT LUNG:   METASTATIC MALIGNANT MELANOMA.   LINES OF EXCISION NEGATIVE FOR MELANOMA.     2.  LEVEL 4 LYMPH NODE, RIGHT:   ONE (1) LYMPH NODE NEGATIVE FOR MELANOMA.    Electronically signed by Juanjo Andrade MD on 10/28/2019 at 1556   Comment     The case is submitted to the [facility] for evaluation and their report is attached.  The findings are telephoned to Dr. Horton at 15:40 on 10/28/2019.   Intraoperative Consultation     FROZEN SECTION DIAGNOSIS:   1.  Spindle cell tumor, diagnosis deferred.   Gross Description     1.  The first container is labeled \"right upper lobe for frozen section, right lung\" and has a wedge of peripheral lung measuring 5.0 x 2.0 x 1.7 cm.  The smooth pleura has an underlying ovoid white tumor measuring 2.5 x 1.7 x 1.5 cm.  Part of the tumor is utilized for frozen section examination as 1A.  The wedge staple line is now removed and the exposed tissue is now marked with black ink.  Additional representative sections are embedded.  1B through 1E additional sections of tumor.       2.  The second container is labeled \"level 4 lymph node, right\" and has an ovoid pink tissue measuring 1.5 x 0.7 x 0.5 cm.  All transverse sections are entirely embedded as 2A.   Special Stains     Immunostains for cytokeratin (AE1/AE3), MARLEE, S100, HMB45, Melan A, CD34, calretinin, CK5/6, MOC-31 and thyroid transcription factor-1 (TTF-1) are performed at [facility] to separate carcinoma from mesothelioma, melanoma and sarcoma.  Immunostains (block 1C) have a malignant tumor negative for AE1/AE3, equivocal for MARLEE, strongly positive for S100 protein, negative for HMB45, negative for melan A, negative for CD34, negative for calretinin, negative for CK5/6, negative for MOC-31 and negative for TTF-1.              ASSESSMENT AND PLAN:       1.  Malignant melanoma " involving right upper lobe, stage IV status post with resection on October 18, 2019:  - Result of PET/CT, pathology were discussed with patient and his family.  - It was also discussed with the patient and his family that melanoma typically does not happen in the lung it must have started somewhere else and metastasized to lung.  Making it stage IV.  - Patient had a wedge resection done on October 18, 2019 by Dr. Horton with negative margin.  - NCCN guidelines were reviewed.  -As per NCCN guidelines, patient was offered adjuvant Opdivo for 1 year in total.  -Side effect of immunotherapy including but not limited to immune mediated pneumonitis, hepatitis, colitis, thyroiditis, encephalitis, nephritis, pan hypopituitarism, dermatitis and skin rash were discussed with the patient.  We will provide them information about immunotherapy consisting of Opdivo today.  It was also discussed with them rarely immune mediated side effect can be severe and life-threatening.  -After thinking about treatment, patient decided to do Opdivo.  Patient was scheduled to start Opdivo on December 27, 2019.  However due to persistent diarrhea Opdivo was held and patient was referred to Dr. Jarrett.  - CT of chest, abdomen and pelvis without contrast done on January 14, 2020 shows new left lower lobe lung nodule which is pleural-based.  Result of CT scan showing new lung nodule was discussed with patient and his son.  -Patient was started on cycle 1 of Opdivo on January 21, 2020.  He tolerated Opdivo well.  -We will board with cycle 2 of Opdivo today  -Plan is to repeat restaging CT of chest, abdomen and pelvis without contrast after cycle 5 of Opdivo.  -BRAF, C-kit mutation testing is pending    2.  History of rectal cancer:  -Patient was diagnosed with colon cancer in 2004.  -Patient received treatment in [place] with Dr. Chaudhari.  -We do not have any records available for review.    3.  Left lower lobe lung nodule:  - CT scan of chest  done on January 14, 2020 shows left lower lobe lung nodule which is pleural-based.  This is new finding as compared to PET/CT.  Lung nodule could be either metastatic focus from melanoma versus new primary lung malignancy.  Due to location of lung nodule, he will need open biopsy which was discussed with patient.  Patient wants to hold off on doing biopsy at present.  - Recommend doing 5 treatment with Opdivo, if restaging CT scan after cycle 5 of Opdivo does not show any response to left lung nodule, will refer him back to Dr. Wright which was discussed with patient today.    4.  Diarrhea:  - His diarrhea is stopped after stopping magnesium supplement.  -Recent colonoscopy on January 22, 2020 does not show any evidence of malignancy or recurrence of colon cancer.    5.  Chronic kidney disease stage III     6 systolic congestive heart failure: Being followed by Dr. Grullon     7hypothyroidism  -Thyroid function test done on February 7 2020 shows T4 is elevated with low TSH.  -Since patient is taking Synthroid 75 mcg p.o. daily.  Will decrease Synthroid to 50 mcg p.o. daily and recheck thyroid function test with cycle 4 of Opdivo.     8.  Health maintenance: Patient does not smoke.     9.Advance Care Planning: For now patient remains full code and is able to make his decisions.  Patient has health care surrogate mentioned on chart.     10 Kuldip Nevarez reports a pain score of  0.  Given his pain assessment as noted, treatment options were discussed and the following options were decided upon as a follow-up plan to address the patient's pain: No intervention required.     11. PHQ-9 Total Score:             Perfecto Samuel MD  2/7/2020  6:45 PM        EMR Dragon/Transcription disclaimer:   Much of this encounter note is an electronic transcription/translation of spoken language to printed text. The electronic translation of spoken language may permit erroneous, or at times, nonsensical words or phrases to be  inadvertently transcribed; Although I have reviewed the note for such errors, some may still exist.

## 2020-02-10 NOTE — TELEPHONE ENCOUNTER
Called pt and informed. Pt wants new prescription sent to [facility] on [facility].

## 2020-02-10 NOTE — TELEPHONE ENCOUNTER
----- Message from Perfecto Samuel MD sent at 2/9/2020 12:44 PM CST -----  Please let patient know, his thyroid blood work is abnormal.  He needs to decrease his Synthroid to 50 mcg p.o. daily.  I have printed prescription of Synthroid images in my office.  We can fax it over to pharmacy of his choice.  Thank you

## 2020-02-21 PROBLEM — R79.89 ELEVATED SERUM CREATININE: Status: ACTIVE | Noted: 2020-01-01

## 2020-02-21 PROBLEM — D64.9 ANEMIA: Status: ACTIVE | Noted: 2020-01-01

## 2020-02-21 NOTE — PROGRESS NOTES
DATE OF VISIT: 2020      REASON FOR VISIT: Metastatic melanoma with lung involvement, history of rectal cancer, abnormal thyroid function test, hypernatremia      HISTORY OF PRESENT ILLNESS:    75-year-old male with medical problem consisting of hypothyroidism, dyslipidemia, coronary artery disease status post stent, status post pacemaker, history of skin cancer including melanoma more than 20 years ago, history of rectal cancer diagnosed in  for which patient was treated by Dr. Chaudhari in [place] with chemotherapy was initially seen in consultation on December 10, 2019 for evaluation of metastatic anal melanoma with lung involvement status post resection.  Due to stage IV disease, Opdivo was recommended to patient.  Patient was last seen here at clinic on 2019 to start Opdivo.  However due to persistent diarrhea, Patient received cycle 1 of Opdivo on 2020.  Patient is here to get cycle 3 of Opdivo.  Complains of 2-3 loose bowel movement without diarrhea.  Denies any nausea or vomiting .  Denies any bleeding.  Denies any new shortness of breath.      PAST MEDICAL HISTORY:    Past Medical History:   Diagnosis Date   • Arthritis    • Cancer (CMS/HCC)     colon, skin   • Colon cancer (CMS/HCC)    • Coronary artery disease    • Disease of thyroid gland    • GERD (gastroesophageal reflux disease)    • History of transfusion    • Hyperlipidemia    • MI (myocardial infarction) (CMS/HCC)        • Pacemaker    • Skin cancer    • Sleep apnea        SOCIAL HISTORY:    Social History     Tobacco Use   • Smoking status: Former Smoker     Years: 45.00     Last attempt to quit:      Years since quittin.1   • Smokeless tobacco: Never Used   Substance Use Topics   • Alcohol use: No   • Drug use: No       Surgical History :  Past Surgical History:   Procedure Laterality Date   • APPENDECTOMY     • COLON SURGERY     • COLONOSCOPY N/A 2017    Procedure: COLONOSCOPY;  Surgeon:  Barrie Jarrett DO;  Location: [facility] ENDOSCOPY;  Service:    • COLONOSCOPY N/A 1/22/2020    Procedure: COLONOSCOPY          (DONE IN OR WITH ENDO)              LATEX ALLERGY;  Surgeon: Barrie Jarrett DO;  Location: [facility] OR;  Service: Gastroenterology;  Laterality: N/A;   • CORONARY ANGIOPLASTY WITH STENT PLACEMENT     • ENDOSCOPY     • FEMUR OPEN REDUCTION INTERNAL FIXATION Left 6/16/2019    Procedure: FEMUR OPEN REDUCTION INTERNAL FIXATION;  Surgeon: Edward Harley MD;  Location: [facility] OR;  Service: Orthopedics   • KIDNEY STONE SURGERY     • PACEMAKER IMPLANTATION     • SALIVARY GLAND EXCISION Left     Left neck due to skin cancer with resultant chronic dry mouth   • THORACOSCOPY Right 10/18/2019    Procedure: THORACOSCOPY VIDEO  ASSISTED , mini THORACOTOMY wedge resection nodule thoracic mediastinal lymphadenectomy bronchoscopy  Latex Allergy;  Surgeon: Bruno Horton MD;  Location: [facility] OR;  Service: Cardiothoracic       ALLERGIES:    Allergies   Allergen Reactions   • Latex Rash   • Tape Rash         FAMILY HISTORY:  Family History   Problem Relation Age of Onset   • Coronary artery disease Mother    • Coronary artery disease Father    • Lung cancer Brother            REVIEW OF SYSTEMS:      CONSTITUTIONAL:  Complains of fatigue.    Has lost 1 pounds since last clinic visit.  Denies any fever, chills .      EYES: No visual disturbances. No discharge. No new lesions     ENMT:  No epistaxis, mouth sores or difficulty swallowing.     RESPIRATORY: Positive for shortness of breath. No new cough or hemoptysis.     CARDIOVASCULAR:  No chest pain or palpitations.     GASTROINTESTINAL: Complains of 2-3 loose bowel movement without diarrhea. No abdominal pain nausea, vomiting or blood in the stool.     GENITOURINARY: No Dysuria or Hematuria.     MUSCULOSKELETAL: Positive for arthritic pain.     LYMPHATICS:  Denies any abnormal swollen glands anywhere in the body.     NEUROLOGICAL : No  "tingling or numbness. No headache or dizziness. No seizures or balance problems.     SKIN: Positive for history of skin cancer affecting bilateral upper extremity and face.  Complains of chronic skin changes affecting bilateral upper extremity.     ENDOCRINE : No new hot or cold intolerance. No new polyuria . No polydipsia.          PHYSICAL EXAMINATION:      VITAL SIGNS:  /56   Pulse 64   Temp 98.8 °F (37.1 °C) (Temporal)   Resp 16   Ht 182.9 cm (72.01\")   Wt 82.5 kg (181 lb 12.8 oz)   BMI 24.65 kg/m²       02/21/20 0833   Weight: 82.5 kg (181 lb 12.8 oz)         ECOG performance status: 2     CONSTITUTIONAL:  Not in any distress.     EYES: Mild conjunctival Pallor. No Icterus. No Pterygium. Extraocular Movements intact.No ptosis.     ENMT:  Normocephalic, Atraumatic.No Facial Asymmetry noted.     NECK:  No adenopathy.Trachea midline. NO JVD.     RESPIRATORY:  Fair air entry bilateral. No rhonchi or wheezing.Fair respiratory effort.     CARDIOVASCULAR:  S1, S2. Regular rate and rhythm. No murmur or gallop appreciated.  Pacemaker present.     ABDOMEN:  Soft,  nontender. Bowel sounds present in all four quadrants.  No Hepatosplenomegaly appreciated.     MUSCULOSKELETAL:  Trace edema.No Calf Tenderness.Decreased range of motion.     NEUROLOGIC:    No  Motor  deficit appreciated. Cranial Nerves 2-12 grossly intact.     SKIN : Evidence of treatment for previous skin cancer on bilateral upper extremity and right side of face.  Evidence of hyperpigmented skin lesion on left side of face. Skin is warm and dry to touch.     LYMPHATICS: No new enlarged lymph nodes in neck or supraclavicular area.     PSYCHIATRY: Alert, awake and oriented ×3.        DIAGNOSTIC DATA:    Glucose   Date Value Ref Range Status   02/21/2020 96 65 - 99 mg/dL Final     Glucose, Arterial   Date Value Ref Range Status   06/16/2019 131 (H) 65 - 95 mmol/L Final     Sodium   Date Value Ref Range Status   02/21/2020 146 (H) 136 - 145 mmol/L " Final   09/11/2019 139 136 - 145 MMOL/L Final     Potassium   Date Value Ref Range Status   02/21/2020 4.0 3.5 - 5.2 mmol/L Final   09/11/2019 4.8 3.5 - 5.4 MMOL/L Final     CO2   Date Value Ref Range Status   02/21/2020 26.0 22.0 - 29.0 mmol/L Final   09/11/2019 26 20 - 33 MMOL/L Final     Chloride   Date Value Ref Range Status   02/21/2020 108 (H) 98 - 107 mmol/L Final   09/11/2019 102 98 - 107 MMOL/L Final     Anion Gap   Date Value Ref Range Status   02/21/2020 12.0 5.0 - 15.0 mmol/L Final   09/11/2019 11 7 - 14 MMOL/L Final     Creatinine   Date Value Ref Range Status   02/21/2020 1.33 (H) 0.76 - 1.27 mg/dL Final   09/11/2019 1.7 (H) 0.6 - 1.2 MG/DL Final     BUN   Date Value Ref Range Status   02/21/2020 23 8 - 23 mg/dL Final   09/11/2019 29 (H) 8 - 23 MG/DL Final     BUN/Creatinine Ratio   Date Value Ref Range Status   02/21/2020 17.3 7.0 - 25.0 Final     Calcium   Date Value Ref Range Status   02/21/2020 9.8 8.6 - 10.5 mg/dL Final   09/11/2019 9.9 8.7 - 10.4 MG/DL Final     eGFR Non  Amer   Date Value Ref Range Status   02/21/2020 52 (L) >60 mL/min/1.73 Final     Alkaline Phosphatase   Date Value Ref Range Status   02/21/2020 196 (H) 39 - 117 U/L Final   09/11/2019 253 (H) 25 - 100 U/L Final   08/21/2019 229 (H) 46 - 116 U/L Final     Total Protein   Date Value Ref Range Status   02/21/2020 6.5 6.0 - 8.5 g/dL Final   09/11/2019 7.3 6.0 - 8.2 G/DL Final   08/21/2019 6.8 6.4 - 8.2 g/dL Final     ALT (SGPT)   Date Value Ref Range Status   02/21/2020 8 1 - 41 U/L Final   09/11/2019 14 10 - 40 U/L Final   08/21/2019 18 16 - 63 U/L Final     AST (SGOT)   Date Value Ref Range Status   02/21/2020 13 1 - 40 U/L Final   09/11/2019 17 0 - 39 U/L Final     Total Bilirubin   Date Value Ref Range Status   02/21/2020 0.5 0.2 - 1.2 mg/dL Final   09/11/2019 0.8 0.3 - 1.2 MG/DL Final     Albumin   Date Value Ref Range Status   02/21/2020 3.30 (L) 3.50 - 5.20 g/dL Final   09/11/2019 3.5 3.4 - 4.8 G/DL Final     Globulin    Date Value Ref Range Status   02/21/2020 3.2 gm/dL Final     A/G Ratio   Date Value Ref Range Status   09/11/2019 0.9  Final     Lab Results   Component Value Date    WBC 6.00 02/21/2020    HGB 12.9 (L) 02/21/2020    HCT 38.7 02/21/2020    MCV 94.2 02/21/2020     02/21/2020     Lab Results   Component Value Date    NEUTROABS 2.78 02/21/2020    IRON 64 06/27/2019    TIBC 250 (L) 06/27/2019    LABIRON 26 06/27/2019    VXDFTPYY81 978 08/21/2019     No results found for: , LABCA2, AFPTM, HCGQUANT, , CHROMGRNA, [id number], CEA, REFLABREPO    Component Free T4   Latest Ref Rng & Units 0.93 - 1.70 ng/dL   9/2/2016 0.80   12/8/2019 1.01   12/27/2019 0.96   1/24/2020 0.86 (L)   2/7/2020 2.15 (H)       Component       TSH Baseline   Latest Ref Rng & Units       0.270 - 4.200 uIU/mL   9/1/2016       7.32 (H)   9/2/2016       1:50 AM 13.60 (H)   9/2/2016       6:30 AM 11.30 (H)   2/15/2017       3.130   11/1/2018       2.59   6/27/2019       2.090   12/8/2019       2.950   12/27/2019       2.760   1/24/2020       1.950   2/7/2020       0.033 (L)           Radiology Data :  PET/CT done on September 27, 2019 showed:  FINDINGS:     HEAD and NECK:  No suspicious hypermetabolic activity     THORAX:  Hypermetabolic activity is noted in the right upper lung  associated with a nodule, SUVmax 4.8. This nodule measures 1.7 x  1.4 x 1.3 cm, previously 1.5 x 1.3 x 1.3 cm     ABDOMEN/PELVIS:  No suspicious hypermetabolic activity     OSSEOUS / MISC:  No suspicious hypermetabolic activity.  There is a depression fracture of the L1 vertebral body not  present on the prior study from June 19, 2019.     ADDITIONAL FINDINGS:   Multiple bilateral renal cysts.   Multiple nonobstructing left renal calculi with the largest  measuring 1.5 cm.  Slightly enlarged prostate.  Fatty liver.     IMPRESSION:  CONCLUSION:  1.  Enlarging hypermetabolic nodule in the right upper lung  suspicious for neoplasm.  2.  There is a depression  "fracture of the L1 vertebral body not  present on the prior study from June 19, 2019.  3.  Fatty liver.  4.  Multiple bilateral renal cysts.  5.  Multiple nonobstructing left renal calculi with the largest  measuring 1.5 cm.           Pathology :  Pathology report from October 18, 2019 showed:      Final Diagnosis   1.  WEDGE, UPPER LOBE OF RIGHT LUNG:   METASTATIC MALIGNANT MELANOMA.   LINES OF EXCISION NEGATIVE FOR MELANOMA.     2.  LEVEL 4 LYMPH NODE, RIGHT:   ONE (1) LYMPH NODE NEGATIVE FOR MELANOMA.    Electronically signed by Juanjo Andrade MD on 10/28/2019 at 1556   Comment     The case is submitted to the [facility] for evaluation and their report is attached.  The findings are telephoned to Dr. Horton at 15:40 on 10/28/2019.   Intraoperative Consultation     FROZEN SECTION DIAGNOSIS:   1.  Spindle cell tumor, diagnosis deferred.   Gross Description     1.  The first container is labeled \"right upper lobe for frozen section, right lung\" and has a wedge of peripheral lung measuring 5.0 x 2.0 x 1.7 cm.  The smooth pleura has an underlying ovoid white tumor measuring 2.5 x 1.7 x 1.5 cm.  Part of the tumor is utilized for frozen section examination as 1A.  The wedge staple line is now removed and the exposed tissue is now marked with black ink.  Additional representative sections are embedded.  1B through 1E additional sections of tumor.       2.  The second container is labeled \"level 4 lymph node, right\" and has an ovoid pink tissue measuring 1.5 x 0.7 x 0.5 cm.  All transverse sections are entirely embedded as 2A.   Special Stains     Immunostains for cytokeratin (AE1/AE3), MARLEE, S100, HMB45, Melan A, CD34, calretinin, CK5/6, MOC-31 and thyroid transcription factor-1 (TTF-1) are performed at [facility] to separate carcinoma from mesothelioma, melanoma and sarcoma.  Immunostains (block 1C) have a malignant tumor negative for AE1/AE3, equivocal for MARLEE, strongly positive for S100 " protein, negative for HMB45, negative for melan A, negative for CD34, negative for calretinin, negative for CK5/6, negative for MOC-31 and negative for TTF-1.             BRAF testing done on January 28, 2020 showed:               C-kit testing done on January 28, 2020 showed:              ASSESSMENT AND PLAN:       1.  Malignant melanoma involving right upper lobe, stage IV status post with resection on October 18, 2019:  - Result of PET/CT, pathology were discussed with patient and his family.  - It was also discussed with the patient and his family that melanoma typically does not happen in the lung it must have started somewhere else and metastasized to lung.  Making it stage IV.  - Patient had a wedge resection done on October 18, 2019 by Dr. Horton with negative margin.  - NCCN guidelines were reviewed.  -As per NCCN guidelines, patient was offered adjuvant Opdivo for 1 year in total.  -Side effect of immunotherapy including but not limited to immune mediated pneumonitis, hepatitis, colitis, thyroiditis, encephalitis, nephritis, pan hypopituitarism, dermatitis and skin rash were discussed with the patient.  We will provide them information about immunotherapy consisting of Opdivo today.  It was also discussed with them rarely immune mediated side effect can be severe and life-threatening.  -After thinking about treatment, patient decided to do Opdivo.  Patient was scheduled to start Opdivo on December 27, 2019.  However due to persistent diarrhea Opdivo was held and patient was referred to Dr. Jarrett.  - CT of chest, abdomen and pelvis without contrast done on January 14, 2020 shows new left lower lobe lung nodule which is pleural-based.  Result of CT scan showing new lung nodule was discussed with patient and his son.  -Patient was started on cycle 1 of Opdivo on January 21, 2020.  He tolerated Opdivo well.  -We will board with cycle 3 of Opdivo today  -Plan is to repeat restaging CT of chest, abdomen and  pelvis without contrast after cycle 5 of Opdivo.  -BRAF, C-kit mutation testing is negative. Results were Discussed with patient and his family.    2.  History of rectal cancer:  -Patient was diagnosed with colon cancer in 2004.  -Patient received treatment in [place] with Dr. Chaudhari.  -We do not have any records available for review.    3.  Left lower lobe lung nodule:  - CT scan of chest done on January 14, 2020 shows left lower lobe lung nodule which is pleural-based.  This is new finding as compared to PET/CT.  Lung nodule could be either metastatic focus from melanoma versus new primary lung malignancy.  Due to location of lung nodule, he will need open biopsy which was discussed with patient.  Patient wants to hold off on doing biopsy at present.  - Recommend doing 5 treatment with Opdivo, if restaging CT scan after cycle 5 of Opdivo does not show any response to left lung nodule, will refer him back to Dr. Wright which was discussed with patient today.    4.  Diarrhea:  - His diarrhea is stopped after stopping magnesium supplement.  -Recent colonoscopy on January 22, 2020 does not show any evidence of malignancy or recurrence of colon cancer.    5.  Chronic kidney disease stage III  -Creatinine is elevated at 1.33.  Sodium is mildly elevated at 146, will provide him with 500 ml of normal saline today     6 systolic congestive heart failure: Being followed by Dr. Grullon     7 hypothyroidism  -Thyroid function test done on February 7 2020 shows T4 is elevated with low TSH.  -Since patient is taking Synthroid 75 mcg p.o. daily.  Dose of Synthroid was decreased to 50 mcg with cycle 2.  Patient states he is taking 50 mcg plus half tablet of 75 mcg.  He was instructed to take only 50 mcg of Synthroid from today on February 21, 2020. recheck thyroid function test with cycle 4 of Opdivo.     8.  Health maintenance: Patient does not smoke.     9.Advance Care Planning: For now patient remains full code and is  able to make his decisions.  Patient has health care surrogate mentioned on chart.     10 Kuldip Nevarez reports a pain score of 0  .  Given his pain assessment as noted, treatment options were discussed and the following options were decided upon as a follow-up plan to address the patient's pain: No intervention required.     11. PHQ-9 Total Score:             Perfecto Samuel MD  2/21/2020  6:35 PM        EMR Dragon/Transcription disclaimer:   Much of this encounter note is an electronic transcription/translation of spoken language to printed text. The electronic translation of spoken language may permit erroneous, or at times, nonsensical words or phrases to be inadvertently transcribed; Although I have reviewed the note for such errors, some may still exist.

## 2020-03-06 NOTE — PROGRESS NOTES
DATE OF VISIT: 3/6/2020      REASON FOR VISIT: Metastatic melanoma with lung involvement, history of rectal cancer, abnormal thyroid function test,       HISTORY OF PRESENT ILLNESS:    75-year-old male with medical problem consisting of hypothyroidism, dyslipidemia, coronary artery disease status post stent, status post pacemaker, history of skin cancer including melanoma more than 20 years ago, history of rectal cancer diagnosed in  for which patient was treated by Dr. Chaudhari in [place] with chemotherapy was initially seen in consultation on December 10, 2019 for evaluation of metastatic anal melanoma with lung involvement status post resection.  Due to stage IV disease, Opdivo was recommended to patient.  Patient was last seen here at clinic on 2019 to start Opdivo.  However due to persistent diarrhea, Patient received cycle 1 of Opdivo on 2020.  Patient is here to get cycle 4 of Opdivo.    Denies any nausea or vomiting or diarrhea.  Denies any bleeding.  Denies any new shortness of breath.      PAST MEDICAL HISTORY:    Past Medical History:   Diagnosis Date   • Arthritis    • Cancer (CMS/HCC)     colon, skin   • Colon cancer (CMS/HCC)    • Coronary artery disease    • Disease of thyroid gland    • GERD (gastroesophageal reflux disease)    • History of transfusion    • Hyperlipidemia    • MI (myocardial infarction) (CMS/HCC)        • Pacemaker    • Skin cancer    • Sleep apnea        SOCIAL HISTORY:    Social History     Tobacco Use   • Smoking status: Former Smoker     Years: 45.00     Last attempt to quit:      Years since quittin.1   • Smokeless tobacco: Never Used   Substance Use Topics   • Alcohol use: No   • Drug use: No       Surgical History :  Past Surgical History:   Procedure Laterality Date   • APPENDECTOMY     • COLON SURGERY     • COLONOSCOPY N/A 2017    Procedure: COLONOSCOPY;  Surgeon: Barrie Jarrett DO;  Location: [facility] ENDOSCOPY;  Service:     • COLONOSCOPY N/A 1/22/2020    Procedure: COLONOSCOPY          (DONE IN OR WITH ENDO)              LATEX ALLERGY;  Surgeon: Barrie Jarrett DO;  Location: [facility];  Service: Gastroenterology;  Laterality: N/A;   • CORONARY ANGIOPLASTY WITH STENT PLACEMENT     • ENDOSCOPY     • FEMUR OPEN REDUCTION INTERNAL FIXATION Left 6/16/2019    Procedure: FEMUR OPEN REDUCTION INTERNAL FIXATION;  Surgeon: Edward Harley MD;  Location: [facility] OR;  Service: Orthopedics   • KIDNEY STONE SURGERY     • PACEMAKER IMPLANTATION     • SALIVARY GLAND EXCISION Left     Left neck due to skin cancer with resultant chronic dry mouth   • THORACOSCOPY Right 10/18/2019    Procedure: THORACOSCOPY VIDEO  ASSISTED , mini THORACOTOMY wedge resection nodule thoracic mediastinal lymphadenectomy bronchoscopy  Latex Allergy;  Surgeon: Bruno Horton MD;  Location: [facility];  Service: Cardiothoracic       ALLERGIES:    Allergies   Allergen Reactions   • Latex Rash   • Tape Rash         FAMILY HISTORY:  Family History   Problem Relation Age of Onset   • Coronary artery disease Mother    • Coronary artery disease Father    • Lung cancer Brother            REVIEW OF SYSTEMS:      CONSTITUTIONAL:  Complains of fatigue.    Has gained 5 pounds since last clinic visit.  Denies any fever, chills .      EYES: No visual disturbances. No discharge. No new lesions     ENMT:  No epistaxis, mouth sores or difficulty swallowing.     RESPIRATORY: Positive for shortness of breath. No new cough or hemoptysis.     CARDIOVASCULAR:  No chest pain or palpitations.     GASTROINTESTINAL: No abdominal pain nausea, vomiting or blood in the stool.     GENITOURINARY: No Dysuria or Hematuria.     MUSCULOSKELETAL: Positive for arthritic pain.     LYMPHATICS:  Denies any abnormal swollen glands anywhere in the body.     NEUROLOGICAL : No tingling or numbness. No headache or dizziness. No seizures or balance problems.     SKIN: Positive for history of skin  cancer affecting bilateral upper extremity and face.  Complains of chronic skin changes affecting bilateral upper extremity.     ENDOCRINE : No new hot or cold intolerance. No new polyuria . No polydipsia.          PHYSICAL EXAMINATION:      VITAL SIGNS:  /56   Pulse 59   Temp 98.1 °F (36.7 °C) (Temporal)   Wt 84.4 kg (186 lb 1.6 oz)   SpO2 97%   BMI 25.23 kg/m²       03/06/20  0814   Weight: 84.4 kg (186 lb 1.6 oz)         ECOG performance status: 2     CONSTITUTIONAL:  Not in any distress.     EYES: Mild conjunctival Pallor. No Icterus. No Pterygium. Extraocular Movements intact.No ptosis.     ENMT:  Normocephalic, Atraumatic.No Facial Asymmetry noted.     NECK:  No adenopathy.Trachea midline. NO JVD.     RESPIRATORY:  Fair air entry bilateral. No rhonchi or wheezing.Fair respiratory effort.     CARDIOVASCULAR:  S1, S2. Regular rate and rhythm. No murmur or gallop appreciated.  Pacemaker present.     ABDOMEN:  Soft,  nontender. Bowel sounds present in all four quadrants.  No Hepatosplenomegaly appreciated.     MUSCULOSKELETAL:  Trace edema.No Calf Tenderness.Decreased range of motion.     NEUROLOGIC:    No  Motor  deficit appreciated. Cranial Nerves 2-12 grossly intact.     SKIN : Evidence of treatment for previous skin cancer on bilateral upper extremity and right side of face.  Evidence of hyperpigmented skin lesion on left side of face. Skin is warm and dry to touch.     LYMPHATICS: No new enlarged lymph nodes in neck or supraclavicular area.     PSYCHIATRY: Alert, awake and oriented ×3.        DIAGNOSTIC DATA:    Glucose   Date Value Ref Range Status   03/06/2020 118 (H) 65 - 99 mg/dL Final     Glucose, Arterial   Date Value Ref Range Status   06/16/2019 131 (H) 65 - 95 mmol/L Final     Sodium   Date Value Ref Range Status   03/06/2020 141 136 - 145 mmol/L Final   09/11/2019 139 136 - 145 MMOL/L Final     Potassium   Date Value Ref Range Status   03/06/2020 4.6 3.5 - 5.2 mmol/L Final   09/11/2019  4.8 3.5 - 5.4 MMOL/L Final     CO2   Date Value Ref Range Status   03/06/2020 25.0 22.0 - 29.0 mmol/L Final   09/11/2019 26 20 - 33 MMOL/L Final     Chloride   Date Value Ref Range Status   03/06/2020 106 98 - 107 mmol/L Final   09/11/2019 102 98 - 107 MMOL/L Final     Anion Gap   Date Value Ref Range Status   03/06/2020 10.0 5.0 - 15.0 mmol/L Final   09/11/2019 11 7 - 14 MMOL/L Final     Creatinine   Date Value Ref Range Status   03/06/2020 1.29 (H) 0.76 - 1.27 mg/dL Final   09/11/2019 1.7 (H) 0.6 - 1.2 MG/DL Final     BUN   Date Value Ref Range Status   03/06/2020 26 (H) 8 - 23 mg/dL Final   09/11/2019 29 (H) 8 - 23 MG/DL Final     BUN/Creatinine Ratio   Date Value Ref Range Status   03/06/2020 20.2 7.0 - 25.0 Final     Calcium   Date Value Ref Range Status   03/06/2020 9.8 8.6 - 10.5 mg/dL Final   09/11/2019 9.9 8.7 - 10.4 MG/DL Final     eGFR Non  Amer   Date Value Ref Range Status   03/06/2020 54 (L) >60 mL/min/1.73 Final     Alkaline Phosphatase   Date Value Ref Range Status   03/06/2020 163 (H) 39 - 117 U/L Final   09/11/2019 253 (H) 25 - 100 U/L Final   08/21/2019 229 (H) 46 - 116 U/L Final     Total Protein   Date Value Ref Range Status   03/06/2020 6.8 6.0 - 8.5 g/dL Final   09/11/2019 7.3 6.0 - 8.2 G/DL Final   08/21/2019 6.8 6.4 - 8.2 g/dL Final     ALT (SGPT)   Date Value Ref Range Status   03/06/2020 10 1 - 41 U/L Final   09/11/2019 14 10 - 40 U/L Final   08/21/2019 18 16 - 63 U/L Final     AST (SGOT)   Date Value Ref Range Status   03/06/2020 16 1 - 40 U/L Final   09/11/2019 17 0 - 39 U/L Final     Total Bilirubin   Date Value Ref Range Status   03/06/2020 0.4 0.2 - 1.2 mg/dL Final   09/11/2019 0.8 0.3 - 1.2 MG/DL Final     Albumin   Date Value Ref Range Status   03/06/2020 3.30 (L) 3.50 - 5.20 g/dL Final   09/11/2019 3.5 3.4 - 4.8 G/DL Final     Globulin   Date Value Ref Range Status   03/06/2020 3.5 gm/dL Final     A/G Ratio   Date Value Ref Range Status   09/11/2019 0.9  Final     Lab  Results   Component Value Date    WBC 6.32 03/06/2020    HGB 13.7 03/06/2020    HCT 40.6 03/06/2020    MCV 94.4 03/06/2020     03/06/2020     Lab Results   Component Value Date    NEUTROABS 3.31 03/06/2020    IRON 64 06/27/2019    TIBC 250 (L) 06/27/2019    LABIRON 26 06/27/2019    FAQTFJRR79 978 08/21/2019     No results found for: , LABCA2, AFPTM, HCGQUANT, , CHROMGRNA, 8CESA55VOG, CEA, REFLABREPO    Component Free T4   Latest Ref Rng & Units 0.93 - 1.70 ng/dL   9/2/2016 0.80   12/8/2019 1.01   12/27/2019 0.96   1/24/2020 0.86 (L)   2/7/2020 2.15 (H)   3/6/2020 0.63 (L)           Component       TSH Baseline   Latest Ref Rng & Units       0.270 - 4.200 uIU/mL   9/1/2016       7.32 (H)   9/2/2016      1:50 AM 13.60 (H)   9/2/2016      6:30 AM 11.30 (H)   2/15/2017       3.130   11/1/2018       2.59   6/27/2019       2.090   12/8/2019       2.950   12/27/2019       2.760   1/24/2020       1.950   2/7/2020       0.033 (L)   3/6/2020       3.390           Radiology Data :  PET/CT done on September 27, 2019 showed:  FINDINGS:     HEAD and NECK:  No suspicious hypermetabolic activity     THORAX:  Hypermetabolic activity is noted in the right upper lung  associated with a nodule, SUVmax 4.8. This nodule measures 1.7 x  1.4 x 1.3 cm, previously 1.5 x 1.3 x 1.3 cm     ABDOMEN/PELVIS:  No suspicious hypermetabolic activity     OSSEOUS / MISC:  No suspicious hypermetabolic activity.  There is a depression fracture of the L1 vertebral body not  present on the prior study from June 19, 2019.     ADDITIONAL FINDINGS:   Multiple bilateral renal cysts.   Multiple nonobstructing left renal calculi with the largest  measuring 1.5 cm.  Slightly enlarged prostate.  Fatty liver.     IMPRESSION:  CONCLUSION:  1.  Enlarging hypermetabolic nodule in the right upper lung  suspicious for neoplasm.  2.  There is a depression fracture of the L1 vertebral body not  present on the prior study from June 19, 2019.  3.  Fatty  "liver.  4.  Multiple bilateral renal cysts.  5.  Multiple nonobstructing left renal calculi with the largest  measuring 1.5 cm.           Pathology :  Pathology report from October 18, 2019 showed:      Final Diagnosis   1.  WEDGE, UPPER LOBE OF RIGHT LUNG:   METASTATIC MALIGNANT MELANOMA.   LINES OF EXCISION NEGATIVE FOR MELANOMA.     2.  LEVEL 4 LYMPH NODE, RIGHT:   ONE (1) LYMPH NODE NEGATIVE FOR MELANOMA.    Electronically signed by Juanjo Andrade MD on 10/28/2019 at 1556   Comment     The case is submitted to the [facility] for evaluation and their report is attached.  The findings are telephoned to Dr. Horton at 15:40 on 10/28/2019.   Intraoperative Consultation     FROZEN SECTION DIAGNOSIS:   1.  Spindle cell tumor, diagnosis deferred.   Gross Description     1.  The first container is labeled \"right upper lobe for frozen section, right lung\" and has a wedge of peripheral lung measuring 5.0 x 2.0 x 1.7 cm.  The smooth pleura has an underlying ovoid white tumor measuring 2.5 x 1.7 x 1.5 cm.  Part of the tumor is utilized for frozen section examination as 1A.  The wedge staple line is now removed and the exposed tissue is now marked with black ink.  Additional representative sections are embedded.  1B through 1E additional sections of tumor.       2.  The second container is labeled \"level 4 lymph node, right\" and has an ovoid pink tissue measuring 1.5 x 0.7 x 0.5 cm.  All transverse sections are entirely embedded as 2A.   Special Stains     Immunostains for cytokeratin (AE1/AE3), MARLEE, S100, HMB45, Melan A, CD34, calretinin, CK5/6, MOC-31 and thyroid transcription factor-1 (TTF-1) are performed at [facility] to separate carcinoma from mesothelioma, melanoma and sarcoma.  Immunostains (block 1C) have a malignant tumor negative for AE1/AE3, equivocal for MARLEE, strongly positive for S100 protein, negative for HMB45, negative for melan A, negative for CD34, negative for calretinin, " negative for CK5/6, negative for MOC-31 and negative for TTF-1.             BRAF testing done on January 28, 2020 showed:               C-kit testing done on January 28, 2020 showed:              ASSESSMENT AND PLAN:       1.  Malignant melanoma involving right upper lobe, stage IV status post with resection on October 18, 2019:  - Result of PET/CT, pathology were discussed with patient and his family.  - It was also discussed with the patient and his family that melanoma typically does not happen in the lung it must have started somewhere else and metastasized to lung.  Making it stage IV.  - Patient had a wedge resection done on October 18, 2019 by Dr. Horton with negative margin.  - NCCN guidelines were reviewed.  -As per NCCN guidelines, patient was offered adjuvant Opdivo for 1 year in total.  -Side effect of immunotherapy including but not limited to immune mediated pneumonitis, hepatitis, colitis, thyroiditis, encephalitis, nephritis, pan hypopituitarism, dermatitis and skin rash were discussed with the patient.  We will provide them information about immunotherapy consisting of Opdivo today.  It was also discussed with them rarely immune mediated side effect can be severe and life-threatening.  -After thinking about treatment, patient decided to do Opdivo.  Patient was scheduled to start Opdivo on December 27, 2019.  However due to persistent diarrhea Opdivo was held and patient was referred to Dr. Jarrett.  - CT of chest, abdomen and pelvis without contrast done on January 14, 2020 shows new left lower lobe lung nodule which is pleural-based.  Result of CT scan showing new lung nodule was discussed with patient and his son.  -Patient was started on cycle 1 of Opdivo on January 21, 2020.  He tolerated Opdivo well.  -We will board with cycle 4 of Opdivo today  -Plan is to repeat restaging CT of chest, abdomen and pelvis without contrast after cycle 5 of Opdivo.  -BRAF, C-kit mutation testing is negative.  Results were Discussed with patient and his family.    2.  History of rectal cancer:  -Patient was diagnosed with colon cancer in 2004.  -Patient received treatment in [place] with Dr. Chaudhari.  -We do not have any records available for review.    3.  Left lower lobe lung nodule:  - CT scan of chest done on January 14, 2020 shows left lower lobe lung nodule which is pleural-based.  This is new finding as compared to PET/CT.  Lung nodule could be either metastatic focus from melanoma versus new primary lung malignancy.  Due to location of lung nodule, he will need open biopsy which was discussed with patient.  Patient wants to hold off on doing biopsy at present.  - Recommend doing 5 treatment with Opdivo, if restaging CT scan after cycle 5 of Opdivo does not show any response to left lung nodule, will refer him back to Dr. Wright which was discussed with patient today.    4.  Diarrhea:  - His diarrhea is stopped after stopping magnesium supplement.  -Recent colonoscopy on January 22, 2020 does not show any evidence of malignancy or recurrence of colon cancer.    5.  Chronic kidney disease stage III  -Creatinine is elevated at 1.29.  Sodium is mildly elevated at 146, will provide him with 500 ml of normal saline today     6 systolic congestive heart failure: Being followed by Dr. Grullon     7 hypothyroidism  -Thyroid function test done on February 7 2020 shows T4 is elevated with low TSH.  -Since patient is taking Synthroid 75 mcg p.o. daily.  Dose of Synthroid was decreased to 50 mcg with cycle 2.   -TSH done today with cycle 4 is normal at 3.3.  T4 is borderline low at 0.65.  We will continue with Synthroid 50 mcg dose for now.  Will repeat thyroid function test with cycle 6.  -  8.  Health maintenance: Patient does not smoke.     9.Advance Care Planning: For now patient remains full code and is able to make his decisions.  Patient has health care surrogate mentioned on chart.     10 Kuldip Nevarez reports  a pain score of 0  .  Given his pain assessment as noted, treatment options were discussed and the following options were decided upon as a follow-up plan to address the patient's pain: No intervention required.     11. PHQ-9 Total Score: 0   No intervention required          Perfecto Samuel MD  3/6/2020  5:56 PM        EMR Dragon/Transcription disclaimer:   Much of this encounter note is an electronic transcription/translation of spoken language to printed text. The electronic translation of spoken language may permit erroneous, or at times, nonsensical words or phrases to be inadvertently transcribed; Although I have reviewed the note for such errors, some may still exist.

## 2020-03-09 NOTE — TELEPHONE ENCOUNTER
----- Message from Perfecto Samuel MD sent at 3/7/2020  2:15 PM CST -----  Please let patient know, his thyroid level is showing improvement.  Recommend continue with Synthroid 50 mcg p.o. daily for now.  Thank you

## 2020-03-13 NOTE — PROGRESS NOTES
Adult Outpatient Nutrition  Assessment    Patient Name:  Kuldip Nevarez  YOB: 1945  MRN: [id number]    Assessment Date:  3/13/2020    Comments: Chart review revealed wt 186.1 lb--up at last [facility] visit (overall trend down). Alb 3.3. Phone contact made today. Pt stated appetite/intake recently better. Past diarrhea resolved when oral magnesium discontinued. Pt states home weight stable -- 178.2 lb today. Reinforced importance of nutrition--iram adequate protein. Mailed suggestions for increasing protein.                         Electronically signed by:  Patricia Cerda RD  03/13/20 11:15

## 2020-03-19 PROBLEM — R55 SYNCOPE: Status: ACTIVE | Noted: 2020-01-01

## 2020-03-19 NOTE — H&P
[facility] Medicine Admission      Date of Admission: 3/18/2020      Primary Care Physician: Mike Draper MD      Chief Complaint: Syncope    HPI:    75-year-old gentleman with past medical history significant for CHF, hypercholesterolemia, hypothyroidism, gout, recent history of?  Melanoma, history of CAD, history of permanent pacemaker placement secondary to sick sinus syndrome, COPD who presented to the hospital with complaints of multiple episodes of syncope.  Patient himself reports that he did not lose consciousness but family member at the bedside is unsure about that.  Patient reports that yesterday he was standing by his bed when all of a sudden he lost balance and fell back on the bed.  He denies any preceding lightheadedness/dizziness/aura before falling down.  He had 2 similar episodes after that.  1 of the episodes was witnessed by EMS and according to the ER physician as well as the family member at the bedside, they could not feel a radial pulse at that time.  Patient himself denies any new numbness/tingling/weakness in any part of his body along with any bowel/bladder incontinence, tongue bite, visual/auditory changes, chest pain, shortness of breath, recent travel, recent immobilization, recent or remote history of blood clots in the legs or lungs or any family history of blood clots.    On arrival to the ER, he was found to have fever and was given Tylenol with subsequent improvement in his symptoms.  Patient was also found to have hypoxia but subsequent ABG was unremarkable.  He is being admitted to the hospitalist service for further evaluation management possible syncope.  He was also found to have orthostatic hypotension in the ER.          Of note, patient according to the family was on a medication to keep his blood pressure up,?  Fludrocortisone, which was discontinued a few weeks ago due to congestive heart failure/fluid  retention.    Concurrent Medical History:  has a past medical history of Arthritis, Cancer (CMS/HCC), Colon cancer (CMS/HCC), Coronary artery disease, Disease of thyroid gland, GERD (gastroesophageal reflux disease), History of transfusion, Hyperlipidemia, MI (myocardial infarction) (CMS/HCC), Pacemaker, Skin cancer, and Sleep apnea.    Past Surgical History:  has a past surgical history that includes Appendectomy; Kidney stone surgery; Colon surgery; Colonoscopy (N/A, 4/19/2017); Femur Open Reduction Internal Fixation (Left, 6/16/2019); Salivary Gland Excision (Left); Coronary angioplasty with stent; Pacemaker Implantation; Esophagogastroduodenoscopy; Thoracoscopy (Right, 10/18/2019); and Colonoscopy (N/A, 1/22/2020).    Family History: family history includes Coronary artery disease in his father and mother; Lung cancer in his brother.     Social History:  reports that he quit smoking about 17 years ago. He quit after 45.00 years of use. He has never used smokeless tobacco. He reports that he does not drink alcohol or use drugs.    Allergies:   Allergies   Allergen Reactions   • Latex Rash   • Tape Rash       Medications:   Prior to Admission medications    Medication Sig Start Date End Date Taking? Authorizing Provider   allopurinol (ZYLOPRIM) 100 MG tablet Take 100 mg by mouth Daily.    Raleigh Lorenzana MD   aspirin 81 MG chewable tablet Chew 81 mg Daily.    Raleihg Lorenzana MD   Cyanocobalamin (VITAMIN B 12 PO) Take 1 tablet by mouth Daily.    Raleigh Lorenzana MD   levothyroxine (SYNTHROID, LEVOTHROID) 50 MCG tablet Take 1 tablet by mouth Daily. 2/9/20   Perfecto Samuel MD   omeprazole (priLOSEC) 40 MG capsule Take 40 mg by mouth Every Other Day. 2/14/17   Raleigh Lorenzana MD   ondansetron (ZOFRAN) 4 MG tablet TAKE 1 TABLET FOUR TIMES DAILY AS NEEDED FOR NAUSEA OR VOMITING 1/21/20   Perfecto Samuel MD   oxyCODONE-acetaminophen (PERCOCET) 5-325 MG per tablet Take 1-2 tablets by mouth Every 4  (Four) Hours As Needed for Moderate Pain  (SURGICAL PAIN). 10/21/19   Starr Henrygh APRCHRISTY   simvastatin (ZOCOR) 40 MG tablet Take 40 mg by mouth Daily. 2/14/17   Provider, MD Raleigh       Review of Systems:  Review of Systems   Otherwise complete ROS is negative except as mentioned above.  Constitutional: [No fevers, no chills, no headaches, no fatigue, no malaise or lethargy]  HEENT: [No change in vision, no photophobia, no hearing loss, no tinnitus, no epistaxis, no hoarseness, no sore throat, no nasal discharge]  Cardiovascular: [No chest pain, no palpitations, no dyspnea on exertion, no orthopnea, no paroxysmal nocturnal dyspnea, no pedal edema]  Respiratory: [No cough, no excessive phlegm production, no hemoptysis, no shortness of breath, no wheezing]  Gastrointestinal: [No abdominal pain, no nausea, no vomiting, no hematemesis, no diarrhea, no constipation, no bright red blood per rectum, no melena]  Urinary: [No dysuria, no hematuria, no urinary frequency, no urinary hesitancy, no urinary incontinence]  Musculoskeletal: [No joint swelling, no myalgias, no neck pain, no back pain]  Integumentary: [No erythema, no jaundice, no rash, no pruritus, no wounds]  Neurological: [No confusion, no convulsions,  no focal weakness, no numbness, no tremors, no abnormal speech.  Positive for syncope]  Psychiatry: [No anxiety, no depression, no suicidal ideation]  Endocrine: [No cold intolerance, no heat intolerance, no polydipsia, no palpitations]  Hematology/Lymphatic: [No easy bleeding, no easy bruising, no lymphadenopathy]    Physical Exam:   Temp:  [96.7 °F (35.9 °C)-103 °F (39.4 °C)] 96.7 °F (35.9 °C)  Heart Rate:  [60-75] 62  Resp:  [15-22] 18  BP: ()/(52-64) 98/54  Physical Exam  General: [Appears stated age, alert, oriented ×3, cooperative]  HEENT: [Normocephalic, atraumatic, EOMI, PERRLA, unremarkable external ear, moist mucous membranes, supple neck, no lymphadenopathy]  CVS: [RRR, S1, S2, no  murmurs, normal peripheral pulses]  Respiratory: [CTA bilaterally, symmetrical expansion, no wheezing, no rales, no crackles]  Gastrointestinal: [Soft, nontender, nondistended, no organomegaly could be appreciated, normal bowel sounds]  Musculoskeletal: [Grossly normal, no tenderness, normal ROM]  Skin: [No rashes, no erythema, no lesions]  Extremities: [Normal inspection.  No edema, no cyanosis, no clubbing.  Normal capillary refill]  Neuro: [Alert, oriented ×3, grossly normal motor and sensory function]  Psychiatry: [No anxiety, no depression, nonsuicidal]        Results Reviewed:  I have personally reviewed current lab, radiology, and data and agree with results.  Lab Results (last 24 hours)     Procedure Component Value Units Date/Time    Blood Gas, Arterial [[id number]]  (Abnormal) Collected:  03/19/20 0104    Specimen:  Arterial Blood Updated:  03/19/20 0116     Site Right Radial     Aubrey's Test Positive     pH, Arterial 7.416 pH units      pCO2, Arterial 39.1 mm Hg      pO2, Arterial 82.3 mm Hg      Comment: 84 Value below reference range        HCO3, Arterial 25.1 mmol/L      Base Excess, Arterial 0.6 mmol/L      O2 Saturation, Arterial 96.8 %      Barometric Pressure for Blood Gas 750 mmHg      Modality Nasal Cannula     Flow Rate 2.5 lpm      Ventilator Mode NA     Collected by NA     Comment: Meter: [id number]     :  [id number]       Urinalysis, Microscopic Only - Urine, Clean Catch [[id number]]  (Abnormal) Collected:  03/18/20 2329    Specimen:  Urine, Clean Catch Updated:  03/19/20 0002     RBC, UA 21-30 /HPF      WBC, UA 13-20 /HPF      Bacteria, UA None Seen /HPF      Squamous Epithelial Cells, UA 3-5 /HPF      Hyaline Casts, UA 7-12 /LPF      Methodology Automated Microscopy    Urinalysis With Microscopic If Indicated (No Culture) - Urine, Clean Catch [[id number]]  (Abnormal) Collected:  03/18/20 2329    Specimen:  Urine, Clean Catch Updated:  03/19/20 0001     Color, UA Dark Yellow      Appearance, UA Clear     pH, UA 6.0     Specific Gravity, UA 1.021     Glucose, UA Negative     Ketones, UA Negative     Bilirubin, UA Small (1+)     Blood, UA Moderate (2+)     Protein, UA 30 mg/dL (1+)     Leuk Esterase, UA Small (1+)     Nitrite, UA Negative     Urobilinogen, UA 1.0 E.U./dL    Extra Tubes [[id number]] Collected:  03/18/20 2116    Specimen:  Blood, Venous Line Updated:  03/18/20 2230    Narrative:       The following orders were created for panel order Extra Tubes.  Procedure                               Abnormality         Status                     ---------                               -----------         ------                     Light Blue Top[[id number]]                                   Final result               Gold Top - SST[[id number]]                                   Final result                 Please view results for these tests on the individual orders.    Light Blue Top [[id number]] Collected:  03/18/20 2116    Specimen:  Blood Updated:  03/18/20 2230     Extra Tube hold for add-on     Comment: Auto resulted       Gold Top - SST [[id number]] Collected:  03/18/20 2116    Specimen:  Blood Updated:  03/18/20 2230     Extra Tube Hold for add-ons.     Comment: Auto resulted.       Scan Slide [[id number]] Collected:  03/18/20 2116    Specimen:  Blood Updated:  03/18/20 2202     RBC Morphology Normal     WBC Morphology Normal     Platelet Estimate Decreased    Troponin [[id number]]  (Normal) Collected:  03/18/20 2116    Specimen:  Blood Updated:  03/18/20 2143     Troponin T 0.015 ng/mL     Narrative:       Troponin T Reference Range:  <= 0.03 ng/mL-   Negative for AMI  >0.03 ng/mL-     Abnormal for myocardial necrosis.  Clinicians would have to utilize clinical acumen, EKG, Troponin and serial changes to determine if it is an Acute Myocardial Infarction or myocardial injury due to an underlying chronic condition.       Results may be falsely decreased if patient taking Biotin.       Comprehensive Metabolic Panel [[id number]]  (Abnormal) Collected:  03/18/20 2116    Specimen:  Blood Updated:  03/18/20 2139     Glucose 96 mg/dL      BUN 31 mg/dL      Creatinine 1.88 mg/dL      Sodium 141 mmol/L      Potassium 4.5 mmol/L      Chloride 103 mmol/L      CO2 25.0 mmol/L      Calcium 9.6 mg/dL      Total Protein 6.7 g/dL      Albumin 3.30 g/dL      ALT (SGPT) 9 U/L      AST (SGOT) 17 U/L      Alkaline Phosphatase 143 U/L      Total Bilirubin 1.4 mg/dL      eGFR Non African Amer 35 mL/min/1.73      Globulin 3.4 gm/dL      A/G Ratio 1.0 g/dL      BUN/Creatinine Ratio 16.5     Anion Gap 13.0 mmol/L     Narrative:       GFR Normal >60  Chronic Kidney Disease <60  Kidney Failure <15      Magnesium [[id number]]  (Abnormal) Collected:  03/18/20 2116    Specimen:  Blood Updated:  03/18/20 2139     Magnesium 1.4 mg/dL     BNP [[id number]]  (Abnormal) Collected:  03/18/20 2116    Specimen:  Blood Updated:  03/18/20 2137     proBNP 5,533.0 pg/mL     Narrative:       Among patients with dyspnea, NT-proBNP is highly sensitive for the detection of acute congestive heart failure. In addition NT-proBNP of <300 pg/ml effectively rules out acute congestive heart failure with 99% negative predictive value.    Results may be falsely decreased if patient taking Biotin.      Lactic Acid, Plasma [[id number]]  (Normal) Collected:  03/18/20 2116    Specimen:  Blood Updated:  03/18/20 2136     Lactate 1.5 mmol/L     CBC & Differential [[id number]] Collected:  03/18/20 2116    Specimen:  Blood Updated:  03/18/20 2126    Narrative:       The following orders were created for panel order CBC & Differential.  Procedure                               Abnormality         Status                     ---------                               -----------         ------                     CBC Auto Differential[[id number]]        Abnormal            Final result                 Please view results for these tests on the individual orders.    CBC  Auto Differential [[id number]]  (Abnormal) Collected:  03/18/20 2116    Specimen:  Blood Updated:  03/18/20 2126     WBC 6.49 10*3/mm3      RBC 3.94 10*6/mm3      Hemoglobin 12.7 g/dL      Hematocrit 38.1 %      MCV 96.7 fL      MCH 32.2 pg      MCHC 33.3 g/dL      RDW 13.8 %      RDW-SD 49.0 fl      MPV 10.4 fL      Platelets 118 10*3/mm3      Neutrophil % 75.8 %      Lymphocyte % 14.6 %      Monocyte % 8.8 %      Eosinophil % 0.3 %      Basophil % 0.3 %      Immature Grans % 0.2 %      Neutrophils, Absolute 4.92 10*3/mm3      Lymphocytes, Absolute 0.95 10*3/mm3      Monocytes, Absolute 0.57 10*3/mm3      Eosinophils, Absolute 0.02 10*3/mm3      Basophils, Absolute 0.02 10*3/mm3      Immature Grans, Absolute 0.01 10*3/mm3      nRBC 0.0 /100 WBC     Blood Culture - Blood, Arm, Right [[id number]] Collected:  03/18/20 2124    Specimen:  Blood from Arm, Right Updated:  03/18/20 2125    Blood Culture - Blood, Arm, Left [[id number]] Collected:  03/18/20 2124    Specimen:  Blood from Arm, Left Updated:  03/18/20 2125        Imaging Results (Last 24 Hours)     Procedure Component Value Units Date/Time    CT Head Without Contrast [[id number]] Collected:  03/18/20 2133     Updated:  03/18/20 2209    Narrative:       Exam: Head CT without contrast    INDICATION: Syncope    TECHNIQUE: Routine head CT without contrast. Sagittal coronal  reconstructions were obtained. This exam was performed according  to our departmental dose-optimization program, which includes  automated exposure control, adjustment of the mA and/or kV  according to patient size and/or use of iterative reconstruction  technique.    COMPARISON: 8/19/2019    FINDINGS: The bony calvarium is intact. There is mucosal  thickening in the left ethmoid and sphenoid sinuses with  near-complete opacification of the left sphenoid sinus. Mastoid  air cells are clear. Plaque is present in the intracranial  arteries. No extra-axial fluid collection. Enlargement of  ventricles  and sulci compatible with age-related atrophy. There  decreased attenuation in the white matter consistent with severe  chronic ischemic change. There is encephalomalacia present in the  medial right occipital lobe compatible with a prior infarct. No  obvious sign of acute infarction. No intraparenchymal hemorrhage,  mass or midline shift.      Impression:       No obvious acute intracranial abnormality. Recommend  follow-up as clinically warranted.    Electronically signed by:  Chano Dimas MD  3/18/2020 10:08 PM  CDT Workstation: [id number]    XR Chest 1 View [[id number]] Collected:  03/18/20 2113     Updated:  03/18/20 2138    Narrative:       Exam: AP portable chest    INDICATION: Syncope    COMPARISON: 12/7/2019    FINDINGS: AP portable chest. The bony structures are intact. The  cardiomediastinal silhouette is unremarkable. Atherosclerotic  plaque is present in the aorta. Left pacemaker is in place with  leads located in the right atrium and right ventricle. There are  calcified hilar nodes. Granulomas are present. Chronic  interstitial changes. No acute infiltrate, pneumothorax or  pleural effusion      Impression:       No acute cardiopulmonary abnormality.    Electronically signed by:  Chano Dimas MD  3/18/2020 9:37 PM  CDT Workstation: [id number]            Assessment:    Active Hospital Problems    Diagnosis   • Syncope             Plan:    Syncope:  Possible syncope  Orthostatic/dehydration versus?  Cardiac  Orthostatic vitals positive  Encourage oral intake.  Avoid IV fluids given history of congestive heart failure and elevated proBNP  Fall precautions  Troponin monitoring  Bilateral carotid Dopplers  2D Echocardiogram done in 2019 shows preserved ejection fraction.  Will hold off on reordering another echo  Cardiology consult      History of congestive heart failure:  Chronic diastolic congestive heart failure  Not on any diuretics currently  We will continue to hold any diuretics for the time  being  Strict intake and output monitoring  Daily weights    Hypomagnesemia:  Initiated on replacement protocol    Fever:  Unknown underlying etiology  Resolved with Tylenol  Continue to hold antibiotics  If recurrent, then consider antibiotics  No risk factors for coronavirus.  Moreover chest x-ray does not show any bilateral infiltrates    History of sick sinus syndrome status post permanent pacemaker placement:  No pacer spikes evident on the telemetry or EKG  Cardiology consult    CAD:  Continue aspirin per home dose    Hypothyroidism:  Continue levothyroxine    Hypercholesterolemia:  Continue statin    Malignant melanoma of the right lung:  Follow-up with oncology in outpatient        I discussed the patient's findings and my recommendations with: Patient and the son    Andrey Brown MD

## 2020-03-19 NOTE — PLAN OF CARE
Problem: Patient Care Overview  Goal: Plan of Care Review  Outcome: Ongoing (interventions implemented as appropriate)  Flowsheets (Taken 3/19/2020 6746)  Progress: no change  Plan of Care Reviewed With: patient  Note:   Patient arrived to the floor this shift. Vss. Resp panel sent because lab didn't receive the one collected in ER. No s/s of distress or c/o pain at this time. Will continue to monitor this pt.

## 2020-03-19 NOTE — ED PROVIDER NOTES
Subjective   75-year-old male presents emergency department after 2 episodes of syncope.  Reportedly the first 1 was when he was trying to get out of the bed and he fell backwards on the bed and lost consciousness.  His wife witnessed this.  EMS was called and when they attempted to move him over to the stretcher he had another syncopal episode.  Patient reports that he did not fully lose consciousness but EMS reports that they could not palpate a radial pulse and he was not responsive.  Reportedly his son checked his blood pressure with the first episode and it was not reading the first 2 times he tried and then it was 50/40.  EMS reported that by the time they checked his blood pressure after the second episode it was approximately 100 systolic.  Family reports that this has happened in the past he previously had to be on medication to keep his blood pressure up that had to be stopped because of some fluid retention.  Reportedly had some fever at home because he felt chilled and got in the bed and covered up with a warm blanket.  Unsure of exactly what the temperature was.  Denies any cough or congestion.  Denies shortness of breath however he is found to be hypoxic.  Does not wear oxygen at home.  No chest pain, vomiting or diarrhea.    Family history, surgical history, social history, current medications and allergies are reviewed with the patient and triage documentation and vitals are reviewed.        History provided by:  Patient, EMS personnel, relative and medical records   used: No        Review of Systems   Constitutional: Positive for chills and fever.   HENT: Negative for congestion, sinus pressure and sinus pain.    Eyes: Negative.    Respiratory: Negative for cough, chest tightness, shortness of breath and wheezing.    Cardiovascular: Negative for chest pain, palpitations and leg swelling.   Gastrointestinal: Negative for abdominal pain, diarrhea, nausea and vomiting.   Endocrine:  Negative for polydipsia, polyphagia and polyuria.   Genitourinary: Negative for dysuria, flank pain, frequency, hematuria and urgency.   Musculoskeletal: Negative for arthralgias, back pain, myalgias and neck pain.   Skin: Negative for rash and wound.   Allergic/Immunologic: Negative.    Neurological: Positive for syncope and light-headedness. Negative for dizziness, facial asymmetry, speech difficulty, weakness and headaches.   Hematological: Negative.    Psychiatric/Behavioral: Negative for confusion. The patient is not nervous/anxious.        Past Medical History:   Diagnosis Date   • Arthritis    • Cancer (CMS/HCC)     colon, skin   • Colon cancer (CMS/HCC)    • Coronary artery disease    • Disease of thyroid gland    • GERD (gastroesophageal reflux disease)    • History of transfusion    • Hyperlipidemia    • MI (myocardial infarction) (CMS/HCC)     2003   • Pacemaker    • Skin cancer    • Sleep apnea        Allergies   Allergen Reactions   • Latex Rash   • Tape Rash       Past Surgical History:   Procedure Laterality Date   • APPENDECTOMY     • COLON SURGERY     • COLONOSCOPY N/A 4/19/2017    Procedure: COLONOSCOPY;  Surgeon: Barrie Jarrett DO;  Location: [facility] ENDOSCOPY;  Service:    • COLONOSCOPY N/A 1/22/2020    Procedure: COLONOSCOPY          (DONE IN OR WITH ENDO)              LATEX ALLERGY;  Surgeon: Barrie Jarrett DO;  Location: [facility] OR;  Service: Gastroenterology;  Laterality: N/A;   • CORONARY ANGIOPLASTY WITH STENT PLACEMENT     • ENDOSCOPY     • FEMUR OPEN REDUCTION INTERNAL FIXATION Left 6/16/2019    Procedure: FEMUR OPEN REDUCTION INTERNAL FIXATION;  Surgeon: Edward Harley MD;  Location: [facility] OR;  Service: Orthopedics   • KIDNEY STONE SURGERY     • PACEMAKER IMPLANTATION     • SALIVARY GLAND EXCISION Left     Left neck due to skin cancer with resultant chronic dry mouth   • THORACOSCOPY Right 10/18/2019    Procedure: THORACOSCOPY VIDEO  ASSISTED , mini THORACOTOMY wedge  resection nodule thoracic mediastinal lymphadenectomy bronchoscopy  Latex Allergy;  Surgeon: Bruno Horton MD;  Location: [facility];  Service: Cardiothoracic       Family History   Problem Relation Age of Onset   • Coronary artery disease Mother    • Coronary artery disease Father    • Lung cancer Brother        Social History     Socioeconomic History   • Marital status:      Spouse name: Not on file   • Number of children: Not on file   • Years of education: Not on file   • Highest education level: Not on file   Occupational History   • Occupation:      Employer: [employer]     Comment: Retired   Tobacco Use   • Smoking status: Former Smoker     Years: 45.00     Last attempt to quit:      Years since quittin.2   • Smokeless tobacco: Never Used   Substance and Sexual Activity   • Alcohol use: No   • Drug use: No   • Sexual activity: Defer           Objective   Physical Exam   Constitutional: He is oriented to person, place, and time. He appears well-developed and well-nourished. No distress.   HENT:   Head: Normocephalic.   Mouth/Throat: Oropharynx is clear and moist.   Eyes: Pupils are equal, round, and reactive to light. Conjunctivae are normal.   Neck: Normal range of motion.   Cardiovascular: Normal rate, regular rhythm and intact distal pulses.   No murmur heard.  Pulmonary/Chest: No accessory muscle usage. Tachypnea noted. No respiratory distress. He has decreased breath sounds in the right lower field and the left lower field. He has no wheezes. He has no rhonchi. He has no rales.   Abdominal: Soft. Bowel sounds are normal. He exhibits no distension. There is no tenderness.   Musculoskeletal: Normal range of motion. He exhibits no edema.   Neurological: He is alert and oriented to person, place, and time. He has normal strength. No cranial nerve deficit or sensory deficit. He displays a negative Romberg sign. GCS eye subscore is 4. GCS verbal subscore is 5. GCS motor subscore  is 6.   Reflex Scores:       Bicep reflexes are 2+ on the right side and 2+ on the left side.       Patellar reflexes are 2+ on the right side and 2+ on the left side.  Skin: Skin is warm and dry. Capillary refill takes less than 2 seconds. He is not diaphoretic. There is pallor.   Psychiatric: He has a normal mood and affect.   Nursing note and vitals reviewed.      Procedures  none         ED Course      Labs Reviewed   COMPREHENSIVE METABOLIC PANEL - Abnormal; Notable for the following components:       Result Value    BUN 31 (*)     Creatinine 1.88 (*)     Albumin 3.30 (*)     Alkaline Phosphatase 143 (*)     Total Bilirubin 1.4 (*)     eGFR Non  Amer 35 (*)     All other components within normal limits    Narrative:     GFR Normal >60  Chronic Kidney Disease <60  Kidney Failure <15     URINALYSIS W/ MICROSCOPIC IF INDICATED (NO CULTURE) - Abnormal; Notable for the following components:    Bilirubin, UA Small (1+) (*)     Blood, UA Moderate (2+) (*)     Protein, UA 30 mg/dL (1+) (*)     Leuk Esterase, UA Small (1+) (*)     All other components within normal limits   BNP (IN-HOUSE) - Abnormal; Notable for the following components:    proBNP 5,533.0 (*)     All other components within normal limits    Narrative:     Among patients with dyspnea, NT-proBNP is highly sensitive for the detection of acute congestive heart failure. In addition NT-proBNP of <300 pg/ml effectively rules out acute congestive heart failure with 99% negative predictive value.    Results may be falsely decreased if patient taking Biotin.     MAGNESIUM - Abnormal; Notable for the following components:    Magnesium 1.4 (*)     All other components within normal limits   CBC WITH AUTO DIFFERENTIAL - Abnormal; Notable for the following components:    RBC 3.94 (*)     Hemoglobin 12.7 (*)     Platelets 118 (*)     Lymphocyte % 14.6 (*)     All other components within normal limits   URINALYSIS, MICROSCOPIC ONLY - Abnormal; Notable for the  following components:    RBC, UA 21-30 (*)     WBC, UA 13-20 (*)     Squamous Epithelial Cells, UA 3-5 (*)     All other components within normal limits   TROPONIN (IN-HOUSE) - Normal    Narrative:     Troponin T Reference Range:  <= 0.03 ng/mL-   Negative for AMI  >0.03 ng/mL-     Abnormal for myocardial necrosis.  Clinicians would have to utilize clinical acumen, EKG, Troponin and serial changes to determine if it is an Acute Myocardial Infarction or myocardial injury due to an underlying chronic condition.       Results may be falsely decreased if patient taking Biotin.     LACTIC ACID, PLASMA - Normal   BLOOD CULTURE   BLOOD CULTURE   SCAN SLIDE   CBC AND DIFFERENTIAL    Narrative:     The following orders were created for panel order CBC & Differential.  Procedure                               Abnormality         Status                     ---------                               -----------         ------                     CBC Auto Differential[[id number]]        Abnormal            Final result                 Please view results for these tests on the individual orders.   EXTRA TUBES    Narrative:     The following orders were created for panel order Extra Tubes.  Procedure                               Abnormality         Status                     ---------                               -----------         ------                     Light Blue Top[[id number]]                                   Final result               Gold Top - SST[[id number]]                                   Final result                 Please view results for these tests on the individual orders.   LIGHT BLUE TOP   GOLD TOP - SST     Ct Head Without Contrast    Result Date: 3/18/2020  Narrative: Exam: Head CT without contrast INDICATION: Syncope TECHNIQUE: Routine head CT without contrast. Sagittal coronal reconstructions were obtained. This exam was performed according to our departmental dose-optimization program, which includes  automated exposure control, adjustment of the mA and/or kV according to patient size and/or use of iterative reconstruction technique. COMPARISON: 8/19/2019 FINDINGS: The bony calvarium is intact. There is mucosal thickening in the left ethmoid and sphenoid sinuses with near-complete opacification of the left sphenoid sinus. Mastoid air cells are clear. Plaque is present in the intracranial arteries. No extra-axial fluid collection. Enlargement of ventricles and sulci compatible with age-related atrophy. There decreased attenuation in the white matter consistent with severe chronic ischemic change. There is encephalomalacia present in the medial right occipital lobe compatible with a prior infarct. No obvious sign of acute infarction. No intraparenchymal hemorrhage, mass or midline shift.     Impression: No obvious acute intracranial abnormality. Recommend follow-up as clinically warranted. Electronically signed by:  Chano Dimas MD  3/18/2020 10:08 PM CDT Workstation: [id number]    Xr Chest 1 View    Result Date: 3/18/2020  Narrative: Exam: AP portable chest INDICATION: Syncope COMPARISON: 12/7/2019 FINDINGS: AP portable chest. The bony structures are intact. The cardiomediastinal silhouette is unremarkable. Atherosclerotic plaque is present in the aorta. Left pacemaker is in place with leads located in the right atrium and right ventricle. There are calcified hilar nodes. Granulomas are present. Chronic interstitial changes. No acute infiltrate, pneumothorax or pleural effusion     Impression: No acute cardiopulmonary abnormality. Electronically signed by:  Chano Dimas MD  3/18/2020 9:37 PM CDT Workstation: [id number]    EKG March 18, 2020 at 2102 reveals pacemaker rhythm with intermittent PVCs at a rate of approximately 75 bpm.No ST elevation depression.  T wave inversion in aVF, V4 through V6.  EKG unchanged from previous.             MDM  Number of Diagnoses or Management Options  Hypoxia:   Orthostatic  hypotension:   Syncope, unspecified syncope type:      Amount and/or Complexity of Data Reviewed  Clinical lab tests: reviewed  Tests in the radiology section of CPT®: reviewed  Tests in the medicine section of CPT®: reviewed  Discuss the patient with other providers: yes    Patient Progress  Patient progress: stable    No signs of congestive heart failure however BNP is elevated.  EKG unchanged from previous.  Laboratory studies reveal no evidence of electrolyte abnormality or dehydration.  He does continue to have orthostatic hypotension after fluids.  With concern for syncope and his continued requirement of oxygen and hypoxia with no explained reason patient is admitted to the hospitalist.    Final diagnoses:   Hypoxia   Orthostatic hypotension   Syncope, unspecified syncope type            Negro Cedeno, DO  03/20/20 0033

## 2020-03-19 NOTE — CONSULTS
Cardiology Consultation Note.        Patient Name: Kuldip Nevarez  Age/Sex: 75 y.o. male  : 1945  MRN: [id number]    Date of consultation: 3/19/2020  Consulting Physician: Cruzito Grullon MD  Primary care Physician: Mike Draper MD  Requesting Physician:  Anrdey Brown MD     Reason for consultation: Syncope    Subjective:       Chief Complaint: Lightheaded dizziness.      History of Present Illness:  Kuldip Nevarez is a 75 y.o. male     Body mass index is 25.59 kg/m². with a past medical history significant for atherosclerotic coronary artery disease status post aborted inferior wall myocardial infarction in  with PTCA and stenting of the mid right coronary artery with deployment of a 3 mm x 18 mm Vision stent on 2014 with repeat coronary angiogram done in  with PTCA and stenting of the distal rightcoronary artery with deployment of a 3 mm x 28 mm Cypher stent with 80%stenosis in the obtuse marginal branch upper division and 60% stenosis in the ramus intermedius branch and 40% stenosis in the 2nd diagonal branch treated medically, history of concentric left ventricular hypertrophy with diastolic dysfunction with an ejection fraction of 35 to 40% with a calcified aortic valve with aortic sclerosis, mild mitral, mild tricuspid, and mild aortic insufficiency, sick sinus syndrome status post [employer] dual-chamber permanent pacemaker implantation, arterial hypertension, hypertensive heart disease, hyperlipidemia, invasive adenocarcinoma of the sigmoid colon status post anterior resection of the upper rectum done by Dr. Correa, with subsequent chemotherapy administered by Dr. Chaudhari in [place], with multiple cutaneous melanoma evaluated by Dr. Andrews, the dermatologist in [place], metastatic melanoma with lung involvement, history of obstructive sleep apnea, noncompliant with CPAP machine, history of gouty arthritis, hyperlipidemia. ,    Patient was evaluated by Dr. Samuel on  3/6/2020.  Patient was recommended Opdivo.  Patient was initiated on Opdivo and received the first cycle on January 24, 2020.    Patient in the emergency room had a febrile episode along with hypoxia.  Due to the patient symptom complex patient was subsequently admitted to [facility].  Patient underwent pacemaker interrogation which had revealed appropriate pacing function with adequate battery reserve.  Patient on questioning had not complained of having any symptoms of chest pain.  Patient has had episodes of low blood pressure on outpatient basis and was recommended Florinef.  Patient on questioning denies any symptoms of palpitation.  Patient on my questioning complained of having symptoms of shortness of breath.  Patient denies any tonic-clonic activity.  Patient denies any weakness or tingling sensation in the upper or lower extremity.  Patient on admission had a white blood cell count of 6.4 with a hemoglobin of 12.7 and a BNP level of 5000 with a BUN of 31 and a creatinine of 1.88.    Patient denies any productive cough.  Patient on questioning has not complained of having any episodes of any bleeding diastasis.      Patient was concerned about his wife who is at home with COPD and needed prescription refills.  Patient was reassured that the office would call his wife and do the needful for her prescription.    Patient 10 point review of system except for stated in the history of present illness and negative.      Concurrent  Medical History:  1.   Shortness of breath with lightheaded dizziness presyncope/syncope.  2.    Sick sinus syndrome that is post [employer] Leonard dual-chamber pacemaker implantation December 2016.  3.   Atherosclerotic coronary artery disease.  4.   Aborted inferior wall myocardial infarction in 2004 with PTCA and  stenting of the right coronary artery with deployment of a 3 mm x 18 mm Vision stent.  5.   Repeat PTCA and stenting of the distal right coronary artery in 2005 with deployment of a 3 mm  x 28 mm Cypher stent.  6.   60% stenosis in the 1st obtuse marginal branch and 50% stenosis in  the ramus intermedius branch and 40% stenosis in the diagonal branch treated medically.  7.   Luminal irregularity in the left anterior descending artery.  8.   Concentric left ventricular hypertrophy with diastolic dysfunction.  9.   Left ventricular systolic dysfunction with an ejection fraction of 35% to 40 %.  10.  Arterial hypertension.  11.  Hypertensive heart disease.  12.  Hyperlipidemia.  13.  Gouty arthritis.  14.  Obstructive sleep apnea not compliant with the CPAP machine.  15.  Stage 2 adenocarcinoma of the sigmoid colon and the rectum status  post resection.  16.  Status post chemotherapy for 5 weeks administered at [place] by Dr. Chaudhari.  17.  Obesity.  18.  History of hepatitis A in 1961.  19.  Cutaneous melanoma carcinoma resection by Dr. Andrews in [place].  20.  Chronic kidney disease.  21.  Pulmonary nodule metastatic melanoma status post thoracoscopy and video-assisted bronchoscopy and resection done in October 2019.  22.  Metastatic melanoma with  Opdivo initiation and received the first cycle on January 24, 2020 followed by Dr. Samuel.         PAST SURGICAL HISTORY:  1.   Colonoscopy.  2.   Cutaneous carcinoma resection.  3.   Low anterior resection of the sigmoid colon and upper rectum of the adenocarcinoma of the colon.  4.  Dual-chamber [employer] permanent pacemaker implantation in 2016  5.  Thoracoscopy video-assisted with wedge resection along with bronchoscopy done on 10/18/2019     SOCIAL HISTORY:  Denies any tobacco or alcohol intake.     FAMILY HISTORY:  Significant for atherosclerotic coronary artery disease.     CARDIAC RISK FACTORS:  1.   Male.  2.   Arterial hypertension.  3.   Hyperlipidemia.  4.   Family history for coronary artery disease.                Allergies:  Allergies   Allergen Reactions   • Latex Rash   • Tape Rash       Medication:  Medications Prior to Admission    Medication Sig Dispense Refill Last Dose   • allopurinol (ZYLOPRIM) 100 MG tablet Take 100 mg by mouth Daily.   3/18/2020 at Unknown time   • aspirin 81 MG chewable tablet Chew 81 mg Daily.   3/18/2020 at Unknown time   • colchicine 0.6 MG tablet Take 0.6 mg by mouth Daily.   3/18/2020 at Unknown time   • Cyanocobalamin (VITAMIN B 12 PO) Take 1 tablet by mouth Daily.   3/18/2020 at Unknown time   • levothyroxine (SYNTHROID, LEVOTHROID) 50 MCG tablet Take 1 tablet by mouth Daily. 30 tablet 1 3/18/2020 at Unknown time   • omeprazole (priLOSEC) 40 MG capsule Take 40 mg by mouth Every Other Day.   3/18/2020 at Unknown time   • simvastatin (ZOCOR) 40 MG tablet Take 40 mg by mouth Daily.   3/19/2020 at Unknown time   • ondansetron (ZOFRAN) 4 MG tablet TAKE 1 TABLET FOUR TIMES DAILY AS NEEDED FOR NAUSEA OR VOMITING 40 tablet 3 Taking   • oxyCODONE-acetaminophen (PERCOCET) 5-325 MG per tablet Take 1-2 tablets by mouth Every 4 (Four) Hours As Needed for Moderate Pain  (SURGICAL PAIN). 36 tablet 0 Not Taking           Review of Systems:       Constitutional:  Denies recent weight loss, weight gain, fever or chills, no change in exercise tolerance.     HENT:  Denies any hearing loss, epistaxis, hoarseness, or difficulty speaking.     Eyes: Wears eyeglasses or contact lenses     Respiratory:  Denies dyspnea with exertion,no cough, wheezing, or hemoptysis.     Cardiovascular: Negative for palpitations, chest pain, orthopnea, PND, peripheral edema, syncope, or claudication.     Gastrointestinal: Positive for lightheaded dizziness syncope.  Denies change in bowel habits, dyspepsia, ulcer disease, hematochezia, or melena.  No nausea, no vomiting, no hematemesis, no diarrhea or constipation.    Endocrine: Negative for cold intolerance, heat intolerance, polydipsia, polyphagia or polyuria. Denies any history of weight change or unintended weight loss.    Genitourinary: Negative for hematuria.  No frequent urination or nocturia.       Musculoskeletal: Denies any history of arthritic symptoms or back problems .  No joint pain, joint stiffness, joint swelling, muscle pain, muscle weakness or neck pain.    Skin:  Denies any change in hair or nails, rashes, or skin lesions.     Allergic/Immunologic: Negative.  Negative for environmental allergies, food allergies or immunocompromised state.     Neurological:  Denies any history of recurrent headaches, strokes, TIA, or seizure disorder.     Hematological: Denies excessive bleeding, easy bruising, fatigue, lymphadenopathy or petechiae or any bleeding disorders.     Psychiatric/Behavioral: Denies any history of depression, substance abuse, or change in cognitive function. Denies any psychomotor reaction or tangential thought.  No depression, homicidal ideations or suicidal ideations.          Objective:     Objective:  Temp:  [96.7 °F (35.9 °C)-103 °F (39.4 °C)] 96.7 °F (35.9 °C)  Heart Rate:  [60-75] 68  Resp:  [15-22] 18  BP: ()/(52-64) 98/54      Body mass index is 25.59 kg/m².           Physical Exam:   General Appearance:    Alert, oriented, cooperative, in no acute distress.   Head:    Normocephalic, atraumatic, without obvious abnormality.   Eyes:           MARIELY.  Lids and lashes normal, conjunctivae and sclerae normal, no icterus, no pallor.   Ears:    Ears appear intact with no abnormalities noted.   Throat:   Mucous membranes pink and moist.   Neck:   Supple, trachea midline, no carotid bruit, no organomegaly or JVD.   Lungs:    Decreased air entry bilaterally with few basilar rales and scattered rhonchi.    Heart:    Regular rhythm and normal rate, normal S1 and S2, no            murmur, no gallop, no rub, no click.   Abdomen:     Soft, non-tender, non-distended, no guarding, no rebound tenderness, normal bowel sounds in all four quadrants, no masses, liver and spleen nonpalpable.   Genitalia:    Deferred.   Extremities:   Moves all extremities well, no edema, no cyanosis, no               redness, no clubbing.   Pulses:   Pulses palpable and equal bilaterally.   Skin:   Moist and warm. No bleeding, bruising or rash.   Neurologic/Psychiatric:   Alert and oriented to person, place, and time.  Motor, power and tone in upper and lower extremities are grossly intact. No focal neurological deficits. Normal cognitive function. No psychomotor reaction or tangential thought. No depression, homicidal ideations and suicidal ideations.       Medication Review:   Current Facility-Administered Medications   Medication Dose Route Frequency Provider Last Rate Last Dose   • allopurinol (ZYLOPRIM) tablet 100 mg  100 mg Oral Daily Andrey Brown MD       • aspirin chewable tablet 81 mg  81 mg Oral Daily Andrey Brown MD       • [START ON 3/20/2020] atorvastatin (LIPITOR) tablet 10 mg  10 mg Oral Daily Andrey Brown MD       • colchicine tablet 0.6 mg  0.6 mg Oral Daily Andrey Brown MD       • levothyroxine (SYNTHROID, LEVOTHROID) tablet 50 mcg  50 mcg Oral Q AM Andrey Brown MD       • Magnesium Sulfate 2 gram Bolus, followed by 8 gram infusion (total Mg dose 10 grams)- Mg less than or equal to 1mg/dL  2 g Intravenous PRN Andrey Brown MD        Or   • Magnesium Sulfate 2 gram / 50mL Infusion (GIVE X 3 BAGS TO EQUAL 6GM TOTAL DOSE) - Mg 1.1 - 1.5 mg/dl  2 g Intravenous PRN Andrey Brown MD        Or   • Magnesium Sulfate 4 gram infusion- Mg 1.6-1.9 mg/dL  4 g Intravenous PRN Andrey Brown MD       • ondansetron (ZOFRAN) tablet 4 mg  4 mg Oral Q6H PRN Andrey Brown MD       • pantoprazole (PROTONIX) EC tablet 40 mg  40 mg Oral QAM Andrey Brown MD       • sodium chloride 0.9 % flush 10 mL  10 mL Intravenous PRN Negro Cedeno DO       • sodium chloride 0.9 % flush 10 mL  10 mL Intravenous Q12H Andrey Brown MD   10 mL at 03/19/20 0247   • sodium chloride 0.9 % flush 10 mL  10 mL Intravenous PRN Andrey Brown MD           Lab Review:     Results from last 7 days   Lab Units 03/18/20  0399    SODIUM mmol/L 141   POTASSIUM mmol/L 4.5   CHLORIDE mmol/L 103   CO2 mmol/L 25.0   BUN mg/dL 31*   CREATININE mg/dL 1.88*   CALCIUM mg/dL 9.6   BILIRUBIN mg/dL 1.4*   ALK PHOS U/L 143*   ALT (SGPT) U/L 9   AST (SGOT) U/L 17   GLUCOSE mg/dL 96     Results from last 7 days   Lab Units 03/19/20  0251 03/18/20 2116   CK TOTAL U/L 137  --    TROPONIN T ng/mL 0.017 0.015         Results from last 7 days   Lab Units 03/18/20  2116   WBC 10*3/mm3 6.49   HEMOGLOBIN g/dL 12.7*   HEMATOCRIT % 38.1   PLATELETS 10*3/mm3 118*         Results from last 7 days   Lab Units 03/18/20 2116   MAGNESIUM mg/dL 1.4*     Results from last 7 days   Lab Units 03/19/20 0251   CHOLESTEROL mg/dL 114   TRIGLYCERIDES mg/dL 72   HDL CHOL mg/dL 43   LDL CHOL mg/dL 57               EKG:   ECG/EMG Results (last 24 hours)     Procedure Component Value Units Date/Time    ECG 12 Lead [[id number]] Collected:  03/18/20 2102     Updated:  03/18/20 2108          ECHO:  Results for orders placed during the hospital encounter of 06/15/19   Adult Transthoracic Echo Complete W/ Cont if Necessary Per Protocol    Narrative · Left ventricular wall thickness is consistent with mild concentric   hypertrophy.  · Left ventricular diastolic dysfunction (grade I) consistent with   impaired relaxation.  · Mild mitral valve regurgitation is present  · Mild tricuspid valve regurgitation is present.  · The following left ventricular wall segments are hypokinetic: mid   anterior, apical anterior, basal anterolateral, mid anterolateral, apical   lateral, basal inferolateral, mid inferolateral, apical inferior, mid   inferior, apical septal, basal inferoseptal, mid inferoseptal, apex   hypokinetic, mid anteroseptal, basal anterior, basal inferior and basal   inferoseptal.  · Right ventricular cavity is borderline dilated.           Imaging:  Imaging Results (Last 24 Hours)     Procedure Component Value Units Date/Time    CT Head Without Contrast [[id number]] Collected:   03/18/20 2133     Updated:  03/18/20 2209    Narrative:       Exam: Head CT without contrast    INDICATION: Syncope    TECHNIQUE: Routine head CT without contrast. Sagittal coronal  reconstructions were obtained. This exam was performed according  to our departmental dose-optimization program, which includes  automated exposure control, adjustment of the mA and/or kV  according to patient size and/or use of iterative reconstruction  technique.    COMPARISON: 8/19/2019    FINDINGS: The bony calvarium is intact. There is mucosal  thickening in the left ethmoid and sphenoid sinuses with  near-complete opacification of the left sphenoid sinus. Mastoid  air cells are clear. Plaque is present in the intracranial  arteries. No extra-axial fluid collection. Enlargement of  ventricles and sulci compatible with age-related atrophy. There  decreased attenuation in the white matter consistent with severe  chronic ischemic change. There is encephalomalacia present in the  medial right occipital lobe compatible with a prior infarct. No  obvious sign of acute infarction. No intraparenchymal hemorrhage,  mass or midline shift.      Impression:       No obvious acute intracranial abnormality. Recommend  follow-up as clinically warranted.    Electronically signed by:  Chano Dimas MD  3/18/2020 10:08 PM  CDT Workstation: [id number]    XR Chest 1 View [[id number]] Collected:  03/18/20 2113     Updated:  03/18/20 2138    Narrative:       Exam: AP portable chest    INDICATION: Syncope    COMPARISON: 12/7/2019    FINDINGS: AP portable chest. The bony structures are intact. The  cardiomediastinal silhouette is unremarkable. Atherosclerotic  plaque is present in the aorta. Left pacemaker is in place with  leads located in the right atrium and right ventricle. There are  calcified hilar nodes. Granulomas are present. Chronic  interstitial changes. No acute infiltrate, pneumothorax or  pleural effusion      Impression:       No acute  cardiopulmonary abnormality.    Electronically signed by:  Chano Dimas MD  3/18/2020 9:37 PM  CDT Workstation: [id number]          I personally viewed and interpreted the patient's EKG/Telemetry data.    Assessment:   1.  Lightheaded dizziness presyncope/syncope.  2.  Sick sinus syndrome status post [employer] pacemaker implantation.  3.  Atherosclerotic coronary artery disease status post PTCA and stenting.  4.  Metastatic melanoma to the lung followed by Dr. Samuel.  5.  Hypotension on Florinef and support stockings.  6.  Prerenal azotemia.  7.  Febrile episode.          Plan:   1.  Lightheaded dizziness presyncope.  Patient had transient loss of consciousness.  Patient does have a dual-chamber [employer] pacemaker implantation.  Patient underwent pacemaker interrogation which had revealed appropriate sensing and pacing function with adequate battery reserve.  Patient episodes of syncope could not be explained secondary to pacemaker malfunction.  Patient episodes of presyncope/syncope is probably secondary to low blood pressure.  Patient on outpatient basis has had low blood pressure and was recommended Florinef and support stocking and to increase her salt intake.    2.  Shortness of breath.  Patient on admission had a febrile episode though patient denies any productive cough.  Patient has been followed by the hospitalist.  Clinically at the present time patient is not volume overloaded.  Patient on last echocardiogram had an ejection fraction of 35 to 40% with concentric left ventricular hypertrophy with diastolic dysfunction.  Patient would be administered Lasix on a as needed basis.    3.  Atherosclerotic coronary artery disease.  Patient has documented history of atherosclerotic coronary artery disease status post aborted inferior wall myocardial infarction in 2004 with PTCA and stenting of the mid right coronary artery with deployment of a 3 mm x 18 mm Vision stent on 07/05/2014 with repeat coronary angiogram  done in 2005 with PTCA and stenting of the distal rightcoronary artery with deployment of a 3 mm x 28 mm Cypher stent with 80%stenosis in the obtuse marginal branch upper division and 60% stenosis in the ramus intermedius branch and 40% stenosis in the 2nd diagonal branch treated medically.  Patient currently is not having any symptoms of substernal chest pain suggestive of angina.  Patient is on aspirin at home.    4.  Mild prerenal azotemia with renal insufficiency.  Would follow the renal function.    5.  Metastatic melanoma to the lung.  Patient was initiated on chemotherapy and has been followed by Dr. Samuel on outpatient basis.    6.  Febrile episode.  Patient has been evaluated for the Corona virus screening and is currently in isolation.    7.  History of sigmoid carcinoma status post resection is noted.    8.  Hypothyroidism.  Patient is on thyroid supplement.    Thank you for the consultation.  Would follow the patient as needed.      Time: time spent in face-to-face evaluation of greater than 55 minutes and interacting and formulating examining and discussing the plan with the patient with 50% of greater time spent in face-to-face interaction.    Cruzito Grullon MD  03/19/20  08:08  Dictated utilizing Dragon dictation.

## 2020-03-19 NOTE — PROGRESS NOTES
[facility] Medicine Services  INPATIENT PROGRESS NOTE    Length of Stay: 0  Date of Admission: 3/18/2020  Primary Care Physician: Mike Draper MD    Subjective   Chief Complaint/HPI: This 75-year-old male was admitted to hospitalist services after experiencing an episode of generalized weakness and syncope.  Patient does demonstrate a mild acute kidney injury, as well as symptoms of sepsis.  Patient currently demonstrates a fever of 102.5 and hypoxia, he is currently 90% on 3 L.  Patient chest x-ray negative, respiratory viruses panel negative, but does demonstrate moderate blood and small leukoesterase in his urine as well as 13-20 white blood cells.  Patient currently undergoing CT scan of the chest to further evaluate.    Review of Systems   Constitutional: Negative for chills.   Respiratory: Negative for shortness of breath.    Cardiovascular: Negative for chest pain.   Gastrointestinal: Negative for abdominal pain and nausea.      Objective    Temp:  [96.7 °F (35.9 °C)-103 °F (39.4 °C)] 102.5 °F (39.2 °C)  Heart Rate:  [60-83] 77  Resp:  [15-22] 18  BP: ()/(52-64) 114/59    Physical Exam   Constitutional: No distress.   HENT:   Head: Normocephalic and atraumatic.   Cardiovascular: Normal rate and regular rhythm.   Pulmonary/Chest: He has rales.   Abdominal: Soft. Bowel sounds are normal. He exhibits no distension. There is no tenderness.   Neurological: He is alert.   Skin: Skin is warm and dry. Capillary refill takes less than 2 seconds. He is not diaphoretic.   Psychiatric: He has a normal mood and affect. His behavior is normal.     Results Review:  I have reviewed the labs, radiology results, and diagnostic studies.    Laboratory Data:   Results from last 7 days   Lab Units 03/18/20  2116   SODIUM mmol/L 141   POTASSIUM mmol/L 4.5   CHLORIDE mmol/L 103   CO2 mmol/L 25.0   BUN mg/dL 31*   CREATININE mg/dL 1.88*   GLUCOSE mg/dL 96   CALCIUM mg/dL 9.6      BILIRUBIN mg/dL 1.4*   ALK PHOS U/L 143*   ALT (SGPT) U/L 9   AST (SGOT) U/L 17   ANION GAP mmol/L 13.0     Estimated Creatinine Clearance: 39.8 mL/min (A) (by C-G formula based on SCr of 1.88 mg/dL (H)).  Results from last 7 days   Lab Units 03/18/20  2116   MAGNESIUM mg/dL 1.4*         Results from last 7 days   Lab Units 03/18/20  2116   WBC 10*3/mm3 6.49   HEMOGLOBIN g/dL 12.7*   HEMATOCRIT % 38.1   PLATELETS 10*3/mm3 118*           Culture Data:   No results found for: BLOODCX  No results found for: URINECX  No results found for: RESPCX  No results found for: WOUNDCX  No results found for: STOOLCX  No components found for: BODYFLD    Radiology Data:   Imaging Results (Last 24 Hours)     Procedure Component Value Units Date/Time    CT Head Without Contrast [[id number]] Collected:  03/18/20 2133     Updated:  03/18/20 2209    Narrative:       Exam: Head CT without contrast    INDICATION: Syncope    TECHNIQUE: Routine head CT without contrast. Sagittal coronal  reconstructions were obtained. This exam was performed according  to our departmental dose-optimization program, which includes  automated exposure control, adjustment of the mA and/or kV  according to patient size and/or use of iterative reconstruction  technique.    COMPARISON: 8/19/2019    FINDINGS: The bony calvarium is intact. There is mucosal  thickening in the left ethmoid and sphenoid sinuses with  near-complete opacification of the left sphenoid sinus. Mastoid  air cells are clear. Plaque is present in the intracranial  arteries. No extra-axial fluid collection. Enlargement of  ventricles and sulci compatible with age-related atrophy. There  decreased attenuation in the white matter consistent with severe  chronic ischemic change. There is encephalomalacia present in the  medial right occipital lobe compatible with a prior infarct. No  obvious sign of acute infarction. No intraparenchymal hemorrhage,  mass or midline shift.      Impression:        No obvious acute intracranial abnormality. Recommend  follow-up as clinically warranted.    Electronically signed by:  Chano Dimas MD  3/18/2020 10:08 PM  CDT Workstation: [id number]    XR Chest 1 View [[id number]] Collected:  03/18/20 2113     Updated:  03/18/20 2138    Narrative:       Exam: AP portable chest    INDICATION: Syncope    COMPARISON: 12/7/2019    FINDINGS: AP portable chest. The bony structures are intact. The  cardiomediastinal silhouette is unremarkable. Atherosclerotic  plaque is present in the aorta. Left pacemaker is in place with  leads located in the right atrium and right ventricle. There are  calcified hilar nodes. Granulomas are present. Chronic  interstitial changes. No acute infiltrate, pneumothorax or  pleural effusion      Impression:       No acute cardiopulmonary abnormality.    Electronically signed by:  Chano Dimas MD  3/18/2020 9:37 PM  CDT Workstation: [id number]          I have reviewed the patient's current medications.     Assessment/Plan     Active Hospital Problems    Diagnosis   • Syncope   Sepsis  Acute respiratory failure with hypoxia  Acute kidney injury  Possible acute cystitis with hematuria      Plan: Proceed with CT scan of the chest per COVID guidelines.  Empiric antibiotic treatment.              This document has been electronically signed by EDIE Young on March 19, 2020 11:43

## 2020-03-20 PROBLEM — R09.02 HYPOXIA: Status: ACTIVE | Noted: 2020-01-01

## 2020-03-20 NOTE — PLAN OF CARE
Problem: Patient Care Overview  Goal: Plan of Care Review  Outcome: Ongoing (interventions implemented as appropriate)  Flowsheets (Taken 3/20/2020 0401)  Progress: declining  Plan of Care Reviewed With: patient  Note:   Pt resting in bed. Temperature throughout night, finally broke after several hours. Oxygen increased to 6L through out night, SOB noted. Pt remains confused at times. Gave son a patient update and educated on possible Covid-19 case, self monitoring and encouraged social distancing. Remains on airborne precautions. No c/o pain or s/s of distress at this time. Will continue to monitor this pt.

## 2020-03-20 NOTE — NURSING NOTE
Upon entering the room, the son was trying to contact patient for in and out catheter d/t bladder scan 514ml. Patient started experiencing the shakes and chills. Oxygen was reading in 60-70's on 6L. Non-rebreather mask applied at this time. Oxygen still reading in 70's with non-rebreather mask on. Called desk for charge nurse to page doctor and respiratory therapist. Louis entered the room and was getting oxygen reading in the 70's as well. Charge nurse Charisma, outside room. Dr. Johnson stood outside room and ordered ABG and transfer to ICU. ICU nurse came up to floor and anesthesiologist paged to intubate. Son called to give a status update and made aware of the situation. Passed off report to Annie Calzada RN from ICU.

## 2020-03-20 NOTE — PROGRESS NOTES
TWO PATIENT IDENTIFIERS WERE USED. THE PATIENT WAS DRAPED WITH A FULL BODY DRAPE AND THE PATIENT'S RIGHT ARM WAS PREPPED WITH CHLORA PREP. ULTRASOUND WAS USED TO LOCALIZE THERIGHT BASILIC VEIN. SUBCUTANEOUS TISSUE AT THE CATHETER SITE WAS INFILTRATED WITH 2% LIDOCAINE. UNDER ULTRASOUND GUIDANCE, THE VEIN WAS ACCESSED WITH A 21 GAUGE  NEEDLE. AN 0.018 WIRE WAS THEN THREADED THROUGH THE NEEDLE. THE 21 GAUGE NEEDLE WAS REMOVED AND A 4 Mongolian SHEATH WAS PLACED OVER THE WIRE INTO THE VEIN.THE MIDLINE CATHETER WAS TRIMMED TO 15CM. THE MIDLINE CATHETER WAS THEN PLACED OVER THE WIRE INTO THE VEIN, THE SHEATH WAS PEELED AWAY, WIRE WAS REMOVED. CATHETER WAS FLUSHED WITH NORMAL SALINE AND CATHETER TIP APPLIED. BIOPATCH PLACED. CATHETER SECURED WITH STAT LOCK AND TEGADERM. PATIENT TOLERATED PROCEDURE WELL. THIS WAS DONE IN ICU  IMPRESSION:SUCCESSFUL PLACEMENT OF DUAL LUMEN MIDLINE.       Yellow gown and capr was worn for extra protection    Annie Daily  3/20/2020  1:12 PM

## 2020-03-20 NOTE — SIGNIFICANT NOTE
RT called to room at 0540 by SEAN Jones for patient in respiratory distress with hypoxia. Pt was on NRB mask with SpO2 in the 70's. I obtained an ABG and sent ABG to lab. Pt intubated at 0727 on 3/20/2020 by anesthesiologist. Airborne and contact precautions taken including CAPR prior to patient being intubated. Pt intubated with size 8 endotracheal tube at 0727 and placed on mechanical ventilator. Pt initial vent settings AC16, Vt 450mL, 100% FiO2, Peep of 5cmH20. Pt was transported to ICU by myself along with 4W SEAN Jones and ICU SEAN Phillips.     Louis Quispe, RRT

## 2020-03-20 NOTE — NURSING NOTE
Order given for cooling blanket when temp 104.6, unable to apply cooling blanket due to shortage at facility. Tyelnol given per order and ice packs applied x2. Fever is trending down. Will continue to monitor this pt.

## 2020-03-20 NOTE — PROGRESS NOTES
Call back to patient's room due to breath stacking on ventilator.  Repeat chest x-ray looks like rapidly progressive ARDS.  Patient required further ventilator adjustments.  In cooperation with respiratory therapy multiple changes were made to decreased breath stacking.  His tidal volume was increased from 4 cc/kg of ideal body weight to 6 cc/kg of ideal body weight, his inspiratory time was decreased, and his level of sedation was increased.  During all of these changes his peak pressures were monitored closely.  Peak pressure remained less than or equal to 20 cm of water during the entirety of these adjustments.  Once the adjustments were completed, pressure settled at approximately 18 cm water.  We will continue to monitor closely and make adjustments as needed.  As his disease course progresses, I expect his respiratory status to worsen and his lung compliance to become more difficult to deal with.  Tidal volume may have to be adjusted again as lung compliance worsens.    Approximately 40 minutes of critical care time were spent managing the patient exclusive of billable procedures.         This document has been electronically signed by Dario Cedeno MD on March 20, 2020 17:23

## 2020-03-20 NOTE — PROGRESS NOTES
Patient underwent pacemaker interrogation.  Patient pacemaker interrogation has revealed appropriate sensing and pacing function with adequate battery reserve.    Patient has not complained of having any symptoms of chest pain.  Patient blood pressure has always been on the low side.  Due to the patient's symptoms of dizziness with low blood pressure patient would be started on Midodrin 10 mg twice a day.

## 2020-03-20 NOTE — ANESTHESIA PROCEDURE NOTES
Airway  Urgency: emergent    Date/Time: 3/20/2020 7:01 AM  End Time:3/20/2020 7:28 AM  Airway not difficult    General Information and Staff    Patient location during procedure: floor  Anesthesiologist: Ruddy Jasmine MD    Consent for Airway (if performed for an anesthetic, see related documentation for consents)  Patient identity confirmed: verbally with patient  Consent: The procedure was performed in an emergent situation. Verbal consent not obtained. Written consent not obtained.  Risks and benefits: risks, benefits and alternatives were not discussed      Indications and Patient Condition  Indications for airway management: respiratory distress/failure    Preoxygenated: yes  MILS maintained throughout  Mask difficulty assessment: 0 - not attempted    Final Airway Details  Final airway type: endotracheal airway      Successful airway: ETT  Cuffed: yes   Successful intubation technique: video laryngoscopy  Facilitating devices/methods: intubating stylet  Endotracheal tube insertion site: oral  Blade: Rosario  Blade size: 4  ETT size (mm): 8.0  Cormack-Lehane Classification: grade I - full view of glottis  Placement verified by: chest auscultation   Measured from: lips  ETT/EBT  to lips (cm): 22  Number of attempts at approach: 1  Assessment: lips, teeth, and gum same as pre-op and atraumatic intubation    Additional Comments  Called to the floor for respiratory distress.  Patient sats 80's to lower 90's and elevated respiratory rate.  Patient stating he was short of air and started pulling at lines.  Has to wait on transport ventilator.  Got transport monitor set up and ventilator in his room.  Had to put on contact isolation helmet, as well at yellow gown and double gloved.  Induced with 100mg propofol and 100mg succinylcholine.  Airway secured by RN. Took off my gown and helmet in isolation room.  Wiped off helmet and mask.  Put rosario handle in clean gloves to be sanitized.  Went directly to shower  and changed my scrubs.

## 2020-03-20 NOTE — TELEPHONE ENCOUNTER
Perfecto Samuel MD David      Pt's son Gurwinder wants a nurse to call him back asap or Dr Samuel pt is hospitalized and in intensive care  and they are needing to know the information found on the CT scan regarding the spot on his Lungs    Please callback  asap    Thanks

## 2020-03-21 NOTE — PLAN OF CARE
Problem: Patient Care Overview  Goal: Plan of Care Review  Outcome: Ongoing (interventions implemented as appropriate)  Flowsheets  Taken 3/20/2020 1905  Progress: declining  Outcome Summary: Patient on norepinephrine gtt to stablize blood pressure. UOP adequate, respiratory distress worsening on vent, patient using abdominal muscles to breath. Febrile with range 100-104 orally. Son kept uptodate.  Taken 3/20/2020 0800  Plan of Care Reviewed With: yuridia

## 2020-03-21 NOTE — PLAN OF CARE
Problem: Patient Care Overview  Goal: Plan of Care Review  Outcome: Ongoing (interventions implemented as appropriate)  Flowsheets (Taken 3/21/2020 4094)  Progress: no change  Plan of Care Reviewed With: patient; son  Outcome Summary: vitals and labwork maintaining, intermittent fever. management with ice packs. tolerating vent with out episodes.

## 2020-03-21 NOTE — PROGRESS NOTES
[facility] Medicine Services  INPATIENT PROGRESS NOTE    Length of Stay: 1  Date of Admission: 3/18/2020  Primary Care Physician: Mike Draper MD    Subjective   Chief Complaint: Syncope  HPI: Intubated and sedated    Review of Systems   Unable to perform ROS: Intubated        Objective    Temp:  [94.4 °F (34.7 °C)-99.3 °F (37.4 °C)] 99.3 °F (37.4 °C)  Heart Rate:  [70-89] 89  Resp:  [26-32] 28  BP: ()/(55-81) 113/55  FiO2 (%):  [60 %] 60 %    Vent Settings:  FiO2 (%):  [60 %] 60 %  S RR:  [24-28] 28  PEEP/CPAP (cm H2O):  [5 cm H20] 5 cm H20  MN SUP:  [0 cm H20] 0 cm H20  MAP (cm H2O):  [11-14] 12    Physical Exam   Constitutional: He appears well-developed and well-nourished. He is sedated and intubated.   HENT:   Head: Normocephalic and atraumatic.   Eyes: Pupils are equal, round, and reactive to light. EOM are normal.   Neck: Normal range of motion. Neck supple.   Cardiovascular: Normal rate, regular rhythm, normal heart sounds and intact distal pulses. Exam reveals no gallop and no friction rub.   No murmur heard.  Pulmonary/Chest: Effort normal. He is intubated. No respiratory distress. He has decreased breath sounds. He has no wheezes. He has rhonchi. He has no rales. He exhibits no tenderness.   Abdominal: Soft. Bowel sounds are normal. He exhibits no distension. There is no tenderness.   Psychiatric: He has a normal mood and affect. His behavior is normal.   Vitals reviewed.      Results Review:  I have reviewed the labs, radiology results, and diagnostic studies.    Laboratory Data:   Results from last 7 days   Lab Units 03/21/20  0902 03/20/20  1028 03/20/20  0620 03/19/20  1436 03/18/20  2116   SODIUM mmol/L 145 139  --  138 141   SODIUM, ARTERIAL mmol/L  --   --  139  --   --    POTASSIUM mmol/L 4.9 4.5  --  4.1 4.5   CHLORIDE mmol/L 112* 107  --  104 103   CO2 mmol/L 22.0 23.0  --  22.0 25.0   BUN mg/dL 39* 40*  --  34* 31*   CREATININE mg/dL 1.53*  1.90*  --  1.80* 1.88*   GLUCOSE mg/dL 80 96  --  104* 96   GLUCOSE, ARTERIAL mmol/L  --   --  117*  --   --    CALCIUM mg/dL 9.2 9.0  --  9.1 9.6   BILIRUBIN mg/dL 0.7 0.8  --   --  1.4*   ALK PHOS U/L 111 103  --   --  143*   ALT (SGPT) U/L 21 17  --   --  9   AST (SGOT) U/L 51* 44*  --   --  17   ANION GAP mmol/L 11.0 9.0  --  12.0 13.0     Estimated Creatinine Clearance: 44.3 mL/min (A) (by C-G formula based on SCr of 1.53 mg/dL (H)).  Results from last 7 days   Lab Units 03/21/20  0537 03/19/20  1436 03/18/20  2116   MAGNESIUM mg/dL 2.4 1.4* 1.4*         Results from last 7 days   Lab Units 03/21/20  0902 03/20/20  1028 03/18/20  2116   WBC 10*3/mm3 7.49 7.98 6.49   HEMOGLOBIN g/dL 13.7 11.8* 12.7*   HEMATOCRIT % 42.2 36.1* 38.1   PLATELETS 10*3/mm3 92* 84* 118*           Culture Data:   Blood Culture   Date Value Ref Range Status   03/18/2020 No growth at 2 days  Preliminary   03/18/2020 No growth at 2 days  Preliminary     Urine Culture   Date Value Ref Range Status   03/18/2020 No growth  Final     Respiratory Culture   Date Value Ref Range Status   03/20/2020 No growth  Preliminary     No results found for: WOUNDCX  No results found for: STOOLCX  No components found for: BODYFLD    Radiology Data:   Imaging Results (Last 24 Hours)     ** No results found for the last 24 hours. **          ABG:  Results from last 7 days   Lab Units 03/21/20  0350   PH, ARTERIAL pH units 7.248*   PO2 ART mm Hg 80.9*   PCO2, ARTERIAL mm Hg 53.1*   HCO3 ART mmol/L 23.1       I have reviewed the patient's current medications.     Assessment/Plan     Active Hospital Problems    Diagnosis   • Hypoxia   • Syncope       Plan:  1.  Respiratory failure:  Remains intubated.  Tolerating ventilator settings.  Tidal volume set at 6cc/kg IBW.  PaO2/FiO2 ratio is 135 consistent with moderate ARDS.  Covid-19 testing is pending.  Patient will continue to be managed on the ventilator according to ARDSnet protocol.  Currently peak pressures are  around 20-24.  Will repeat chest xray tomorrow.  2.  Fever:  Currently afebrile.  Continue empiric antibiotics.  3.  Respiratory acidosis:  Acidosis continues to be out of proportion to the degree of hypercarbia that patient has.  CO2 is within normal limits.  Based on current recommendations for Covid-19, we will allow some degree of hypercarbia.  4.  Shock:  Presumed septic.  Continues to require levophed.  Cultures are pending.  On empiric antibiotics.  Covid-19 testing pending.  5.  Lung nodule:  We will repeat CT once patient has become more stable.      Approximately 35 minutes of critical care time were spent managing the patient exclusive of billable procedures.     Prognosis guarded    Discharge Planning: I expect patient to be discharged to home versus skilled facility in 3-4 days.        This document has been electronically signed by Dario Cedeno MD on March 21, 2020 16:10

## 2020-03-21 NOTE — PLAN OF CARE
Problem: Ventilation, Mechanical Invasive (Adult)  Goal: Signs and Symptoms of Listed Potential Problems Will be Absent, Minimized or Managed (Ventilation, Mechanical Invasive)  Outcome: Ongoing (interventions implemented as appropriate)  Flowsheets (Taken 3/21/2020 0347)  Problems Assessed (Mechanical Ventilation, Invasive): all  Problems Present (Mech Vent, Invasive): none     Problem: Ventilation, Mechanical Invasive (Adult)  Intervention: Prevent Airway Displacement/Mechanical Dysfunction  Flowsheets (Taken 3/21/2020 0347)  Airway Safety Measures: suction at bedside  Intervention: Prevent Airway-Related Skin/Tissue Breakdown  Flowsheets (Taken 3/21/2020 0000 by Porsche Hoffman, RN)  Device Skin Pressure Protection: absorbent pad utilized/changed;adhesive use limited;skin-to-device areas padded;skin-to-skin areas padded  Intervention: Prevent Ventilator-Induced Lung Injury  Flowsheets (Taken 3/21/2020 0347)  Lung Protection Measures: optimal PEEP applied; plateau/inspiratory pressure monitored; ventilator synchrony promoted; ventilator waveforms monitored  Intervention: Promote/Provide Early Rehabilitation Treatment/Mobility Program  Flowsheets (Taken 3/21/2020 0347)  Activity Management: bedrest  Intervention: Support Psychosocial Response to Intubation/Mechanical Ventilation  Flowsheets (Taken 3/21/2020 0347)  Supportive Measures: positive reinforcement provided; relaxation techniques promoted  Intervention: Promote Early Weaning/Extubation  Flowsheets (Taken 3/21/2020 0347)  Environmental Support: calm environment promoted; distractions minimized  Sleep/Rest Enhancement: awakenings minimized; relaxation techniques promoted  Intervention: Prevent Ventilator-Associated Pneumonia (VAP)  Flowsheets  Taken 3/21/2020 0347 by Emilia Juarez, RRT  Head of Bed (HOB): HOB elevated  VAP Prevention Bundle: HOB elevation maintained;oral care regularly provided  VAP Prevention Contraindications: condition  unstable  Taken 3/21/2020 0000 by Porsche Hoffman RN  Oral Care: lip lubricant applied;swabbed with antiseptic solution;teeth brushed;tongue brushed  Intervention: Optimize Oxygenation/Ventilation  Flowsheets (Taken 3/21/2020 0347)  Airway/Ventilation Management: airway patency maintained; calming measures promoted; humidification applied; pulmonary hygiene promoted   Pt tolerating current vent settings, no SBT due to condition. Will continue to monitor closely.

## 2020-03-22 NOTE — PLAN OF CARE
Problem: Ventilation, Mechanical Invasive (Adult)  Goal: Signs and Symptoms of Listed Potential Problems Will be Absent, Minimized or Managed (Ventilation, Mechanical Invasive)  Outcome: Ongoing (interventions implemented as appropriate)   Pt tolerating current vent settings at this time, no changes this shift, will continue to monitor.

## 2020-03-22 NOTE — NURSING NOTE
Nursing note ,   Patient developed hypothermia and had to have a bear hugger applied per doctors orders. Dr lyons called and had BiCarb 2 amp added to fluids per PT labs. Levophed was titrated throughout the night due to fluctuating blood pressure. Patient was non responsive on 60 of propofol and MARY was called per protocol. Sedation was backed off till responses were seen then brought back up till the patient stopped breathing over the vent. VS were fluctuating all night and temperature resolved by early morning.

## 2020-03-22 NOTE — PROGRESS NOTES
[facility] Medicine Services  INPATIENT PROGRESS NOTE    Length of Stay: 2  Date of Admission: 3/18/2020  Primary Care Physician: Mike Draper MD    Subjective   Chief Complaint: Syncope  HPI: Intubated and sedated    Review of Systems   Unable to perform ROS: Intubated        Objective    Temp:  [94.1 °F (34.5 °C)-98.5 °F (36.9 °C)] 94.1 °F (34.5 °C)  Heart Rate:  [64-93] 74  Resp:  [28-30] 28  BP: ()/(53-78) 102/58  FiO2 (%):  [50 %-60 %] 50 %    Vent Settings:  FiO2 (%):  [50 %-60 %] 50 %  S RR:  [28] 28  PEEP/CPAP (cm H2O):  [5 cm H20] 5 cm H20  MI SUP:  [0 cm H20] 0 cm H20  MAP (cm H2O):  [10-13] 10    Physical Exam   Constitutional: He appears well-developed and well-nourished. He is sedated and intubated.   HENT:   Head: Normocephalic and atraumatic.   Eyes: Pupils are equal, round, and reactive to light. EOM are normal.   Neck: Normal range of motion. Neck supple.   Cardiovascular: Normal rate, regular rhythm, normal heart sounds and intact distal pulses. Exam reveals no gallop and no friction rub.   No murmur heard.  Pulmonary/Chest: Effort normal. He is intubated. No respiratory distress. He has decreased breath sounds. He has no wheezes. He has rhonchi. He has no rales. He exhibits no tenderness.   Abdominal: Soft. Bowel sounds are normal. He exhibits no distension. There is no tenderness.   Psychiatric: He has a normal mood and affect. His behavior is normal.   Vitals reviewed.      Results Review:  I have reviewed the labs, radiology results, and diagnostic studies.    Laboratory Data:   Results from last 7 days   Lab Units 03/22/20  0424 03/21/20  0902 03/20/20  1028  03/18/20  2116   SODIUM mmol/L 146* 145 139   < > 141   SODIUM, ARTERIAL   --   --   --    < >  --    POTASSIUM mmol/L 5.0 4.9 4.5   < > 4.5   CHLORIDE mmol/L 113* 112* 107   < > 103   CO2 mmol/L 22.0 22.0 23.0   < > 25.0   BUN mg/dL 40* 39* 40*   < > 31*   CREATININE mg/dL 1.46*  1.53* 1.90*   < > 1.88*   GLUCOSE mg/dL 75 80 96   < > 96   GLUCOSE, ARTERIAL   --   --   --    < >  --    CALCIUM mg/dL 9.3 9.2 9.0   < > 9.6   BILIRUBIN mg/dL  --  0.7 0.8  --  1.4*   ALK PHOS U/L  --  111 103  --  143*   ALT (SGPT) U/L  --  21 17  --  9   AST (SGOT) U/L  --  51* 44*  --  17   ANION GAP mmol/L 11.0 11.0 9.0   < > 13.0    < > = values in this interval not displayed.     Estimated Creatinine Clearance: 47.3 mL/min (A) (by C-G formula based on SCr of 1.46 mg/dL (H)).  Results from last 7 days   Lab Units 03/21/20  0537 03/19/20  1436 03/18/20  2116   MAGNESIUM mg/dL 2.4 1.4* 1.4*         Results from last 7 days   Lab Units 03/22/20  0424 03/21/20  0902 03/20/20  1028 03/18/20  2116   WBC 10*3/mm3 9.06 7.49 7.98 6.49   HEMOGLOBIN g/dL 14.6 13.7 11.8* 12.7*   HEMATOCRIT % 45.4 42.2 36.1* 38.1   PLATELETS 10*3/mm3 71* 92* 84* 118*           Culture Data:   No results found for: BLOODCX  No results found for: URINECX  Respiratory Culture   Date Value Ref Range Status   03/20/2020 Scant growth (1+) Normal Respiratory Adriana  Final     No results found for: WOUNDCX  No results found for: STOOLCX  No components found for: BODYFLD    Radiology Data:   Imaging Results (Last 24 Hours)     Procedure Component Value Units Date/Time    XR Chest 1 View [[id number]] Collected:  03/22/20 0605     Updated:  03/22/20 0611    Narrative:       History:  Respiratory failure, R09.02 Hypoxemia I95.1 Orthostatic  hypotension R55 Syncope and collapse    Procedure: XR CHEST 1 VIEW    Comparison:  March 20, 2020    Findings:  Portable view of the chest was obtained at 5:05 AM.   Endotracheal tube terminates 4 cm above the deysi.  Feeding tube  terminates in the region of the gastric fundus.  The heart size  is within normal limits.  AV pacemaker is again noted.  No  significant change in bilateral interstitial infiltrates. There  is no definite pleural effusion or pneumothorax.      Impression:       Impression: No significant  change as above.    Electronically signed by:  Manuelito Carl MD  3/22/2020 6:10 AM CDT  Workstation: [id number]          ABG:  Results from last 7 days   Lab Units 03/22/20  0521   PH, ARTERIAL pH units 7.233*   PO2 ART mm Hg 99.0   PCO2, ARTERIAL mm Hg 57.8*   HCO3 ART mmol/L 24.4       I have reviewed the patient's current medications.     Assessment/Plan     Active Hospital Problems    Diagnosis   • Hypoxia   • Syncope       Plan:  1.  Respiratory failure:  Remains intubated.  Tolerating ventilator settings.  Tidal volume set at 6cc/kg IBW.  PaO2/FiO2 ratio is 167 consistent with moderate ARDS.  Improved some from yesterday.  Covid-19 testing is pending.  Patient will continue to be managed on the ventilator according to ARDSnet protocol.  Currently peak pressures remained around 20-24.  Chest x-ray is essentially unchanged.    2.  Fever: Patient was actually hypothermic requiring Elvin hugger.  Continue empiric antibiotics.  3.  Respiratory acidosis:  Acidosis continues to be out of proportion to the degree of hypercarbia that patient has.  CO2 is within normal limits.  Based on current recommendations for Covid-19, we will allow some degree of hypercarbia.  4.  Shock:  Presumed septic.  Continues to require levophed.  Cultures are pending.  On empiric antibiotics.  Covid-19 testing pending.  5.  Lung nodule:  We will repeat CT once patient has become more stable.    6.  Thrombocytopenia: Some blood in OG tube today.  Will check coagulation studies.    Approximately 35 minutes of critical care time were spent managing the patient exclusive of billable procedures.     Prognosis guarded    Discharge Planning: I expect patient to be discharged to home versus skilled facility in 3-4 days.        This document has been electronically signed by Dario Cedeno MD on March 22, 2020 15:31

## 2020-03-23 NOTE — CONSULTS
CRITICAL CARE CONSULT NOTE  Ariadne Lisa MD    [facility] CRITICAL CARE STEPDOWN  3/23/2020        Kuldip Nevarez  75 y.o. male  1945  [id number]            Requesting physician: Matthew Tsang DO    Reason for Consultation: Acute hypoxemic respiratory failure    CC: Unable to obtain    Subjective     History of Present Illness:  Kuldip Nevarez is a 75 y.o. male with PMH significant for COPD, past tobacco use, NAFISA, ASCAD s/p CAD, pacemaker, colon cancer s/p resection/ chemo/ XRT in 2004, hyperlipidemia, and GERD who presented to the ED on 3/18 complaining of a syncopal episode.  History is obtained from the record.  Patient admitted to multiple episodes of syncope where he would lose his balance and fall.  In the ED, he was noted to have a fever and hypoxia.  Patient was also noted to have orthostatic hypotension.  He was admitted for work-up and management.  Given some of his symptoms there were concerns about possible coated infection so he was sent for CT of the chest.  The CT did show some underlying fibrotic changes with some increased interstitial findings at the bases and periphery.  He subsequently had significant worsening of hypoxemia and required endotracheal intubation the following day.  He was transferred to the ICU and put on airborne precautions.  Patient did have Covid 19 testing sent.  He had also had issues with hypotension requiring vasopressors as well as acute worsening of his chronic kidney disease.  Patient was put on arts net and has been maintained since that time.  I was consulted to assist with ventilator management. Pt previously smoked but quit in 2004. His father and brother had asthma. His brother also had lung cancer. He has done [employer], [employer],  (noncombat), and the telephone company. He may have been exposed to asbestos.    Review of Systems:   Review of Systems   Unable to perform ROS: Intubated       All systems were reviewed  and negative except as noted above in the HPI.    Home Meds:  Medications Prior to Admission   Medication Sig Dispense Refill Last Dose   • allopurinol (ZYLOPRIM) 100 MG tablet Take 100 mg by mouth Daily.   3/18/2020 at Unknown time   • aspirin 81 MG chewable tablet Chew 81 mg Daily.   3/18/2020 at Unknown time   • colchicine 0.6 MG tablet Take 0.6 mg by mouth Daily.   3/18/2020 at Unknown time   • Cyanocobalamin (VITAMIN B 12 PO) Take 1 tablet by mouth Daily.   3/18/2020 at Unknown time   • levothyroxine (SYNTHROID, LEVOTHROID) 50 MCG tablet Take 1 tablet by mouth Daily. 30 tablet 1 3/18/2020 at Unknown time   • omeprazole (priLOSEC) 40 MG capsule Take 40 mg by mouth Every Other Day.   3/18/2020 at Unknown time   • simvastatin (ZOCOR) 40 MG tablet Take 40 mg by mouth Daily.   3/19/2020 at Unknown time   • ondansetron (ZOFRAN) 4 MG tablet TAKE 1 TABLET FOUR TIMES DAILY AS NEEDED FOR NAUSEA OR VOMITING 40 tablet 3 Taking   • oxyCODONE-acetaminophen (PERCOCET) 5-325 MG per tablet Take 1-2 tablets by mouth Every 4 (Four) Hours As Needed for Moderate Pain  (SURGICAL PAIN). 36 tablet 0 Not Taking       Inpatient Meds:    Current Facility-Administered Medications:   •  acetaminophen (TYLENOL) suppository 650 mg, 650 mg, Rectal, Q6H PRN, Dario Cedeno MD  •  acetaminophen (TYLENOL) tablet 650 mg, 650 mg, Oral, Q6H PRN, Bassam Kay PA, 650 mg at 03/19/20 1934  •  aspirin chewable tablet 81 mg, 81 mg, Oral, Daily, Andrey Brown MD, 81 mg at 03/23/20 0941  •  atorvastatin (LIPITOR) tablet 10 mg, 10 mg, Oral, Daily, Andrey Brown MD, 10 mg at 03/23/20 0942  •  cefepime (MAXIPIME) 2 g/100 mL 0.9% NS (mbp), 2 g, Intravenous, Q12H, Bassam Kay PA, 2 g at 03/23/20 1240  •  chlorhexidine (PERIDEX) 0.12 % solution 15 mL, 15 mL, Mouth/Throat, Q12H, Dario Cedeno MD, 15 mL at 03/23/20 0942  •  doxycycline (VIBRAMYCIN) 100 mg/250 mL 0.9% NS VTB, 100 mg, Intravenous, Q12H, Bassam Kay PA, Last Rate: 0  mL/hr at 03/20/20 0255, 100 mg at 03/23/20 1308  •  levothyroxine (SYNTHROID, LEVOTHROID) tablet 50 mcg, 50 mcg, Oral, Q AM, Andrey Brown MD, 50 mcg at 03/23/20 0521  •  Magnesium Sulfate 2 gram Bolus, followed by 8 gram infusion (total Mg dose 10 grams)- Mg less than or equal to 1mg/dL, 2 g, Intravenous, PRN **OR** Magnesium Sulfate 2 gram / 50mL Infusion (GIVE X 3 BAGS TO EQUAL 6GM TOTAL DOSE) - Mg 1.1 - 1.5 mg/dl, 2 g, Intravenous, PRN, Last Rate: 25 mL/hr at 03/20/20 0948, 2 g at 03/20/20 0948 **OR** Magnesium Sulfate 4 gram infusion- Mg 1.6-1.9 mg/dL, 4 g, Intravenous, PRN, Andrey Brown MD  •  midodrine (PROAMATINE) tablet 10 mg, 10 mg, Oral, BID AC, Cruzito Grullon MD, 10 mg at 03/23/20 0944  •  norepinephrine (LEVOPHED) 8 mg/250 mL (32 mcg/mL) in sodium chloride 0.9% infusion (premix), 0.02-0.3 mcg/kg/min, Intravenous, Titrated, Dario Cedeno MD, Last Rate: 2.96 mL/hr at 03/23/20 1310, 0.02 mcg/kg/min at 03/23/20 1310  •  ondansetron (ZOFRAN) tablet 4 mg, 4 mg, Oral, Q6H PRN, Andrey Brown MD  •  pantoprazole (PROTONIX) injection 40 mg, 40 mg, Intravenous, Q AM, Dario Cedeno MD, 40 mg at 03/23/20 0520  •  propofol (DIPRIVAN) infusion 10 mg/mL 100 mL, 5-50 mcg/kg/min, Intravenous, Titrated, Dario Cedeno MD, Last Rate: 23.7 mL/hr at 03/23/20 1308, 50 mcg/kg/min at 03/23/20 1308  •  [COMPLETED] Insert peripheral IV, , , Once **AND** sodium chloride 0.9 % flush 10 mL, 10 mL, Intravenous, PRN, Negro Cedeno, DO  •  sodium chloride 0.9 % flush 10 mL, 10 mL, Intravenous, Q12H, Andrey Brown MD, 10 mL at 03/21/20 1006  •  sodium chloride 0.9 % flush 10 mL, 10 mL, Intravenous, PRN, Andrey Brown MD  •  sodium chloride 0.9 % flush 10 mL, 10 mL, Intravenous, Q12H, Dario Cedeno MD, 10 mL at 03/23/20 0944  •  sodium chloride 0.9 % flush 10 mL, 10 mL, Intravenous, Q12H, Dario Cedeno MD, 10 mL at 03/23/20 0942  •  sodium chloride 0.9 % flush 10 mL, 10 mL, Intravenous, PRN,  Dario Cedeno MD    Allergies:  Allergies   Allergen Reactions   • Latex Rash   • Tape Rash       Past Medical History:  Past Medical History:   Diagnosis Date   • Arthritis    • Cancer (CMS/HCC)     colon, skin   • Colon cancer (CMS/HCC)    • Coronary artery disease    • Disease of thyroid gland    • GERD (gastroesophageal reflux disease)    • History of transfusion    • Hyperlipidemia    • MI (myocardial infarction) (CMS/HCC)     2003   • Pacemaker    • Skin cancer    • Sleep apnea        Past Surgical History:  Past Surgical History:   Procedure Laterality Date   • APPENDECTOMY     • COLON SURGERY     • COLONOSCOPY N/A 4/19/2017    Procedure: COLONOSCOPY;  Surgeon: Barrie Jarrett DO;  Location: [facility] ENDOSCOPY;  Service:    • COLONOSCOPY N/A 1/22/2020    Procedure: COLONOSCOPY          (DONE IN OR WITH ENDO)              LATEX ALLERGY;  Surgeon: Barrie Jarrett DO;  Location: [facility] OR;  Service: Gastroenterology;  Laterality: N/A;   • CORONARY ANGIOPLASTY WITH STENT PLACEMENT     • ENDOSCOPY     • FEMUR OPEN REDUCTION INTERNAL FIXATION Left 6/16/2019    Procedure: FEMUR OPEN REDUCTION INTERNAL FIXATION;  Surgeon: Edward Harley MD;  Location: [facility] OR;  Service: Orthopedics   • KIDNEY STONE SURGERY     • PACEMAKER IMPLANTATION     • SALIVARY GLAND EXCISION Left     Left neck due to skin cancer with resultant chronic dry mouth   • THORACOSCOPY Right 10/18/2019    Procedure: THORACOSCOPY VIDEO  ASSISTED , mini THORACOTOMY wedge resection nodule thoracic mediastinal lymphadenectomy bronchoscopy  Latex Allergy;  Surgeon: Bruno Horton MD;  Location: [facility] OR;  Service: Cardiothoracic        Social History:   Social History     Socioeconomic History   • Marital status:      Spouse name: Not on file   • Number of children: Not on file   • Years of education: Not on file   • Highest education level: Not on file   Occupational History   • Occupation:      Employer:  [employer]     Comment: Retired   Tobacco Use   • Smoking status: Former Smoker     Years: 45.00     Last attempt to quit:      Years since quittin.2   • Smokeless tobacco: Never Used   Substance and Sexual Activity   • Alcohol use: No   • Drug use: No   • Sexual activity: Defer       Family History:  Family History   Problem Relation Age of Onset   • Coronary artery disease Mother    • Coronary artery disease Father    • Lung cancer Brother        Objective     Vital Sign Min/Max for last 24 hours:  Temp  Min: 96.5 °F (35.8 °C)  Max: 98.9 °F (37.2 °C)   BP  Min: 82/51  Max: 142/65   Pulse  Min: 59  Max: 92   Resp  Min: 28  Max: 31   SpO2  Min: 88 %  Max: 100 %   No data recorded   Weight  Min: 79 kg (174 lb 2.6 oz)  Max: 79 kg (174 lb 2.6 oz)     Physical Exam:  96.8 °F (36 °C) (Oral) 60 (!) 83/52 (!) 30 91% 79 kg (174 lb 2.6 oz) Body mass index is 23.62 kg/m².  Physical Exam   Constitutional: He appears well-developed and well-nourished. He is sedated and intubated.   HENT:   Head: Normocephalic and atraumatic.   Nose: Nose normal.   Mouth/Throat: Oropharynx is clear and moist and mucous membranes are normal.   ETT, OGT   Eyes: Conjunctivae, EOM and lids are normal.   Neck: Trachea normal. Neck supple. No thyroid mass present.   Cardiovascular: Normal rate, regular rhythm and normal heart sounds. Exam reveals no gallop.   No murmur heard.  Pulmonary/Chest: Effort normal and breath sounds normal. He is intubated. No respiratory distress. He has no wheezes. He has no rhonchi. He has no rales.   Abdominal: Soft. Normal appearance and bowel sounds are normal. There is no tenderness.     Vascular Status -  His right foot exhibits no edema. His left foot exhibits no edema.  Lymphadenopathy:        Head (right side): No submandibular adenopathy present.        Head (left side): No submandibular adenopathy present.     He has no cervical adenopathy.        Right: No supraclavicular adenopathy present.        Left:  No supraclavicular adenopathy present.   Neurological: He is unresponsive.   Sedated   Skin: Skin is warm and dry. No cyanosis. Nails show no clubbing.   Psychiatric:   Unable to assess       Central Lines/PICC: present    Data Review-   Labs: I personally reviewed the latest laboratory results.  Lab Results (last 24 hours)     Procedure Component Value Units Date/Time    Basic Metabolic Panel [[id number]]  (Abnormal) Collected:  03/23/20 0507    Specimen:  Blood Updated:  03/23/20 0600     Glucose 78 mg/dL      BUN 36 mg/dL      Creatinine 1.27 mg/dL      Sodium 145 mmol/L      Potassium 4.7 mmol/L      Chloride 112 mmol/L      CO2 21.0 mmol/L      Calcium 8.8 mg/dL      eGFR Non African Amer 55 mL/min/1.73      BUN/Creatinine Ratio 28.3     Anion Gap 12.0 mmol/L     Narrative:       GFR Normal >60  Chronic Kidney Disease <60  Kidney Failure <15      CBC & Differential [[id number]] Collected:  03/23/20 0507    Specimen:  Blood Updated:  03/23/20 0545    Narrative:       The following orders were created for panel order CBC & Differential.  Procedure                               Abnormality         Status                     ---------                               -----------         ------                     CBC Auto Differential[[id number]]        Abnormal            Final result                 Please view results for these tests on the individual orders.    CBC Auto Differential [[id number]]  (Abnormal) Collected:  03/23/20 0507    Specimen:  Blood Updated:  03/23/20 0545     WBC 7.44 10*3/mm3      RBC 4.05 10*6/mm3      Hemoglobin 13.2 g/dL      Hematocrit 39.3 %      MCV 97.0 fL      MCH 32.6 pg      MCHC 33.6 g/dL      RDW 14.6 %      RDW-SD 52.6 fl      MPV 11.4 fL      Platelets 85 10*3/mm3      Neutrophil % 68.2 %      Lymphocyte % 11.3 %      Monocyte % 10.3 %      Eosinophil % 8.9 %      Basophil % 0.5 %      Immature Grans % 0.8 %      Neutrophils, Absolute 5.07 10*3/mm3      Lymphocytes, Absolute 0.84  10*3/mm3      Monocytes, Absolute 0.77 10*3/mm3      Eosinophils, Absolute 0.66 10*3/mm3      Basophils, Absolute 0.04 10*3/mm3      Immature Grans, Absolute 0.06 10*3/mm3      nRBC 0.0 /100 WBC     Blood Culture - Blood, Arm, Right [[id number]] Collected:  03/18/20 2124    Specimen:  Blood from Arm, Right Updated:  03/22/20 2130     Blood Culture No growth at 4 days    Blood Culture - Blood, Arm, Left [[id number]] Collected:  03/18/20 2124    Specimen:  Blood from Arm, Left Updated:  03/22/20 2130     Blood Culture No growth at 4 days    D-dimer, Quantitative [[id number]]  (Abnormal) Collected:  03/22/20 1606    Specimen:  Blood Updated:  03/22/20 1631     D-Dimer, Quantitative 2,680 ng/mL (FEU)     Narrative:       Dimer values <500 ng/ml FEU are FDA approved as aid in diagnosis of deep venous thrombosis and pulmonary embolism.  This test should not be used in an exclusion strategy with pretest probability alone.    A recent guideline regarding diagnosis for pulmonary thromboembolism recommends an adjusted exclusion criterion of age x 10 ng/ml FEU for patients >50 years of age ([employer]gi Intern Med 2015; 163: 701-711).      Fibrinogen [[id number]]  (Normal) Collected:  03/22/20 1606    Specimen:  Blood Updated:  03/22/20 1631     Fibrinogen 315 mg/dL     Protime-INR [[id number]]  (Normal) Collected:  03/22/20 1606    Specimen:  Blood Updated:  03/22/20 1631     Protime 14.1 Seconds      INR 1.11    Narrative:       Therapeutic range for most indications is 2.0-3.0 INR,  or 2.5-3.5 for mechanical heart valves.    aPTT [[id number]]  (Normal) Collected:  03/22/20 1606    Specimen:  Blood Updated:  03/22/20 1631     PTT 37.8 seconds     Narrative:       The recommended Heparin therapeutic range is 68-97 seconds.           Imaging: I personally visualized the relevant images of scans/x-rays performed.  Imaging Results (Last 24 Hours)     Procedure Component Value Units Date/Time    US guided vascular access [[id number]] Resulted:   03/20/20 1310     Updated:  03/23/20 1041    Narrative:       This procedure was auto-finalized with no dictation required.            Assessment/Plan     Assessment:  #Acute hypoxemic respiratory failure  #Shock, presumed sepsis  #COVID- 19 PUI  #Acute on chronic renal failure  #Pneumonia  #Multiple syncopal episodes  #Metabolic and respiratory acidosis  #Metastatic melanoma  #Hypothermia  #Thrombocytopenia and lymphopenia      Recommendations:  -Continue AC/VC.  Increased PEEP to 10  -Wean Levophed  -Wean propofol.  Use fentanyl and Versed as needed for comfort  -Continue empiric antibiotics  -Follow-up Covid- 19 test.  Continue airborne precautions pending result.  -Monitor renal function and urine output.  Avoid nephrotoxins  -Monitor platelets  -Start tube feeds via OG tube  -PPX: Holding chemoprophylaxis due to thrombocytopenia, Protonix  -DNR    Patient with multiple comorbidities now with acute respiratory failure.  While he does not have any specific risk for franci coronavirus, he does have multiple findings/symptoms that are suspicious.  Due to his multiple comorbidities, he does have a high mortality risk if he were to have coronavirus.  Even if he does not have coronavirus, it appears that he has some underlying fibrotic lung disease and with his known history of metastatic cancer as well as his advancing age, his prognosis is guarded.  I recommend discussing goals of care with the patient's family.      Critical care time spent: 42 minutes  This time excludes other billable procedures. Time does include preparation of documents, medical consultations, review of old records, and direct bedside care.       Thank you for allowing me to participate in the care of Kuldip Nevarez. Please contact me with any questions.       This document has been electronically signed by Ariadne Lisa MD on March 23, 2020 14:02      [phone]    Dictated using Dragon

## 2020-03-23 NOTE — CONSULTS
Adult Nutrition  Assessment    Patient Name:  Kuldip Nevarez  YOB: 1945  MRN: [id number]  Admit Date:  3/18/2020    Assessment Date:  3/23/2020    Comments:  Pt currently NPO sedated on vent.  He is being treated for REsp FAilure with ARDS; Intermittent fever; & septic Shock.   COVID 19 test is pending.  Unable to speak with family so diet and wt hx are questionable.  Rd will start Peptmen AF with goal rate of 45cc/hr with current Propofol providing 480 calories.      Reason for Assessment     Row Name 03/23/20 1503          Reason for Assessment    Reason For Assessment  physician consult     Diagnosis  pulmonary disease;infection/sepsis     Identified At Risk by Screening Criteria  tube feeding or parenteral nutrition         Nutrition/Diet History     Row Name 03/23/20 1505          Nutrition/Diet History    Typical Food/Fluid Intake  Pt currently in Isolation--sedated on the vent           Labs/Tests/Procedures/Meds     Row Name 03/23/20 1506          Labs/Procedures/Meds    Lab Results Reviewed  reviewed, pertinent        Diagnostic Tests/Procedures    Diagnostic Test/Procedure Reviewed  reviewed, pertinent        Medications    Pertinent Medications Reviewed  reviewed, pertinent         Physical Findings     Row Name 03/23/20 1506          Physical Findings    Overall Physical Appearance  on ventilator support     Tubes  orogastric tube         Estimated/Assessed Needs     Row Name 03/23/20 1506          Calculation Measurements    Weight Used For Calculations  81.6 kg (180 lb)        Estimated/Assessed Needs    Additional Documentation  Additional Requirements (Group);Fluid Requirements (Group);[place]-St. Jeor Equation (Group);Protein Requirements (Group);Calorie Requirements (Group);KCAL/KG (Group)        Calorie Requirements    Estimated Calorie Requirement (kcal/day)  2000        KCAL/KG    KCAL/KG  25 Kcal/Kg (kcal)     25 Kcal/Kg (kcal)  2041.175        Wat[place]-St. Jeor Equation     RMR (Glascock-St. Jeor Equation)  1579.47        Protein Requirements    Weight Used For Protein Calculations  80.7 kg (178 lb)     Est Protein Requirement Amount (gms/kg)  1.2 gm protein     Estimated Protein Requirements (gms/day)  96.89        Fluid Requirements    Estimated Fluid Requirements (mL/day)  2000     RDA Method (mL)  2000     Lou-Maricruz Method (over 20 kg)  3132.94         Nutrition Prescription Ordered     Row Name 03/23/20 1507          Nutrition Prescription PO    Current PO Diet  NPO                 Electronically signed by:  Eve Wilder RD  03/23/20 15:17

## 2020-03-23 NOTE — PLAN OF CARE
Remains on mechanical ventilation, BP decreased, requires Levophed support. Sedation provided with Diprivan. Paced rhythm. Urine output decreased. Tube feeding initiated as ordered.

## 2020-03-23 NOTE — PLAN OF CARE
Problem: Patient Care Overview  Goal: Plan of Care Review  Outcome: Ongoing (interventions implemented as appropriate)  Flowsheets (Taken 3/22/2020 1909)  Progress: no change  Plan of Care Reviewed With: patient; son  Outcome Summary: vss on norepinephrine, intermittent fever/hypothermia managed with icepacks/bairhugger as needed. MD aware of thermal changes. UOP adequate. Tolerating ventilator without issue. Family kept apprised of unchanged condition.

## 2020-03-23 NOTE — PLAN OF CARE
Problem: Ventilation, Mechanical Invasive (Adult)  Goal: Signs and Symptoms of Listed Potential Problems Will be Absent, Minimized or Managed (Ventilation, Mechanical Invasive)  Outcome: Ongoing (interventions implemented as appropriate)  Flowsheets (Taken 3/23/2020 1515)  Problems Assessed (Mechanical Ventilation, Invasive): inability to wean; malnutrition  Problems Present (Mech Vent, Invasive): inability to wean     Problem: Patient Care Overview  Goal: Plan of Care Review  Outcome: Ongoing (interventions implemented as appropriate)  Flowsheets (Taken 3/23/2020 1515)  Progress: no change  Plan of Care Reviewed With: caregiver  Outcome Summary: New Assessment:  Pt currently NPO on the vent with resp failure; ARDS; and sepsis.  COVID-19 test pending.  Rd consulted to start EN will started Peptamen AF with goal rate of 45cc/hr

## 2020-03-23 NOTE — PLAN OF CARE
VS stable , titrated Levo down and holding, resting comfortably, intermittent fever being controlled by bear hugger and ice packs as needed.

## 2020-03-23 NOTE — PLAN OF CARE
Problem: Ventilation, Mechanical Invasive (Adult)  Goal: Signs and Symptoms of Listed Potential Problems Will be Absent, Minimized or Managed (Ventilation, Mechanical Invasive)  Outcome: Ongoing (interventions implemented as appropriate)     Problem: Ventilation, Mechanical Invasive (Adult)  Intervention: Prevent Airway Displacement/Mechanical Dysfunction  Flowsheets (Taken 3/23/2020 1704)  Airway Safety Measures: suction at bedside  Intervention: Prevent Airway-Related Skin/Tissue Breakdown  Flowsheets (Taken 3/23/2020 1721)  Device Skin Pressure Protection: skin-to-device areas padded  Intervention: Prevent Ventilator-Induced Lung Injury  Flowsheets (Taken 3/23/2020 1721)  Lung Protection Measures: low inspiratory pressure provided; low tidal volume provided; lung compliance monitored; optimal PEEP applied; plateau/inspiratory pressure monitored; ventilator synchrony promoted; ventilator waveforms monitored  Intervention: Prevent Ventilator-Associated Pneumonia (VAP)  Flowsheets  Taken 3/23/2020 1721 by Radha Prieto RRT  Head of Bed (HOB): HOB at 30 degrees  Taken 3/23/2020 0800 by Marylou Brower RN  VAP Prevention Bundle: HOB elevation maintained;oral care regularly provided;stress ulcer prophylaxis provided;vent circuit breaks minimized;VTE prophylaxis provided  Intervention: Optimize Oxygenation/Ventilation  Flowsheets (Taken 3/23/2020 1721)  Airway/Ventilation Management: airway patency maintained

## 2020-03-23 NOTE — PROGRESS NOTES
[facility]  INPATIENT PROGRESS NOTE    Length of Stay: 3  Date of Admission: 3/18/2020  Primary Care Physician: Mike Draper MD    Subjective   Chief Complaint: Syncope    HPI: Patient remains intubated and sedated.  Remains on Levophed for pressor support.    Review of Systems   Unable to perform ROS: Intubated      Objective    Temp:  [96.5 °F (35.8 °C)-98.9 °F (37.2 °C)] 96.5 °F (35.8 °C)  Heart Rate:  [59-80] 67  Resp:  [28-32] 32  BP: ()/(51-73) 110/59  FiO2 (%):  [50 %] 50 %    Vent Settings:  FiO2 (%):  [50 %] 50 %  S RR:  [28] 28  PEEP/CPAP (cm H2O):  [5 cm H20-10 cm H20] 10 cm H20  NE SUP:  [0 cm H20] 0 cm H20  MAP (cm H2O):  [11-18] 17    Physical Exam   Constitutional: He appears well-developed and well-nourished. No distress. He is sedated and intubated.   HENT:   Head: Normocephalic and atraumatic.   Eyes: Pupils are equal, round, and reactive to light. EOM are normal. No scleral icterus.   Neck: Normal range of motion. Neck supple. No thyromegaly present.   Cardiovascular: Normal rate, regular rhythm, normal heart sounds and intact distal pulses. Exam reveals no gallop and no friction rub.   No murmur heard.  Pulmonary/Chest: Effort normal. He is intubated. No respiratory distress. He has decreased breath sounds. He has no wheezes. He has rhonchi. He has no rales. He exhibits no tenderness.   Abdominal: Soft. Bowel sounds are normal. He exhibits no distension. There is no tenderness.   Musculoskeletal: He exhibits no edema or tenderness.   Lymphadenopathy:     He has no cervical adenopathy.   Neurological: He exhibits normal muscle tone.   Intubated and sedated.   Skin: Skin is warm and dry. He is not diaphoretic.   Psychiatric:   Intubated and sedated.   Vitals reviewed.      Results Review:  I have reviewed the labs, radiology results, and diagnostic studies.    Laboratory Data:   Results from last 7 days   Lab Units  03/23/20  0507 03/22/20  0424 03/21/20  0902 03/20/20  1028  03/18/20  2116   SODIUM mmol/L 145 146* 145 139   < > 141   SODIUM, ARTERIAL   --   --   --   --    < >  --    POTASSIUM mmol/L 4.7 5.0 4.9 4.5   < > 4.5   CHLORIDE mmol/L 112* 113* 112* 107   < > 103   CO2 mmol/L 21.0* 22.0 22.0 23.0   < > 25.0   BUN mg/dL 36* 40* 39* 40*   < > 31*   CREATININE mg/dL 1.27 1.46* 1.53* 1.90*   < > 1.88*   GLUCOSE mg/dL 78 75 80 96   < > 96   GLUCOSE, ARTERIAL   --   --   --   --    < >  --    CALCIUM mg/dL 8.8 9.3 9.2 9.0   < > 9.6   BILIRUBIN mg/dL  --   --  0.7 0.8  --  1.4*   ALK PHOS U/L  --   --  111 103  --  143*   ALT (SGPT) U/L  --   --  21 17  --  9   AST (SGOT) U/L  --   --  51* 44*  --  17   ANION GAP mmol/L 12.0 11.0 11.0 9.0   < > 13.0    < > = values in this interval not displayed.     Estimated Creatinine Clearance: 54.4 mL/min (by C-G formula based on SCr of 1.27 mg/dL).  Results from last 7 days   Lab Units 03/21/20  0537 03/19/20  1436 03/18/20  2116   MAGNESIUM mg/dL 2.4 1.4* 1.4*         Results from last 7 days   Lab Units 03/23/20  0507 03/22/20  0424 03/21/20  0902 03/20/20  1028 03/18/20  2116   WBC 10*3/mm3 7.44 9.06 7.49 7.98 6.49   HEMOGLOBIN g/dL 13.2 14.6 13.7 11.8* 12.7*   HEMATOCRIT % 39.3 45.4 42.2 36.1* 38.1   PLATELETS 10*3/mm3 85* 71* 92* 84* 118*     Results from last 7 days   Lab Units 03/22/20  1606   INR  1.11       Culture Data:   No results found for: BLOODCX  No results found for: URINECX  No results found for: RESPCX  No results found for: WOUNDCX  No results found for: STOOLCX  No components found for: BODYFLD    Radiology Data:   Imaging Results (Last 24 Hours)     Procedure Component Value Units Date/Time    US guided vascular access [[id number]] Resulted:  03/20/20 1310     Updated:  03/23/20 1041    Narrative:       This procedure was auto-finalized with no dictation required.          ABG:  Results from last 7 days   Lab Units 03/22/20  0521   PH, ARTERIAL pH units 7.233*    PO2 ART mm Hg 99.0   PCO2, ARTERIAL mm Hg 57.8*   HCO3 ART mmol/L 24.4       I have reviewed the patient's current medications.     Assessment/Plan     Active Hospital Problems    Diagnosis   • Hypoxia   • Syncope       Plan:  1.  Acute respiratory failure with hypoxia: Remains intubated and sedated. Tolerating ventilator settings.  Continue ventilatory support as per ARDS net protocol. Covid-19 testing is pending.  We will have pulmonology consultation for ventilator management.  I had a goals of care discussion with patient's son Gurwinder Nevarez today.  Stated that patient's CODE STATUS should be DNR.  However he would want to visit the patient if possible tomorrow and would consider withdrawing life support if patient does not improve. We will revisit goals of care discussion tomorrow morning if patient is not improving, as visitation is currently restricted to end-of-life situations.     2.  Fever: Patient was actually hypothermic requiring Elvin hugger.  Continue empiric antibiotics with IV cefepime and doxycycline.  Continue Elvin hugger.    3.  Respiratory acidosis: CO2 is within normal limits. Based on current recommendations for Covid-19, we will allow some degree of hypercarbia.    4.  Shock:  Presumed septic.  Continues to require levophed.  Cultures are pending.  On empiric antibiotics.  Covid-19 testing pending.  Wean Levophed.  Wean propofol.  Use Versed and fentanyl as needed for comfort.  Monitor renal function.    5.  Lung nodule:  We will repeat CT once patient has become more stable.      6.  Thrombocytopenia: Patient had some blood in OG tube.  His platelets are currently 85.  INR, PTT, fibrinogen within normal limits.  Continue IV omeprazole.    DVT prophylaxis with SCDs    CODE STATUS is DNR    Approximately 38 minutes of critical care time were spent managing the patient exclusive of billable procedures.      Due to patient's multiple comorbidities, his prognosis is guarded.  I discussed with  patient's son this evening and he expressed understanding.  He stated the patient should be DNR and he would like to visit the patient in the morning if he does not improve before deciding on whether to discontinue life support or not.    Discharge Planning: In progress    This document has been electronically signed by Matthew Tsang MD on March 23, 2020 17:47

## 2020-03-23 NOTE — PLAN OF CARE
Problem: Ventilation, Mechanical Invasive (Adult)  Goal: Signs and Symptoms of Listed Potential Problems Will be Absent, Minimized or Managed (Ventilation, Mechanical Invasive)  Outcome: Ongoing (interventions implemented as appropriate)   Pt tolerating current vent settings, no changes this shift, will continue to monitor.

## 2020-03-24 NOTE — CONSULTS
Nutrition Services    Patient Name:  Kuldip Nevarez  YOB: 1945  MRN: [id number]  Admit Date:  3/18/2020    Tube feeding started and advancing to goal rate.  Currently at 30cc/hr.  COVID-19 still pending. Will monitor on tube feeding.      Electronically signed by:  Eve Wilder RD  03/24/20 10:52

## 2020-03-24 NOTE — PLAN OF CARE
Problem: Ventilation, Mechanical Invasive (Adult)  Goal: Signs and Symptoms of Listed Potential Problems Will be Absent, Minimized or Managed (Ventilation, Mechanical Invasive)  Outcome: Ongoing (interventions implemented as appropriate)  Flowsheets (Taken 3/24/2020 0449)  Problems Assessed (Mechanical Ventilation, Invasive): all  Problems Present (Mech Vent, Invasive): inability to wean     Problem: Ventilation, Mechanical Invasive (Adult)  Intervention: Prevent Airway Displacement/Mechanical Dysfunction  Flowsheets (Taken 3/24/2020 0449)  Airway Safety Measures: suction at bedside     Problem: Ventilation, Mechanical Invasive (Adult)  Intervention: Prevent Airway-Related Skin/Tissue Breakdown  Flowsheets (Taken 3/24/2020 0449)  Device Skin Pressure Protection: absorbent pad utilized/changed; skin-to-device areas padded     Problem: Ventilation, Mechanical Invasive (Adult)  Intervention: Prevent Ventilator-Induced Lung Injury  Flowsheets (Taken 3/24/2020 0449)  Lung Protection Measures: low inspiratory pressure provided; low tidal volume provided; lung compliance monitored; ventilator synchrony promoted; plateau/inspiratory pressure monitored; optimal PEEP applied; ventilator waveforms monitored

## 2020-03-24 NOTE — PLAN OF CARE
Remains on mechanical ventilation and sedation. Remains on levophed at low dose. Tolerating tube feeding which is now at goal. Urine output decreased, 500 cc bolus administered and IVFs initiated.

## 2020-03-24 NOTE — PROGRESS NOTES
CRITICAL CARE PROGRESS NOTE  Ariadne Lisa MD    [facility] CRITICAL CARE STEPDOWN  3/24/2020        Kuldip Nevarez  [id number]  1945  75 y.o. male            LOS: 4 days   Andrey Brown MD    Chief Complaint/Reason for visit: Follow-up acute hypoxemic respiratory failure    Subjective     Interval History:   History taken from: patient/ chart    No significant events.  Remains on AC/VC with FiO2 50% and PEEP 10.  Sats in mid to upper 90s.  Continues to require low-dose norepinephrine.  Sedated on propofol 50 and unresponsive.  Tube feeds started and tolerating thus far.  Low-grade fevers.    Review of Systems:   Review of Systems   Unable to perform ROS: Intubated     All systems were reviewed and negative except as noted above in the HPI.    Medical history, surgical history, social history, family history reviewed    Objective     Intake/Output:    Intake/Output Summary (Last 24 hours) at 3/24/2020 0812  Last data filed at 3/24/2020 0600  Gross per 24 hour   Intake 1694 ml   Output 1055 ml   Net 639 ml       Nutrition: TFs    Infusions:    norepinephrine 0.02-0.3 mcg/kg/min Last Rate: 0.02 mcg/kg/min (03/23/20 1310)   propofol 5-50 mcg/kg/min Last Rate: 50 mcg/kg/min (03/24/20 0312)       Respiratory:  FiO2 (%):  [40 %-50 %] 40 %  S RR:  [28] 28  PEEP/CPAP (cm H2O):  [5 cm H20-10 cm H20] 10 cm H20  AR SUP:  [0 cm H20] 0 cm H20  MAP (cm H2O):  [12-18] 18    Vital Sign Min/Max for last 24 hours:  Temp  Min: 96 °F (35.6 °C)  Max: 99.1 °F (37.3 °C)   BP  Min: 82/51  Max: 138/67   Pulse  Min: 59  Max: 77   Resp  Min: 27  Max: 32   SpO2  Min: 88 %  Max: 98 %   No data recorded   Weight  Min: 79.2 kg (174 lb 9.7 oz)  Max: 79.2 kg (174 lb 9.7 oz)     Physical Exam:  99.1 °F (37.3 °C) (Oral) 67 94/51 28 95% 79.2 kg (174 lb 9.7 oz) Body mass index is 23.68 kg/m².  Physical Exam   Constitutional: He appears well-developed and well-nourished. He is sedated and intubated.   HENT:   Head:  Normocephalic and atraumatic.   Nose: Nose normal.   Mouth/Throat: Oropharynx is clear and moist.   ETT, OGT   Eyes: Lids are normal.   Cardiovascular: Normal rate, regular rhythm and normal heart sounds. Exam reveals no gallop.   No murmur heard.  Pulmonary/Chest: Effort normal. He is intubated. No respiratory distress. He has decreased breath sounds in the right lower field and the left lower field. He has no wheezes. He has no rhonchi. He has no rales.   Abdominal: Soft. Normal appearance and bowel sounds are normal.   Musculoskeletal:   Trace BLE edema     Vascular Status -  His right foot exhibits abnormal foot edema. His left foot exhibits abnormal foot edema.  Neurological:   Sedated and unresponsive   Skin: Skin is warm and dry. No cyanosis. Nails show no clubbing.   Psychiatric:   Unable to assess       Central Lines/PICC: absent     Results Review:  I personally reviewed the patient's new clinical results.   Lab Results (last 24 hours)     Procedure Component Value Units Date/Time    Basic Metabolic Panel [[id number]]  (Abnormal) Collected:  03/24/20 0605    Specimen:  Blood Updated:  03/24/20 0710     Glucose 99 mg/dL      BUN 40 mg/dL      Creatinine 1.64 mg/dL      Sodium 145 mmol/L      Potassium 5.2 mmol/L      Chloride 111 mmol/L      CO2 22.0 mmol/L      Calcium 9.1 mg/dL      eGFR Non African Amer 41 mL/min/1.73      BUN/Creatinine Ratio 24.4     Anion Gap 12.0 mmol/L     Narrative:       GFR Normal >60  Chronic Kidney Disease <60  Kidney Failure <15      CBC & Differential [[id number]] Collected:  03/24/20 0605    Specimen:  Blood Updated:  03/24/20 0649    Narrative:       The following orders were created for panel order CBC & Differential.  Procedure                               Abnormality         Status                     ---------                               -----------         ------                     CBC Auto Differential[[id number]]        Abnormal            Final result                    Please view results for these tests on the individual orders.    CBC Auto Differential [[id number]]  (Abnormal) Collected:  03/24/20 0605    Specimen:  Blood Updated:  03/24/20 0649     WBC 8.56 10*3/mm3      RBC 4.36 10*6/mm3      Hemoglobin 14.1 g/dL      Hematocrit 43.8 %      .5 fL      MCH 32.3 pg      MCHC 32.2 g/dL      RDW 14.6 %      RDW-SD 54.9 fl      MPV 10.8 fL      Platelets 94 10*3/mm3      Neutrophil % 79.2 %      Lymphocyte % 7.2 %      Monocyte % 8.9 %      Eosinophil % 3.2 %      Basophil % 0.4 %      Immature Grans % 1.1 %      Neutrophils, Absolute 6.79 10*3/mm3      Lymphocytes, Absolute 0.62 10*3/mm3      Monocytes, Absolute 0.76 10*3/mm3      Eosinophils, Absolute 0.27 10*3/mm3      Basophils, Absolute 0.03 10*3/mm3      Immature Grans, Absolute 0.09 10*3/mm3      nRBC 0.0 /100 WBC     Scan Slide [[id number]] Collected:  03/24/20 0605    Specimen:  Blood Updated:  03/24/20 0635    Blood Culture - Blood, Arm, Right [[id number]] Collected:  03/18/20 2124    Specimen:  Blood from Arm, Right Updated:  03/23/20 2130     Blood Culture No growth at 5 days    Blood Culture - Blood, Arm, Left [[id number]] Collected:  03/18/20 2124    Specimen:  Blood from Arm, Left Updated:  03/23/20 2130     Blood Culture No growth at 5 days        Results from last 7 days   Lab Units 03/22/20  0521   PH, ARTERIAL pH units 7.233*   PO2 ART mm Hg 99.0   PCO2, ARTERIAL mm Hg 57.8*   HCO3 ART mmol/L 24.4     Lab Results   Component Value Date    BLOODCX No growth at 5 days 03/18/2020    BLOODCX No growth at 5 days 03/18/2020     Lab Results   Component Value Date    URINECX No growth 03/18/2020       I independently reviewed the patient's new imaging, including images and reports.  Imaging Results (Last 24 Hours)     Procedure Component Value Units Date/Time    US guided vascular access [[id number]] Resulted:  03/20/20 1310     Updated:  03/23/20 1041    Narrative:       This procedure was auto-finalized with no  dictation required.          All medications reviewed.     aspirin 81 mg Oral Daily   atorvastatin 10 mg Oral Daily   cefepime 2 g Intravenous Q12H   chlorhexidine 15 mL Mouth/Throat Q12H   doxycycline 100 mg Intravenous Q12H   levothyroxine 50 mcg Oral Q AM   midodrine 10 mg Oral BID AC   pantoprazole 40 mg Intravenous Q AM   sodium chloride 10 mL Intravenous Q12H   sodium chloride 10 mL Intravenous Q12H   sodium chloride 10 mL Intravenous Q12H         Assessment/Plan     ASSESSMENT:  #Acute hypoxemic respiratory failure  #Shock, presumed sepsis  #COVID- 19 PUI  #Acute on chronic renal failure  #Pneumonia-resistant interstitial infiltrates on chest x-ray with baseline fibrotic changes  #Multiple syncopal episodes  #Metabolic and respiratory acidosis  #Metastatic melanoma  #Hypothermia  #Thrombocytopenia and lymphopenia      PLAN:  -Continue AC/VC.  Decreased FiO2 to 40%.  Continue PEEP at 10.  -Check ABG.  If remains with respiratory acidosis, recommend increasing tidal volume and monitoring minute ventilation as well as peak airway pressures.  -Attempt to wean propofol as tolerated.  Use fentanyl and Versed as needed for comfort  -Continue empiric antibiotics  -Follow-up Covid- 19 test.  Continue airborne precautions pending result.  -Monitor renal function and urine output.  Avoid nephrotoxins  -Monitor platelets  -Continue tube feeds via OG tube  -PPX: Holding chemoprophylaxis due to thrombocytopenia, Protonix  -DNR    Patient with high mortality risk as well as probability for a prolonged ventilator weaning given his baseline health issues and lung disease.  Continue goals of care discussion with the patient's family regarding wishes for ongoing aggressive care.    D/w RN andRt    Critical Care Time Spent: 37 minutes  I personally provided care to this critically ill patient as documented above.  Critical care time does not include time spent on separately billed procedures.  None of my critical care time was  concurrent with other critical care providers.         This document has been electronically signed by Ariadne Lisa MD on March 24, 2020 08:12      [phone]    Dictated using Dragon

## 2020-03-25 NOTE — SIGNIFICANT NOTE
Patient's test result came back positive for COVID-19.  I discussed with patient's son Gurwinder Nevarez and updated him about test results.  Gurwinder Nevarez stated that he would like patient's grandson Fernie Nevarez (Seth) to be at bedside prior to terminal extubation. He would see if he is available to come to bedside this evening or early tomorrow morning for terminal extubation. He however requested that patient CODE STATUS be changed to comfort measures only and we should stop blood test, needlesticks all medications.    Plan  - Change patient's CODE STATUS to comfort measures only  - Discontinue active medications apart from sedatives and fentanyl for pain  -Would continue on pressors with Levophed for now until patient's grandson can be at bedside for terminal extubation.  - Please note patient's grandson Fernie Nevarez (Seth) is the only family member that can be allowed to present outside patient's room during terminal extubation as he had no contact with the patient and is not currently on self-isolation, unlike the other family members who had close contact with the patient.

## 2020-03-25 NOTE — PLAN OF CARE
Patient remains on vent at this time. Peep decreased to 8cmH20 at beginning of shift. No other changes have been made to his ventilator in this night. Patient remains tachypneic. Patient does not qualify for SBT. Plan is to terminally extubate later in AM. Will continue to monitor patient and progress.

## 2020-03-25 NOTE — PROGRESS NOTES
[facility] Medicine Services  INPATIENT PROGRESS NOTE    Length of Stay: 5  Date of Admission: 3/18/2020  Primary Care Physician: Mike Draper MD    Subjective   Chief Complaint: Syncope    HPI: Patient remains intubated and sedated.  Remains on Levophed for pressor support.  Patient's family would want final call with testing results to be back before proceeding with terminal extubation.  This is so they can be at the bedside.    Review of Systems   Unable to perform ROS: Intubated      Objective    Temp:  [97.8 °F (36.6 °C)-99.7 °F (37.6 °C)] 99.2 °F (37.3 °C)  Heart Rate:  [60-75] 75  Resp:  [28-31] 28  BP: ()/(48-62) 102/55  FiO2 (%):  [40 %-50 %] 50 %    Vent Settings:  FiO2 (%):  [40 %-50 %] 50 %  S RR:  [28] 28  PEEP/CPAP (cm H2O):  [8 cm H20-10 cm H20] 8 cm H20  KS SUP:  [0 cm H20] 0 cm H20  MAP (cm H2O):  [11-18] 15    Physical Exam   Constitutional: He appears well-developed and well-nourished. No distress. He is sedated and intubated.   HENT:   Head: Normocephalic and atraumatic.   Eyes: Pupils are equal, round, and reactive to light. EOM are normal. No scleral icterus.   Neck: Normal range of motion. Neck supple. No thyromegaly present.   Cardiovascular: Normal rate, regular rhythm, normal heart sounds and intact distal pulses. Exam reveals no gallop and no friction rub.   No murmur heard.  Pulmonary/Chest: Effort normal. He is intubated. No respiratory distress. He has decreased breath sounds. He has no wheezes. He has rhonchi. He has no rales. He exhibits no tenderness.   Decreased breath sounds globally.   Abdominal: Soft. Bowel sounds are normal. He exhibits no distension. There is no tenderness.   Musculoskeletal: He exhibits no edema or tenderness.   Lymphadenopathy:     He has no cervical adenopathy.   Neurological: He exhibits normal muscle tone.   Intubated and sedated.   Skin: Skin is warm and dry. He is not diaphoretic.   Psychiatric:      Intubated and sedated.   Vitals reviewed.    Results Review:  I have reviewed the labs, radiology results, and diagnostic studies.    Laboratory Data:   Results from last 7 days   Lab Units 03/25/20  0325 03/24/20  0605 03/23/20  0507  03/21/20  0902 03/20/20  1028  03/18/20  2116   SODIUM mmol/L 144 145 145   < > 145 139   < > 141   SODIUM, ARTERIAL   --   --   --   --   --   --    < >  --    POTASSIUM mmol/L 4.7 5.2 4.7   < > 4.9 4.5   < > 4.5   CHLORIDE mmol/L 112* 111* 112*   < > 112* 107   < > 103   CO2 mmol/L 21.0* 22.0 21.0*   < > 22.0 23.0   < > 25.0   BUN mg/dL 46* 40* 36*   < > 39* 40*   < > 31*   CREATININE mg/dL 1.84* 1.64* 1.27   < > 1.53* 1.90*   < > 1.88*   GLUCOSE mg/dL 130* 99 78   < > 80 96   < > 96   GLUCOSE, ARTERIAL   --   --   --   --   --   --    < >  --    CALCIUM mg/dL 8.7 9.1 8.8   < > 9.2 9.0   < > 9.6   BILIRUBIN mg/dL  --   --   --   --  0.7 0.8  --  1.4*   ALK PHOS U/L  --   --   --   --  111 103  --  143*   ALT (SGPT) U/L  --   --   --   --  21 17  --  9   AST (SGOT) U/L  --   --   --   --  51* 44*  --  17   ANION GAP mmol/L 11.0 12.0 12.0   < > 11.0 9.0   < > 13.0    < > = values in this interval not displayed.     Estimated Creatinine Clearance: 38.5 mL/min (A) (by C-G formula based on SCr of 1.84 mg/dL (H)).  Results from last 7 days   Lab Units 03/21/20  0537 03/19/20  1436 03/18/20  2116   MAGNESIUM mg/dL 2.4 1.4* 1.4*         Results from last 7 days   Lab Units 03/25/20  0325 03/24/20  0605 03/23/20  0507 03/22/20  0424 03/21/20  0902   WBC 10*3/mm3 8.77 8.56 7.44 9.06 7.49   HEMOGLOBIN g/dL 12.6* 14.1 13.2 14.6 13.7   HEMATOCRIT % 39.9 43.8 39.3 45.4 42.2   PLATELETS 10*3/mm3 95* 94* 85* 71* 92*     Results from last 7 days   Lab Units 03/22/20  1606   INR  1.11       Culture Data:   No results found for: BLOODCX  No results found for: URINECX  No results found for: RESPCX  No results found for: WOUNDCX  No results found for: STOOLCX  No components found for:  [facility]    Radiology Data:   Imaging Results (Last 24 Hours)     ** No results found for the last 24 hours. **          ABG:  Results from last 7 days   Lab Units 03/24/20  1049   PH, ARTERIAL pH units 7.240*   PO2 ART mm Hg 74.8*   PCO2, ARTERIAL mm Hg 59.0*   HCO3 ART mmol/L 25.3       I have reviewed the patient's current medications.     Assessment/Plan     Active Hospital Problems    Diagnosis   • Hypoxia   • Syncope       Plan:  1.  Acute respiratory failure with hypoxia: Remains intubated and sedated.  Pulmonology input appreciated.  Tolerating ventilator settings.  Continue ventilatory support as per ARDS net protocol. Covid-19 testing is pending.  Patient's prognosis is poor due to multiple comorbidities and persistent respiratory failure. Patient's family plan for terminal extubation once patient's COVID-19 test result is known so they could possibly be at bedside. We will plan to change patient's CODE STATUS to comfort measures afterwards.     2.  Hypothermia : Patient was actually hypothermic requiring Elvin hugger.  Continue empiric antibiotics with IV cefepime and doxycycline.  Continue Elvin hugger.    3.  Respiratory acidosis: Adjust ventilator settings CO2 is within normal limits. Based on current recommendations for Covid-19, we will allow some degree of hypercarbia.    4.  Shock:  Presumed septic.  Continues to require levophed.  Cultures are pending.  On empiric antibiotics.  Covid-19 testing pending.  Wean Levophed.  Wean propofol.  Use Versed and fentanyl as needed for comfort.  Monitor renal function.    5.  Lung nodule:  We will repeat CT if patient becomes more stable.      6.  Thrombocytopenia: Continue to monitor platelets.  Platelet count currently 95.Continue IV omeprazole.    DVT prophylaxis with SCDs    CODE STATUS is DNR    Discharge Planning: In progress.  Plan for terminal extubation when family is ready.    This document has been electronically signed by Matthew Tsang MD on  March 25, 2020 15:36

## 2020-03-25 NOTE — PROGRESS NOTES
[facility] Medicine Services  INPATIENT PROGRESS NOTE    Length of Stay: 4  Date of Admission: 3/18/2020  Primary Care Physician: Mike Draper MD    Subjective   Chief Complaint: Syncope    HPI: Patient remains intubated and sedated.  Remains on Levophed for pressor support.    Review of Systems   Unable to perform ROS: Intubated      Objective    Temp:  [97.9 °F (36.6 °C)-100.5 °F (38.1 °C)] 98 °F (36.7 °C)  Heart Rate:  [60-79] 60  Resp:  [27-31] 31  BP: ()/(48-67) 84/48  FiO2 (%):  [40 %-50 %] 40 %    Vent Settings:  FiO2 (%):  [40 %-50 %] 40 %  S RR:  [28] 28  PEEP/CPAP (cm H2O):  [10 cm H20] 10 cm H20  [place] SUP:  [0 cm H20] 0 cm H20  MAP (cm H2O):  [14-18] 18    Physical Exam   Constitutional: He appears well-developed and well-nourished. No distress. He is sedated and intubated.   HENT:   Head: Normocephalic and atraumatic.   Eyes: Pupils are equal, round, and reactive to light. EOM are normal. No scleral icterus.   Neck: Normal range of motion. Neck supple. No thyromegaly present.   Cardiovascular: Normal rate, regular rhythm, normal heart sounds and intact distal pulses. Exam reveals no gallop and no friction rub.   No murmur heard.  Pulmonary/Chest: Effort normal. He is intubated. No respiratory distress. He has decreased breath sounds. He has no wheezes. He has rhonchi. He has no rales. He exhibits no tenderness.   Decreased breath sounds globally.   Abdominal: Soft. Bowel sounds are normal. He exhibits no distension. There is no tenderness.   Musculoskeletal: He exhibits no edema or tenderness.   Lymphadenopathy:     He has no cervical adenopathy.   Neurological: He exhibits normal muscle tone.   Intubated and sedated.   Skin: Skin is warm and dry. He is not diaphoretic.   Psychiatric:   Intubated and sedated.   Vitals reviewed.      Results Review:  I have reviewed the labs, radiology results, and diagnostic studies.    Laboratory Data:   Results from  last 7 days   Lab Units 03/24/20  0605 03/23/20  0507 03/22/20  0424 03/21/20  0902 03/20/20  1028  03/18/20  2116   SODIUM mmol/L 145 145 146* 145 139   < > 141   SODIUM, ARTERIAL   --   --   --   --   --    < >  --    POTASSIUM mmol/L 5.2 4.7 5.0 4.9 4.5   < > 4.5   CHLORIDE mmol/L 111* 112* 113* 112* 107   < > 103   CO2 mmol/L 22.0 21.0* 22.0 22.0 23.0   < > 25.0   BUN mg/dL 40* 36* 40* 39* 40*   < > 31*   CREATININE mg/dL 1.64* 1.27 1.46* 1.53* 1.90*   < > 1.88*   GLUCOSE mg/dL 99 78 75 80 96   < > 96   GLUCOSE, ARTERIAL   --   --   --   --   --    < >  --    CALCIUM mg/dL 9.1 8.8 9.3 9.2 9.0   < > 9.6   BILIRUBIN mg/dL  --   --   --  0.7 0.8  --  1.4*   ALK PHOS U/L  --   --   --  111 103  --  143*   ALT (SGPT) U/L  --   --   --  21 17  --  9   AST (SGOT) U/L  --   --   --  51* 44*  --  17   ANION GAP mmol/L 12.0 12.0 11.0 11.0 9.0   < > 13.0    < > = values in this interval not displayed.     Estimated Creatinine Clearance: 42.3 mL/min (A) (by C-G formula based on SCr of 1.64 mg/dL (H)).  Results from last 7 days   Lab Units 03/21/20  0537 03/19/20  1436 03/18/20  2116   MAGNESIUM mg/dL 2.4 1.4* 1.4*         Results from last 7 days   Lab Units 03/24/20  0605 03/23/20  0507 03/22/20  0424 03/21/20  0902 03/20/20  1028   WBC 10*3/mm3 8.56 7.44 9.06 7.49 7.98   HEMOGLOBIN g/dL 14.1 13.2 14.6 13.7 11.8*   HEMATOCRIT % 43.8 39.3 45.4 42.2 36.1*   PLATELETS 10*3/mm3 94* 85* 71* 92* 84*     Results from last 7 days   Lab Units 03/22/20  1606   INR  1.11       Culture Data:   No results found for: BLOODCX  No results found for: URINECX  No results found for: RESPCX  No results found for: WOUNDCX  No results found for: STOOLCX  No components found for: BODYFLD    Radiology Data:   Imaging Results (Last 24 Hours)     ** No results found for the last 24 hours. **          ABG:  Results from last 7 days   Lab Units 03/24/20  1049   PH, ARTERIAL pH units 7.240*   PO2 ART mm Hg 74.8*   PCO2, ARTERIAL mm Hg 59.0*   HCO3 ART  mmol/L 25.3       I have reviewed the patient's current medications.     Assessment/Plan     Active Hospital Problems    Diagnosis   • Hypoxia   • Syncope       Plan:  1.  Acute respiratory failure with hypoxia: Remains intubated and sedated.  Pulmonology input appreciated.  Tolerating ventilator settings.  Continue ventilatory support as per ARDS net protocol. Covid-19 testing is pending.  I had a goals of care discussion again with patient's son Gurwinder Nevarez today.  I informed him about patient's poor prognosis from ARDS, hypotension on pressors and hypothermia.  He agreed to terminal extubation in the morning and requested that patient's 2 grandsons should be allowed to be at bedside prior to terminal extubation to say goodbye to patient. Patient's son understood the risk of infection as patient's COVID-19 test is still pending and agreed to communicate that the patient's grandson.  He agreed to making patient's grandsons wear protective masks and gowns during the planned end-of-life visit to reduce risk of infection although this cannot be totally eliminated.  They would also not be present during the actual extubation process to prevent inhalation of aerosols and he would not be allowed to reenter the patient's room afterwards.  We will plan to change patient's CODE STATUS to comfort measures only in the morning after this.     2.  Fever: Patient was actually hypothermic requiring Elvin hugger.  Continue empiric antibiotics with IV cefepime and doxycycline.  Continue Elvin hugger.    3.  Respiratory acidosis: CO2 is within normal limits. Based on current recommendations for Covid-19, we will allow some degree of hypercarbia.    4.  Shock:  Presumed septic.  Continues to require levophed.  Cultures are pending.  On empiric antibiotics.  Covid-19 testing pending.  Wean Levophed.  Wean propofol.  Use Versed and fentanyl as needed for comfort.  Monitor renal function.    5.  Lung nodule:  We will repeat CT once  patient has become more stable.      6.  Thrombocytopenia: Patient had some blood in OG tube.  His platelets are currently 85.  INR, PTT, fibrinogen within normal limits.  Continue IV omeprazole.    DVT prophylaxis with SCDs    CODE STATUS is DNR    Approximately 33 minutes of critical care time were spent managing the patient exclusive of billable procedures.       Discharge Planning: In progress    This document has been electronically signed by Matthew Tsang MD on March 24, 2020 21:59

## 2020-03-25 NOTE — PROGRESS NOTES
CRITICAL CARE PROGRESS NOTE  Ariadne Lisa MD    [facility] CRITICAL CARE STEPDOWN  3/25/2020        Kuldip Nevarez  [id number]  1945  75 y.o. male            LOS: 5 days   Andrey Brown MD    Chief Complaint/Reason for visit: Follow-up acute hypoxemic respiratory failure    Subjective     Interval History:   History taken from: patient/ chart    No significant events.  Stable on FiO2 40% and PEEP weaned to 8.  Continues to be tachypneic breathing over the ventilator.  Sedated on propofol and unarousable.  Afebrile but requiring Elvin hugger to maintain normothermia.  Continues to require Levophed.  Family is planning transition to comfort care today.  COVID 19 still pending.    Review of Systems:   Review of Systems   Unable to perform ROS: Intubated     All systems were reviewed and negative except as noted above in the HPI.    Medical history, surgical history, social history, family history reviewed    Objective     Intake/Output:    Intake/Output Summary (Last 24 hours) at 3/25/2020 0827  Last data filed at 3/25/2020 0600  Gross per 24 hour   Intake 4120 ml   Output 1235 ml   Net 2885 ml       Nutrition: TFs    Infusions:    norepinephrine 0.02-0.3 mcg/kg/min Last Rate: 0.04 mcg/kg/min (03/24/20 1102)   propofol 5-50 mcg/kg/min Last Rate: 40 mcg/kg/min (03/25/20 9688)   sodium chloride 100 mL/hr Last Rate: 100 mL/hr (03/25/20 4566)       Respiratory:  FiO2 (%):  [40 %] 40 %  S RR:  [28] 28  PEEP/CPAP (cm H2O):  [8 cm H20-10 cm H20] 8 cm H20  CA SUP:  [0 cm H20] 0 cm H20  MAP (cm H2O):  [12-18] 14    Vital Sign Min/Max for last 24 hours:  Temp  Min: 97.8 °F (36.6 °C)  Max: 100.5 °F (38.1 °C)   BP  Min: 84/48  Max: 140/64   Pulse  Min: 60  Max: 79   Resp  Min: 29  Max: 31   SpO2  Min: 94 %  Max: 100 %   No data recorded   Weight  Min: 81 kg (178 lb 9.2 oz)  Max: 81 kg (178 lb 9.2 oz)     Physical Exam:  97.9 °F (36.6 °C) (Oral) 70 101/52 (!) 30 95% 81 kg (178 lb 9.2 oz) Body  mass index is 24.22 kg/m².  Physical Exam   Constitutional: He appears well-developed and well-nourished. He is sedated and intubated.   HENT:   Head: Normocephalic and atraumatic.   Nose: Nose normal.   Mouth/Throat: Oropharynx is clear and moist.   ETT, OGT   Eyes: Lids are normal.   Cardiovascular: Normal rate, regular rhythm and normal heart sounds. Exam reveals no gallop.   No murmur heard.  Pulmonary/Chest: Effort normal. Tachypnea noted. He is intubated. No respiratory distress. He has decreased breath sounds in the right lower field and the left lower field. He has no wheezes. He has no rhonchi. He has no rales.   Abdominal: Soft. Normal appearance and bowel sounds are normal.   Musculoskeletal:   Trace BLE edema     Vascular Status -  His right foot exhibits abnormal foot edema. His left foot exhibits abnormal foot edema.  Neurological:   Sedated and unresponsive   Skin: Skin is warm and dry. No cyanosis. Nails show no clubbing.   Psychiatric:   Unable to assess       Central Lines/PICC: absent     Results Review:  I personally reviewed the patient's new clinical results.   Lab Results (last 24 hours)     Procedure Component Value Units Date/Time    Basic Metabolic Panel [[id number]]  (Abnormal) Collected:  03/25/20 0325    Specimen:  Blood Updated:  03/25/20 0400     Glucose 130 mg/dL      BUN 46 mg/dL      Creatinine 1.84 mg/dL      Sodium 144 mmol/L      Potassium 4.7 mmol/L      Chloride 112 mmol/L      CO2 21.0 mmol/L      Calcium 8.7 mg/dL      eGFR Non African Amer 36 mL/min/1.73      BUN/Creatinine Ratio 25.0     Anion Gap 11.0 mmol/L     Narrative:       GFR Normal >60  Chronic Kidney Disease <60  Kidney Failure <15      CBC & Differential [[id number]] Collected:  03/25/20 0325    Specimen:  Blood Updated:  03/25/20 0341    Narrative:       The following orders were created for panel order CBC & Differential.  Procedure                               Abnormality         Status                        ---------                               -----------         ------                     CBC Auto Differential[[id number]]        Abnormal            Final result                 Please view results for these tests on the individual orders.    CBC Auto Differential [[id number]]  (Abnormal) Collected:  03/25/20 0325    Specimen:  Blood Updated:  03/25/20 0341     WBC 8.77 10*3/mm3      RBC 3.95 10*6/mm3      Hemoglobin 12.6 g/dL      Hematocrit 39.9 %      .0 fL      MCH 31.9 pg      MCHC 31.6 g/dL      RDW 14.7 %      RDW-SD 55.3 fl      MPV 10.4 fL      Platelets 95 10*3/mm3      Neutrophil % 74.0 %      Lymphocyte % 8.8 %      Monocyte % 8.6 %      Eosinophil % 7.4 %      Basophil % 0.2 %      Immature Grans % 1.0 %      Neutrophils, Absolute 6.49 10*3/mm3      Lymphocytes, Absolute 0.77 10*3/mm3      Monocytes, Absolute 0.75 10*3/mm3      Eosinophils, Absolute 0.65 10*3/mm3      Basophils, Absolute 0.02 10*3/mm3      Immature Grans, Absolute 0.09 10*3/mm3      nRBC 0.0 /100 WBC     Blood Gas, Arterial [[id number]]  (Abnormal) Collected:  03/24/20 1049    Specimen:  Arterial Blood Updated:  03/24/20 1058     Site Femoral Artery     Aubrey's Test N/A     pH, Arterial 7.240 pH units      Comment: 84 Value below reference range        pCO2, Arterial 59.0 mm Hg      Comment: 83 Value above reference range        pO2, Arterial 74.8 mm Hg      Comment: 84 Value below reference range        HCO3, Arterial 25.3 mmol/L      Base Excess, Arterial -3.1 mmol/L      Comment: 84 Value below reference range        O2 Saturation, Arterial 94.9 %      Barometric Pressure for Blood Gas 747 mmHg      Modality Ventilator     FIO2 40 %      Ventilator Mode NA     Set Tidal Volume 520     PEEP 10.0     Collected by RT     Comment: Meter: [id number]     :  [id number]       Blood Gas, Arterial [[id number]]  (Abnormal) Collected:  03/24/20 0856    Specimen:  Arterial Blood Updated:  03/24/20 0831     Site Right Radial      Aubrey's Test N/A     pH, Arterial 7.189 pH units      Comment: 85 Value below critical limit        pCO2, Arterial 66.8 mm Hg      Comment: 83 Value above reference range        pO2, Arterial 82.9 mm Hg      Comment: 84 Value below reference range        HCO3, Arterial 25.5 mmol/L      Base Excess, Arterial -4.1 mmol/L      Comment: 84 Value below reference range        O2 Saturation, Arterial 95.3 %      Barometric Pressure for Blood Gas 748 mmHg      Modality Ventilator     FIO2 40 %      Ventilator Mode AC     Set Tidal Volume 480     PEEP 10.0     Collected by K WORD     Comment: Meter: [id number]     :  [id number]           Results from last 7 days   Lab Units 03/24/20  1049   PH, ARTERIAL pH units 7.240*   PO2 ART mm Hg 74.8*   PCO2, ARTERIAL mm Hg 59.0*   HCO3 ART mmol/L 25.3     Lab Results   Component Value Date    BLOODCX No growth at 5 days 03/18/2020    BLOODCX No growth at 5 days 03/18/2020     Lab Results   Component Value Date    URINECX No growth 03/18/2020       I independently reviewed the patient's new imaging, including images and reports.  Imaging Results (Last 24 Hours)     ** No results found for the last 24 hours. **          All medications reviewed.     aspirin 81 mg Oral Daily   atorvastatin 10 mg Oral Daily   cefepime 2 g Intravenous Q12H   chlorhexidine 15 mL Mouth/Throat Q12H   doxycycline 100 mg Intravenous Q12H   levothyroxine 50 mcg Oral Q AM   midodrine 10 mg Oral BID AC   pantoprazole 40 mg Intravenous Q AM   sodium chloride 10 mL Intravenous Q12H   sodium chloride 10 mL Intravenous Q12H   sodium chloride 10 mL Intravenous Q12H         Assessment/Plan     ASSESSMENT:  #Acute hypoxemic respiratory failure  #Shock, presumed sepsis  #COVID- 19 PUI  #Acute on chronic renal failure  #Pneumonia-resistant interstitial infiltrates on chest x-ray with baseline fibrotic changes  #Multiple syncopal episodes  #Metabolic and respiratory acidosis  #Metastatic  melanoma  #Hypothermia  #Thrombocytopenia and lymphopenia      PLAN:  -Continue AC/VC.  Decrease PEEP to 5..  -Attempt to wean propofol as tolerated.  Use fentanyl and Versed as needed for comfort  -Continue empiric antibiotics  -Follow-up Covid- 19 test.  Continue airborne precautions pending result.  -Monitor renal function and urine output.  Avoid nephrotoxins  -Monitor platelets  -Continue tube feeds via OG tube  -PPX: Holding chemoprophylaxis due to thrombocytopenia, Protonix  -DNR    Planning terminal extubation and transition to comfort care per the family today.  If coded testing has not returned, family will need to don proper PPE if they desire to visit at the bedside.    D/w RN and RT    Critical Care Time Spent:24 minutes  I personally provided care to this critically ill patient as documented above.  Critical care time does not include time spent on separately billed procedures.  None of my critical care time was concurrent with other critical care providers.         This document has been electronically signed by Ariadne Lisa MD on March 25, 2020 08:27      [phone]    Dictated using Dragon

## 2020-03-25 NOTE — CONSULTS
Nutrition Services    Patient Name:  Kuldip Nvearez  YOB: 1945  MRN: [id number]  Admit Date:  3/18/2020    Pt remains in Isolation.  Per MD notes--planning for terminal extubation and transition to Comfort care.  Will monitor tx plans    Electronically signed by:  Eve Wilder RD  03/25/20 15:06

## 2020-03-26 NOTE — PROGRESS NOTES
CRITICAL CARE PROGRESS NOTE  Ariadne Lisa MD    [facility] CRITICAL CARE STEPDOWN  3/26/2020        Kuldip Nevarez  [id number]  1945  75 y.o. male            LOS: 6 days   Andrey Brown MD    Chief Complaint/Reason for visit: Follow-up acute hypoxemic respiratory failure    Subjective     Interval History:   History taken from: patient/ chart    COVID returned positive.  Remains on AC/VC but FiO2 stable at 40% and PEEP weaned to 5.  Continues to require vasopressors.  Sedated with propofol and unresponsive.  Still requiring Elvin hugger for normothermia.  Family planning on terminal extubation today.    Review of Systems:   Review of Systems   Unable to perform ROS: Intubated     All systems were reviewed and negative except as noted above in the HPI.    Medical history, surgical history, social history, family history reviewed    Objective     Intake/Output:    Intake/Output Summary (Last 24 hours) at 3/26/2020 0847  Last data filed at 3/26/2020 0500  Gross per 24 hour   Intake 3346.64 ml   Output 850 ml   Net 2496.64 ml       Nutrition: TFs    Infusions:    norepinephrine 0.02-0.3 mcg/kg/min Last Rate: 0.04 mcg/kg/min (03/24/20 1102)   propofol 5-50 mcg/kg/min Last Rate: 40 mcg/kg/min (03/26/20 0635)   sodium chloride 100 mL/hr Last Rate: 100 mL/hr (03/26/20 0204)       Respiratory:  FiO2 (%):  [40 %-50 %] 45 %  S RR:  [28] 28  PEEP/CPAP (cm H2O):  [5 cm H20-8 cm H20] 5 cm H20  KS SUP:  [0 cm H20] 0 cm H20  MAP (cm H2O):  [11-16] 13    Vital Sign Min/Max for last 24 hours:  Temp  Min: 99.1 °F (37.3 °C)  Max: 100.1 °F (37.8 °C)   BP  Min: 91/55  Max: 147/71   Pulse  Min: 59  Max: 76   Resp  Min: 28  Max: 32   SpO2  Min: 94 %  Max: 98 %   No data recorded   Weight  Min: 81.2 kg (179 lb 0.2 oz)  Max: 81.2 kg (179 lb 0.2 oz)     Physical Exam:  100.1 °F (37.8 °C) (Oral) 69 143/76 (!) 30 97% 81.2 kg (179 lb 0.2 oz) Body mass index is 24.28 kg/m².  Physical Exam   Constitutional: He  appears well-developed and well-nourished. He is sedated and intubated.   HENT:   Head: Normocephalic and atraumatic.   Nose: Nose normal.   Mouth/Throat: Oropharynx is clear and moist.   ETT, OGT   Eyes: Lids are normal.   Cardiovascular: Normal rate, regular rhythm and normal heart sounds. Exam reveals no gallop.   No murmur heard.  Pulmonary/Chest: Effort normal. He is intubated. No respiratory distress. He has decreased breath sounds in the right lower field and the left lower field. He has no wheezes. He has no rhonchi. He has no rales.   Abdominal: Soft. Normal appearance and bowel sounds are normal.   Musculoskeletal:   Trace BLE edema     Vascular Status -  His right foot exhibits abnormal foot edema. His left foot exhibits abnormal foot edema.  Neurological:   Sedated and unresponsive   Skin: Skin is warm and dry. No cyanosis. Nails show no clubbing.   Psychiatric:   Unable to assess       Central Lines/PICC: absent     Results Review:  I personally reviewed the patient's new clinical results.   Lab Results (last 24 hours)     Procedure Component Value Units Date/Time    SARS-CoV-2, KATIANA ([facility]) - Swab, Nasopharynx [[id number]]  (Abnormal) Collected:  03/19/20 0335    Specimen:  Swab from Nasopharynx Updated:  03/25/20 1648     SARS-CoV-2, KATIANA Detected     Comment: This test was developed and its performance characteristics determined  by [employer]. This test has not been FDA cleared or  approved. This test has been authorized by FDA under an Emergency Use  Authorization (EUA). This test has been validated in accordance with  the FDA's Guidance Document (Policy for Diagnostics Testing in  Laboratories Certified to Perform High Complexity Testing under CLIA  prior to Emergency Use Authorization for Coronavirus Disease-2019  during the Public Health Emergency) issued on February 29th, 2020.  FDA independent review of this validation is pending. This test is  only authorized for the duration of time  the declaration that  circumstances exist justifying the authorization of the emergency use  of in vitro diagnostic tests for detection of SARS-CoV-2 virus and/or  diagnosis of COVID-19 infection under section 564(b)(1) of the Act, 21  U.S.C. 360bbb-3(b)(1), unless the authorization is terminated or  revoked sooner.       Narrative:       Performed at:  [address] - [facility]  [id number]  : Ko French MD, Phone:  [phone]  Specimen Comment: Test(s) SARS-CoV-2, KATIANA called to Syd Prieto on 03/25/2020 at 17:39 ES  Specimen Comment: T        Results from last 7 days   Lab Units 03/24/20  1049   PH, ARTERIAL pH units 7.240*   PO2 ART mm Hg 74.8*   PCO2, ARTERIAL mm Hg 59.0*   HCO3 ART mmol/L 25.3     Lab Results   Component Value Date    BLOODCX No growth at 5 days 03/18/2020    BLOODCX No growth at 5 days 03/18/2020     Lab Results   Component Value Date    URINECX No growth 03/18/2020       I independently reviewed the patient's new imaging, including images and reports.  Imaging Results (Last 24 Hours)     ** No results found for the last 24 hours. **          All medications reviewed.     chlorhexidine 15 mL Mouth/Throat Q12H   sodium chloride 10 mL Intravenous Q12H   sodium chloride 10 mL Intravenous Q12H   sodium chloride 10 mL Intravenous Q12H         Assessment/Plan     ASSESSMENT:  #Acute hypoxemic respiratory failure  #Shock, presumed sepsis  #COVID- 19 confirmed  #Acute on chronic renal failure  #Pneumonia-resistant interstitial infiltrates on chest x-ray with baseline fibrotic changes  #Multiple syncopal episodes  #Metabolic and respiratory acidosis  #Metastatic melanoma  #Hypothermia  #Thrombocytopenia and lymphopenia      PLAN:  -Continue AC/VC.   -Attempt to wean propofol as tolerated.  Use fentanyl and Versed as needed for comfort  -Continue empiric antibiotics  -Follow-up Covid- 19 test.  Continue airborne precautions pending result.  -Monitor  renal function and urine output.  Avoid nephrotoxins  -Monitor platelets  -Stop tube feeds given planned terminal extubation  -PPX: Holding chemoprophylaxis due to thrombocytopenia, Protonix  -DNR    Planning terminal extubation later today once grandson has arrived.  Patient is to remain in negative pressure room on isolation.  Discussed with RT regarding procedure for extubation to limit exposures.  Recommend using morphine and Ativan once patient is extubated for comfort measures.  Life expectancy is hours to days.    D/w RN and RT    Critical Care Time Spent: 25 minutes  I personally provided care to this critically ill patient as documented above.  Critical care time does not include time spent on separately billed procedures.  None of my critical care time was concurrent with other critical care providers.         This document has been electronically signed by Ariadne Lisa MD on March 26, 2020 08:47      [phone]    Dictated using Dragon

## 2020-03-26 NOTE — DISCHARGE SUMMARY
[facility] Medicine Services  DEATH SUMMARY       Date of Admission: 3/18/2020  Date of Death:  3/26/2020 at approximately 1410  Primary Care Physician: Mike Draper MD    Presenting Problem/History of Present Illness:  Orthostatic hypotension [I95.1]  Syncope [R55]  Hypoxia [R09.02]  Syncope, unspecified syncope type [R55]  Hypoxia [R09.02]     Final Death Diagnoses:  Active Hospital Problems    Diagnosis   • Hypoxia   • Syncope   Acute respiratory failure with hypoxia  Pneumonia due to COVID-19 infection  Septic shock secondary to COVID-19  Hypothermia  Respiratory acidosis  Lung nodule  Thrombocytopenia  Coronary artery disease  Sick sinus syndrome status post pacemaker  Melanoma with lung metastasis  CHF  Hypercholesterolemia  Hypothyroidism  Gout    Consults:   Consults     Date and Time Order Name Status Description    3/23/2020 1235 Inpatient Pulmonology Consult Completed     3/19/2020 0210 Inpatient Cardiology Consult Completed           Procedures Performed: None             Pertinent Test Results:     Collected Updated Procedure Result Status    03/25/2020 0325 03/25/2020 0400 Basic Metabolic Panel [[id number]]    (Abnormal)   Blood    Final result Component Value Units   Glucose 130High  mg/dL   BUN 46High  mg/dL   Creatinine 1.84High  mg/dL   Sodium 144 mmol/L   Potassium 4.7 mmol/L   Chloride 112High  mmol/L   CO2 21.0Low  mmol/L   Calcium 8.7 mg/dL   eGFR Non African Am 36Low  mL/min/1.73   BUN/Creatinine Ratio 25.0    Anion Gap 11.0 mmol/L           03/25/2020 0325 03/25/2020 0341 CBC Auto Differential [[id number]]   (Abnormal)   Blood    Final result Component Value Units   WBC 8.77 10*3/mm3   RBC 3.95Low  10*6/mm3   Hemoglobin 12.6Low  g/dL   Hematocrit 39.9 %   .0High  fL   MCH 31.9 pg   MCHC 31.6 g/dL   RDW 14.7 %   RDW-SD 55.3High  fl   MPV 10.4 fL   Platelets 95Low  10*3/mm3   Neutrophil Rel % 74.0 %   Lymphocyte Rel % 8.8Low  %   Monocyte Rel  % 8.6 %   Eosinophil Rel % 7.4High  %   Basophil Rel % 0.2 %   Immature Granulocyte Rel % 1.0High  %   Neutrophils Absolute 6.49 10*3/mm3   Lymphocytes Absolute 0.77 10*3/mm3   Monocytes Absolute 0.75 10*3/mm3   Eosinophils Absolute 0.65High  10*3/mm3   Basophils Absolute 0.02 10*3/mm3   Immature Grans, Absolute 0.09High  10*3/mm3   nRBC 0.0 /100 WBC        03/24/2020 1049 03/24/2020 1058 Blood Gas, Arterial [[id number]]   (Abnormal)   Arterial Blood    Final result Component Value Units   Site Femoral Artery    Aubrey's Test N/A    pH, Arterial 7.240Low   pH units   pCO2, Arterial 59.0High   mm Hg   pO2, Arterial 74.8Low   mm Hg   HCO3, Arterial 25.3 mmol/L   Base Excess -3.1Low   mmol/L   O2 Saturation, Arterial 94.9 %   Barometric Pressure for Blood Gas 747 mmHg   Modality Ventilator    FIO2 40 %   Ventilator Mode NA    Set Tidal Volume 520    PEEP 10.0    Collected by RT               03/18/2020 2116 03/18/2020 2139 Comprehensive Metabolic Panel [[id number]]    (Abnormal)   Blood    Final result Component Value Units   Glucose 96 mg/dL   BUN 31High  mg/dL   Creatinine 1.88High  mg/dL   Sodium 141 mmol/L   Potassium 4.5 mmol/L   Chloride 103 mmol/L   CO2 25.0 mmol/L   Calcium 9.6 mg/dL   Total Protein 6.7 g/dL   Albumin 3.30Low  g/dL   ALT (SGPT) 9 U/L   AST (SGOT) 17 U/L   Alkaline Phosphatase 143High  U/L   Total Bilirubin 1.4High  mg/dL   eGFR Non African Am 35Low  mL/min/1.73   Globulin 3.4 gm/dL   A/G Ratio 1.0 g/dL   BUN/Creatinine Ratio 16.5    Anion Gap 13.0 mmol/L           03/18/2020 2116 03/18/2020 2137 BNP [[id number]]    (Abnormal)   Blood    Final result Component Value Units   proBNP 5,533.0High  pg/mL           03/18/2020 2116 03/18/2020 2143 Troponin [[id number]]    Blood    Final result Component Value Units   Troponin T 0.015 ng/mL           03/18/2020 2116 03/18/2020 2139 Magnesium [[id number]]   (Abnormal)   Blood    Final result Component Value Units   Magnesium 1.4Low  mg/dL           03/18/2020  2116 03/18/2020 2136 Lactic Acid, Plasma [[id number]]   Blood    Final result Component Value Units   Lactate 1.5 mmol/L           03/18/2020 2116 03/18/2020 2126 CBC Auto Differential [[id number]]   (Abnormal)   Blood    Final result Component Value Units   WBC 6.49 10*3/mm3   RBC 3.94Low  10*6/mm3   Hemoglobin 12.7Low  g/dL   Hematocrit 38.1 %   MCV 96.7 fL   MCH 32.2 pg   MCHC 33.3 g/dL   RDW 13.8 %   RDW-SD 49.0 fl   MPV 10.4 fL   Platelets 118Low  10*3/mm3   Neutrophil Rel % 75.8 %   Lymphocyte Rel % 14.6Low  %   Monocyte Rel % 8.8 %   Eosinophil Rel % 0.3 %   Basophil Rel % 0.3 %   Immature Granulocyte Rel % 0.2 %   Neutrophils Absolute 4.92 10*3/mm3   Lymphocytes Absolute 0.95 10*3/mm3   Monocytes Absolute 0.57 10*3/mm3   Eosinophils Absolute 0.02 10*3/mm3   Basophils Absolute 0.02 10*3/mm3   Immature Grans, Absolute 0.01 10*3/mm3   nRBC 0.0 /100 WBC             03/19/2020 0104 03/19/2020 0116 Blood Gas, Arterial [[id number]]   (Abnormal)   Arterial Blood    Final result Component Value Units   Site Right Radial    Aubrey's Test Positive    pH, Arterial 7.416 pH units   pCO2, Arterial 39.1 mm Hg   pO2, Arterial 82.3Low   mm Hg   HCO3, Arterial 25.1 mmol/L   Base Excess 0.6 mmol/L   O2 Saturation, Arterial 96.8 %   Barometric Pressure for Blood Gas 750 mmHg   Modality Nasal Cannula    Flow Rate 2.5 lpm   Ventilator Mode NA    Collected by NA             03/18/2020 2329 03/19/2020 0001 Urinalysis With Microscopic If Indicated (No Culture) - Urine, Clean Catch [[id number]]   (Abnormal)   Urine, Clean Catch    Final result Component Value   Color, UA Dark Yellow   Appearance, UA Clear   pH, UA 6.0   Specific Gravity, UA 1.021   Glucose Negative   Ketones, UA Negative   Bilirubin, UA Small (1+)Abnormal    Blood, UA Moderate (2+)Abnormal    Protein, UA 30 mg/dL (1+)Abnormal    Leukocytes, UA Small (1+)Abnormal    Nitrite, UA Negative   Urobilinogen, UA 1.0 E.U./dL           03/18/2020 2329 03/19/2020 0002 Urinalysis,  Microscopic Only - Urine, Clean Catch [[id number]]   (Abnormal)   Urine, Clean Catch    Final result Component Value Units   RBC, UA 21-30Abnormal  /HPF   WBC, UA 13-20Abnormal  /HPF   Bacteria, UA None Seen /HPF   Squamous Epithelial Cells, UA 3-5Abnormal  /HPF   Hyaline Casts, UA 7-12 /LPF   Methodology: Automated Microscopy         03/19/2020 0335 03/25/2020 1648 SARS-CoV-2, KATIANA ([facility]) - Swab, Nasopharynx [[id number]]    (Abnormal)   Swab from Nasopharynx    Edited Result - FINAL Component Value   SARS-CoV-2, KATIANA DetectedAbnormal           Hospital Course:  The patient is a 75 y.o. male with a past medical history significant for CHF, hypercholesterolemia, hypothyroidism, gout, recent history of metastatic anal melanoma to lung on immunotherapy with Opdivo, history of CAD, history of permanent pacemaker placement secondary to sick sinus syndrome, COPD who presented to the hospital with complaints of multiple episodes of syncope. Patient himself reported that he did not lose consciousness but family members at the bedside was unsure about that. Patient reported that the day prior to presentation he was standing by his bed when all of a sudden, he lost balance and fell back on the bed. He denied any preceding lightheadedness/dizziness/aura before falling down. He had 2 similar episodes after that. 1 of the episodes was witnessed by EMS and according to the ER physician as well as the family member at the bedside, they could not feel a radial pulse at that time. Patient himself denied any new numbness/tingling/weakness in any part of his body along with any bowel/bladder incontinence, tongue bite, visual/auditory changes, chest pain, shortness of breath, recent travel, recent immobilization, recent or remote history of blood clots in the legs or lungs or any family history of blood clots.     On arrival to the ER, he was found to have fever and was given tylenol with subsequent improvement in his symptoms.  Patient  was also found to have hypoxia but subsequent ABG was unremarkable. He was admitted to the hospitalist service for further evaluation and management possible syncope.  He was also found to have orthostatic hypotension in the ER. Of note, patient according to the family was on  Fludrocortisone for his blood pressure, which was discontinued a few weeks prior due to congestive heart failure/fluid retention. In the emergency room he had a normal white blood cell count of 6.4 and hemoglobin of 12.7. He was admitted and managed for syncope, chronic diastolic congestive heart failure, and fever of unknown etiology.  Given some of his symptoms there were concerns about possible COVID-19 infection so he was sent for CT of the chest. The CT did show some underlying fibrotic changes with some increased interstitial findings at the bases and periphery. He subsequently had significant worsening of hypoxemia and required endotracheal intubation the following day. He was transferred to the ICU and put on airborne precautions.  Patient did have Covid 19 testing sent which eventually came back positive.  He had also had issues with hypotension requiring vasopressors as well as acute worsening of his chronic kidney disease.  Patient was put on ARDS net protocol.  However his clinical condition worsened and he required increasing level of respiratory support and vasopressor support.  Patient's family was informed about his COVID-19 positive test and instructed to self isolate for at least 14 days.  Goals of care meeting was done with patients son over the telephone. Due to patient's worsening condition and patient's son agreed to terminal extubation due to his extremely poor prognosis.  Patient eventually  on 3/26/2020 at 1410 PM.       Matthew Tsang MD  20  11:52    Time: 45 minutes    Part of this note may be an electronic transcription/translation of spoken language to printed text using the Dragon Dictation  system.

## 2020-03-26 NOTE — CONSULTS
Nutrition Services    Patient Name:  Kuldip Nevarez  YOB: 1945  MRN: [id number]  Admit Date:  3/18/2020  Pt remains NPO on the vent.  He has tested Positive for COVID 19.  He is to be terminally extubated today and transitioned to comfort care.  No longer on Nutrition support.  Branden will monitor     Electronically signed by:  Eve Wilder RD  03/26/20 12:43

## 2020-03-26 NOTE — PROGRESS NOTES
Oncology Nurse Navigator referral received on 03/20/2020 for pt oncology supportive care needs. Pending Covid-19 testing, no bedside nurse navigator support was offered. Pt testing returned positive results for Covid-19. Unable to offer bedside support. Appears plan of care is for terminal extubation. Will monitor pt status to assess for any supportive care needs that may needed for family or change in pt status.

## 2020-03-26 NOTE — PLAN OF CARE
Patient intubated and sedated, ARDS, on levophed for BP, waiting for family to come to make decision to extubate.

## 2020-03-26 NOTE — PROGRESS NOTES
[facility] Medicine Services  INPATIENT PROGRESS NOTE    Length of Stay: 6  Date of Admission: 3/18/2020  Primary Care Physician: Mike Draper MD    Subjective   Chief Complaint: Syncope    HPI: Patient remains intubated and sedated.  Remains on Levophed for pressor support.  Patient's grandson will be at bedside for terminal extubation today.    Review of Systems   Unable to perform ROS: Intubated      Objective    Temp:  [99.1 °F (37.3 °C)-100.1 °F (37.8 °C)] 100.1 °F (37.8 °C)  Heart Rate:  [59-76] 62  Resp:  [28-32] 30  BP: (108-148)/(56-76) 148/72  FiO2 (%):  [45 %-50 %] 45 %    Vent Settings:  FiO2 (%):  [45 %-50 %] 45 %  S RR:  [28] 28  PEEP/CPAP (cm H2O):  [5 cm H20-8 cm H20] 5 cm H20  IN SUP:  [0 cm H20] 0 cm H20  MAP (cm H2O):  [12-16] 13    Physical Exam   Constitutional: He appears well-developed and well-nourished. No distress. He is sedated and intubated.   HENT:   Head: Normocephalic and atraumatic.   Eyes: Pupils are equal, round, and reactive to light. EOM are normal. No scleral icterus.   Neck: Normal range of motion. Neck supple. No thyromegaly present.   Cardiovascular: Normal rate, regular rhythm, normal heart sounds and intact distal pulses. Exam reveals no gallop and no friction rub.   No murmur heard.  Pulmonary/Chest: Effort normal. He is intubated. No respiratory distress. He has decreased breath sounds. He has no wheezes. He has rhonchi. He has no rales. He exhibits no tenderness.   Decreased breath sounds globally.   Abdominal: Soft. Bowel sounds are normal. He exhibits no distension. There is no tenderness.   Musculoskeletal: He exhibits no edema or tenderness.   Lymphadenopathy:     He has no cervical adenopathy.   Neurological: He exhibits normal muscle tone.   Intubated and sedated.   Skin: Skin is warm and dry. He is not diaphoretic.   Psychiatric:   Intubated and sedated.   Vitals reviewed.    Results Review:  I have reviewed the  labs, radiology results, and diagnostic studies.    Laboratory Data:   Results from last 7 days   Lab Units 03/25/20  0325 03/24/20  0605 03/23/20  0507  03/21/20  0902 03/20/20  1028   SODIUM mmol/L 144 145 145   < > 145 139   POTASSIUM mmol/L 4.7 5.2 4.7   < > 4.9 4.5   CHLORIDE mmol/L 112* 111* 112*   < > 112* 107   CO2 mmol/L 21.0* 22.0 21.0*   < > 22.0 23.0   BUN mg/dL 46* 40* 36*   < > 39* 40*   CREATININE mg/dL 1.84* 1.64* 1.27   < > 1.53* 1.90*   GLUCOSE mg/dL 130* 99 78   < > 80 96   CALCIUM mg/dL 8.7 9.1 8.8   < > 9.2 9.0   BILIRUBIN mg/dL  --   --   --   --  0.7 0.8   ALK PHOS U/L  --   --   --   --  111 103   ALT (SGPT) U/L  --   --   --   --  21 17   AST (SGOT) U/L  --   --   --   --  51* 44*   ANION GAP mmol/L 11.0 12.0 12.0   < > 11.0 9.0    < > = values in this interval not displayed.     Estimated Creatinine Clearance: 38.6 mL/min (A) (by C-G formula based on SCr of 1.84 mg/dL (H)).  Results from last 7 days   Lab Units 03/21/20  0537 03/19/20  1436   MAGNESIUM mg/dL 2.4 1.4*         Results from last 7 days   Lab Units 03/25/20  0325 03/24/20  0605 03/23/20  0507 03/22/20  0424 03/21/20  0902   WBC 10*3/mm3 8.77 8.56 7.44 9.06 7.49   HEMOGLOBIN g/dL 12.6* 14.1 13.2 14.6 13.7   HEMATOCRIT % 39.9 43.8 39.3 45.4 42.2   PLATELETS 10*3/mm3 95* 94* 85* 71* 92*     Results from last 7 days   Lab Units 03/22/20  1606   INR  1.11       Culture Data:   No results found for: BLOODCX  No results found for: URINECX  No results found for: RESPCX  No results found for: WOUNDCX  No results found for: STOOLCX  No components found for: BODYFLD    Radiology Data:   Imaging Results (Last 24 Hours)     ** No results found for the last 24 hours. **          ABG:  Results from last 7 days   Lab Units 03/24/20  1049   PH, ARTERIAL pH units 7.240*   PO2 ART mm Hg 74.8*   PCO2, ARTERIAL mm Hg 59.0*   HCO3 ART mmol/L 25.3       I have reviewed the patient's current medications.     Assessment/Plan     Active Hospital  Problems    Diagnosis   • Hypoxia   • Syncope       Plan:  1.  Acute respiratory failure with hypoxia secondary to COVID-19 pneumonia:   Patient remains intubated and sedated.  Pulmonology input appreciated. Patient's prognosis is poor due to multiple comorbidities and persistent respiratory failure from COVID-19 pneumonia. Patient's family plan for terminal extubation this afternoon once patient's grandson is outside patient's room.  -Discontinue tube feeds  -Once patient's grandson is available start IV morphine PRN for pain or dyspnea  -IV Ativan PRN for anxiety or agitation  -Scopolamine patch for excessive secretions  -Terminal extubation  -Life expectancy is hours to days.    2. pneumonia due to COVID-19 virus:  -As above    3.  Hypothermia : Can allow bear hugger for comfort.  Patient is comfort measures only CODE STATUS.    4.  Respiratory acidosis: Terminal extubation and start patient on comfort focused care.    5.  Shock:  Presumed septic. Patient will be started on comfort focused care.  Discontinue Levophed.    6.  Lung nodule:  Patient will be started on comfort focused care..     7.  Thrombocytopenia: Patient will be started on comfort focused care..    DVT prophylaxis not applicable for comfort measures only CODE STATUS    CODE STATUS is comfort measures only    Discharge Planning: In progress.  Plan for terminal extubation when family is ready today.    This document has been electronically signed by Matthew Tsang MD on March 26, 2020 13:15
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
## (undated) DEVICE — SINGLE-USE BIOPSY FORCEPS: Brand: RADIAL JAW 4

## (undated) DEVICE — STPLR SKIN VISISTAT WD 35CT

## (undated) DEVICE — SPNG LAP 18X18IN LF STRL PK/5

## (undated) DEVICE — OASIS DRAIN, SINGLE, INLINE & ATS COMPATIBLE: Brand: OASIS

## (undated) DEVICE — CONN TBG Y 5 IN 1 LF STRL

## (undated) DEVICE — VISUALIZATION SYSTEM: Brand: CLEARIFY

## (undated) DEVICE — GLV SURG SENSICARE MICRO PF LF 7.5 STRL

## (undated) DEVICE — ECHELON FLEX POWERED PLUS COMPACT ARTICULATING ENDOSCOPIC LINEAR CUTTER, 60MM: Brand: ECHELON FLEX

## (undated) DEVICE — GW THRD SPADE PT DHS 2.5X230MM

## (undated) DEVICE — 3M™ STERI-STRIP™ REINFORCED ADHESIVE SKIN CLOSURES, R1547, 1/2 IN X 4 IN (12 MM X 100 MM), 6 STRIPS/ENVELOPE: Brand: 3M™ STERI-STRIP™

## (undated) DEVICE — WOUND RETRACTOR AND PROTECTOR: Brand: ALEXIS O WOUND PROTECTOR-RETRACTOR

## (undated) DEVICE — TP SXN YANKR BLB TIP W/TBG 10F LF STRL

## (undated) DEVICE — ELECTRD BLD EZ CLN MOD 2.5IN

## (undated) DEVICE — GLV SURG SENSICARE POLYISPRN W/ALOE PF LF 6.5 GRN STRL

## (undated) DEVICE — GLV SURG TRIUMPH ORTHO W/ALOE PF LTX 7.0

## (undated) DEVICE — PK HIP LF 60

## (undated) DEVICE — ELECTRD BLD EXT EDGE/INSUL 6IN

## (undated) DEVICE — SPNG DRN AMD EXCILON 6PLY 4X4IN PK/2

## (undated) DEVICE — APPL CHLORAPREP W/TINT 26ML ORNG

## (undated) DEVICE — GLV SURG SENSICARE PI LF PF 7.5 GRN STRL

## (undated) DEVICE — TOTAL TRAY, 16FR 10ML SIL FOLEY, URN: Brand: MEDLINE

## (undated) DEVICE — TOWEL,OR,DSP,ST,BLUE,DLX,8/PK,10PK/CS: Brand: MEDLINE

## (undated) DEVICE — GOWN,NON-REINFORCED,4XL: Brand: MEDLINE

## (undated) DEVICE — SYR SLP TP 10ML DISP

## (undated) DEVICE — 24 FR EXTENDED LENGTH – SILICONE CATHETER: Brand: SILICONE THORACIC CATHETERS

## (undated) DEVICE — DRSNG WND BORDR/ADHS NONADHR/GZ LF 4X10IN STRL

## (undated) DEVICE — PENCL E/S HNDSWCH ROCKR CB

## (undated) DEVICE — SUT VIC 2 TP 1MS/4 27IN DYED J649G

## (undated) DEVICE — DRP WARMR MACH

## (undated) DEVICE — STERILE POLYISOPRENE POWDER-FREE SURGICAL GLOVES WITH EMOLLIENT COATING: Brand: PROTEXIS

## (undated) DEVICE — DRSNG TELFA PAD NONADH STR 1S 3X8IN

## (undated) DEVICE — GLV SURG SENSICARE PI PF LF 7 GRN STRL

## (undated) DEVICE — CONTAINER,SPECIMEN,OR STERILE,4OZ: Brand: MEDLINE

## (undated) DEVICE — SUT SILK 0 FSL 18IN 678G

## (undated) DEVICE — SUT PROLN 4/0 RB1 D/A 36IN 8557H

## (undated) DEVICE — SUT PDS 2 0 CT1 27IN CLR Z259H

## (undated) DEVICE — PK MAJ PROC LF 60

## (undated) DEVICE — CANN SMPL SOFTECH BIFLO ETCO2 A/M 7FT

## (undated) DEVICE — SOL IRR NACL 0.9PCT BT 1000ML

## (undated) DEVICE — SPNG DISSCT SECTO KTTNER PK/5

## (undated) DEVICE — 3M™ WARMING BLANKET, LOWER BODY, 10 PER CASE, 42568: Brand: BAIR HUGGER™

## (undated) DEVICE — SUT PDS 3/0 SH 27IN DYED Z316H

## (undated) DEVICE — PAD PERI POST

## (undated) DEVICE — SUT VIC 2/0 SH 27IN

## (undated) DEVICE — GLV SURG SENSICARE POLYISPRN W/ALOE PF LF 6 GRN STRL

## (undated) DEVICE — SUT VIC 2 CP 27IN UD VCP195H

## (undated) DEVICE — SUT MONOCRYL 4/0 PS2 27IN Y426H ETY426H

## (undated) DEVICE — SYS SKIN CLS DERMABOND PRINEO W/22CM MESH TP

## (undated) DEVICE — PAD,ABDOMINAL,8"X10",ST,LF: Brand: MEDLINE

## (undated) DEVICE — 3M™ IOBAN™ 2 ANTIMICROBIAL INCISE DRAPE 6651EZ: Brand: IOBAN™ 2

## (undated) DEVICE — SOL IRRIG H2O 1000ML STRL

## (undated) DEVICE — BIT DRL QC DIA 3.2X145MM

## (undated) DEVICE — GLV SURG SENSICARE PI LF PF 8 GRN STRL